# Patient Record
Sex: FEMALE | Race: WHITE | Employment: FULL TIME | URBAN - METROPOLITAN AREA
[De-identification: names, ages, dates, MRNs, and addresses within clinical notes are randomized per-mention and may not be internally consistent; named-entity substitution may affect disease eponyms.]

---

## 2017-02-20 ENCOUNTER — OFFICE VISIT (OUTPATIENT)
Dept: BEHAVIORAL HEALTH | Facility: CLINIC | Age: 37
End: 2017-02-20
Payer: COMMERCIAL

## 2017-02-20 DIAGNOSIS — F41.1 GAD (GENERALIZED ANXIETY DISORDER): Primary | ICD-10-CM

## 2017-02-20 DIAGNOSIS — F33.2 SEVERE EPISODE OF RECURRENT MAJOR DEPRESSIVE DISORDER, WITHOUT PSYCHOTIC FEATURES (H): ICD-10-CM

## 2017-02-20 PROCEDURE — 90832 PSYTX W PT 30 MINUTES: CPT | Performed by: SOCIAL WORKER

## 2017-02-20 ASSESSMENT — ANXIETY QUESTIONNAIRES
5. BEING SO RESTLESS THAT IT IS HARD TO SIT STILL: NEARLY EVERY DAY
6. BECOMING EASILY ANNOYED OR IRRITABLE: NEARLY EVERY DAY
IF YOU CHECKED OFF ANY PROBLEMS ON THIS QUESTIONNAIRE, HOW DIFFICULT HAVE THESE PROBLEMS MADE IT FOR YOU TO DO YOUR WORK, TAKE CARE OF THINGS AT HOME, OR GET ALONG WITH OTHER PEOPLE: EXTREMELY DIFFICULT
3. WORRYING TOO MUCH ABOUT DIFFERENT THINGS: NEARLY EVERY DAY
2. NOT BEING ABLE TO STOP OR CONTROL WORRYING: NEARLY EVERY DAY
GAD7 TOTAL SCORE: 19
7. FEELING AFRAID AS IF SOMETHING AWFUL MIGHT HAPPEN: SEVERAL DAYS
1. FEELING NERVOUS, ANXIOUS, OR ON EDGE: NEARLY EVERY DAY

## 2017-02-20 ASSESSMENT — PATIENT HEALTH QUESTIONNAIRE - PHQ9: 5. POOR APPETITE OR OVEREATING: NEARLY EVERY DAY

## 2017-02-20 NOTE — PROGRESS NOTES
[facility]  October 28, 2016      Behavioral Health Clinician Progress Note    Patient Name: Lianna Sorto           Service Type: Individual      Service Location:   Face to Face in Clinic     Session Start Time: 4:10 PM Session End Time: 4:30 PM      Session Length: 16 - 37      Attendees: Client    Visit Activities (Refresh list every visit): [facility] Only    Diagnostic Assessment Date: 9-6-2016  Treatment Plan Review Date: 5-  See Flowsheets for today's PHQ-9 and FAUSTINO-7 results  Previous PHQ-9:   PHQ-9 SCORE 9/28/2016 9/28/2016 10/28/2016   Total Score - - -   Total Score 16 16 18     Previous FAUSTINO-7:   FAUSTINO-7 SCORE 9/28/2016 9/28/2016 10/28/2016   Total Score - - -   Total Score 9 9 15       REJI LEVEL:  REJI Score (Last Two) 11/23/2010 9/6/2016   REJI Raw Score 49 32   Activation Score 82.8 62.6   REJI Level 4 3       DATA  Extended Session (60+ minutes): No  Interactive Complexity: No  Crisis: No    Treatment Objective(s) Addressed in This Session:  Target Behavior(s): disease management/lifestyle changes decrease anxiety and depression    Depressed Mood: Increase interest, engagement, and pleasure in doing things  Improve quantity and quality of night time sleep / decrease daytime naps  Feel less tired and more energy during the day   Improve diet, appetite, mindful eating, and / or meal planning  Improve concentration, focus, and mindfulness in daily activities   Anxiety: will experience a reduction in anxiety, will develop more effective coping skills to manage anxiety symptoms, will develop healthy cognitive patterns and beliefs and will increase ability to function adaptively  Relationship Problems: will address relationship difficulties in a more adaptive manner  Grief / Loss: will engage in effective approach to address and resolve grief/loss issues    Current Stressors / Issues:  Pt reports symptoms continue - she does not believe the medications are working for her.   Discussed asking PCP for referral to see Stacie Burrows to assess medications. She will make this request on my chart. She will also consider making an appointment with a female primary care provider. Patient reports her 9-year-old son's behaviors are under control and he has a psychiatrist and a therapist. Patient continues to have work struggles and is exhausted when she gets home at night.  She is sad that she has very little patience for her kids and doesn't have support. Discussed self-care-eating healthy, exercising, resting in being around supportive people. Patient will focus on trying to fit more things than in her schedule that are healthy for her to continue to assess as needed     Progress on Treatment Objective(s) / Homework:  New Objective established this session - PREPARATION (Decided to change - considering how); Intervened by negotiating a change plan and determining options / strategies for behavior change, identifying triggers, exploring social supports, and working towards setting a date to begin behavior change     Care Plan review completed:yes    Medication Review:  No changes to current psychiatric medication(s)    Medication Compliance:  Yes    Changes in Health Issues:   None reported    Chemical Use Review:   Substance Use: Chemical use reviewed, no active concerns identified      Tobacco Use: No current tobacco use.      Assessment: Current Emotional / Mental Status (status of significant symptoms):  Risk status (Self / Other harm or suicidal ideation)  Patient has had a history of suicidal ideation: no plan  Patient denies current fears or concerns for personal safety.  Patient denies current or recent suicidal ideation or behaviors.  Patient denies current or recent homicidal ideation or behaviors.  Patient denies current or recent self injurious behavior or ideation.  Patient denies other safety concerns.  A safety and risk management plan has not been developed at this time, however  patient was encouraged to call [place] [phone] should there be a change in any of these risk factors.    Appearance:   Appropriate   Eye Contact:   Fair   Psychomotor Behavior: Normal   Attitude:   Cooperative   Orientation:   All  Speech   Rate / Production: Normal    Volume:  Normal   Mood:    Anxious  Depressed  Sad  -  Affect:    Worrisome  tearful throughout session   Thought Content:  Clear   Thought Form:  Coherent  Logical   Insight:    Good     Diagnoses:  1. FAUSTINO (generalized anxiety disorder)    2. Severe episode of recurrent major depressive disorder, without psychotic features (H)        Collateral Reports Completed:  Routed note to PCP    Plan: (Homework, other):  Patient was  encouraged to schedule a follow up appointment with the clinic [facility] as needed.    CD Recommendations: No indications of CD issues.    ROMMEL Mixon (Mindy), [facility]    ______________________________________________________________________    Integrated Primary Care Behavioral Health Treatment Plan    Patient's Name: Lianna Sorto  YOB: 1980    Date: February 20, 2017    DSM-V Diagnoses: 296.33 Major Depressive Disorder, Recurrent Episode, Severe _ and With melancholic features or 300.02 (F41.1) Generalized Anxiety Disorder  Psychosocial / Contextual Factors: Patient is a single parent with 2 sons   WHODAS: see diagnostic assessment   Referral / Collaboration:  The following referral(s) was/were discussed but client declines follow up at this time. Continue to assess.    Anticipated number of session or this episode of care:3-8    MeasurableTreatment Goal(s) related to diagnosis / functional impairment(s)  Goal 1: Patient will decrease depression and anxiety by 50% as indicated by PHQ9 score, FAUSTINO 7 score and self report     I will know I've met my goal whenI am happy again      Objective #A (Patient Action)    Patient will Decrease frequency and intensity of feeling down, depressed, hopeless.  Status: New  - Date: February 20, 2017     Intervention(s)  [facility] will teach emotional regulation skills. Increase coping behaviors to manage painful emotions.    Objective #B  Patient will identify at least 3 triggers for anxiety.  Status: New - Date: February 20, 2017     Intervention(s)  [facility] will teach distraction skills. To use when symptoms feel overwhelming.    Patient has reviewed and agreed to the above plan.    Written by  Jodi Gauthier [facility], [facility]

## 2017-02-20 NOTE — MR AVS SNAPSHOT
After Visit Summary   2/20/2017    Lianna Sorto    MRN: [id number]           Patient Information     Date Of Birth          1980        Visit Information        Provider Department      2/20/2017 4:00 PM Jodi Gauthier MSW [facility]        Today's Diagnoses     FAUSTINO (generalized anxiety disorder)    -  1    Severe episode of recurrent major depressive disorder, without psychotic features (H)           Follow-ups after your visit        Who to contact     If you have questions or need follow up information about today's clinic visit or your schedule please contact [facility] directly at [phone].  Normal or non-critical lab and imaging results will be communicated to you by [employer], letter or phone within 4 business days after the clinic has received the results. If you do not hear from us within 7 days, please contact the clinic through [employer] or phone. If you have a critical or abnormal lab result, we will notify you by phone as soon as possible.  Submit refill requests through [facility] or call your pharmacy and they will forward the refill request to us. Please allow 3 business days for your refill to be completed.          Additional Information About Your Visit        MyChart Information     [employer] gives you secure access to your electronic health record. If you see a primary care provider, you can also send messages to your care team and make appointments. If you have questions, please call your primary care clinic.  If you do not have a primary care provider, please call [phone] and they will assist you.        Care EveryWhere ID     This is your Care EveryWhere ID. This could be used by other organizations to access your [place] medical records  [id number]         Blood Pressure from Last 3 Encounters:   09/28/16 118/83   08/31/16 122/77   04/20/16 145/81    Weight from Last 3 Encounters:   09/28/16 236 lb 2 oz (107.1 kg)   09/12/16 233 lb  (105.7 kg)   08/31/16 233 lb (105.7 kg)              Today, you had the following     No orders found for display       Primary Care Provider Office Phone # Fax #    Dillon Bonilla [phone]       [facility]        [address] [address]        Thank you!     Thank you for choosing [facility]  for your care. Our goal is always to provide you with excellent care. Hearing back from our patients is one way we can continue to improve our services. Please take a few minutes to complete the written survey that you may receive in the mail after your visit with us. Thank you!             Your Updated Medication List - Protect others around you: Learn how to safely use, store and throw away your medicines at www.disposemymeds.org.          This list is accurate as of: 2/20/17  5:52 PM.  Always use your most recent med list.                   Brand Name Dispense Instructions for use    buPROPion 150 MG 24 hr tablet    WELLBUTRIN XL    90 tablet    Take 1 tablet (150 mg) by mouth every morning       clindamycin 1 % lotion    CLINDAMAX    60 mL    Apply to AA QD       cyclobenzaprine 10 MG tablet    FLEXERIL    30 tablet    Take 1 tablet (10 mg) by mouth 3 times daily as needed for muscle spasms       fluticasone 50 MCG/ACT spray    FLONASE    16 g    Spray 2 sprays into both nostrils daily       hydrOXYzine 50 MG tablet    ATARAX    60 tablet    Take 1 tablet (50 mg) by mouth every 8 hours as needed for anxiety       ibuprofen 800 MG tablet    ADVIL/MOTRIN    90 tablet    Take 1 tablet (800 mg) by mouth every 8 hours as needed for moderate pain       levonorgestrel-ethinyl estradiol 0.15-0.03 MG per tablet    SEASONALE    91 tablet    Take 1 tablet by mouth daily       order for DME     2 Units    Equipment being ordered: Dynaflex insert       * spironolactone 100 MG tablet    ALDACTONE    90 tablet    1 tab PO daily       * spironolactone 50  MG tablet    ALDACTONE    90 tablet    Take 1 tablet (50 mg) by mouth daily       traMADol 50 MG tablet    ULTRAM    30 tablet    Take 1 tablet (50 mg) by mouth every 6 hours as needed for pain       triamcinolone 0.1 % cream    KENALOG    80 g    Apply to AA BID x 2 weeks, then PRN only       * Notice:  This list has 2 medication(s) that are the same as other medications prescribed for you. Read the directions carefully, and ask your doctor or other care provider to review them with you.

## 2017-02-21 ENCOUNTER — MYC MEDICAL ADVICE (OUTPATIENT)
Dept: FAMILY MEDICINE | Facility: CLINIC | Age: 37
End: 2017-02-21

## 2017-02-21 DIAGNOSIS — F41.1 GAD (GENERALIZED ANXIETY DISORDER): Primary | ICD-10-CM

## 2017-02-21 ASSESSMENT — ANXIETY QUESTIONNAIRES: GAD7 TOTAL SCORE: 19

## 2017-02-21 ASSESSMENT — PATIENT HEALTH QUESTIONNAIRE - PHQ9: SUM OF ALL RESPONSES TO PHQ QUESTIONS 1-9: 23

## 2017-02-21 NOTE — TELEPHONE ENCOUNTER
Dr Bonilla, please see her mychart note/ request for referral to Stacie GUTIERRES/ collaborative care psychiatry.    Shereen Chavez RNC

## 2017-03-05 NOTE — TELEPHONE ENCOUNTER
FANNIE      Last Written Prescription Date: 12-12-16  Last Fill Quantity: 91,  # refills: 0   Last Office Visit with G, P or [facility] prescribing provider: 09-28-16

## 2017-03-06 RX ORDER — LEVONORGESTREL AND ETHINYL ESTRADIOL 0.15-0.03
1 KIT ORAL DAILY
Qty: 91 TABLET | Refills: 0 | Status: SHIPPED | OUTPATIENT
Start: 2017-03-06 | End: 2017-06-05

## 2017-03-06 NOTE — TELEPHONE ENCOUNTER
Prescription approved per [facility] [place].    Filomena Hodges, PharmD  [facility]  On behalf of [facility]

## 2017-03-08 ENCOUNTER — OFFICE VISIT (OUTPATIENT)
Dept: PSYCHIATRY | Facility: CLINIC | Age: 37
End: 2017-03-08
Attending: FAMILY MEDICINE
Payer: COMMERCIAL

## 2017-03-08 VITALS
BODY MASS INDEX: 38.54 KG/M2 | DIASTOLIC BLOOD PRESSURE: 84 MMHG | SYSTOLIC BLOOD PRESSURE: 112 MMHG | HEIGHT: 66 IN | WEIGHT: 239.8 LBS | TEMPERATURE: 98 F | HEART RATE: 84 BPM

## 2017-03-08 DIAGNOSIS — F90.0 ADHD (ATTENTION DEFICIT HYPERACTIVITY DISORDER), INATTENTIVE TYPE: Primary | ICD-10-CM

## 2017-03-08 DIAGNOSIS — F33.1 MAJOR DEPRESSIVE DISORDER, RECURRENT EPISODE, MODERATE (H): ICD-10-CM

## 2017-03-08 DIAGNOSIS — F41.1 GAD (GENERALIZED ANXIETY DISORDER): ICD-10-CM

## 2017-03-08 PROCEDURE — 90792 PSYCH DIAG EVAL W/MED SRVCS: CPT | Performed by: CLINICAL NURSE SPECIALIST

## 2017-03-08 RX ORDER — LISDEXAMFETAMINE DIMESYLATE 20 MG/1
20 CAPSULE ORAL EVERY MORNING
Qty: 30 CAPSULE | Refills: 0 | Status: SHIPPED | OUTPATIENT
Start: 2017-03-08 | End: 2017-05-08

## 2017-03-08 ASSESSMENT — ANXIETY QUESTIONNAIRES
6. BECOMING EASILY ANNOYED OR IRRITABLE: NEARLY EVERY DAY
3. WORRYING TOO MUCH ABOUT DIFFERENT THINGS: NEARLY EVERY DAY
IF YOU CHECKED OFF ANY PROBLEMS ON THIS QUESTIONNAIRE, HOW DIFFICULT HAVE THESE PROBLEMS MADE IT FOR YOU TO DO YOUR WORK, TAKE CARE OF THINGS AT HOME, OR GET ALONG WITH OTHER PEOPLE: VERY DIFFICULT
7. FEELING AFRAID AS IF SOMETHING AWFUL MIGHT HAPPEN: NOT AT ALL
2. NOT BEING ABLE TO STOP OR CONTROL WORRYING: NEARLY EVERY DAY
GAD7 TOTAL SCORE: 16
1. FEELING NERVOUS, ANXIOUS, OR ON EDGE: NEARLY EVERY DAY
5. BEING SO RESTLESS THAT IT IS HARD TO SIT STILL: SEVERAL DAYS

## 2017-03-08 ASSESSMENT — PATIENT HEALTH QUESTIONNAIRE - PHQ9: 5. POOR APPETITE OR OVEREATING: NEARLY EVERY DAY

## 2017-03-08 NOTE — MR AVS SNAPSHOT
After Visit Summary   3/8/2017    Lianna Sorto    MRN: [id number]           Patient Information     Date Of Birth          1980        Visit Information        Provider Department      3/8/2017 2:45 PM Stacie Burrows APRN [facility]        Today's Diagnoses     ADHD (attention deficit hyperactivity disorder), inattentive type    -  1      Care Instructions    Treatment Plan:  Continue hydroxyzine 50 mg every 8 hours as needed for anxiety.   Discontinue bupropion (Wellbutrin)  mg daily.   Begin lisdexamfetamine (Vyvanse) 20 mg daily.   Other options: return to amphetamine (Adderall) 10 mg daily or clonidine or guanfacine.     Follow up in 2 weeks.     Read 1-2-3 Magic.     - Recommend patient discuss medications with their pharmacist.   - Safety plan was reviewed; to the ER as needed or call after hours crisis line; [phone]  - Education and counseling was done regarding use of medications, psychotherapy options  - Call [phone] for appointment or to speak to a nurse.   -Office hours: Monday through Thursday 8:00 am to 4:30 pm; Friday 8:00 am to Noon.   - Patient was given a copy of this Treatment Plan today.        Follow-ups after your visit        Who to contact     If you have questions or need follow up information about today's clinic visit or your schedule please contact [facility] directly at [phone].  Normal or non-critical lab and imaging results will be communicated to you by MyChart, letter or phone within 4 business days after the clinic has received the results. If you do not hear from us within 7 days, please contact the clinic through [employer] or phone. If you have a critical or abnormal lab result, we will notify you by phone as soon as possible.  Submit refill requests through [employer] or call your pharmacy and they will forward the refill request to us. Please allow 3 business days for your refill to be completed.     "      Additional Information About Your Visit        [employer] Information     [employer] gives you secure access to your electronic health record. If you see a primary care provider, you can also send messages to your care team and make appointments. If you have questions, please call your primary care clinic.  If you do not have a primary care provider, please call [phone] and they will assist you.        Care EveryWhere ID     This is your Care EveryWhere ID. This could be used by other organizations to access your [place] medical records  [id number]        Your Vitals Were     Pulse Temperature Height BMI (Body Mass Index)          84 98  F (36.7  C) (Tympanic) 5' 5.5\" (1.664 m) 39.3 kg/m2         Blood Pressure from Last 3 Encounters:   03/08/17 112/84   09/28/16 118/83   08/31/16 122/77    Weight from Last 3 Encounters:   03/08/17 239 lb 12.8 oz (108.8 kg)   09/28/16 236 lb 2 oz (107.1 kg)   09/12/16 233 lb (105.7 kg)              Today, you had the following     No orders found for display         Today's Medication Changes          These changes are accurate as of: 3/8/17  3:49 PM.  If you have any questions, ask your nurse or doctor.               Start taking these medicines.        Dose/Directions    lisdexamfetamine 20 MG capsule   Commonly known as:  VYVANSE   Used for:  ADHD (attention deficit hyperactivity disorder), inattentive type   Started by:  Stacie Burrows APRN CNS        Dose:  20 mg   Take 1 capsule (20 mg) by mouth every morning   Quantity:  30 capsule   Refills:  0         These medicines have changed or have updated prescriptions.        Dose/Directions    spironolactone 50 MG tablet   Commonly known as:  ALDACTONE   This may have changed:  Another medication with the same name was removed. Continue taking this medication, and follow the directions you see here.   Used for:  Hidradenitis suppurativa   Changed by:  Yumi Brewer PA-C        Dose:  50 mg   Take 1 tablet " (50 mg) by mouth daily   Quantity:  90 tablet   Refills:  3         Stop taking these medicines if you haven't already. Please contact your care team if you have questions.     buPROPion 150 MG 24 hr tablet   Commonly known as:  WELLBUTRIN XL   Stopped by:  Stacie Burrows APRN CNS                Where to get your medicines      Some of these will need a paper prescription and others can be bought over the counter.  Ask your nurse if you have questions.     Bring a paper prescription for each of these medications     lisdexamfetamine 20 MG capsule                Primary Care Provider Office Phone # Fax #    Dillon David Bonilla [phone]       [facility]        [address] [address]        Thank you!     Thank you for choosing [facility]  for your care. Our goal is always to provide you with excellent care. Hearing back from our patients is one way we can continue to improve our services. Please take a few minutes to complete the written survey that you may receive in the mail after your visit with us. Thank you!             Your Updated Medication List - Protect others around you: Learn how to safely use, store and throw away your medicines at www.disposemymeds.org.          This list is accurate as of: 3/8/17  3:49 PM.  Always use your most recent med list.                   Brand Name Dispense Instructions for use    clindamycin 1 % lotion    CLINDAMAX    60 mL    Apply to AA QD       cyclobenzaprine 10 MG tablet    FLEXERIL    30 tablet    Take 1 tablet (10 mg) by mouth 3 times daily as needed for muscle spasms       fluticasone 50 MCG/ACT spray    FLONASE    16 g    Spray 2 sprays into both nostrils daily       hydrOXYzine 50 MG tablet    ATARAX    60 tablet    Take 1 tablet (50 mg) by mouth every 8 hours as needed for anxiety       ibuprofen 800 MG tablet    ADVIL/MOTRIN    90 tablet    Take 1 tablet (800 mg) by mouth  every 8 hours as needed for moderate pain       levonorgestrel-ethinyl estradiol 0.15-0.03 MG per tablet    SEASONALE    91 tablet    Take 1 tablet by mouth daily       lisdexamfetamine 20 MG capsule    VYVANSE    30 capsule    Take 1 capsule (20 mg) by mouth every morning       order for DME     2 Units    Equipment being ordered: Dynaflex insert       spironolactone 50 MG tablet    ALDACTONE    90 tablet    Take 1 tablet (50 mg) by mouth daily       traMADol 50 MG tablet    ULTRAM    30 tablet    Take 1 tablet (50 mg) by mouth every 6 hours as needed for pain       triamcinolone 0.1 % cream    KENALOG    80 g    Apply to AA BID x 2 weeks, then PRN only

## 2017-03-08 NOTE — PATIENT INSTRUCTIONS
Treatment Plan:  Continue hydroxyzine 50 mg every 8 hours as needed for anxiety.   Discontinue bupropion (Wellbutrin)  mg daily.   Begin lisdexamfetamine (Vyvanse) 20 mg daily.   Other options: return to amphetamine (Adderall) 10 mg daily or clonidine or guanfacine.     Follow up in 2 weeks.     Read 1-2-3 Magic.     - Recommend patient discuss medications with their pharmacist.   - Safety plan was reviewed; to the ER as needed or call after hours crisis line; [phone]  - Education and counseling was done regarding use of medications, psychotherapy options  - Call [phone] for appointment or to speak to a nurse.   -Office hours: Monday through Thursday 8:00 am to 4:30 pm; Friday 8:00 am to Noon.   - Patient was given a copy of this Treatment Plan today.

## 2017-03-08 NOTE — PROGRESS NOTES
"                                                         Outpatient Psychiatric Evaluation-Standard    Name: Lianna Sorto  : 1980  Date: 3/8/2017    Source of Referral:  Primary Care Physician: Dillon Bonilla  Current Psychotherapist: Christine Gauthier - twice monthly    Identifying Data:  Patient is a 36 year old,  female who presents for initial visit with me.  Patient is currently employed full time, [employer] - registration.  Patient attended the session alone.   60 minutes were spent on evaluation with 40 minutes CC time.    HPI:  Patient reports was diagnosed with ADD as a child and was formally tested in  confirming ADD. Concerta was ineffective, amphetamine (Adderall) was helpful, but had side effects of feeling tired and nausea. Patient reports trying several antidepressants for anxiety and now more depression. Sertraline (Zoloft), duloxetine (Cymbalta), paroxetine (Paxil) and citalopram (Celexa) were ineffective. Bupropion (Wellbutrin)  mg causes irritability. Patient reports hydroxyzine 50 mg is ineffective, but 100 mg was somewhat helpful. Patient reports feeling waves of emotion, tearful 2-3 times per week. Patient states \"I try not to make new relationships\" as she \"puts up a wall\" to avoid emotional pain. Patient is  twice, first from ex 's drug use and second patient \"just couldn't handle\" her second 's depression. Patient reports having solid female relationships. Patient reports anxiety is the primary issue which is related to focus and organizational issues.     Patient reports stress and anxiety related to single parenting her two sons. One son has behavior issues and sees a psychiatric provider and therapist. Patient was diagnosed with fibromyalgia in .     Patient reports childhood was difficult - \"always walking on egg shells\" due to her mother's boyfriend. Patient has never met her biological father. Patient reports " "her mother had multiple partners.     Psychiatric Review of Symptoms:  Depression: Sleep: No change, Decrease and 4-5 hours per night, difficulty falling and staying asleep.  Appetite: No change and difficulty losing weight related to fibromyalgia  Depressed Mood Interest: Decrease Energy: Decrease Concentration: Decrease Worthless: Increase and More so in the past year - stress at home     Last PHQ-9 score = 12 vs 20    Rosanna:  No symptoms  Mood Disorder Questionnaire: Negative    Anxiety: Feeling nervous or on edge  Uncontrolled worrying  Trouble relaxing  Restlessness  Easily annoyed or irritable    GAD7 score: 16  Panic:  No symptoms  Agoraphobia:  Yes  OCD:  No symptoms  Psychosis: No symptoms  ADD / ADHD: Attention Problem(s) Task Completion Difficulties Poor Organization Distractible Forgetful  Gambling or shoplifting: No  Eating Disorder:  No symptoms  Suicide attempts:  No  Current SI risk:  No Protective Factors: children \"I want to enjoy life\"    Significant Losses / Trauma / Abuse / Neglect Issues:  There are no indications or report of: significant losses, trauma, abuse or neglect.    PTSD:  No symptoms    Issues of possible neglect are not present.    A safety and risk management plan has not been developed at this time, however client was given the after-hours number / 911 should there be a change in any of these risk factors.      Psychiatric History:   Hospitalizations: None  Past psychotherapy: counseling and medication(s) from physician / PCP    Past medication trials: (patient was presented with a list to review all currently available antidepressants, mood stabilizers, tranquilizers, hypnotics and antipsychotics)  New Antidepressants:  Celexa (citalopram), Cymbalta (duloxetine), Paxil (paroxetine), Wellbutrin, Zyban, Aplenzin (bupropion) and Zoloft (sertraline)  Stimulants / ADHD Meds: Adderall (amphetamine salts) and Concerta (methylphenidate)  Tranquilizers:  Atarax/Vistaril (hydroxyzine) " "  Sedatives: trazodone (Desyrel)       Chemical Use History:  Patient has not received chemical dependency treatment in the past.  Patient reports no problems as a result of their drinking / drug use.  Current use of drugs or alcohol: N/A  CAGE: None of the patient's responses to the CAGE screening were positive / Negative CAGE score   Based on the negative Cage-Aid score and clinical interview there  are not indications of drug or alcohol abuse.  Tobacco use: No  Ready to quit?  No  NRT tried: NA    Past Medical History:  Surgery:   Past Surgical History   Procedure Laterality Date     Surgical history of -        oral surgery     C  delivery only  2007,      arrest of dilation at 6 cm, low transverse uterine incision     Vagina surgery       Fell off deck, internal bleeding.     Allergies:    Allergies   Allergen Reactions     Celexa [Citalopram Hydrobromide] GI Disturbance     Morphine      Polymyxin B Other (See Comments)     Polymixin eye drops  \"severe burning\"     Vicodin [Hydrocodone-Acetaminophen]      Stomach upset, \"hearing things\", irritability      Primary MD: Dillon Bonilla Ipa  Seizures or head injury: No  Diet: \"Normal\"  Exercise: no regular exercise program  Supplements: Vitamin D    Current Medications:  Current Outpatient Prescriptions   Medication Sig     levonorgestrel-ethinyl estradiol (SEASONALE) 0.15-0.03 MG per tablet Take 1 tablet by mouth daily     triamcinolone (KENALOG) 0.1 % cream Apply to AA BID x 2 weeks, then PRN only     traMADol (ULTRAM) 50 MG tablet Take 1 tablet (50 mg) by mouth every 6 hours as needed for pain     buPROPion (WELLBUTRIN XL) 150 MG 24 hr tablet Take 1 tablet (150 mg) by mouth every morning     ibuprofen (ADVIL,MOTRIN) 800 MG tablet Take 1 tablet (800 mg) by mouth every 8 hours as needed for moderate pain     hydrOXYzine (ATARAX) 50 MG tablet Take 1 tablet (50 mg) by mouth every 8 hours as needed for anxiety     spironolactone (ALDACTONE) " "50 MG tablet Take 1 tablet (50 mg) by mouth daily     cyclobenzaprine (FLEXERIL) 10 MG tablet Take 1 tablet (10 mg) by mouth 3 times daily as needed for muscle spasms     clindamycin (CLINDAMAX) 1 % lotion Apply to AA QD     fluticasone (FLONASE) 50 MCG/ACT nasal spray Spray 2 sprays into both nostrils daily     order for DME Equipment being ordered: Dynaflex insert     [DISCONTINUED] spironolactone (ALDACTONE) 100 MG tablet 1 tab PO daily (Patient not taking: Reported on 3/8/2017)     No current facility-administered medications for this visit.        Vital Signs:  /84 (BP Location: Right arm, Patient Position: Chair, Cuff Size: Adult Large)  Pulse 84  Temp 98  F (36.7  C) (Tympanic)  Ht 5' 5.5\" (1.664 m)  Wt 239 lb 12.8 oz (108.8 kg)  BMI 39.3 kg/m2      Review of Systems:  (constitutional, HEENT, Neuro, Cardiac, Pulmonary, GI, , Heme / Lymph, Endocrine, Skin / Breast, MSK reviewed)  10 point ROS was negative except for the following: plantar fascitis, fibromyalgia    Family History:   (with focus on psychiatric and substance abuse)  Chemical use problems None  Mental health history: Mother Bipolar  Patient reports family history includes Allergies in her son; Arthritis in her mother; Breast Cancer in an other family member; Breast Cancer (age of onset: 42) in her maternal grandmother; C.A.D. in her maternal grandfather, mother, and another family member; CEREBROVASCULAR DISEASE in her maternal grandfather and another family member; Coronary Artery Disease in her mother and another family member; DIABETES in her maternal grandfather, mother, and another family member; Depression in her mother; GASTROINTESTINAL DISEASE in her mother; HEART DISEASE in her maternal grandfather, maternal grandmother, and mother; Hyperlipidemia in her mother; Hypertension in her mother and another family member; MENTAL ILLNESS in her mother; Musculoskeletal Disorder in her mother and another family member; Neurologic Disorder " "in her mother; OSTEOPOROSIS in her mother; Obesity in an other family member; Unknown/Adopted in her father, paternal grandfather, and paternal grandmother. There is no history of Asthma, Cancer - colorectal, or Prostate Cancer.    Social History:   Patient grew up in [place], [place]    Siblings: 2   Intact family growing up?; Parents never  - has never met biological father.   Highest education level was associate degree / vocational certificate.   Marital status and living situation: Lives with two children  two children. Sons age 9 and 7.  she has not been involved with the legal system.      Mental Status Assessment:     Appearance:  Well groomed      Behavior/relationship to examiner/demeanor:  Cooperative, engaged and pleasant  Motor activity:  Normal  Gait:  Normal   Speech:  Normal in volume, articulation, coherence   Mood (subjective report):  \"Irritable\"  Affect (objective appearance):  Mood congruent  Thought Process (Associations):  Logical, linear and goal directed  Thought content:  No evidence of suicidal or homicidal ideation,          no overt psychosis and                    patient does not appear to be responding to internal stimuli  Oriented to person, place, date/time   Attention Span and concentration: Intact   Memory:  Short-term memory intact and Long-term memory; Intact  Language:  Fluent   Fund of Knowledge/Intelligence:  Average  Use of language: Intact   Abstraction:  Normal  Insight:  Adequate  Judgment:  Adequate for safety    DSM5  Diagnosis:    Attention-Deficit/Hyperactivity Disorder  314.00 (F90.0) Predominantly inattentive presentation  296.32 Major Depressive Disorder, Recurrent Episode, Moderate _ and With anxious distress  300.01 (F41.0) Panic Disorder  300.02 (F41.1) Generalized Anxiety Disorder  Psychosocial & Contextual Factors: financial issues, children with behavior issues, recent separation    Strengths and Liabilities:   Patient identified the following strengths " or resources that will help her  succeed in counseling: friends / good social support, family support and positive work environment.  Things that may interfere with the patient's success include:financial hardship.    WHODAS 2.0 TOTAL SCORES 9/6/2016   Total Score 45         Impression:  Patient appears to be struggling with ADHD, inattentive type symptoms leading to high levels of anxiety and depression. Amphetamine (Adderall) was helpful in the past, but caused side effects. Lisdexamfetamine (Vyvanse) has a slower release mechanism and may be better tolerated. Discussed other options as listed below.    Bupropion (Wellbutrin) is causing increased irritability and is discontinued.   Medication side effects and alternatives reviewed.     Treatment Plan:  Continue hydroxyzine 50 mg every 8 hours as needed for anxiety.   Discontinue bupropion (Wellbutrin)  mg daily.   Begin lisdexamfetamine (Vyvanse) 20 mg daily.   Other options: return to amphetamine (Adderall) 10 mg daily or clonidine or guanfacine.     Follow up in 2 weeks.     Read 1-2-3 Magic.     - Recommend patient discuss medications with their pharmacist.   - Safety plan was reviewed; to the ER as needed or call after hours crisis line; [phone]  - Education and counseling was done regarding use of medications, psychotherapy options  - Call [phone] for appointment or to speak to a nurse.   -Office hours: Monday through Thursday 8:00 am to 4:30 pm; Friday 8:00 am to Noon.   - Patient was given a copy of this Treatment Plan today.     My Practice Policy was reviewed and signed: YES       Patient will continue to be seen for ongoing consultation and stabilization.      Signed: Stacie Burrows, RN, MS, APRN                 Psychiatry

## 2017-03-09 ENCOUNTER — TELEPHONE (OUTPATIENT)
Dept: PSYCHIATRY | Facility: CLINIC | Age: 37
End: 2017-03-09

## 2017-03-09 ASSESSMENT — PATIENT HEALTH QUESTIONNAIRE - PHQ9: SUM OF ALL RESPONSES TO PHQ QUESTIONS 1-9: 20

## 2017-03-09 ASSESSMENT — ANXIETY QUESTIONNAIRES: GAD7 TOTAL SCORE: 16

## 2017-03-09 NOTE — TELEPHONE ENCOUNTER
Prior Authorization required for Vyvanse.. Please call [employer] at [phone] with ID# [id number]. Please let us know the outcome.  Thanks,   Zuleyma Santiago  Certified Pharmacy Technician  [facility]  [phone]

## 2017-03-10 NOTE — TELEPHONE ENCOUNTER
PA initiated.     Lianna Sorto (Key: [id number])  Vyvanse 20MG capsules  Status: PA Request  Created: March 10th, 2017  Sent: March 10th, 2017

## 2017-03-17 ENCOUNTER — TELEPHONE (OUTPATIENT)
Dept: PSYCHIATRY | Facility: CLINIC | Age: 37
End: 2017-03-17

## 2017-03-17 NOTE — TELEPHONE ENCOUNTER
Reason for call: Would like to switch to another medication due to copay amount.  Also had a question regarding the dose amount.    Can we leave a detailed message: yes

## 2017-03-20 NOTE — TELEPHONE ENCOUNTER
PA filled out and signed by provider. Faxed to [employer].  Analisa No CMA..........3/20/2017 12:28 PM

## 2017-03-22 NOTE — TELEPHONE ENCOUNTER
Chart reviewed. Even with PA, Vyvanse montly copy is not reasonable for this patient. Adderall caused side effects. Wellbutrin was ineffective. Patient would like ideas for an alternative.     From 3/8/17:  Impression:  Patient appears to be struggling with ADHD, inattentive type symptoms leading to high levels of anxiety and depression. Amphetamine (Adderall) was helpful in the past, but caused side effects. Lisdexamfetamine (Vyvanse) has a slower release mechanism and may be better tolerated. Discussed other options as listed below.   Bupropion (Wellbutrin) is causing increased irritability and is discontinued.   Medication side effects and alternatives reviewed.      Treatment Plan:  Continue hydroxyzine 50 mg every 8 hours as needed for anxiety.   Discontinue bupropion (Wellbutrin)  mg daily.   Begin lisdexamfetamine (Vyvanse) 20 mg daily.   Other options: return to amphetamine (Adderall) 10 mg daily or clonidine or guanfacine.      Follow up in 2 weeks.      Read 1-2-3 Magic.      - Recommend patient discuss medications with their pharmacist.   - Safety plan was reviewed; to the ER as needed or call after hours crisis line; [phone]  - Education and counseling was done regarding use of medications, psychotherapy options  - Call [phone] for appointment or to speak to a nurse.   -Office hours: Monday through Thursday 8:00 am to 4:30 pm; Friday 8:00 am to Noon.   - Patient was given a copy of this Treatment Plan today.      My Practice Policy was reviewed and signed: YES         Patient will continue to be seen for ongoing consultation and stabilization.        Signed: Stacie Burrows, RN, MS, APRN  Psychiatry

## 2017-03-22 NOTE — TELEPHONE ENCOUNTER
Reason for call: Client is having concerns with insurance coverage regarding medication changes.  Is requesting to go back to Adderall (generic) as that has been covered in the past.  Also has questions regarding dose changes.    Can we leave a detailed message: yes

## 2017-03-22 NOTE — TELEPHONE ENCOUNTER
Patient could try guanfacine 2 mg at bedtime for ADHD. Please review if she has any cardiac issues. If she would like to proceed, guanfacine 2 mg, #30, 1 refill. Thanks.

## 2017-03-23 NOTE — TELEPHONE ENCOUNTER
Reviewed chart and Pt requests. Left message for Lianna Sorto and explained Stacie Burrows, RN, MS, APRN suggested guanfacine for Pt. Requested call back to discuss with pt.     Will await call back.

## 2017-03-24 NOTE — TELEPHONE ENCOUNTER
Attempted outreach to work number, however as no indication if message could be left, and it was not a personal VM did not leave a VM for Pt, Will attempt outreach again.

## 2017-03-24 NOTE — TELEPHONE ENCOUNTER
Attempted outreach again, left VM on Pt's phone. Encouraged pt to schedule appt with Stacie Burrows, RN, MS, APRN

## 2017-03-28 ENCOUNTER — MYC MEDICAL ADVICE (OUTPATIENT)
Dept: PSYCHIATRY | Facility: CLINIC | Age: 37
End: 2017-03-28

## 2017-03-29 DIAGNOSIS — F90.0 ADHD (ATTENTION DEFICIT HYPERACTIVITY DISORDER), INATTENTIVE TYPE: Primary | ICD-10-CM

## 2017-03-29 RX ORDER — DEXTROAMPHETAMINE SACCHARATE, AMPHETAMINE ASPARTATE, DEXTROAMPHETAMINE SULFATE AND AMPHETAMINE SULFATE 5; 5; 5; 5 MG/1; MG/1; MG/1; MG/1
20 TABLET ORAL DAILY
Qty: 30 TABLET | Refills: 0 | Status: SHIPPED | OUTPATIENT
Start: 2017-03-29 | End: 2017-05-03

## 2017-03-29 NOTE — TELEPHONE ENCOUNTER
Patient notified via [employer] message that there is a prescription ready for . Requested she let us know how she would like to go about picking up the written prescription.  Analisa No CMA..........3/29/2017 1:44 PM

## 2017-03-29 NOTE — TELEPHONE ENCOUNTER
Okay for amphetamine (Adderall) 20 mg daily. I will print Rx here and ask nursing to phone patient.

## 2017-03-30 ENCOUNTER — TELEPHONE (OUTPATIENT)
Dept: PSYCHIATRY | Facility: CLINIC | Age: 37
End: 2017-03-30

## 2017-03-30 NOTE — TELEPHONE ENCOUNTER
Insurance requires a prior auth for adderall 20 mg tabs    [employer]  ID# [id number]  [phone]    Thanks!    Yamila Warren, [facility]  [phone]

## 2017-03-30 NOTE — TELEPHONE ENCOUNTER
Signed Rx is at the  [facility]   Can be picked up during clinic hours   My chart was sent to pt     Keren Arnold  [facility]

## 2017-04-04 NOTE — TELEPHONE ENCOUNTER
FOLLOWING UP ON THIS PRIOR AUTH REQUEST   OUR RECORD'S SHOW WE ARE STILL AWAITING A REPLY ON THIS REQUEST                                                        THANK YOU

## 2017-04-05 NOTE — TELEPHONE ENCOUNTER
PA was submitted 3/31. Per the insurance company they have 3-5 business days to reply. Per Cover my meds, no determination has been made yet.

## 2017-04-06 ENCOUNTER — TELEPHONE (OUTPATIENT)
Dept: FAMILY MEDICINE | Facility: CLINIC | Age: 37
End: 2017-04-06

## 2017-04-06 ENCOUNTER — E-VISIT (OUTPATIENT)
Dept: FAMILY MEDICINE | Facility: CLINIC | Age: 37
End: 2017-04-06
Payer: COMMERCIAL

## 2017-04-06 DIAGNOSIS — J06.9 VIRAL URI: Primary | ICD-10-CM

## 2017-04-06 PROCEDURE — 98969 ZZC NONPHYSICIAN ONLINE ASSESSMENT AND MANAGEMENT: CPT | Performed by: NURSE PRACTITIONER

## 2017-04-06 NOTE — TELEPHONE ENCOUNTER
Reason for Call:  Other call back    Detailed comments: She has a URI.  She had an e-visit with Clau Dukes who told her to take her allergy meds.  She takes Zyrtec and Flonase daily.  She would like an antibiotic.  She uses [facility].  Please advise.    Phone Number Patient can be reached at: Other phone number:  [phone]  Oncology    Best Time: any    Can we leave a detailed message on this number? YES    Call taken on 4/6/2017 at 8:55 AM by Skylar Marina

## 2017-04-06 NOTE — TELEPHONE ENCOUNTER
Patient was notified of the instructions below from the evisit on 4/5/17.     This is likely a viral infection at this time. Most viruses resolve within 7-10 days.   I recommend that you start taking something for your allergies, such as Zyrtec D.   Using nasal saline or a Neti pot will help irrigate your sinuses.   Take ibuprofen 600 mg every 6 hours for the sinus pressure and pain.     If you are not feeling any better in one week, please let me know.   Clau Dukes CNP     Patient agrees and understands the plan.  Patient will call us in a week if she is not improving.    Julia HUERTA RN

## 2017-04-10 ENCOUNTER — TELEPHONE (OUTPATIENT)
Dept: FAMILY MEDICINE | Facility: CLINIC | Age: 37
End: 2017-04-10

## 2017-04-10 DIAGNOSIS — J01.00 ACUTE NON-RECURRENT MAXILLARY SINUSITIS: Primary | ICD-10-CM

## 2017-04-10 NOTE — TELEPHONE ENCOUNTER
Left message on machine to call back    [facility] may give her the information.    Antibiotics were sent to her pharmacy.    Julia HUERTA RN

## 2017-04-10 NOTE — TELEPHONE ENCOUNTER
S-(situation): Patient called to report the symptoms are getting worse.     B-(background): patient did have an evisit on 4/6/17.    A-(assessment): Patient is getting worse.  Patient reports have facial pain and swelling more on the right side.  Patient reports the right side of the jaw and neck are tender and slightly swollen.  Patient reports having an headache.  Patient reports having nasal discharge with some blood seen.      R-(recommendations): will send to provider for review.  Evisit notes  Thank you for your reply Lianna.   This is likely a viral infection at this time. Most viruses resolve within 7-10 days.   I recommend that you start taking something for your allergies, such as Zyrtec D.   Using nasal saline or a Neti pot will help irrigate your sinuses.   Take ibuprofen 600 mg every 6 hours for the sinus pressure and pain.     If you are not feeling any better in one week, please let me know.   Clau Dukes, CNP

## 2017-04-10 NOTE — TELEPHONE ENCOUNTER
Reason for Call:  Other sinus problem    Detailed comments: Patient states her sinus infection is no better and she would like antibiotics.  [facility]    Phone Number Patient can be reached at: Work number on file:  [phone] (work)    Best Time: any    Can we leave a detailed message on this number? YES    Call taken on 4/10/2017 at 8:07 AM by Mindy Wagoner

## 2017-05-03 ENCOUNTER — MYC MEDICAL ADVICE (OUTPATIENT)
Dept: PSYCHIATRY | Facility: CLINIC | Age: 37
End: 2017-05-03

## 2017-05-03 DIAGNOSIS — F90.0 ADHD (ATTENTION DEFICIT HYPERACTIVITY DISORDER), INATTENTIVE TYPE: ICD-10-CM

## 2017-05-03 RX ORDER — DEXTROAMPHETAMINE SACCHARATE, AMPHETAMINE ASPARTATE, DEXTROAMPHETAMINE SULFATE AND AMPHETAMINE SULFATE 5; 5; 5; 5 MG/1; MG/1; MG/1; MG/1
20 TABLET ORAL DAILY
Qty: 30 TABLET | Refills: 0 | Status: SHIPPED | OUTPATIENT
Start: 2017-05-03 | End: 2017-05-08 | Stop reason: DRUGHIGH

## 2017-05-03 NOTE — TELEPHONE ENCOUNTER
Patient notified that Rx is ready for . Patient will  from clinic .  Analisa No CMA..........5/3/2017 3:21 PM

## 2017-05-08 ENCOUNTER — OFFICE VISIT (OUTPATIENT)
Dept: PSYCHIATRY | Facility: CLINIC | Age: 37
End: 2017-05-08
Payer: COMMERCIAL

## 2017-05-08 VITALS
HEART RATE: 80 BPM | BODY MASS INDEX: 39 KG/M2 | SYSTOLIC BLOOD PRESSURE: 123 MMHG | WEIGHT: 238 LBS | DIASTOLIC BLOOD PRESSURE: 92 MMHG

## 2017-05-08 DIAGNOSIS — F90.0 ADHD (ATTENTION DEFICIT HYPERACTIVITY DISORDER), INATTENTIVE TYPE: Primary | ICD-10-CM

## 2017-05-08 PROCEDURE — 99214 OFFICE O/P EST MOD 30 MIN: CPT | Performed by: CLINICAL NURSE SPECIALIST

## 2017-05-08 RX ORDER — DEXTROAMPHETAMINE SACCHARATE, AMPHETAMINE ASPARTATE, DEXTROAMPHETAMINE SULFATE AND AMPHETAMINE SULFATE 2.5; 2.5; 2.5; 2.5 MG/1; MG/1; MG/1; MG/1
10 TABLET ORAL 3 TIMES DAILY
Qty: 90 TABLET | Refills: 0 | Status: SHIPPED | OUTPATIENT
Start: 2017-07-01 | End: 2017-09-14

## 2017-05-08 RX ORDER — DEXTROAMPHETAMINE SACCHARATE, AMPHETAMINE ASPARTATE, DEXTROAMPHETAMINE SULFATE AND AMPHETAMINE SULFATE 2.5; 2.5; 2.5; 2.5 MG/1; MG/1; MG/1; MG/1
10 TABLET ORAL 3 TIMES DAILY
Qty: 90 TABLET | Refills: 0 | Status: SHIPPED | OUTPATIENT
Start: 2017-05-08 | End: 2017-08-30

## 2017-05-08 RX ORDER — DEXTROAMPHETAMINE SACCHARATE, AMPHETAMINE ASPARTATE, DEXTROAMPHETAMINE SULFATE AND AMPHETAMINE SULFATE 2.5; 2.5; 2.5; 2.5 MG/1; MG/1; MG/1; MG/1
10 TABLET ORAL 3 TIMES DAILY
Qty: 90 TABLET | Refills: 0 | Status: SHIPPED | OUTPATIENT
Start: 2017-06-06 | End: 2017-05-08

## 2017-05-08 RX ORDER — DEXTROAMPHETAMINE SACCHARATE, AMPHETAMINE ASPARTATE, DEXTROAMPHETAMINE SULFATE AND AMPHETAMINE SULFATE 2.5; 2.5; 2.5; 2.5 MG/1; MG/1; MG/1; MG/1
10 TABLET ORAL 3 TIMES DAILY
Qty: 90 TABLET | Refills: 0 | Status: SHIPPED | OUTPATIENT
Start: 2017-07-01 | End: 2017-05-08

## 2017-05-08 RX ORDER — DEXTROAMPHETAMINE SACCHARATE, AMPHETAMINE ASPARTATE, DEXTROAMPHETAMINE SULFATE AND AMPHETAMINE SULFATE 2.5; 2.5; 2.5; 2.5 MG/1; MG/1; MG/1; MG/1
10 TABLET ORAL 3 TIMES DAILY
Qty: 90 TABLET | Refills: 0 | Status: SHIPPED | OUTPATIENT
Start: 2017-06-06 | End: 2017-09-14

## 2017-05-08 ASSESSMENT — ANXIETY QUESTIONNAIRES
GAD7 TOTAL SCORE: 2
5. BEING SO RESTLESS THAT IT IS HARD TO SIT STILL: NOT AT ALL
3. WORRYING TOO MUCH ABOUT DIFFERENT THINGS: SEVERAL DAYS
IF YOU CHECKED OFF ANY PROBLEMS ON THIS QUESTIONNAIRE, HOW DIFFICULT HAVE THESE PROBLEMS MADE IT FOR YOU TO DO YOUR WORK, TAKE CARE OF THINGS AT HOME, OR GET ALONG WITH OTHER PEOPLE: SOMEWHAT DIFFICULT
1. FEELING NERVOUS, ANXIOUS, OR ON EDGE: SEVERAL DAYS
2. NOT BEING ABLE TO STOP OR CONTROL WORRYING: NOT AT ALL
6. BECOMING EASILY ANNOYED OR IRRITABLE: NOT AT ALL
7. FEELING AFRAID AS IF SOMETHING AWFUL MIGHT HAPPEN: NOT AT ALL

## 2017-05-08 ASSESSMENT — PATIENT HEALTH QUESTIONNAIRE - PHQ9: 5. POOR APPETITE OR OVEREATING: NOT AT ALL

## 2017-05-08 NOTE — PATIENT INSTRUCTIONS
Treatment Plan:  Change to amphetamine (Adderall) 10 mg three times daily. Three months prescriptions provided today.     Follow up in 3 months.     - Recommend patient discuss medications with their pharmacist. Risks and benefits for medications were discussed including, but not limited to, side effects.   - Safety plan was reviewed; to the ER as needed or call after hours crisis line; [phone]  - Education and counseling was done regarding use of medications, psychotherapy options  - Call [phone] for appointment or to speak to a nurse.    -Office hours: Monday through Thursday 8:00 am to 4:30 pm; Friday 8:00 am to Noon.   - Patient received a copy of this Treatment Plan today.

## 2017-05-08 NOTE — MR AVS SNAPSHOT
After Visit Summary   5/8/2017    Lianna Sorto    MRN: [id number]           Patient Information     Date Of Birth          1980        Visit Information        Provider Department      5/8/2017 9:15 AM Stacie Burrows APRN [facility] (formerly [facility]3]        Today's Diagnoses     ADHD (attention deficit hyperactivity disorder), inattentive type    -  1      Care Instructions    Treatment Plan:  Change to amphetamine (Adderall) 10 mg three times daily. Three months prescriptions provided today.     Follow up in 3 months.     - Recommend patient discuss medications with their pharmacist. Risks and benefits for medications were discussed including, but not limited to, side effects.   - Safety plan was reviewed; to the ER as needed or call after hours crisis line; [phone]  - Education and counseling was done regarding use of medications, psychotherapy options  - Call [phone] for appointment or to speak to a nurse.    -Office hours: Monday through Thursday 8:00 am to 4:30 pm; Friday 8:00 am to Noon.   - Patient received a copy of this Treatment Plan today.          Follow-ups after your visit        Who to contact     If you have questions or need follow up information about today's clinic visit or your schedule please contact [facility] directly at [phone].  Normal or non-critical lab and imaging results will be communicated to you by MyChart, letter or phone within 4 business days after the clinic has received the results. If you do not hear from us within 7 days, please contact the clinic through [employer] or phone. If you have a critical or abnormal lab result, we will notify you by phone as soon as possible.  Submit refill requests through [email or web address] or call your pharmacy and they will forward the refill request to us. Please allow 3 business days for your refill to be completed.          Additional Information About Your Visit        MyChart Information      Tracour gives you secure access to your electronic health record. If you see a primary care provider, you can also send messages to your care team and make appointments. If you have questions, please call your primary care clinic.  If you do not have a primary care provider, please call [phone] and they will assist you.        Care EveryWhere ID     This is your Care EveryWhere ID. This could be used by other organizations to access your [facility] medical records  [id number]        Your Vitals Were     Pulse BMI (Body Mass Index)                80 39 kg/m2           Blood Pressure from Last 3 Encounters:   05/08/17 (!) 123/92   03/08/17 112/84   09/28/16 118/83    Weight from Last 3 Encounters:   05/08/17 238 lb (108 kg)   03/08/17 239 lb 12.8 oz (108.8 kg)   09/28/16 236 lb 2 oz (107.1 kg)              Today, you had the following     No orders found for display         Today's Medication Changes          These changes are accurate as of: 5/8/17  9:33 AM.  If you have any questions, ask your nurse or doctor.               Start taking these medicines.        Dose/Directions    * amphetamine-dextroamphetamine 10 MG per tablet   Commonly known as:  ADDERALL   Used for:  ADHD (attention deficit hyperactivity disorder), inattentive type   Replaces:  amphetamine-dextroamphetamine 20 MG per tablet   Started by:  Stacie Burrows APRN CNS        Dose:  10 mg   Take 1 tablet (10 mg) by mouth 3 times daily   Quantity:  90 tablet   Refills:  0       * amphetamine-dextroamphetamine 10 MG per tablet   Commonly known as:  ADDERALL   Used for:  ADHD (attention deficit hyperactivity disorder), inattentive type   Started by:  Stacie Burrows APRN CNS        Dose:  10 mg   Start taking on:  6/6/2017   Take 1 tablet (10 mg) by mouth 3 times daily   Quantity:  90 tablet   Refills:  0       * amphetamine-dextroamphetamine 10 MG per tablet   Commonly known as:  ADDERALL   Used for:  ADHD (attention deficit  hyperactivity disorder), inattentive type   Started by:  Stacie Burrows APRN CNS        Dose:  10 mg   Start taking on:  7/1/2017   Take 1 tablet (10 mg) by mouth 3 times daily   Quantity:  90 tablet   Refills:  0       * Notice:  This list has 3 medication(s) that are the same as other medications prescribed for you. Read the directions carefully, and ask your doctor or other care provider to review them with you.      Stop taking these medicines if you haven't already. Please contact your care team if you have questions.     amphetamine-dextroamphetamine 20 MG per tablet   Commonly known as:  ADDERALL   Replaced by:  amphetamine-dextroamphetamine 10 MG per tablet   Stopped by:  Stacie Burrows APRN CNS           lisdexamfetamine 20 MG capsule   Commonly known as:  VYVANSE   Stopped by:  Stacie Burrows APRN CNS                Where to get your medicines      Some of these will need a paper prescription and others can be bought over the counter.  Ask your nurse if you have questions.     Bring a paper prescription for each of these medications     amphetamine-dextroamphetamine 10 MG per tablet    amphetamine-dextroamphetamine 10 MG per tablet    amphetamine-dextroamphetamine 10 MG per tablet                Primary Care Provider Office Phone # Fax #    Dillon Bonilla [phone]       [address] [address]        Thank you!     Thank you for choosing [facility]  for your care. Our goal is always to provide you with excellent care. Hearing back from our patients is one way we can continue to improve our services. Please take a few minutes to complete the written survey that you may receive in the mail after your visit with us. Thank you!             Your Updated Medication List - Protect others around you: Learn how to safely use, store and throw away your medicines at  www.disposemymeds.org.          This list is accurate as of: 5/8/17  9:33 AM.  Always use your most recent med list.                   Brand Name Dispense Instructions for use    * amphetamine-dextroamphetamine 10 MG per tablet    ADDERALL    90 tablet    Take 1 tablet (10 mg) by mouth 3 times daily       * amphetamine-dextroamphetamine 10 MG per tablet   Start taking on:  6/6/2017    ADDERALL    90 tablet    Take 1 tablet (10 mg) by mouth 3 times daily       * amphetamine-dextroamphetamine 10 MG per tablet   Start taking on:  7/1/2017    ADDERALL    90 tablet    Take 1 tablet (10 mg) by mouth 3 times daily       clindamycin 1 % lotion    CLINDAMAX    60 mL    Apply to AA QD       cyclobenzaprine 10 MG tablet    FLEXERIL    30 tablet    Take 1 tablet (10 mg) by mouth 3 times daily as needed for muscle spasms       fluticasone 50 MCG/ACT spray    FLONASE    16 g    Spray 2 sprays into both nostrils daily       ibuprofen 800 MG tablet    ADVIL/MOTRIN    90 tablet    Take 1 tablet (800 mg) by mouth every 8 hours as needed for moderate pain       levonorgestrel-ethinyl estradiol 0.15-0.03 MG per tablet    SEASONALE    91 tablet    Take 1 tablet by mouth daily       order for DME     2 Units    Equipment being ordered: Dynaflex insert       spironolactone 50 MG tablet    ALDACTONE    90 tablet    Take 1 tablet (50 mg) by mouth daily       traMADol 50 MG tablet    ULTRAM    30 tablet    Take 1 tablet (50 mg) by mouth every 6 hours as needed for pain       triamcinolone 0.1 % cream    KENALOG    80 g    Apply to AA BID x 2 weeks, then PRN only       * Notice:  This list has 3 medication(s) that are the same as other medications prescribed for you. Read the directions carefully, and ask your doctor or other care provider to review them with you.

## 2017-05-08 NOTE — PROGRESS NOTES
Psychiatric Progress Note    Name: Lianna Sorto  Date: 5/8/2017  Length of Visit: 30 minutes  MRN: [id number]      Current Outpatient Prescriptions   Medication Sig     amphetamine-dextroamphetamine (ADDERALL) 20 MG per tablet Take 1 tablet (20 mg) by mouth daily     levonorgestrel-ethinyl estradiol (SEASONALE) 0.15-0.03 MG per tablet Take 1 tablet by mouth daily     triamcinolone (KENALOG) 0.1 % cream Apply to AA BID x 2 weeks, then PRN only     traMADol (ULTRAM) 50 MG tablet Take 1 tablet (50 mg) by mouth every 6 hours as needed for pain     ibuprofen (ADVIL,MOTRIN) 800 MG tablet Take 1 tablet (800 mg) by mouth every 8 hours as needed for moderate pain     spironolactone (ALDACTONE) 50 MG tablet Take 1 tablet (50 mg) by mouth daily     cyclobenzaprine (FLEXERIL) 10 MG tablet Take 1 tablet (10 mg) by mouth 3 times daily as needed for muscle spasms     clindamycin (CLINDAMAX) 1 % lotion Apply to AA QD     fluticasone (FLONASE) 50 MCG/ACT nasal spray Spray 2 sprays into both nostrils daily     order for DME Equipment being ordered: Dynaflex insert     No current facility-administered medications for this visit.        Therapist:  ROMMEL Dupree    PHQ-9:  PHQ-9 score:    PHQ-9 SCORE 3/8/2017   Total Score 20       FAUSTINO-7:  FAUSTINO-7 SCORE 10/28/2016 2/20/2017 3/8/2017   Total Score - - -   Total Score 15 19 16           Interim History:  Patient returns for follow up from initial appointment 3-8-2017. At that time, hydroxyzine 50 mg every 8 hours as needed for anxiety, bupropion (Wellbutrin)  mg daily was discontinued and lisdexamfetamine (Vyvanse) was prescribed. Patient was to return in two weeks. Patient was not able to get insurance to cover the lisdexamfetamine (Vyvanse) and amphetamine (Adderall) 20 mg daily was started.     Today, patient reports she is doing quite well taking the amphetamine (Adderall), with increased focus and decreased irritability. Patient reports having more patience  "with her children. Patient reports slight dry mouth which is tolerable.       Past Medical History:   Diagnosis Date     Abnormal Pap smear of cervix 2011    Lolita-Benign     Acute tonsillitis 2008    treated with antibiotics     Condylomata 4/7/2011     Endometritis 2008    s/p SAB, treated with antibiotics     Hidradenitis 7/3/2009     Intussusception (H) 1983    surgically repaired     Unspecified trauma to perineum and vulva, unspecified as to episode of care in pregnancy 1985    Vaginal and uterine tear with internal bleeding, scarring.         10 point ROS is negative except for those listed above.     Vital Signs:   BP (!) 123/92 (BP Location: Right arm, Patient Position: Chair, Cuff Size: Adult Regular)  Pulse 80  Wt 238 lb (108 kg)  BMI 39 kg/m2      Mental Status Assessment:  Appearance:  Well groomed      Behavior/relationship to examiner/demeanor:  Cooperative, engaged and pleasant  Motor activity:  Normal  Gait:  Normal   Speech:  Normal in volume, articulation, coherence   Mood (subjective report):  \"So much better\"  Affect (objective appearance):  Mood congruent  Thought Process (Associations):  Logical, linear and goal directed  Thought content:  No evidence of suicidal or homicidal ideation,          no overt psychosis and                    patient does not appear to be responding to internal stimuli  Oriented to person, place, date/time   Attention Span and concentration: Intact   Memory:  Short-term memory intact and Long-term memory; Intact  Language:  Fluent   Fund of Knowledge/Intelligence:  Average  Use of language: Intact   Abstraction:  Normal  Insight:  Adequate  Judgment:  Adequate for safety    DSM DIAGNOSIS:  Attention-Deficit/Hyperactivity Disorder 314.00 (F90.0) Predominantly inattentive presentation  296.32 Major Depressive Disorder, Recurrent Episode, Moderate _ and With anxious distress  300.01 (F41.0) Panic Disorder  300.02 (F41.1) Generalized Anxiety " Disorder    Assessment:  Patient is tolerating the amphetamine (Adderall) and would benefit from an additional dose during the dy.     Medication side effects and alternatives were reviewed.     Treatment Plan:  Change to amphetamine (Adderall) 10 mg three times daily. Three months prescriptions provided today.     Follow up in 3 months.     - Recommend patient discuss medications with their pharmacist. Risks and benefits for medications were discussed including, but not limited to, side effects.   - Safety plan was reviewed; to the ER as needed or call after hours crisis line; [phone]  - Education and counseling was done regarding use of medications, psychotherapy options  - Call [phone] for appointment or to speak to a nurse.    -Office hours: Monday through Thursday 8:00 am to 4:30 pm; Friday 8:00 am to Noon.   - Patient received a copy of this Treatment Plan today.    Patient will continue to be seen for ongoing consultation and stabilization.      Signed:  Stacie Burrows RN, MS, CNS-BC

## 2017-05-09 ASSESSMENT — ANXIETY QUESTIONNAIRES: GAD7 TOTAL SCORE: 2

## 2017-05-09 ASSESSMENT — PATIENT HEALTH QUESTIONNAIRE - PHQ9: SUM OF ALL RESPONSES TO PHQ QUESTIONS 1-9: 5

## 2017-05-12 DIAGNOSIS — L73.2 HIDRADENITIS SUPPURATIVA: Primary | ICD-10-CM

## 2017-05-12 RX ORDER — AMPICILLIN TRIHYDRATE 500 MG
CAPSULE ORAL
Qty: 60 CAPSULE | Refills: 1 | Status: SHIPPED | OUTPATIENT
Start: 2017-05-12 | End: 2017-05-15

## 2017-05-12 NOTE — TELEPHONE ENCOUNTER
Can offer 1:30 on 5/22 if she would like    Can offer an antibiotic in the mean time which should help the flare. Will pend if she would like to try this.

## 2017-05-12 NOTE — TELEPHONE ENCOUNTER
Reason for Call:  Other     Detailed comments: Pt says her issue is worsening - She has had some clots (2 pea-size) come out from hidradenitis last night. Spots keep getting bigger and swelling. Spots are also painful.  She would like to be seen sooner than 06/05. - Please advise. She is hoping for Monday afternoon (05/15) - after 3:45.      Phone Number Patient can be reached at: Home number on file [phone] (home) / During work hours *[phone] (7:15-3:45)    Best Time: See above    Can we leave a detailed message on this number? YES    Call taken on 5/12/2017 at 11:28 AM by Denise Behrendt

## 2017-05-12 NOTE — TELEPHONE ENCOUNTER
Spoke to pt and she would like to start the medication.  Pt was able to make follow up appt sooner. Script sent to pharmacy.  Jennifer FARIAS RN BSN PHN  [facility]

## 2017-05-15 ENCOUNTER — TELEPHONE (OUTPATIENT)
Dept: DERMATOLOGY | Facility: CLINIC | Age: 37
End: 2017-05-15

## 2017-05-15 ENCOUNTER — OFFICE VISIT (OUTPATIENT)
Dept: DERMATOLOGY | Facility: CLINIC | Age: 37
End: 2017-05-15
Payer: COMMERCIAL

## 2017-05-15 ENCOUNTER — MYC MEDICAL ADVICE (OUTPATIENT)
Dept: DERMATOLOGY | Facility: CLINIC | Age: 37
End: 2017-05-15

## 2017-05-15 VITALS — SYSTOLIC BLOOD PRESSURE: 141 MMHG | HEART RATE: 93 BPM | OXYGEN SATURATION: 98 % | DIASTOLIC BLOOD PRESSURE: 84 MMHG

## 2017-05-15 DIAGNOSIS — L73.2 HIDRADENITIS SUPPURATIVA: ICD-10-CM

## 2017-05-15 PROCEDURE — 99213 OFFICE O/P EST LOW 20 MIN: CPT | Performed by: PHYSICIAN ASSISTANT

## 2017-05-15 RX ORDER — SPIRONOLACTONE 50 MG/1
50 TABLET, FILM COATED ORAL DAILY
Qty: 90 TABLET | Refills: 3 | Status: SHIPPED | OUTPATIENT
Start: 2017-05-15 | End: 2017-05-16 | Stop reason: DRUGHIGH

## 2017-05-15 RX ORDER — SPIRONOLACTONE 100 MG/1
100 TABLET, FILM COATED ORAL DAILY
Qty: 90 TABLET | Refills: 1 | Status: SHIPPED | OUTPATIENT
Start: 2017-05-15 | End: 2017-10-27

## 2017-05-15 RX ORDER — CLINDAMYCIN PHOSPHATE 10 UG/ML
LOTION TOPICAL
Qty: 60 ML | Refills: 11 | Status: SHIPPED | OUTPATIENT
Start: 2017-05-15 | End: 2018-07-13

## 2017-05-15 RX ORDER — AMPICILLIN TRIHYDRATE 500 MG
CAPSULE ORAL
Qty: 60 CAPSULE | Refills: 1 | Status: SHIPPED | OUTPATIENT
Start: 2017-05-15 | End: 2018-01-03

## 2017-05-15 NOTE — NURSING NOTE
Chief Complaint   Patient presents with     Derm Problem     fu hidradenitis       Vitals:    05/15/17 1030   BP: 141/84   Pulse: 93   SpO2: 98%     Wt Readings from Last 1 Encounters:   05/08/17 108 kg (238 lb)       Neyda Martinez LPN.................5/15/2017

## 2017-05-15 NOTE — TELEPHONE ENCOUNTER
Pharmacy is requesting to know which strength rx for Spironolactone to fill?...    Please advise. Patient waiting at Pharmacy. Jeanette Gutierrez RN

## 2017-05-15 NOTE — MR AVS SNAPSHOT
After Visit Summary   5/15/2017    Lianna Sorto    MRN: [id number]           Patient Information     Date Of Birth          1980        Visit Information        Provider Department      5/15/2017 10:30 AM Yumi Brewer PA-C [facility]        Today's Diagnoses     Hidradenitis suppurativa           Follow-ups after your visit        Who to contact     If you have questions or need follow up information about today's clinic visit or your schedule please contact [facility] directly at [phone].  Normal or non-critical lab and imaging results will be communicated to you by Honeyhart, letter or phone within 4 business days after the clinic has received the results. If you do not hear from us within 7 days, please contact the clinic through [employer] or phone. If you have a critical or abnormal lab result, we will notify you by phone as soon as possible.  Submit refill requests through tripJane or call your pharmacy and they will forward the refill request to us. Please allow 3 business days for your refill to be completed.          Additional Information About Your Visit        MyChart Information     tripJane gives you secure access to your electronic health record. If you see a primary care provider, you can also send messages to your care team and make appointments. If you have questions, please call your primary care clinic.  If you do not have a primary care provider, please call [phone] and they will assist you.        Care EveryWhere ID     This is your Care EveryWhere ID. This could be used by other organizations to access your [facility] medical records  [id number]        Your Vitals Were     Pulse Pulse Oximetry                93 98%           Blood Pressure from Last 3 Encounters:   05/15/17 141/84   05/08/17 (!) 123/92   03/08/17 112/84    Weight from Last 3 Encounters:   05/08/17 108 kg (238 lb)   03/08/17 108.8 kg (239 lb 12.8 oz)   09/28/16 107.1 kg  (236 lb 2 oz)              Today, you had the following     No orders found for display         Today's Medication Changes          These changes are accurate as of: 5/15/17  3:02 PM.  If you have any questions, ask your nurse or doctor.               These medicines have changed or have updated prescriptions.        Dose/Directions    * spironolactone 50 MG tablet   Commonly known as:  ALDACTONE   This may have changed:  Another medication with the same name was added. Make sure you understand how and when to take each.   Used for:  Hidradenitis suppurativa   Changed by:  Yumi Brewer PA-C        Dose:  50 mg   Take 1 tablet (50 mg) by mouth daily   Quantity:  90 tablet   Refills:  3       * spironolactone 100 MG tablet   Commonly known as:  ALDACTONE   This may have changed:  You were already taking a medication with the same name, and this prescription was added. Make sure you understand how and when to take each.   Used for:  Hidradenitis suppurativa   Changed by:  Yumi Brewer PA-C        Dose:  100 mg   Take 1 tablet (100 mg) by mouth daily   Quantity:  90 tablet   Refills:  1       * Notice:  This list has 2 medication(s) that are the same as other medications prescribed for you. Read the directions carefully, and ask your doctor or other care provider to review them with you.         Where to get your medicines      These medications were sent to [facility] [address]  [address] [place] [address]     Phone:  [phone]     ampicillin 500 MG capsule    clindamycin 1 % lotion    spironolactone 100 MG tablet    spironolactone 50 MG tablet                Primary Care Provider Office Phone # Fax #    Dillon Bonilla [phone]       [facility]        [address] [place] [address]        Thank you!     Thank you for choosing [facility]  for your care. Our goal is always to  provide you with excellent care. Hearing back from our patients is one way we can continue to improve our services. Please take a few minutes to complete the written survey that you may receive in the mail after your visit with us. Thank you!             Your Updated Medication List - Protect others around you: Learn how to safely use, store and throw away your medicines at www.disposemymeds.org.          This list is accurate as of: 5/15/17  3:02 PM.  Always use your most recent med list.                   Brand Name Dispense Instructions for use    * amphetamine-dextroamphetamine 10 MG per tablet    ADDERALL    90 tablet    Take 1 tablet (10 mg) by mouth 3 times daily       * amphetamine-dextroamphetamine 10 MG per tablet   Start taking on:  6/6/2017    ADDERALL    90 tablet    Take 1 tablet (10 mg) by mouth 3 times daily       * amphetamine-dextroamphetamine 10 MG per tablet   Start taking on:  7/1/2017    ADDERALL    90 tablet    Take 1 tablet (10 mg) by mouth 3 times daily       ampicillin 500 MG capsule    PRINCIPEN    60 capsule    1 tab PO BID       clindamycin 1 % lotion    CLINDAMAX    60 mL    Apply to AA QD       cyclobenzaprine 10 MG tablet    FLEXERIL    30 tablet    Take 1 tablet (10 mg) by mouth 3 times daily as needed for muscle spasms       fluticasone 50 MCG/ACT spray    FLONASE    16 g    Spray 2 sprays into both nostrils daily       ibuprofen 800 MG tablet    ADVIL/MOTRIN    90 tablet    Take 1 tablet (800 mg) by mouth every 8 hours as needed for moderate pain       levonorgestrel-ethinyl estradiol 0.15-0.03 MG per tablet    SEASONALE    91 tablet    Take 1 tablet by mouth daily       order for DME     2 Units    Equipment being ordered: Dynaflex insert       * spironolactone 50 MG tablet    ALDACTONE    90 tablet    Take 1 tablet (50 mg) by mouth daily       * spironolactone 100 MG tablet    ALDACTONE    90 tablet    Take 1 tablet (100 mg) by mouth daily       traMADol 50 MG tablet    ULTRAM     30 tablet    Take 1 tablet (50 mg) by mouth every 6 hours as needed for pain       triamcinolone 0.1 % cream    KENALOG    80 g    Apply to AA BID x 2 weeks, then PRN only       * Notice:  This list has 5 medication(s) that are the same as other medications prescribed for you. Read the directions carefully, and ask your doctor or other care provider to review them with you.

## 2017-05-15 NOTE — TELEPHONE ENCOUNTER
That is totally fine. Give the 100 mg a chance to work and if she is still flaring, can always increase to 150 mg.

## 2017-05-15 NOTE — PROGRESS NOTES
HPI:   Lianna Sorto is a 35 year old female who presents for recheck of hidradenitis suppurativa - flaring  chief complaint  Location: getting new cysts in groin and now in axilla  Condition present for HS on and off for years. .   Previous treatments include: doxy x2 weeks (not helpful), thinks she has had clinda gel in the past (helpful)  -Denies h/o skin CA    Review Of Systems  Eyes: negative  Ears/Nose/Throat: negative  Respiratory: No shortness of breath, dyspnea on exertion, cough, or hemoptysis  Cardiovascular: negative  Gastrointestinal: negative  Genitourinary: negative  Musculoskeletal: negative  Neurologic: negative  Psychiatric: negative        PHYSICAL EXAM:   A&Ox3:Yes    Well developed, well nourished female Yes   Skin Type: 2  Areas checked: face, head, neck, groin, hands, wrists  1. Post inflammatory hyperpigmenation; healing cysts and tract marks on bilateral inguinal fold      ASSESSMENT/PLAN:     1. Hidradenitis suppurativa bilateral groin - advised. Flaring today. Started ampicillin a few weeks ago and feels that this has been helping.  Doing clindamycin QD and BPO wash QD in the shower. Also on spironolactone 100 mg. Discussed increasing this vs Accutane vs Humira. She would like to increase venu for now; into booklet for Humira given. From initial visit: Has had on and off for years. Recently has been getting painful, inflamed draining cysts. PCP put her on doxy, which helped minimally. Had years ago as well, lost weight which helped greatly. Just went through a difficult divorce recently so she did gain some weight. Discussed that decreasing her weight will help with this, which she does know.    --Continue Ampicillin 500 mg BID x 2-3 more months  --Continue OTC BPO wash to groin QD in shower. Advised on potential to bleach fabrics.  --Continue clindamycin lotion QD after shower  --Increase to spironolactone to 150 mg daily. Advised on potential for hypotension, teratogenetic effects,  menstrual irregularities, hyperkalemia and need for Q 6 month K+ checks. Stressed importance of PO water intake.   --Discussed Humira if struggling. Potential side effects of decreased immunity, hepatic dysfunction, risk of developing lymphoma, increased risk of cardiovascular events, reactivation of latent TB or other respiratory fungal infections discussed at length.       Follow-up: 2-3 months  CC:   Scribed By: Yumi Brewer MS, PA-C

## 2017-05-15 NOTE — TELEPHONE ENCOUNTER
"Spoke to patient.     \"I found out from the pharmacy that I was only on 50 mg of Spironolactone, so that might be why I had the flare up...  I am wanting to be on 100 mg. I didn't have these issues when I was the 100 mg, so I think I want to just start back at the 100 mg for now and if it doesn't work, I will call and maybe then we can go to the 150 mg? I don't really want to go too high- that medication makes me dizzy..\"     Please advise.  Ok to change Spironolactone dose to 100 mg daily instead of 150 mg daily?...    Jeanette Gutierrez RN         "

## 2017-06-05 RX ORDER — LEVONORGESTREL / ETHINYL ESTRADIOL 0.15-0.03
KIT ORAL
Qty: 91 TABLET | Refills: 0 | Status: SHIPPED | OUTPATIENT
Start: 2017-06-05 | End: 2017-06-07

## 2017-06-06 ENCOUNTER — TELEPHONE (OUTPATIENT)
Dept: FAMILY MEDICINE | Facility: CLINIC | Age: 37
End: 2017-06-06

## 2017-06-06 NOTE — TELEPHONE ENCOUNTER
Reason for Call:  Other letter     Detailed comments: pt is wanting a letter for a stand up desk for work     Phone Number Patient can be reached at: Other phone number: [phone]    Best Time: any     Can we leave a detailed message on this number? YES    Call taken on 6/6/2017 at 7:55 AM by Keren Valdez

## 2017-06-06 NOTE — TELEPHONE ENCOUNTER
Message left for pt to return a call to the clinic. What is the indication for the stand up desk. Pt may need an appt to follow up with this.     Vijaya Zavala RN

## 2017-06-06 NOTE — LETTER
[facility]  [address] [facility] [address] [address]  [phone]          June 7, 2017    RE:  Lianna Sorto                                                                                                                                                       [address] [facility] [address]            To whom it may concern:    Lianna Sorto has degenerative lumbar disc disease at the L3-L4 level.  This can cause low back pain when seated for long periods.  Please accommodate her request for a stand-up desk.    Your kind consideration is greatly appreciated!      Sincerely,        Dillon Bonilla MD

## 2017-06-06 NOTE — TELEPHONE ENCOUNTER
Pt is requesting a letter for a stand up desk at work. Pt reports that an ergonomic assessment was done at work and stand up desks were recommended, but have put on hold. She is hoping that a MD note will help her get this. The indication is mid low back pain. There was an MRI done on 3/9/16 that shows bulging disc L3-L4. Pt will be in clinic tomorrow for an appt with Clau MARTEL for a pap. She can be reached on her cell phone.     Please review and advise.   Thank you,  Vijaya Zavala RN

## 2017-06-07 ENCOUNTER — OFFICE VISIT (OUTPATIENT)
Dept: FAMILY MEDICINE | Facility: CLINIC | Age: 37
End: 2017-06-07
Payer: COMMERCIAL

## 2017-06-07 VITALS
TEMPERATURE: 99.1 F | HEART RATE: 82 BPM | DIASTOLIC BLOOD PRESSURE: 81 MMHG | HEIGHT: 66 IN | WEIGHT: 229 LBS | BODY MASS INDEX: 36.8 KG/M2 | SYSTOLIC BLOOD PRESSURE: 116 MMHG

## 2017-06-07 DIAGNOSIS — Z30.41 ENCOUNTER FOR SURVEILLANCE OF CONTRACEPTIVE PILLS: ICD-10-CM

## 2017-06-07 DIAGNOSIS — Z01.419 ENCOUNTER FOR GYNECOLOGICAL EXAMINATION WITHOUT ABNORMAL FINDING: Primary | ICD-10-CM

## 2017-06-07 PROCEDURE — G0124 SCREEN C/V THIN LAYER BY MD: HCPCS | Performed by: NURSE PRACTITIONER

## 2017-06-07 PROCEDURE — 99395 PREV VISIT EST AGE 18-39: CPT | Performed by: NURSE PRACTITIONER

## 2017-06-07 PROCEDURE — 87624 HPV HI-RISK TYP POOLED RSLT: CPT | Performed by: NURSE PRACTITIONER

## 2017-06-07 PROCEDURE — G0145 SCR C/V CYTO,THINLAYER,RESCR: HCPCS | Performed by: NURSE PRACTITIONER

## 2017-06-07 RX ORDER — LEVONORGESTREL AND ETHINYL ESTRADIOL 0.15-0.03
1 KIT ORAL DAILY
Qty: 84 TABLET | Refills: 4 | Status: SHIPPED | OUTPATIENT
Start: 2017-06-07 | End: 2018-08-21

## 2017-06-07 NOTE — PROGRESS NOTES
Answers for HPI/ROS submitted by the patient on 6/5/2017   Annual Exam:  Getting at least 3 servings of Calcium per day:: Yes  Bi-annual eye exam:: Yes  Dental care twice a year:: NO  Sleep apnea or symptoms of sleep apnea:: Daytime drowsiness, Excessive snoring  Diet:: Breakfast skipped  Frequency of exercise:: 1 day/week  Taking medications regularly:: Yes  Medication side effects:: None  Additional concerns today:: No  PHQ-2 Score: 2  Duration of exercise:: 45-60 minutes     SUBJECTIVE:     CC: Lianna Sorto is an 37 year old woman who presents for preventive health visit.     Healthy Habits:    Do you get at least three servings of calcium containing foods daily (dairy, green leafy vegetables, etc.)? yes    Amount of exercise or daily activities, outside of work: 1 day(s) per week    Problems taking medications regularly No    Medication side effects: No    Have you had an eye exam in the past two years? yes    Do you see a dentist twice per year? no    Do you have sleep apnea, excessive snoring or daytime drowsiness?yes- daytime drowsiness and excessive snoring.          Today's PHQ-2 Score:   PHQ-2 ( 1999 [employer]) 6/5/2017 1/27/2016   Q1: Little interest or pleasure in doing things 1 0   Q2: Feeling down, depressed or hopeless 1 0   PHQ-2 Score 2 0   Q1: Little interest or pleasure in doing things Several days -   Q2: Feeling down, depressed or hopeless Several days -   PHQ-2 Score 2 -       Abuse: Current or Past(Physical, Sexual or Emotional)- No  Do you feel safe in your environment - Yes    Social History   Substance Use Topics     Smoking status: Former Smoker     Packs/day: 0.50     Years: 10.00     Quit date: 1/3/2007     Smokeless tobacco: Never Used     Alcohol use Yes      Comment: Avg. twice per month     The patient does not drink >3 drinks per day nor >7 drinks per week.    Recent Labs   Lab Test  07/13/12   0910  06/22/11   1030   CHOL  180  186   HDL  51  46*   LDL  115  124   TRIG  74  77  "  [facility]  4.0  [facility]       Reviewed orders with patient.  Reviewed health maintenance and updated orders accordingly - Yes    Mammo Decision Support:  Mammogram not appropriate for this patient based on age.      History of abnormal Pap smear: YES - updated in Problem List and Health Maintenance accordingly    Reviewed and updated as needed this visit by clinical staff  Tobacco  Meds  Med Hx  Surg Hx  Fam Hx  Soc Hx        Reviewed and updated as needed this visit by Provider            ROS:  C: NEGATIVE for fever, chills, change in weight  I: NEGATIVE for worrisome rashes, moles or lesions  E: NEGATIVE for vision changes or irritation  ENT: NEGATIVE for ear, mouth and throat problems  R: NEGATIVE for significant cough or SOB  B: NEGATIVE for masses, tenderness or discharge  CV: NEGATIVE for chest pain, palpitations or peripheral edema  GI: NEGATIVE for nausea, abdominal pain, heartburn, or change in bowel habits  : NEGATIVE for unusual urinary or vaginal symptoms. Periods are regular.  M: NEGATIVE for significant arthralgias or myalgia  N: NEGATIVE for weakness, dizziness or paresthesias  P: NEGATIVE for changes in mood or affect    Problem list, Medication list, Allergies, and Medical/Social/Surgical histories reviewed in [facility] and updated as appropriate.      OBJECTIVE:     /81 (BP Location: Left arm)  Pulse 82  Temp 99.1  F (37.3  C) (Oral)  Ht 5' 6\" (1.676 m)  Wt 229 lb (103.9 kg)  LMP 03/15/2017 (Approximate)  BMI 36.96 kg/m2  EXAM:  GENERAL: healthy, alert and no distress  EYES: Eyes grossly normal to inspection, PERRL and conjunctivae and sclerae normal  HENT: ear canals and TM's normal, nose and mouth without ulcers or lesions  NECK: no adenopathy, no asymmetry, masses, or scars and thyroid normal to palpation  RESP: lungs clear to auscultation - no rales, rhonchi or wheezes  BREAST: normal without masses, tenderness or nipple discharge and no palpable axillary masses or " "adenopathy  CV: regular rate and rhythm, normal S1 S2, no S3 or S4, no murmur, click or rub, no peripheral edema and peripheral pulses strong  ABDOMEN: soft, nontender, no hepatosplenomegaly, no masses and bowel sounds normal   (female): normal female external genitalia, normal urethral meatus, vaginal mucosa pink, moist, well rugated, and normal cervix/adnexa/uterus without masses or discharge  MS: no gross musculoskeletal defects noted, no edema  SKIN: no suspicious lesions or rashes  NEURO: Normal strength and tone, mentation intact and speech normal  PSYCH: mentation appears normal, affect normal/bright    ASSESSMENT/PLAN:         ICD-10-CM    1. Encounter for gynecological examination without abnormal finding Z01.419 Pap imaged thin layer screen with HPV - recommended age 30 - 65     HPV High Risk Types DNA Cervical   2. Encounter for surveillance of contraceptive pills Z30.41 levonorgestrel-ethinyl estradiol (JOLESSA) 0.15-0.03 MG per tablet       COUNSELING:   Reviewed preventive health counseling, as reflected in patient instructions         reports that she quit smoking about 10 years ago. She has a 5.00 pack-year smoking history. She has never used smokeless tobacco.    Estimated body mass index is 36.96 kg/(m^2) as calculated from the following:    Height as of this encounter: 5' 6\" (1.676 m).    Weight as of this encounter: 229 lb (103.9 kg).   Weight management plan: Discussed healthy diet and exercise guidelines and patient will follow up in 12 months in clinic to re-evaluate.    The risks, benefits and treatment options of prescribed medications or other treatments have been discussed with the patient. The patient verbalized their understanding and should call or follow up if no improvement or if they develop further problems.    DANA Longoria [facility]  "

## 2017-06-07 NOTE — MR AVS SNAPSHOT
After Visit Summary   6/7/2017    Lianna Sorto    MRN: [id number]           Patient Information     Date Of Birth          1980        Visit Information        Provider Department      6/7/2017 11:40 AM Clau Dukes APRN [facility]        Today's Diagnoses     Encounter for gynecological examination without abnormal finding    -  1    Encounter for surveillance of contraceptive pills          Care Instructions      Preventive Health Recommendations  Female Ages 26 - 39  Yearly exam:   See your health care provider every year in order to    Review health changes.     Discuss preventive care.      Review your medicines if you your doctor has prescribed any.    Until age 30: Get a Pap test every three years (more often if you have had an abnormal result).    After age 30: Talk to your doctor about whether you should have a Pap test every 3 years or have a Pap test with HPV screening every 5 years.   You do not need a Pap test if your uterus was removed (hysterectomy) and you have not had cancer.  You should be tested each year for STDs (sexually transmitted diseases), if you're at risk.   Talk to your provider about how often to have your cholesterol checked.  If you are at risk for diabetes, you should have a diabetes test (fasting glucose).  Shots: Get a flu shot each year. Get a tetanus shot every 10 years.   Nutrition:     Eat at least 5 servings of fruits and vegetables each day.    Eat whole-grain bread, whole-wheat pasta and brown rice instead of white grains and rice.    Talk to your provider about Calcium and Vitamin D.     Lifestyle    Exercise at least 150 minutes a week (30 minutes a day, 5 days of the week). This will help you control your weight and prevent disease.    Limit alcohol to one drink per day.    No smoking.     Wear sunscreen to prevent skin cancer.    See your dentist every six months for an exam and cleaning.            Follow-ups after  "your visit        Who to contact     If you have questions or need follow up information about today's clinic visit or your schedule please contact [facility] directly at [phone].  Normal or non-critical lab and imaging results will be communicated to you by MyChart, letter or phone within 4 business days after the clinic has received the results. If you do not hear from us within 7 days, please contact the clinic through [employer] or phone. If you have a critical or abnormal lab result, we will notify you by phone as soon as possible.  Submit refill requests through [employer] or call your pharmacy and they will forward the refill request to us. Please allow 3 business days for your refill to be completed.          Additional Information About Your Visit        MyChart Information     [employer] gives you secure access to your electronic health record. If you see a primary care provider, you can also send messages to your care team and make appointments. If you have questions, please call your primary care clinic.  If you do not have a primary care provider, please call [phone] and they will assist you.        Care EveryWhere ID     This is your Care EveryWhere ID. This could be used by other organizations to access your [facility] medical records  [id number]        Your Vitals Were     Pulse Temperature Height Last Period BMI (Body Mass Index)       82 99.1  F (37.3  C) (Oral) 5' 6\" (1.676 m) 03/15/2017 (Approximate) 36.96 kg/m2        Blood Pressure from Last 3 Encounters:   06/07/17 116/81   05/15/17 141/84   05/08/17 (!) 123/92    Weight from Last 3 Encounters:   06/07/17 229 lb (103.9 kg)   05/08/17 238 lb (108 kg)   03/08/17 239 lb 12.8 oz (108.8 kg)              We Performed the Following     HPV High Risk Types DNA Cervical     Pap imaged thin layer screen with HPV - recommended age 30 - 65          Today's Medication Changes          These changes are accurate as of: 6/7/17 11:50 AM.  If " you have any questions, ask your nurse or doctor.               These medicines have changed or have updated prescriptions.        Dose/Directions    levonorgestrel-ethinyl estradiol 0.15-0.03 MG per tablet   Commonly known as:  FANNIE   This may have changed:  See the new instructions.   Used for:  Encounter for surveillance of contraceptive pills   Changed by:  Clau Dukes APRN CNP        Dose:  1 tablet   Take 1 tablet by mouth daily   Quantity:  84 tablet   Refills:  4            Where to get your medicines      These medications were sent to [facility] [address] [place] [address]  [address] [place] [address]     Phone:  [phone]     levonorgestrel-ethinyl estradiol 0.15-0.03 MG per tablet                Primary Care Provider Office Phone # Fax #    Dillon Bonilla [phone]       [facility]        [address] [place] [address]        Thank you!     Thank you for choosing [facility]  for your care. Our goal is always to provide you with excellent care. Hearing back from our patients is one way we can continue to improve our services. Please take a few minutes to complete the written survey that you may receive in the mail after your visit with us. Thank you!             Your Updated Medication List - Protect others around you: Learn how to safely use, store and throw away your medicines at www.disposemymeds.org.          This list is accurate as of: 6/7/17 11:50 AM.  Always use your most recent med list.                   Brand Name Dispense Instructions for use    * amphetamine-dextroamphetamine 10 MG per tablet    ADDERALL    90 tablet    Take 1 tablet (10 mg) by mouth 3 times daily       * amphetamine-dextroamphetamine 10 MG per tablet    ADDERALL    90 tablet    Take 1 tablet (10 mg) by mouth 3 times daily       * amphetamine-dextroamphetamine 10 MG per tablet   Start taking on:   7/1/2017    ADDERALL    90 tablet    Take 1 tablet (10 mg) by mouth 3 times daily       ampicillin 500 MG capsule    PRINCIPEN    60 capsule    1 tab PO BID       clindamycin 1 % lotion    CLINDAMAX    60 mL    Apply to AA QD       cyclobenzaprine 10 MG tablet    FLEXERIL    30 tablet    Take 1 tablet (10 mg) by mouth 3 times daily as needed for muscle spasms       fluticasone 50 MCG/ACT spray    FLONASE    16 g    Spray 2 sprays into both nostrils daily       ibuprofen 800 MG tablet    ADVIL/MOTRIN    90 tablet    Take 1 tablet (800 mg) by mouth every 8 hours as needed for moderate pain       levonorgestrel-ethinyl estradiol 0.15-0.03 MG per tablet    JOLESSA    84 tablet    Take 1 tablet by mouth daily       order for DME     2 Units    Equipment being ordered: Dynaflex insert       spironolactone 100 MG tablet    ALDACTONE    90 tablet    Take 1 tablet (100 mg) by mouth daily       traMADol 50 MG tablet    ULTRAM    30 tablet    Take 1 tablet (50 mg) by mouth every 6 hours as needed for pain       triamcinolone 0.1 % cream    KENALOG    80 g    Apply to AA BID x 2 weeks, then PRN only       * Notice:  This list has 3 medication(s) that are the same as other medications prescribed for you. Read the directions carefully, and ask your doctor or other care provider to review them with you.

## 2017-06-07 NOTE — NURSING NOTE
"Chief Complaint   Patient presents with     Physical       Initial /81 (BP Location: Left arm)  Pulse 82  Temp 99.1  F (37.3  C) (Oral)  Ht 5' 6\" (1.676 m)  Wt 229 lb (103.9 kg)  LMP 03/15/2017 (Approximate)  BMI 36.96 kg/m2 Estimated body mass index is 36.96 kg/(m^2) as calculated from the following:    Height as of this encounter: 5' 6\" (1.676 m).    Weight as of this encounter: 229 lb (103.9 kg).  Medication Reconciliation: complete  "

## 2017-06-12 LAB
COPATH REPORT: ABNORMAL
PAP: ABNORMAL

## 2017-06-13 LAB
FINAL DIAGNOSIS: NORMAL
HPV HR 12 DNA CVX QL NAA+PROBE: NEGATIVE
HPV16 DNA SPEC QL NAA+PROBE: NEGATIVE
HPV18 DNA SPEC QL NAA+PROBE: NEGATIVE
SPECIMEN DESCRIPTION: NORMAL

## 2017-08-30 ENCOUNTER — TELEPHONE (OUTPATIENT)
Dept: PSYCHIATRY | Facility: CLINIC | Age: 37
End: 2017-08-30

## 2017-08-30 DIAGNOSIS — F90.0 ADHD (ATTENTION DEFICIT HYPERACTIVITY DISORDER), INATTENTIVE TYPE: ICD-10-CM

## 2017-08-30 RX ORDER — DEXTROAMPHETAMINE SACCHARATE, AMPHETAMINE ASPARTATE, DEXTROAMPHETAMINE SULFATE AND AMPHETAMINE SULFATE 2.5; 2.5; 2.5; 2.5 MG/1; MG/1; MG/1; MG/1
10 TABLET ORAL 3 TIMES DAILY
Qty: 90 TABLET | Refills: 0 | Status: SHIPPED | OUTPATIENT
Start: 2017-08-30 | End: 2018-01-03

## 2017-08-30 NOTE — TELEPHONE ENCOUNTER
Patient was given 90 days of this RX . She called today to schedule her med check. She declined earlier openings due to schedule conflicts.   Will route to provider. If refilled, patient will  at clinic.     RX not on RN protocol.    Last Office Visit with [facility], [facility] or [facility] prescribing provider: 17  Next 5 appointments (look out 90 days)     Sep 14, 2017 12:45 PM CDT   [facility] Visit with DANA Molina   [facility]    [address] [facility] [address]   [phone]                   BP Readings from Last 3 Encounters:   17 116/81   05/15/17 141/84   17 (!) 123/92     Pulse Readings from Last 2 Encounters:   17 82   05/15/17 93     Lab Results   Component Value Date    GLC 97 2014     Lab Results   Component Value Date    WBC 8.0 2013     Lab Results   Component Value Date    RBC 4.41 2013     Lab Results   Component Value Date    HGB 12.9 2013     Lab Results   Component Value Date    HCT 38.9 2013     No components found for: MCT  Lab Results   Component Value Date    MCV 88 2013     Lab Results   Component Value Date    MCH 29.3 2013     Lab Results   Component Value Date    MCHC 33.2 2013     Lab Results   Component Value Date    RDW 13.5 2013     Lab Results   Component Value Date     2013     Lab Results   Component Value Date    CHOL 180 2012     Lab Results   Component Value Date    HDL 51 2012     Lab Results   Component Value Date     2012     Lab Results   Component Value Date    TRIG 74 2012     Lab Results   Component Value Date    CHOLHDLRATIO 4.0 2012          [facility] [place] Date: 17  Query Report Page#: 1  Patient Rx History Report  JULIA DUBOIS  Search Criteria: Last Name 'julia' and First Name 'jose m' and  = '[birth year]' and Request Period = '16'  to  '17' - 2 out of 2 Recipients Selected.  Fill Date Product, Str, Form Qty Days Pt ID Prescriber Written RX# N/R* Pharm **MED+  ---------- -------------------------------- ------ ---- --------- ---------- ---------- ------------ ----- --------- ------  2017 DEXTROAMP-AMPHETAMIN 10 MG TAB 90.00 30 [id number] 2017 [id number] N [id number] 00.0  06/15/2017 DEXTROAMP-AMPHETAMIN 10 MG TAB 90.00 30 [id number] 2017 [id number] N [id number] 00.0  2017 DEXTROAMP-AMPHETAMIN 10 MG TAB 90.00 30 [id number] 2017 [id number] N [id number] 00.0  2017 DEXTROAMP-AMPHETAMIN 20 MG TAB 30.00 30 [id number] 2017 [id number] N [id number] 00.0  2017 DEXTROAMP-AMPHETAMIN 20 MG TAB 30.00 30 [id number] 2017 [id number] N [id number] 00.0  2016 TRAMADOL HCL 50 MG TABLET 30.00 7 [id number] 2016 [id number] N [id number] 21.43  *N/R N=New R=Refill  +MED Daily  Prescribers for prescriptions listed  ----------------------------------------------------------------------------------------------------------------------------------  [id number] LINDA VAN I, (MD); [address] [facility] [address]  [id number] RAOUL WYNN ([facility]); [facility], [address] [facility] [address]  Pharmacies that dispensed prescriptions listed  ----------------------------------------------------------------------------------------------------------------------------------  [id number] [facility]; [address] [facility] [address],  Patients that match search criteria  ----------------------------------------------------------------------------------------------------------------------------------  [id number] ROSSANA KRUGER,  [birth year]; [address] , [facility] [address]  [id number] JULIA DUBOIS  [birth year]; [address] [facility] [address]  [id number] JULIA KRUGER,  [birth year]; [address] [facility] [address]  MED  Summary  This section displays cumulative MED values by unique recipient. The MED Max value is the maximum occurrence of cumulative MED  sustained for any 3 consecutive days. This value is calculated based on prescriptions dispensed during the date range requested.  -----------------------------------------------------------------------------------------------------------------------------------  21.43 SHERLY TRACY; 1980; [address], [facility] [address]  0 SHERLY DUMONT; 1980; [address] [facility] [address]  **Per CDC guidance, the conversion factors and associated daily morphine milligram equivalents for drugs prescribed as part of  medication-assisted treatment for opioid use disorder should not be used to benchmark against dosage thresholds meant for opioids  prescribed for pain.  Report Disclaimers:  The report provided above is based upon the search criteria and the data provided by the dispensing entities. For more information  about any prescription, please contact the dispenser or the prescriber.  This report contains confidential information, including patient identifiers, and is not a public record. The information on this  report must be treated as protected health information and is to be disclosed to others only as authorized by applicable state  and Federal regulations.

## 2017-08-30 NOTE — TELEPHONE ENCOUNTER
Reason for call:  Other   Patient called regarding (reason for call): prescription  Additional comments: Needs refill of adderal before appointment on 9/14.      Phone number to reach patient:  Home number on file [phone] (home)    Best Time:  anytime    Can we leave a detailed message on this number?  YES

## 2017-09-14 ENCOUNTER — OFFICE VISIT (OUTPATIENT)
Dept: PSYCHIATRY | Facility: CLINIC | Age: 37
End: 2017-09-14
Payer: COMMERCIAL

## 2017-09-14 VITALS
HEART RATE: 98 BPM | RESPIRATION RATE: 16 BRPM | TEMPERATURE: 98.2 F | SYSTOLIC BLOOD PRESSURE: 124 MMHG | DIASTOLIC BLOOD PRESSURE: 84 MMHG | HEIGHT: 66 IN

## 2017-09-14 DIAGNOSIS — F41.1 GAD (GENERALIZED ANXIETY DISORDER): Primary | ICD-10-CM

## 2017-09-14 DIAGNOSIS — F90.0 ADHD (ATTENTION DEFICIT HYPERACTIVITY DISORDER), INATTENTIVE TYPE: ICD-10-CM

## 2017-09-14 DIAGNOSIS — F33.1 MAJOR DEPRESSIVE DISORDER, RECURRENT EPISODE, MODERATE (H): ICD-10-CM

## 2017-09-14 PROCEDURE — 99214 OFFICE O/P EST MOD 30 MIN: CPT | Performed by: CLINICAL NURSE SPECIALIST

## 2017-09-14 RX ORDER — DEXTROAMPHETAMINE SACCHARATE, AMPHETAMINE ASPARTATE, DEXTROAMPHETAMINE SULFATE AND AMPHETAMINE SULFATE 2.5; 2.5; 2.5; 2.5 MG/1; MG/1; MG/1; MG/1
10 TABLET ORAL 3 TIMES DAILY
Qty: 90 TABLET | Refills: 0 | Status: SHIPPED | OUTPATIENT
Start: 2017-09-28 | End: 2018-01-03

## 2017-09-14 RX ORDER — DEXTROAMPHETAMINE SACCHARATE, AMPHETAMINE ASPARTATE, DEXTROAMPHETAMINE SULFATE AND AMPHETAMINE SULFATE 2.5; 2.5; 2.5; 2.5 MG/1; MG/1; MG/1; MG/1
10 TABLET ORAL 3 TIMES DAILY
Qty: 90 TABLET | Refills: 0 | Status: SHIPPED | OUTPATIENT
Start: 2017-10-12 | End: 2018-01-03

## 2017-09-14 RX ORDER — GABAPENTIN 100 MG/1
100-300 CAPSULE ORAL 2 TIMES DAILY PRN
Qty: 90 CAPSULE | Refills: 1 | Status: SHIPPED | OUTPATIENT
Start: 2017-09-14 | End: 2017-12-12

## 2017-09-14 ASSESSMENT — PATIENT HEALTH QUESTIONNAIRE - PHQ9
SUM OF ALL RESPONSES TO PHQ QUESTIONS 1-9: 3
5. POOR APPETITE OR OVEREATING: NOT AT ALL

## 2017-09-14 ASSESSMENT — ANXIETY QUESTIONNAIRES
GAD7 TOTAL SCORE: 3
5. BEING SO RESTLESS THAT IT IS HARD TO SIT STILL: NOT AT ALL
1. FEELING NERVOUS, ANXIOUS, OR ON EDGE: SEVERAL DAYS
7. FEELING AFRAID AS IF SOMETHING AWFUL MIGHT HAPPEN: NOT AT ALL
IF YOU CHECKED OFF ANY PROBLEMS ON THIS QUESTIONNAIRE, HOW DIFFICULT HAVE THESE PROBLEMS MADE IT FOR YOU TO DO YOUR WORK, TAKE CARE OF THINGS AT HOME, OR GET ALONG WITH OTHER PEOPLE: NOT DIFFICULT AT ALL
3. WORRYING TOO MUCH ABOUT DIFFERENT THINGS: SEVERAL DAYS
2. NOT BEING ABLE TO STOP OR CONTROL WORRYING: NOT AT ALL
6. BECOMING EASILY ANNOYED OR IRRITABLE: SEVERAL DAYS

## 2017-09-14 NOTE — MR AVS SNAPSHOT
After Visit Summary   9/14/2017    Lianna Sorto    MRN: [id number]           Patient Information     Date Of Birth          1980        Visit Information        Provider Department      9/14/2017 12:45 PM Stacie Burrows APRN [facility]        Today's Diagnoses     FAUSTINO (generalized anxiety disorder)    -  1    ADHD (attention deficit hyperactivity disorder), inattentive type          Care Instructions    Treatment Plan:  Continue amphetamine (Adderall) 10 mg three times daily.     Begin gabapentin 100-300 mg twice daily as needed for anxiety.     Follow up in 3 months.    - Recommend patient discuss medications with their pharmacist. Risks and benefits for medications were discussed including, but not limited to, side effects.   - Safety plan was reviewed; to the ER as needed or call after hours crisis line; [phone]  - Education and counseling was done regarding use of medications, psychotherapy options  - Call [phone] for appointment or to speak to a nurse.    -Office hours: Monday through Thursday 8:00 am to 4:30 pm; Friday 8:00 am to Noon.   - Patient received a copy of this Treatment Plan today.          Follow-ups after your visit        Who to contact     If you have questions or need follow up information about today's clinic visit or your schedule please contact [facility] directly at [phone].  Normal or non-critical lab and imaging results will be communicated to you by MyChart, letter or phone within 4 business days after the clinic has received the results. If you do not hear from us within 7 days, please contact the clinic through MyChart or phone. If you have a critical or abnormal lab result, we will notify you by phone as soon as possible.  Submit refill requests through [employer] or call your pharmacy and they will forward the refill request to us. Please allow 3 business days for your refill to be completed.           "Additional Information About Your Visit        MyChart Information     [employer] gives you secure access to your electronic health record. If you see a primary care provider, you can also send messages to your care team and make appointments. If you have questions, please call your primary care clinic.  If you do not have a primary care provider, please call [phone] and they will assist you.        Care EveryWhere ID     This is your Care EveryWhere ID. This could be used by other organizations to access your [facility] medical records  [id number]        Your Vitals Were     Pulse Temperature Respirations Height          98 98.2  F (36.8  C) (Tympanic) 16 5' 6\" (1.676 m)         Blood Pressure from Last 3 Encounters:   09/14/17 124/84   06/07/17 116/81   05/15/17 141/84    Weight from Last 3 Encounters:   06/07/17 229 lb (103.9 kg)   05/08/17 238 lb (108 kg)   03/08/17 239 lb 12.8 oz (108.8 kg)              Today, you had the following     No orders found for display         Today's Medication Changes          These changes are accurate as of: 9/14/17  1:06 PM.  If you have any questions, ask your nurse or doctor.               Start taking these medicines.        Dose/Directions    gabapentin 100 MG capsule   Commonly known as:  NEURONTIN   Used for:  FAUSTINO (generalized anxiety disorder)        Dose:  100-300 mg   Take 1-3 capsules (100-300 mg) by mouth 2 times daily as needed   Quantity:  90 capsule   Refills:  1            Where to get your medicines      These medications were sent to [facility] [place] [address]  [address] [address]     Phone:  [phone]     gabapentin 100 MG capsule         Some of these will need a paper prescription and others can be bought over the counter.  Ask your nurse if you have questions.     Bring a paper prescription for each of these medications     amphetamine-dextroamphetamine 10 MG per tablet    amphetamine-dextroamphetamine " 10 MG per tablet                Primary Care Provider Office Phone # Fax #    Dillon Bonilla [phone] [address] [address]        Equal Access to Services     MICHAEL STERN : Hadii aad ku hadgrissalty Soemilyali, waaxda luqadaha, qaybta kaalmada adeegyada, oswaldo ayadin hayaajazmin andradeadánchristina ramires. So [facility] [phone].    ATENCIÓN: Si habla español, tiene a bermudez disposición servicios gratuitos de asistencia lingüística. Llame al [phone].    We comply with applicable federal civil rights laws and [place] laws. We do not discriminate on the basis of race, color, national origin, age, disability sex, sexual orientation or gender identity.            Thank you!     Thank you for choosing [facility]  for your care. Our goal is always to provide you with excellent care. Hearing back from our patients is one way we can continue to improve our services. Please take a few minutes to complete the written survey that you may receive in the mail after your visit with us. Thank you!             Your Updated Medication List - Protect others around you: Learn how to safely use, store and throw away your medicines at www.disposemymeds.org.          This list is accurate as of: 9/14/17  1:06 PM.  Always use your most recent med list.                   Brand Name Dispense Instructions for use Diagnosis    * amphetamine-dextroamphetamine 10 MG per tablet    ADDERALL    90 tablet    Take 1 tablet (10 mg) by mouth 3 times daily    ADHD (attention deficit hyperactivity disorder), inattentive type       * amphetamine-dextroamphetamine 10 MG per tablet   Start taking on:  9/28/2017    ADDERALL    90 tablet    Take 1 tablet (10 mg) by mouth 3 times daily    ADHD (attention deficit hyperactivity disorder), inattentive type       * amphetamine-dextroamphetamine 10 MG per tablet   Start taking on:  10/12/2017    ADDERALL    90 tablet    Take 1 tablet (10 mg) by mouth  3 times daily    ADHD (attention deficit hyperactivity disorder), inattentive type       ampicillin 500 MG capsule    PRINCIPEN    60 capsule    1 tab PO BID    Hidradenitis suppurativa       clindamycin 1 % lotion    CLINDAMAX    60 mL    Apply to AA QD    Hidradenitis suppurativa       cyclobenzaprine 10 MG tablet    FLEXERIL    30 tablet    Take 1 tablet (10 mg) by mouth 3 times daily as needed for muscle spasms    Tension headache, Back muscle spasm       fluticasone 50 MCG/ACT spray    FLONASE    16 g    Spray 2 sprays into both nostrils daily    Allergic state       gabapentin 100 MG capsule    NEURONTIN    90 capsule    Take 1-3 capsules (100-300 mg) by mouth 2 times daily as needed    FAUSTINO (generalized anxiety disorder)       ibuprofen 800 MG tablet    ADVIL/MOTRIN    90 tablet    Take 1 tablet (800 mg) by mouth every 8 hours as needed for moderate pain    Fibromyalgia       levonorgestrel-ethinyl estradiol 0.15-0.03 MG per tablet    JOLESSA    84 tablet    Take 1 tablet by mouth daily    Encounter for surveillance of contraceptive pills       order for DME     2 Units    Equipment being ordered: Dynaflex insert    Bilateral foot pain, Repetitive stress injury       spironolactone 100 MG tablet    ALDACTONE    90 tablet    Take 1 tablet (100 mg) by mouth daily    Hidradenitis suppurativa       traMADol 50 MG tablet    ULTRAM    30 tablet    Take 1 tablet (50 mg) by mouth every 6 hours as needed for pain    Fibromyalgia       triamcinolone 0.1 % cream    KENALOG    80 g    Apply to AA BID x 2 weeks, then PRN only    Eczema, unspecified type       * Notice:  This list has 3 medication(s) that are the same as other medications prescribed for you. Read the directions carefully, and ask your doctor or other care provider to review them with you.

## 2017-09-14 NOTE — NURSING NOTE
"Chief Complaint   Patient presents with     Recheck Medication       Initial /84  Pulse 98  Temp 98.2  F (36.8  C) (Tympanic)  Resp 16  Ht 5' 6\" (1.676 m) Estimated body mass index is 36.96 kg/(m^2) as calculated from the following:    Height as of 6/7/17: 5' 6\" (1.676 m).    Weight as of 6/7/17: 229 lb (103.9 kg).    Medication Reconciliation:  complete    Brittany Mora CMA (AAMA)  "
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
## 2017-09-15 ASSESSMENT — ANXIETY QUESTIONNAIRES: GAD7 TOTAL SCORE: 3

## 2017-09-22 PROBLEM — F33.1 MODERATE EPISODE OF RECURRENT MAJOR DEPRESSIVE DISORDER (H): Status: ACTIVE | Noted: 2017-09-22

## 2017-10-25 ENCOUNTER — OFFICE VISIT (OUTPATIENT)
Dept: BEHAVIORAL HEALTH | Facility: CLINIC | Age: 37
End: 2017-10-25
Payer: COMMERCIAL

## 2017-10-25 DIAGNOSIS — F33.1 MODERATE EPISODE OF RECURRENT MAJOR DEPRESSIVE DISORDER (H): ICD-10-CM

## 2017-10-25 DIAGNOSIS — F41.1 GAD (GENERALIZED ANXIETY DISORDER): Primary | ICD-10-CM

## 2017-10-25 PROCEDURE — 90791 PSYCH DIAGNOSTIC EVALUATION: CPT | Performed by: SOCIAL WORKER

## 2017-10-25 ASSESSMENT — ANXIETY QUESTIONNAIRES
4. TROUBLE RELAXING: SEVERAL DAYS
3. WORRYING TOO MUCH ABOUT DIFFERENT THINGS: SEVERAL DAYS
GAD7 TOTAL SCORE: 7
5. BEING SO RESTLESS THAT IT IS HARD TO SIT STILL: NOT AT ALL
2. NOT BEING ABLE TO STOP OR CONTROL WORRYING: SEVERAL DAYS
6. BECOMING EASILY ANNOYED OR IRRITABLE: MORE THAN HALF THE DAYS
IF YOU CHECKED OFF ANY PROBLEMS ON THIS QUESTIONNAIRE, HOW DIFFICULT HAVE THESE PROBLEMS MADE IT FOR YOU TO DO YOUR WORK, TAKE CARE OF THINGS AT HOME, OR GET ALONG WITH OTHER PEOPLE: SOMEWHAT DIFFICULT
1. FEELING NERVOUS, ANXIOUS, OR ON EDGE: MORE THAN HALF THE DAYS
7. FEELING AFRAID AS IF SOMETHING AWFUL MIGHT HAPPEN: NOT AT ALL
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
## 2017-10-25 NOTE — MR AVS SNAPSHOT
After Visit Summary   10/25/2017    Lianna Sorto    MRN: [id number]           Patient Information     Date Of Birth          1980        Visit Information        Provider Department      10/25/2017 10:00 AM Jodi Gauthier LICSW [facility]        Today's Diagnoses     FAUSTINO (generalized anxiety disorder)    -  1    Moderate episode of recurrent major depressive disorder (H)           Follow-ups after your visit        Who to contact     If you have questions or need follow up information about today's clinic visit or your schedule please contact [facility] directly at [phone].  Normal or non-critical lab and imaging results will be communicated to you by [employer], letter or phone within 4 business days after the clinic has received the results. If you do not hear from us within 7 days, please contact the clinic through InStaff[employer] or phone. If you have a critical or abnormal lab result, we will notify you by phone as soon as possible.  Submit refill requests through [employer] or call your pharmacy and they will forward the refill request to us. Please allow 3 business days for your refill to be completed.          Additional Information About Your Visit        MyChart Information     [employer] gives you secure access to your electronic health record. If you see a primary care provider, you can also send messages to your care team and make appointments. If you have questions, please call your primary care clinic.  If you do not have a primary care provider, please call [phone] and they will assist you.        Care EveryWhere ID     This is your Care EveryWhere ID. This could be used by other organizations to access your [place] medical records  [id number]         Blood Pressure from Last 3 Encounters:   09/14/17 124/84   06/07/17 116/81   05/15/17 141/84    Weight from Last 3 Encounters:   06/07/17 229 lb (103.9 kg)   05/08/17 238 lb (108 kg)   03/08/17 239 lb  12.8 oz (108.8 kg)              Today, you had the following     No orders found for display       Primary Care Provider Office Phone # Fax #    Dillon Bonilla [phone] [address] [facility] [address]        Equal Access to Services     MICHAEL STERN : Hadii brandon ku hadasho Soomaali, waaxda luqadaha, qaybta kaalmada adeegyada, waxay river andradeadánchristina ramires. So [facility] [phone].    ATENCIÓN: Si habla español, tiene a bermudez disposición servicios gratuitos de asistencia lingüística. Shayy al [phone].    We comply with applicable federal civil rights laws and [place] laws. We do not discriminate on the basis of race, color, national origin, age, disability, sex, sexual orientation, or gender identity.            Thank you!     Thank you for choosing [facility]  for your care. Our goal is always to provide you with excellent care. Hearing back from our patients is one way we can continue to improve our services. Please take a few minutes to complete the written survey that you may receive in the mail after your visit with us. Thank you!             Your Updated Medication List - Protect others around you: Learn how to safely use, store and throw away your medicines at www.disposemymeds.org.          This list is accurate as of: 10/25/17 11:59 PM.  Always use your most recent med list.                   Brand Name Dispense Instructions for use Diagnosis    * amphetamine-dextroamphetamine 10 MG per tablet    ADDERALL    90 tablet    Take 1 tablet (10 mg) by mouth 3 times daily    ADHD (attention deficit hyperactivity disorder), inattentive type       * amphetamine-dextroamphetamine 10 MG per tablet    ADDERALL    90 tablet    Take 1 tablet (10 mg) by mouth 3 times daily    ADHD (attention deficit hyperactivity disorder), inattentive type       * amphetamine-dextroamphetamine 10 MG per tablet    ADDERALL    90 tablet    Take 1 tablet (10  mg) by mouth 3 times daily    ADHD (attention deficit hyperactivity disorder), inattentive type       ampicillin 500 MG capsule    PRINCIPEN    60 capsule    1 tab PO BID    Hidradenitis suppurativa       clindamycin 1 % lotion    CLINDAMAX    60 mL    Apply to AA QD    Hidradenitis suppurativa       cyclobenzaprine 10 MG tablet    FLEXERIL    30 tablet    Take 1 tablet (10 mg) by mouth 3 times daily as needed for muscle spasms    Tension headache, Back muscle spasm       fluticasone 50 MCG/ACT spray    FLONASE    16 g    Spray 2 sprays into both nostrils daily    Allergic state       gabapentin 100 MG capsule    NEURONTIN    90 capsule    Take 1-3 capsules (100-300 mg) by mouth 2 times daily as needed    FAUSTINO (generalized anxiety disorder)       ibuprofen 800 MG tablet    ADVIL/MOTRIN    90 tablet    Take 1 tablet (800 mg) by mouth every 8 hours as needed for moderate pain    Fibromyalgia       levonorgestrel-ethinyl estradiol 0.15-0.03 MG per tablet    JOLESSA    84 tablet    Take 1 tablet by mouth daily    Encounter for surveillance of contraceptive pills       order for DME     2 Units    Equipment being ordered: Dynaflex insert    Bilateral foot pain, Repetitive stress injury       traMADol 50 MG tablet    ULTRAM    30 tablet    Take 1 tablet (50 mg) by mouth every 6 hours as needed for pain    Fibromyalgia       triamcinolone 0.1 % cream    KENALOG    80 g    Apply to AA BID x 2 weeks, then PRN only    Eczema, unspecified type       * Notice:  This list has 3 medication(s) that are the same as other medications prescribed for you. Read the directions carefully, and ask your doctor or other care provider to review them with you.

## 2017-10-27 DIAGNOSIS — L73.2 HIDRADENITIS SUPPURATIVA: ICD-10-CM

## 2017-10-30 RX ORDER — SPIRONOLACTONE 100 MG/1
100 TABLET, FILM COATED ORAL DAILY
Qty: 90 TABLET | Refills: 1 | Status: SHIPPED | OUTPATIENT
Start: 2017-10-30 | End: 2018-04-25

## 2017-10-30 ASSESSMENT — PATIENT HEALTH QUESTIONNAIRE - PHQ9: SUM OF ALL RESPONSES TO PHQ QUESTIONS 1-9: 12

## 2017-10-31 ASSESSMENT — ANXIETY QUESTIONNAIRES: GAD7 TOTAL SCORE: 7

## 2017-12-12 DIAGNOSIS — F41.1 GAD (GENERALIZED ANXIETY DISORDER): ICD-10-CM

## 2017-12-12 RX ORDER — GABAPENTIN 100 MG/1
100-300 CAPSULE ORAL 2 TIMES DAILY PRN
Qty: 45 CAPSULE | Refills: 0 | Status: SHIPPED | OUTPATIENT
Start: 2017-12-12 | End: 2018-01-03

## 2017-12-12 NOTE — TELEPHONE ENCOUNTER
Gabapentin      Last Written Prescription Date:  09/14/2017  Last Fill Quantity: 90,   # refills: 1  Last Office Visit: 9/14/2017  Future Office visit:       Routing refill request to provider for review/approval because:  Drug not on the [facility], [facility] or [facility] refill protocol or controlled substance

## 2017-12-12 NOTE — TELEPHONE ENCOUNTER
Pt needs 3 month follow up scheduled.  Will forward to scheduling to call Pt.    gabapentin (NEURONTIN) 100 MG capsule  Controlled Substance Refill Request     Last refill: 10/27/17, Please add Writer to [place], Writer looked up Pt under Dr. Stevens's [place]NPMP account.    Last clinic visit: 9/14/17    Next appt: none scheduled     Controlled substance agreement on file: No.      Documentation in problem list reviewed:  Yes    Processing:  Escribe    RX monitoring [place] ([place]) reviewed:  reviewed- no concerns  [place] profile:  [email or web address]      Stacie, Please Add writer to your [place]MP as a delegated user.      Thanks    Jian Moctezuma RN

## 2017-12-15 ENCOUNTER — MYC MEDICAL ADVICE (OUTPATIENT)
Dept: FAMILY MEDICINE | Facility: CLINIC | Age: 37
End: 2017-12-15

## 2017-12-18 NOTE — TELEPHONE ENCOUNTER
Clau:  OK to schedule pt to discuss long-term management of her gabapentin and adderall, transitioning from Stacie Stormy?  Areli MALDONADO RN

## 2018-01-03 ENCOUNTER — OFFICE VISIT (OUTPATIENT)
Dept: PSYCHIATRY | Facility: CLINIC | Age: 38
End: 2018-01-03
Payer: COMMERCIAL

## 2018-01-03 VITALS
BODY MASS INDEX: 35.67 KG/M2 | WEIGHT: 221 LBS | HEART RATE: 111 BPM | TEMPERATURE: 97.1 F | SYSTOLIC BLOOD PRESSURE: 126 MMHG | DIASTOLIC BLOOD PRESSURE: 85 MMHG

## 2018-01-03 DIAGNOSIS — F90.0 ADHD (ATTENTION DEFICIT HYPERACTIVITY DISORDER), INATTENTIVE TYPE: ICD-10-CM

## 2018-01-03 DIAGNOSIS — F41.1 GAD (GENERALIZED ANXIETY DISORDER): ICD-10-CM

## 2018-01-03 PROCEDURE — 99213 OFFICE O/P EST LOW 20 MIN: CPT | Performed by: CLINICAL NURSE SPECIALIST

## 2018-01-03 RX ORDER — DEXTROAMPHETAMINE SACCHARATE, AMPHETAMINE ASPARTATE, DEXTROAMPHETAMINE SULFATE AND AMPHETAMINE SULFATE 2.5; 2.5; 2.5; 2.5 MG/1; MG/1; MG/1; MG/1
10 TABLET ORAL 3 TIMES DAILY
Qty: 90 TABLET | Refills: 0 | Status: SHIPPED | OUTPATIENT
Start: 2018-03-03 | End: 2018-04-25

## 2018-01-03 RX ORDER — GABAPENTIN 100 MG/1
100 CAPSULE ORAL 3 TIMES DAILY PRN
Qty: 90 CAPSULE | Refills: 3 | Status: SHIPPED | OUTPATIENT
Start: 2018-01-03 | End: 2018-04-25

## 2018-01-03 RX ORDER — DEXTROAMPHETAMINE SACCHARATE, AMPHETAMINE ASPARTATE, DEXTROAMPHETAMINE SULFATE AND AMPHETAMINE SULFATE 2.5; 2.5; 2.5; 2.5 MG/1; MG/1; MG/1; MG/1
10 TABLET ORAL 3 TIMES DAILY
Qty: 90 TABLET | Refills: 0 | Status: SHIPPED | OUTPATIENT
Start: 2018-01-03 | End: 2018-04-25

## 2018-01-03 RX ORDER — DEXTROAMPHETAMINE SACCHARATE, AMPHETAMINE ASPARTATE, DEXTROAMPHETAMINE SULFATE AND AMPHETAMINE SULFATE 2.5; 2.5; 2.5; 2.5 MG/1; MG/1; MG/1; MG/1
10 TABLET ORAL 3 TIMES DAILY
Qty: 90 TABLET | Refills: 0 | Status: SHIPPED | OUTPATIENT
Start: 2018-02-03 | End: 2018-04-25

## 2018-01-03 ASSESSMENT — ANXIETY QUESTIONNAIRES
IF YOU CHECKED OFF ANY PROBLEMS ON THIS QUESTIONNAIRE, HOW DIFFICULT HAVE THESE PROBLEMS MADE IT FOR YOU TO DO YOUR WORK, TAKE CARE OF THINGS AT HOME, OR GET ALONG WITH OTHER PEOPLE: SOMEWHAT DIFFICULT
1. FEELING NERVOUS, ANXIOUS, OR ON EDGE: MORE THAN HALF THE DAYS
6. BECOMING EASILY ANNOYED OR IRRITABLE: SEVERAL DAYS
GAD7 TOTAL SCORE: 7
3. WORRYING TOO MUCH ABOUT DIFFERENT THINGS: MORE THAN HALF THE DAYS
4. TROUBLE RELAXING: NOT AT ALL
5. BEING SO RESTLESS THAT IT IS HARD TO SIT STILL: NOT AT ALL
2. NOT BEING ABLE TO STOP OR CONTROL WORRYING: SEVERAL DAYS
7. FEELING AFRAID AS IF SOMETHING AWFUL MIGHT HAPPEN: SEVERAL DAYS

## 2018-01-03 ASSESSMENT — PATIENT HEALTH QUESTIONNAIRE - PHQ9: SUM OF ALL RESPONSES TO PHQ QUESTIONS 1-9: 14

## 2018-01-03 NOTE — PATIENT INSTRUCTIONS
Treatment Plan:  Continue amphetamine (Adderall) 10 mg three times daily and gabapentin 100 mg three times daily as needed.     Follow up with primary care provider.     - Recommend patient discuss medications with their pharmacist. Risks and benefits for medications were discussed including, but not limited to, side effects.   - Safety plan was reviewed; to the ER as needed or call after hours crisis line; [phone]  - Education and counseling was done regarding use of medications, psychotherapy options  - Call [phone] for appointment or to speak to a nurse.    -Office hours: Monday through Thursday 8:00 am to 4:30 pm.   - Patient received a copy of this Treatment Plan today.

## 2018-01-03 NOTE — NURSING NOTE
"Chief Complaint   Patient presents with     Recheck Medication       Initial /85 (BP Location: Right arm, Patient Position: Sitting, Cuff Size: Adult Large)  Pulse 111  Temp 97.1  F (36.2  C) (Tympanic)  Wt 221 lb (100.2 kg)  BMI 35.67 kg/m2 Estimated body mass index is 35.67 kg/(m^2) as calculated from the following:    Height as of 9/14/17: 5' 6\" (1.676 m).    Weight as of this encounter: 221 lb (100.2 kg).  Medication Reconciliation: complete    "

## 2018-01-03 NOTE — MR AVS SNAPSHOT
After Visit Summary   1/3/2018    Lianna Sorto    MRN: [id number]           Patient Information     Date Of Birth          1980        Visit Information        Provider Department      1/3/2018 12:45 PM Stacie Burrows APRN [facility]        Today's Diagnoses     FAUSTINO (generalized anxiety disorder)        ADHD (attention deficit hyperactivity disorder), inattentive type          Care Instructions    Treatment Plan:  Continue amphetamine (Adderall) 10 mg three times daily and gabapentin 100 mg three times daily as needed.     Follow up with primary care provider.     - Recommend patient discuss medications with their pharmacist. Risks and benefits for medications were discussed including, but not limited to, side effects.   - Safety plan was reviewed; to the ER as needed or call after hours crisis line; [phone]  - Education and counseling was done regarding use of medications, psychotherapy options  - Call [phone] for appointment or to speak to a nurse.    -Office hours: Monday through Thursday 8:00 am to 4:30 pm.   - Patient received a copy of this Treatment Plan today.            Follow-ups after your visit        Your next 10 appointments already scheduled     Mar 28, 2018 10:20 AM TORREYT   Beto Johnson with [facility]    [address] [address] [phone]              Who to contact     If you have questions or need follow up information about today's clinic visit or your schedule please contact [facility] directly at [phone].  Normal or non-critical lab and imaging results will be communicated to you by MyChart, letter or phone within 4 business days after the clinic has received the results. If you do not hear from us within 7 days, please contact the clinic through MyChart or phone. If you have a critical or abnormal lab result, we  will notify you by phone as soon as possible.  Submit refill requests through [employer] or call your pharmacy and they will forward the refill request to us. Please allow 3 business days for your refill to be completed.          Additional Information About Your Visit        Pok[employer]Reader Information     [employer] gives you secure access to your electronic health record. If you see a primary care provider, you can also send messages to your care team and make appointments. If you have questions, please call your primary care clinic.  If you do not have a primary care provider, please call [phone] and they will assist you.        Care EveryWhere ID     This is your Care EveryWhere ID. This could be used by other organizations to access your [place] medical records  [id number]        Your Vitals Were     Pulse Temperature BMI (Body Mass Index)             111 97.1  F (36.2  C) (Tympanic) 35.67 kg/m2          Blood Pressure from Last 3 Encounters:   01/03/18 126/85   09/14/17 124/84   06/07/17 116/81    Weight from Last 3 Encounters:   01/03/18 221 lb (100.2 kg)   06/07/17 229 lb (103.9 kg)   05/08/17 238 lb (108 kg)              Today, you had the following     No orders found for display         Today's Medication Changes          These changes are accurate as of: 1/3/18  1:17 PM.  If you have any questions, ask your nurse or doctor.               These medicines have changed or have updated prescriptions.        Dose/Directions    gabapentin 100 MG capsule   Commonly known as:  NEURONTIN   This may have changed:    - how much to take  - when to take this   Used for:  FAUSTINO (generalized anxiety disorder)   Changed by:  Stacie Burrows APRN CNS        Dose:  100 mg   Take 1 capsule (100 mg) by mouth 3 times daily as needed   Quantity:  90 capsule   Refills:  3         Stop taking these medicines if you haven't already. Please contact your care team if you have questions.     ampicillin 500 MG capsule   Commonly  known as:  JOHNATHAN   Stopped by:  Stacie Burrows APRN CNS                Where to get your medicines      These medications were sent to [facility] - [place], [place] - [address]  [address] [address]     Phone:  [phone]     gabapentin 100 MG capsule         Some of these will need a paper prescription and others can be bought over the counter.  Ask your nurse if you have questions.     Bring a paper prescription for each of these medications     amphetamine-dextroamphetamine 10 MG per tablet    amphetamine-dextroamphetamine 10 MG per tablet    amphetamine-dextroamphetamine 10 MG per tablet                Primary Care Provider Office Phone # Fax #    Dillon Bonilla [phone] [address] [facility] [facility] [address]        Equal Access to Services     MICHAEL STERN : Hadii aad ku hadasho Soomaali, waaxda luqadaha, qaybta kaalmada adeegyada, oswaldo negron . So [facility] [phone].    ATENCIÓN: Si habla español, tiene a bermudez disposición servicios gratuitos de asistencia lingüística. Llame al [phone].    We comply with applicable federal civil rights laws and [place] laws. We do not discriminate on the basis of race, color, national origin, age, disability, sex, sexual orientation, or gender identity.            Thank you!     Thank you for choosing [facility]  for your care. Our goal is always to provide you with excellent care. Hearing back from our patients is one way we can continue to improve our services. Please take a few minutes to complete the written survey that you may receive in the mail after your visit with us. Thank you!             Your Updated Medication List - Protect others around you: Learn how to safely use, store and throw away your medicines at www.disposemymeds.org.          This list is accurate as of: 1/3/18  1:17 PM.  Always use your most recent med list.                    Brand Name Dispense Instructions for use Diagnosis    * amphetamine-dextroamphetamine 10 MG per tablet    ADDERALL    90 tablet    Take 1 tablet (10 mg) by mouth 3 times daily    ADHD (attention deficit hyperactivity disorder), inattentive type       * amphetamine-dextroamphetamine 10 MG per tablet   Start taking on:  2/3/2018    ADDERALL    90 tablet    Take 1 tablet (10 mg) by mouth 3 times daily    ADHD (attention deficit hyperactivity disorder), inattentive type       * amphetamine-dextroamphetamine 10 MG per tablet   Start taking on:  3/3/2018    ADDERALL    90 tablet    Take 1 tablet (10 mg) by mouth 3 times daily    ADHD (attention deficit hyperactivity disorder), inattentive type       clindamycin 1 % lotion    CLINDAMAX    60 mL    Apply to AA QD    Hidradenitis suppurativa       cyclobenzaprine 10 MG tablet    FLEXERIL    30 tablet    Take 1 tablet (10 mg) by mouth 3 times daily as needed for muscle spasms    Tension headache, Back muscle spasm       fluticasone 50 MCG/ACT spray    FLONASE    16 g    Spray 2 sprays into both nostrils daily    Allergic state       gabapentin 100 MG capsule    NEURONTIN    90 capsule    Take 1 capsule (100 mg) by mouth 3 times daily as needed    FAUSTINO (generalized anxiety disorder)       ibuprofen 800 MG tablet    ADVIL/MOTRIN    90 tablet    Take 1 tablet (800 mg) by mouth every 8 hours as needed for moderate pain    Fibromyalgia       levonorgestrel-ethinyl estradiol 0.15-0.03 MG per tablet    JOLESSA    84 tablet    Take 1 tablet by mouth daily    Encounter for surveillance of contraceptive pills       order for DME     2 Units    Equipment being ordered: Dynaflex insert    Bilateral foot pain, Repetitive stress injury       spironolactone 100 MG tablet    ALDACTONE    90 tablet    Take 1 tablet (100 mg) by mouth daily    Hidradenitis suppurativa       traMADol 50 MG tablet    ULTRAM    30 tablet    Take 1 tablet (50 mg) by mouth every 6 hours as needed  for pain    Fibromyalgia       triamcinolone 0.1 % cream    KENALOG    80 g    Apply to AA BID x 2 weeks, then PRN only    Eczema, unspecified type       * Notice:  This list has 3 medication(s) that are the same as other medications prescribed for you. Read the directions carefully, and ask your doctor or other care provider to review them with you.

## 2018-01-03 NOTE — PROGRESS NOTES
Psychiatric Progress Note    Name: Lianna Sorto  Date: 1/3/2018  Length of Visit: Spent 21 minutes face to face with this patient, where at least 50 % of this time was spent in counseling on/or about family stress.     MRN: [id number]      Current Outpatient Prescriptions   Medication Sig     gabapentin (NEURONTIN) 100 MG capsule Take 1-3 capsules (100-300 mg) by mouth 2 times daily as needed     spironolactone (ALDACTONE) 100 MG tablet Take 1 tablet (100 mg) by mouth daily     amphetamine-dextroamphetamine (ADDERALL) 10 MG per tablet Take 1 tablet (10 mg) by mouth 3 times daily     levonorgestrel-ethinyl estradiol (JOLESSA) 0.15-0.03 MG per tablet Take 1 tablet by mouth daily     clindamycin (CLINDAMAX) 1 % lotion Apply to AA QD     triamcinolone (KENALOG) 0.1 % cream Apply to AA BID x 2 weeks, then PRN only     ibuprofen (ADVIL,MOTRIN) 800 MG tablet Take 1 tablet (800 mg) by mouth every 8 hours as needed for moderate pain     fluticasone (FLONASE) 50 MCG/ACT nasal spray Spray 2 sprays into both nostrils daily     amphetamine-dextroamphetamine (ADDERALL) 10 MG per tablet Take 1 tablet (10 mg) by mouth 3 times daily     amphetamine-dextroamphetamine (ADDERALL) 10 MG per tablet Take 1 tablet (10 mg) by mouth 3 times daily     traMADol (ULTRAM) 50 MG tablet Take 1 tablet (50 mg) by mouth every 6 hours as needed for pain (Patient not taking: Reported on 1/3/2018)     order for DME Equipment being ordered: Dynaflex insert     cyclobenzaprine (FLEXERIL) 10 MG tablet Take 1 tablet (10 mg) by mouth 3 times daily as needed for muscle spasms (Patient not taking: Reported on 1/3/2018)     No current facility-administered medications for this visit.        Therapist:  None    PHQ-9:  PHQ-9 score:    PHQ-9 SCORE 10/25/2017   Total Score -   Total Score 12       FAUSTINO-7:  FAUSTINO-7 SCORE 5/8/2017 9/14/2017 10/25/2017   Total Score - - -   Total Score 2 3 7           Interim History:  Patient returns for follow up from  "appointment 9-. Amphetamine (Adderall) 10 mg three times daily was continued. Gabapentin 100-300 mg twice daily for anxiety was added.     Patient reports the amphetamine (Adderall) 10 mg three times daily is effective. Without the amphetamine (Adderall), her \"mind goes a million miles an hour\".  Patient reports less irritability and more calm taking the gabapentin 100 mg twice daily which is also helpful for pain.      Patient has plantar fascitis and muscle cramps in her feet.  Patient reports her 10 year old son will be going to a day treatment program in [place] due to behavioral issues.     Past Medical History:   Diagnosis Date     Abnormal Pap smear of cervix 2011    [place]-Benign     Acute tonsillitis 2008    treated with antibiotics     Condylomata 4/7/2011     Endometritis 2008    s/p SAB, treated with antibiotics     Hidradenitis 7/3/2009     Intussusception (H) 1983    surgically repaired     Unspecified trauma to perineum and vulva, unspecified as to episode of care in pregnancy 1985    Vaginal and uterine tear with internal bleeding, scarring.         10 point ROS is negative except for those listed above.     Vital Signs:   /85 (BP Location: Right arm, Patient Position: Sitting, Cuff Size: Adult Large)  Pulse 111  Temp 97.1  F (36.2  C) (Tympanic)  Wt 221 lb (100.2 kg)  BMI 35.67 kg/m2      Mental Status Assessment:  Appearance:  Well groomed      Behavior/relationship to examiner/demeanor:  Cooperative, engaged and pleasant  Motor activity:  Normal  Gait:  Normal   Speech:  Normal in volume, articulation, coherence   Mood (subjective report):  \"Good\"  Affect (objective appearance):  Mood congruent  Thought Process (Associations):  Logical, linear and goal directed  Thought content:  No evidence of suicidal or homicidal ideation,          no overt psychosis and                    patient does not appear to be responding to internal stimuli  Oriented to person, place, date/time "   Attention Span and concentration: Intact   Memory:  Short-term memory intact and Long-term memory; Intact  Language:  Fluent   Fund of Knowledge/Intelligence:  Average  Use of language: Intact   Abstraction:  Normal  Insight:  Adequate  Judgment:  Adequate for safety    DSM DIAGNOSIS:  Attention-Deficit/Hyperactivity Disorder 314.00 (F90.0) Predominantly inattentive presentation  296.32 Major Depressive Disorder, Recurrent Episode, Moderate _ and With anxious distress  300.01 (F41.0) Panic Disorder  300.02 (F41.1) Generalized Anxiety Disorder    Assessment:  Patient is stable on current psychotropic medications and will return to the care of her PCP.     Medication side effects and alternatives were reviewed.     Treatment Plan:  Continue amphetamine (Adderall) 10 mg three times daily and gabapentin 100 mg three times daily as needed.     Follow up with primary care provider.     - Recommend patient discuss medications with their pharmacist. Risks and benefits for medications were discussed including, but not limited to, side effects.   - Safety plan was reviewed; to the ER as needed or call after hours crisis line; [phone]  - Education and counseling was done regarding use of medications, psychotherapy options  - Call [phone] for appointment or to speak to a nurse.    -Office hours: Monday through Thursday 8:00 am to 4:30 pm.   - Patient received a copy of this Treatment Plan today.    The patient is being returned to the referring provider for ongoing care and medication prescribing.  The patient can be referred back to this service for further consultation as needed.      Signed:  Stacie Burrows RN, MS, CNS-BC

## 2018-01-04 ASSESSMENT — ANXIETY QUESTIONNAIRES: GAD7 TOTAL SCORE: 7

## 2018-01-21 ENCOUNTER — HOSPITAL ENCOUNTER (EMERGENCY)
Facility: CLINIC | Age: 38
Discharge: HOME | End: 2018-01-21
Attending: PHYSICIAN ASSISTANT | Admitting: PHYSICIAN ASSISTANT
Payer: COMMERCIAL

## 2018-01-21 VITALS
DIASTOLIC BLOOD PRESSURE: 90 MMHG | HEART RATE: 93 BPM | TEMPERATURE: 98.3 F | OXYGEN SATURATION: 100 % | RESPIRATION RATE: 16 BRPM | SYSTOLIC BLOOD PRESSURE: 128 MMHG

## 2018-01-21 DIAGNOSIS — J10.1 INFLUENZA A: ICD-10-CM

## 2018-01-21 LAB
FLUAV+FLUBV AG SPEC QL: NEGATIVE
FLUAV+FLUBV AG SPEC QL: POSITIVE
INTERNAL QC OK POCT: YES
S PYO AG THROAT QL IA.RAPID: NEGATIVE
SPECIMEN SOURCE: ABNORMAL

## 2018-01-21 PROCEDURE — 87081 CULTURE SCREEN ONLY: CPT | Performed by: PHYSICIAN ASSISTANT

## 2018-01-21 PROCEDURE — 87804 INFLUENZA ASSAY W/OPTIC: CPT | Mod: 91 | Performed by: PHYSICIAN ASSISTANT

## 2018-01-21 PROCEDURE — 87880 STREP A ASSAY W/OPTIC: CPT | Performed by: PHYSICIAN ASSISTANT

## 2018-01-21 PROCEDURE — 99213 OFFICE O/P EST LOW 20 MIN: CPT | Performed by: PHYSICIAN ASSISTANT

## 2018-01-21 PROCEDURE — G0463 HOSPITAL OUTPT CLINIC VISIT: HCPCS

## 2018-01-21 RX ORDER — OSELTAMIVIR PHOSPHATE 75 MG/1
75 CAPSULE ORAL 2 TIMES DAILY
Qty: 10 CAPSULE | Refills: 0 | Status: SHIPPED | OUTPATIENT
Start: 2018-01-21 | End: 2018-01-26

## 2018-01-21 NOTE — DISCHARGE INSTRUCTIONS
Influenza (Adult)    Influenza is also called the flu. It is a viral illness that affects the air passages of your lungs. It is different from the common cold. The flu can easily be passed from one to person to another. It may be spread through the air by coughing and sneezing. Or it can be spread by touching the sick person and then touching your own eyes, nose, or mouth.  The flu starts 1 to 3 days after you are exposed to the flu virus. It may last for 1 to 2 weeks but many people feel tired or fatigued for many weeks afterward. You usually don t need to take antibiotics unless you have a complication. This might be an ear or sinus infection or pneumonia.  Symptoms of the flu may be mild or severe. They can include extreme tiredness (wanting to stay in bed all day), chills, fevers, muscle aches, soreness with eye movement, headache, and a dry, hacking cough.  Home care  Follow these guidelines when caring for yourself at home:    Avoid being around cigarette smoke, whether yours or other people s.    Acetaminophen or ibuprofen will help ease your fever, muscle aches, and headache. Don t give aspirin to anyone younger than 18 who has the flu. Aspirin can harm the liver.    Nausea and loss of appetite are common with the flu. Eat light meals. Drink 6 to 8 glasses of liquids every day. Good choices are water, sport drinks, soft drinks without caffeine, juices, tea, and soup. Extra fluids will also help loosen secretions in your nose and lungs.    Over-the-counter cold medicines will not make the flu go away faster. But the medicines may help with coughing, sore throat, and congestion in your nose and sinuses. Don t use a decongestant if you have high blood pressure.    Stay home until your fever has been gone for at least 24 hours without using medicine to reduce fever.  Follow-up care  Follow up with your healthcare provider, or as advised, if you are not getting better over the next week.  If you are age 65 or  older, talk with your provider about getting a pneumococcal vaccine every 5 years. You should also get this vaccine if you have chronic asthma or COPD. All adults should get a flu vaccine every fall. Ask your provider about this.  When to seek medical advice  Call your healthcare provider right away if any of these occur:    Cough with lots of colored mucus (sputum) or blood in your mucus    Chest pain, shortness of breath, wheezing, or trouble breathing    Severe headache, or face, neck, or ear pain    New rash with fever    Fever of 100.4 F (38 C) or higher, or as directed by your healthcare provider    Confusion, behavior change, or seizure    Severe weakness or dizziness    You get a new fever or cough after getting better for a few days  Date Last Reviewed: 1/1/2017 2000-2017 [employer]. [address] [place] [address], [place] [address]. All rights reserved. This information is not intended as a substitute for professional medical care. Always follow your healthcare professional's instructions.

## 2018-01-21 NOTE — LETTER
[facility] EMERGENCY DEPARTMENT  [address] [address]  Phone: [phone]  Fax: [phone]    January 21, 2018        Lianna Sorto  [address] [address]          To whom it may concern:    RE: Lianna Dsouza was evaluated in the urgent care for an illness on 1/21/18 and diagnosed with influenza A.     Please contact me for questions or concerns.      Sincerely,        Jazz Kevin PA-C

## 2018-01-21 NOTE — ED PROVIDER NOTES
History     Chief Complaint   Patient presents with     Cough     cough, fever, sore throat since yesterday.     Pharyngitis     HPI  Lianna Sorto is a 37 year old female who presents to  with concern over sore throat since earlier today.  She additionally complains of increased temp up to 100.1, chills, myalgias,  Cough, sinus pressure, nausea.  She has not had any shortness of breath, wheezing, nausea, vomiting, abdominal pain.    Problem List:    Patient Active Problem List    Diagnosis Date Noted     Moderate episode of recurrent major depressive disorder (H) 09/22/2017     Priority: Medium     Controlled substance agreement signed 09/28/2016     Priority: Medium     Hidradenitis suppurativa 11/02/2015     Priority: Medium     Obesity 10/19/2015     Priority: Medium     Anxiety 06/02/2014     Priority: Medium     Pelvic pain in female 11/13/2013     Priority: Medium     Fibromyalgia 05/28/2013     Priority: Medium     History of major depression 02/22/2013     Priority: Medium     Piriformis syndrome 12/12/2012     Priority: Medium     Scapular dyskinesis 03/13/2012     Priority: Medium     Varicose veins of legs 07/13/2011     Priority: Medium     Sacroiliac pain 07/13/2011     Priority: Medium     Right thigh pain 07/13/2011     Priority: Medium     Back muscle spasm 06/06/2011     Priority: Medium     Tension headache 06/06/2011     Priority: Medium     CARDIOVASCULAR SCREENING; LDL GOAL LESS THAN 130 06/06/2011     Priority: Medium     ASCUS on Pap smear 03/22/2011     Priority: Medium     3/22/11: Pap - ASCUS, + HPV 16. Plan colp.  4/7/11:[employer]--benign. Repeat pap 6 months. Reminder placed in epic.   11/22/11:Pap--ASCUS +(low risk) HPV type 54. Rpt pap in 1 yr. In , ep, prob list, hx updated. Reminder sent.  2/20/13:Pap--NIL. Pap 1 year. Reminder placed in EPIC  3/18/14: Pap - NIL, Neg HPV. Repeat pap 3 yrs.   6/7/2017 Pap: ASCUS, neg HPV. Plan to repeat Pap+HPV cotesting in 3 yrs (2020)            DDD (degenerative disc disease), cervical 03/10/2011     Priority: Medium     Acne 03/10/2011     Priority: Medium     Attention deficit disorder of adult 2010     Priority: Medium     Allergic rhinitis 2007     Priority: Medium     Problem list name updated by automated process. Provider to review          Past Medical History:    Past Medical History:   Diagnosis Date     Abnormal Pap smear of cervix      Acute tonsillitis      Condylomata 2011     Endometritis      Hidradenitis 7/3/2009     Intussusception (H)      Unspecified trauma to perineum and vulva, unspecified as to episode of care in pregnancy      Past Surgical History:    Past Surgical History:   Procedure Laterality Date     C  DELIVERY ONLY  2007,     arrest of dilation at 6 cm, low transverse uterine incision     SURGICAL HISTORY OF -       oral surgery     VAGINA SURGERY      Fell off deck, internal bleeding.       Family History:    Family History   Problem Relation Age of Onset     HEART DISEASE Mother      Arthritis Mother      Neurologic Disorder Mother      C.A.D. Mother      GASTROINTESTINAL DISEASE Mother      Musculoskeletal Disorder Mother      DIABETES Mother      Coronary Artery Disease Mother      has had 2 heart attacks before the age of 55     Hypertension Mother      Hyperlipidemia Mother      Depression Mother      MENTAL ILLNESS Mother      OSTEOPOROSIS Mother      HEART DISEASE Maternal Grandmother      Breast Cancer Maternal Grandmother 42     HEART DISEASE Maternal Grandfather      C.A.D. Maternal Grandfather      CEREBROVASCULAR DISEASE Maternal Grandfather      DIABETES Maternal Grandfather      Breast Cancer Other      CEREBROVASCULAR DISEASE Other      C.A.D. Other      DIABETES Other      Hypertension Other      Musculoskeletal Disorder Other      Unknown/Adopted Father      Unknown/Adopted Paternal Grandmother      Unknown/Adopted Paternal Grandfather      Allergies Son       Coronary Artery Disease Other      My uncle just  of a heart attack 1n February     Obesity Other      Asthma No family hx of      Cancer - colorectal No family hx of      Prostate Cancer No family hx of        Social History:  Marital Status:   [4]  Social History   Substance Use Topics     Smoking status: Former Smoker     Packs/day: 0.50     Years: 10.00     Quit date: 1/3/2007     Smokeless tobacco: Never Used     Alcohol use Yes      Comment: Avg. twice per month        Medications:      oseltamivir (TAMIFLU) 75 MG capsule   gabapentin (NEURONTIN) 100 MG capsule   amphetamine-dextroamphetamine (ADDERALL) 10 MG per tablet   [START ON 2/3/2018] amphetamine-dextroamphetamine (ADDERALL) 10 MG per tablet   [START ON 3/3/2018] amphetamine-dextroamphetamine (ADDERALL) 10 MG per tablet   spironolactone (ALDACTONE) 100 MG tablet   levonorgestrel-ethinyl estradiol (JOLESSA) 0.15-0.03 MG per tablet   clindamycin (CLINDAMAX) 1 % lotion   triamcinolone (KENALOG) 0.1 % cream   traMADol (ULTRAM) 50 MG tablet   ibuprofen (ADVIL,MOTRIN) 800 MG tablet   order for DME   cyclobenzaprine (FLEXERIL) 10 MG tablet   fluticasone (FLONASE) 50 MCG/ACT nasal spray       Review of Systems  CONSTITUTIONAL:POSITIVE  for Fever, chills, myalgias  INTEGUMENTARY/SKIN: POSITIVE for resolving rash on arm   EYES: NEGATIVE for vision changes or irritation  ENT/MOUTH: POSITIVE for sore throat, nasal congestion and NEGATIVE for ear pain   RESP:POSITIVE for cough NEGATIVE for shortness of breath, dyspnea, wheezing    GI: NEGATIVE for abdominal pain, diarrhea, nausea and vomiting  Physical Exam   BP: 128/90  Pulse: 93  Temp: 98.3  F (36.8  C)  Resp: 16  SpO2: 100 %  Physical Exam    GENERAL APPEARANCE: healthy, alert and no distress  EYES: EOMI,  PERRL, conjunctiva clear  HENT: ear canals and TM's normal.  Nasal discharge present.  Posterior pharynx not erythematous without exudate  NECK: supple, nontender, no lymphadenopathy  RESP:  lungs clear to auscultation - no rales, rhonchi or wheezes, frequent cough  CV: regular rates and rhythm, normal S1 S2, no murmur noted  SKIN: no suspicious lesions or rashes  ED Course     ED Course     Procedures        Critical Care time:  none            Labs Ordered and Resulted from Time of ED Arrival Up to the Time of Departure from the ED   INFLUENZA A/B ANTIGEN - Abnormal; Notable for the following:        Result Value    Influenza A Positive (*)     All other components within normal limits   RAPID STREP GROUP A SCREEN POCT       Assessments & Plan (with Medical Decision Making)     I have reviewed the nursing notes.    I have reviewed the findings, diagnosis, plan and need for follow up with the patient.       Discharge Medication List as of 1/21/2018  2:13 PM      START taking these medications    Details   oseltamivir (TAMIFLU) 75 MG capsule Take 1 capsule (75 mg) by mouth 2 times daily for 5 days, Disp-10 capsule, R-0, E-Prescribe           Final diagnoses:   Influenza A     37-year-old female presents to urgent care with concern over subjective fever, chills, myalgias, sore throat and cough which began earlier today.  She had stable vital signs upon arrival.  Physical exam findings as described above.  As part of evaluation she did have negative rapid strep test with culture pending.  Testing for influenza A was positive.  Her symptoms are classic for flu.  I do not suspect secondary pneumonia or other bacterial infection at this time.  Patient was discharged home stable with prescription for Tamiflu.  She was instructed to follow-up with primary care provider for recheck if no resolution of fever within the next 3-4 days.  Worrisome reasons to return to the ER/UC sooner discussed.    Disclaimer: This note consists of symbols derived from keyboarding, dictation, and/or voice recognition software. As a result, there may be errors in the script that have gone undetected.  Please consider this when  interpreting information found in the chart.      1/21/2018   [facility] EMERGENCY DEPARTMENT     Jazz Kevin PA-C  01/25/18 1136

## 2018-01-21 NOTE — ED AVS SNAPSHOT
[facility] Emergency Department    [address] [address]    Phone:  [phone]    Fax:  [phone]                                       Lianna Sorto   MRN: [id number]    Department:  [facility] Emergency Department   Date of Visit:  1/21/2018           After Visit Summary Signature Page     I have received my discharge instructions, and my questions have been answered. I have discussed any challenges I see with this plan with the nurse or doctor.    ..........................................................................................................................................  Patient/Patient Representative Signature      ..........................................................................................................................................  Patient Representative Print Name and Relationship to Patient    ..................................................               ................................................  Date                                            Time    ..........................................................................................................................................  Reviewed by Signature/Title    ...................................................              ..............................................  Date                                                            Time

## 2018-01-21 NOTE — ED AVS SNAPSHOT
[facility] Emergency Department    [address] [address]    Phone:  [phone]    Fax:  [phone]                                       Lianna Sorto   MRN: [id number]    Department:  [facility] Emergency Department   Date of Visit:  1/21/2018           Patient Information     Date Of Birth          1980        Your diagnoses for this visit were:     Influenza A        You were seen by Jazz Kevin PA-C.      Follow-up Information     Follow up with Dillon Bonilla MD.    Specialty:  Family Practice    Why:  if no improvement of fever or sooner if new or worsening symptoms     Contact information:    [address] [id number] [phone]          Discharge Instructions         Influenza (Adult)    Influenza is also called the flu. It is a viral illness that affects the air passages of your lungs. It is different from the common cold. The flu can easily be passed from one to person to another. It may be spread through the air by coughing and sneezing. Or it can be spread by touching the sick person and then touching your own eyes, nose, or mouth.  The flu starts 1 to 3 days after you are exposed to the flu virus. It may last for 1 to 2 weeks but many people feel tired or fatigued for many weeks afterward. You usually don t need to take antibiotics unless you have a complication. This might be an ear or sinus infection or pneumonia.  Symptoms of the flu may be mild or severe. They can include extreme tiredness (wanting to stay in bed all day), chills, fevers, muscle aches, soreness with eye movement, headache, and a dry, hacking cough.  Home care  Follow these guidelines when caring for yourself at home:    Avoid being around cigarette smoke, whether yours or other people s.    Acetaminophen or ibuprofen will help ease your fever, muscle aches, and headache. Don t give aspirin to anyone younger than 18 who has the flu. Aspirin can harm  the liver.    Nausea and loss of appetite are common with the flu. Eat light meals. Drink 6 to 8 glasses of liquids every day. Good choices are water, sport drinks, soft drinks without caffeine, juices, tea, and soup. Extra fluids will also help loosen secretions in your nose and lungs.    Over-the-counter cold medicines will not make the flu go away faster. But the medicines may help with coughing, sore throat, and congestion in your nose and sinuses. Don t use a decongestant if you have high blood pressure.    Stay home until your fever has been gone for at least 24 hours without using medicine to reduce fever.  Follow-up care  Follow up with your healthcare provider, or as advised, if you are not getting better over the next week.  If you are age 65 or older, talk with your provider about getting a pneumococcal vaccine every 5 years. You should also get this vaccine if you have chronic asthma or COPD. All adults should get a flu vaccine every fall. Ask your provider about this.  When to seek medical advice  Call your healthcare provider right away if any of these occur:    Cough with lots of colored mucus (sputum) or blood in your mucus    Chest pain, shortness of breath, wheezing, or trouble breathing    Severe headache, or face, neck, or ear pain    New rash with fever    Fever of 100.4 F (38 C) or higher, or as directed by your healthcare provider    Confusion, behavior change, or seizure    Severe weakness or dizziness    You get a new fever or cough after getting better for a few days  Date Last Reviewed: 1/1/2017 2000-2017 [employer]. [address] [place] [address], [place] [address]. All rights reserved. This information is not intended as a substitute for professional medical care. Always follow your healthcare professional's instructions.          Future Appointments        Provider Department Dept Phone Center    3/28/2018 10:20 AM DANA Longoria [facility]  [phone] [facility]      24 Hour Appointment Hotline       To make an appointment at any [facility], call [phone] ([phone]). If you don't have a family doctor or clinic, we will help you find one. [facility] clinics are conveniently located to serve the needs of you and your family.             Review of your medicines      START taking        Dose / Directions Last dose taken    oseltamivir 75 MG capsule   Commonly known as:  TAMIFLU   Dose:  75 mg   Quantity:  10 capsule        Take 1 capsule (75 mg) by mouth 2 times daily for 5 days   Refills:  0          Our records show that you are taking the medicines listed below. If these are incorrect, please call your family doctor or clinic.        Dose / Directions Last dose taken    * amphetamine-dextroamphetamine 10 MG per tablet   Commonly known as:  ADDERALL   Dose:  10 mg   Quantity:  90 tablet        Take 1 tablet (10 mg) by mouth 3 times daily   Refills:  0        * amphetamine-dextroamphetamine 10 MG per tablet   Commonly known as:  ADDERALL   Dose:  10 mg   Quantity:  90 tablet   Start taking on:  2/3/2018        Take 1 tablet (10 mg) by mouth 3 times daily   Refills:  0        * amphetamine-dextroamphetamine 10 MG per tablet   Commonly known as:  ADDERALL   Dose:  10 mg   Quantity:  90 tablet   Start taking on:  3/3/2018        Take 1 tablet (10 mg) by mouth 3 times daily   Refills:  0        clindamycin 1 % lotion   Commonly known as:  CLINDAMAX   Quantity:  60 mL        Apply to AA QD   Refills:  11        cyclobenzaprine 10 MG tablet   Commonly known as:  FLEXERIL   Dose:  10 mg   Quantity:  30 tablet        Take 1 tablet (10 mg) by mouth 3 times daily as needed for muscle spasms   Refills:  5        fluticasone 50 MCG/ACT spray   Commonly known as:  FLONASE   Dose:  2 spray   Quantity:  16 g        Spray 2 sprays into both nostrils daily   Refills:  0        gabapentin 100 MG capsule   Commonly known as:  NEURONTIN   Dose:  100 mg    Quantity:  90 capsule        Take 1 capsule (100 mg) by mouth 3 times daily as needed   Refills:  3        ibuprofen 800 MG tablet   Commonly known as:  ADVIL/MOTRIN   Dose:  800 mg   Quantity:  90 tablet        Take 1 tablet (800 mg) by mouth every 8 hours as needed for moderate pain   Refills:  3        levonorgestrel-ethinyl estradiol 0.15-0.03 MG per tablet   Commonly known as:  JOLESSA   Dose:  1 tablet   Quantity:  84 tablet        Take 1 tablet by mouth daily   Refills:  4        order for DME   Quantity:  2 Units        Equipment being ordered: Dynaflex insert   Refills:  0        spironolactone 100 MG tablet   Commonly known as:  ALDACTONE   Dose:  100 mg   Quantity:  90 tablet        Take 1 tablet (100 mg) by mouth daily   Refills:  1        traMADol 50 MG tablet   Commonly known as:  ULTRAM   Dose:  50 mg   Quantity:  30 tablet        Take 1 tablet (50 mg) by mouth every 6 hours as needed for pain   Refills:  3        triamcinolone 0.1 % cream   Commonly known as:  KENALOG   Quantity:  80 g        Apply to AA BID x 2 weeks, then PRN only   Refills:  1        * Notice:  This list has 3 medication(s) that are the same as other medications prescribed for you. Read the directions carefully, and ask your doctor or other care provider to review them with you.            Prescriptions were sent or printed at these locations (1 Prescription)                   [facility] [address] [address]    Telephone:  [phone]   Fax:  [phone]   Hours:                  E-Prescribed (1 of 1)         oseltamivir (TAMIFLU) 75 MG capsule                Procedures and tests performed during your visit     Beta strep group A r/o culture    Influenza A/B antigen    Rapid strep group A screen POCT      Orders Needing Specimen Collection     None      Pending Results     Date and Time Order Name Status Description    1/21/2018 1319 Beta strep group A r/o  culture In process             Pending Culture Results     Date and Time Order Name Status Description    1/21/2018 1319 Beta strep group A r/o culture In process             Pending Results Instructions     If you had any lab results that were not finalized at the time of your Discharge, you can call the ED Lab Result RN at [phone]. You will be contacted by this team for any positive Lab results or changes in treatment. The nurses are available 7 days a week from 10A to 6:30P.  You can leave a message 24 hours per day and they will return your call.        Test Results From Your Hospital Stay        1/21/2018  1:55 PM      Component Results     Component Value Ref Range & Units Status    Influenza A/B Agn Specimen Nasal  Final    Influenza A Positive (A) NEG^Negative Final    Influenza B Negative NEG^Negative Final    Test results must be correlated with clinical data. If necessary, results   should be confirmed by a molecular assay or viral culture.           1/21/2018  1:20 PM      Component Results     Component Value Ref Range & Units Status    Rapid Strep A Screen Negative neg Final    Internal QC OK Yes  Final         1/21/2018  1:37 PM                Thank you for choosing [facility]       Thank you for choosing [facility] for your care. Our goal is always to provide you with excellent care. Hearing back from our patients is one way we can continue to improve our services. Please take a few minutes to complete the written survey that you may receive in the mail after you visit with us. Thank you!        [employer] Information     [employer] gives you secure access to your electronic health record. If you see a primary care provider, you can also send messages to your care team and make appointments. If you have questions, please call your primary care clinic.  If you do not have a primary care provider, please call [phone] and they will assist you.        Care EveryWhere ID     This is your Care EveryWhere  ID. This could be used by other organizations to access your [place] medical records  [id number]        Equal Access to Services     MICHAEL STERN : Eryn Mobley, zulma solano, oswaldo milan. So [facility] [phone].    ATENCIÓN: Si habla español, tiene a bermudez disposición servicios gratuitos de asistencia lingüística. Llame al [phone].    We comply with applicable federal civil rights laws and [place] laws. We do not discriminate on the basis of race, color, national origin, age, disability, sex, sexual orientation, or gender identity.            After Visit Summary       This is your record. Keep this with you and show to your community pharmacist(s) and doctor(s) at your next visit.

## 2018-01-23 LAB
BACTERIA SPEC CULT: NORMAL
Lab: NORMAL
SPECIMEN SOURCE: NORMAL

## 2018-04-19 DIAGNOSIS — F90.0 ADHD (ATTENTION DEFICIT HYPERACTIVITY DISORDER), INATTENTIVE TYPE: ICD-10-CM

## 2018-04-23 RX ORDER — DEXTROAMPHETAMINE SACCHARATE, AMPHETAMINE ASPARTATE, DEXTROAMPHETAMINE SULFATE AND AMPHETAMINE SULFATE 2.5; 2.5; 2.5; 2.5 MG/1; MG/1; MG/1; MG/1
10 TABLET ORAL 3 TIMES DAILY
Qty: 90 TABLET | Refills: 0 | OUTPATIENT
Start: 2018-04-23

## 2018-04-23 NOTE — TELEPHONE ENCOUNTER
Stacie Burrows APRN CNS   Psych Rn - [facility]; Dillon Bonilla MD 1 hour ago (7:43 AM)                   Patient is to follow up with PCP.  (Routing comment)         Left message for patient to return call to clinic.    Kadi DAVIDSON Rn

## 2018-04-23 NOTE — TELEPHONE ENCOUNTER
Patient returning call to clinic  Notified her of refill for Adderall should be addressed through her PCP per Stacie Burrows note below  Patient verbalized understanding and agreed with plan  Scheduled appt 4/25/18 with provider  Transferred patient to scheduling to change her PCP to [facility], per patient request    Kadi DAVIDSON Rn

## 2018-04-25 ENCOUNTER — TELEPHONE (OUTPATIENT)
Dept: FAMILY MEDICINE | Facility: CLINIC | Age: 38
End: 2018-04-25

## 2018-04-25 ENCOUNTER — OFFICE VISIT (OUTPATIENT)
Dept: FAMILY MEDICINE | Facility: CLINIC | Age: 38
End: 2018-04-25
Payer: COMMERCIAL

## 2018-04-25 VITALS
HEIGHT: 66 IN | BODY MASS INDEX: 34.55 KG/M2 | TEMPERATURE: 98.9 F | DIASTOLIC BLOOD PRESSURE: 87 MMHG | SYSTOLIC BLOOD PRESSURE: 124 MMHG | HEART RATE: 89 BPM | WEIGHT: 215 LBS

## 2018-04-25 DIAGNOSIS — G44.209 TENSION HEADACHE: ICD-10-CM

## 2018-04-25 DIAGNOSIS — L73.2 HIDRADENITIS SUPPURATIVA: ICD-10-CM

## 2018-04-25 DIAGNOSIS — M62.830 BACK MUSCLE SPASM: ICD-10-CM

## 2018-04-25 DIAGNOSIS — F90.0 ADHD (ATTENTION DEFICIT HYPERACTIVITY DISORDER), INATTENTIVE TYPE: ICD-10-CM

## 2018-04-25 DIAGNOSIS — F90.0 ADHD (ATTENTION DEFICIT HYPERACTIVITY DISORDER), INATTENTIVE TYPE: Primary | ICD-10-CM

## 2018-04-25 DIAGNOSIS — F41.1 GAD (GENERALIZED ANXIETY DISORDER): ICD-10-CM

## 2018-04-25 LAB
ANION GAP SERPL CALCULATED.3IONS-SCNC: 5 MMOL/L (ref 3–14)
BUN SERPL-MCNC: 13 MG/DL (ref 7–30)
CALCIUM SERPL-MCNC: 8.8 MG/DL (ref 8.5–10.1)
CHLORIDE SERPL-SCNC: 106 MMOL/L (ref 94–109)
CO2 SERPL-SCNC: 27 MMOL/L (ref 20–32)
CREAT SERPL-MCNC: 0.74 MG/DL (ref 0.52–1.04)
GFR SERPL CREATININE-BSD FRML MDRD: 88 ML/MIN/1.7M2
GLUCOSE SERPL-MCNC: 105 MG/DL (ref 70–99)
POTASSIUM SERPL-SCNC: 3.8 MMOL/L (ref 3.4–5.3)
SODIUM SERPL-SCNC: 138 MMOL/L (ref 133–144)

## 2018-04-25 PROCEDURE — 99214 OFFICE O/P EST MOD 30 MIN: CPT | Performed by: NURSE PRACTITIONER

## 2018-04-25 PROCEDURE — 36415 COLL VENOUS BLD VENIPUNCTURE: CPT | Performed by: NURSE PRACTITIONER

## 2018-04-25 PROCEDURE — 80048 BASIC METABOLIC PNL TOTAL CA: CPT | Performed by: NURSE PRACTITIONER

## 2018-04-25 RX ORDER — DEXTROAMPHETAMINE SACCHARATE, AMPHETAMINE ASPARTATE, DEXTROAMPHETAMINE SULFATE AND AMPHETAMINE SULFATE 2.5; 2.5; 2.5; 2.5 MG/1; MG/1; MG/1; MG/1
10 TABLET ORAL 3 TIMES DAILY
Qty: 90 TABLET | Refills: 0 | Status: SHIPPED | OUTPATIENT
Start: 2018-06-25 | End: 2018-08-21

## 2018-04-25 RX ORDER — GABAPENTIN 100 MG/1
100 CAPSULE ORAL 3 TIMES DAILY PRN
Qty: 90 CAPSULE | Refills: 11 | Status: SHIPPED | OUTPATIENT
Start: 2018-04-25 | End: 2019-04-30

## 2018-04-25 RX ORDER — SPIRONOLACTONE 100 MG/1
100 TABLET, FILM COATED ORAL DAILY
Qty: 90 TABLET | Refills: 3 | Status: SHIPPED | OUTPATIENT
Start: 2018-04-25 | End: 2018-12-03

## 2018-04-25 RX ORDER — DEXTROAMPHETAMINE SACCHARATE, AMPHETAMINE ASPARTATE, DEXTROAMPHETAMINE SULFATE AND AMPHETAMINE SULFATE 2.5; 2.5; 2.5; 2.5 MG/1; MG/1; MG/1; MG/1
10 TABLET ORAL 3 TIMES DAILY
Qty: 90 TABLET | Refills: 0 | Status: SHIPPED | OUTPATIENT
Start: 2018-05-25 | End: 2018-04-25

## 2018-04-25 RX ORDER — DEXTROAMPHETAMINE SACCHARATE, AMPHETAMINE ASPARTATE, DEXTROAMPHETAMINE SULFATE AND AMPHETAMINE SULFATE 2.5; 2.5; 2.5; 2.5 MG/1; MG/1; MG/1; MG/1
10 TABLET ORAL 3 TIMES DAILY
Qty: 90 TABLET | Refills: 0 | Status: SHIPPED | OUTPATIENT
Start: 2018-04-25 | End: 2018-04-25

## 2018-04-25 RX ORDER — DEXTROAMPHETAMINE SACCHARATE, AMPHETAMINE ASPARTATE, DEXTROAMPHETAMINE SULFATE AND AMPHETAMINE SULFATE 2.5; 2.5; 2.5; 2.5 MG/1; MG/1; MG/1; MG/1
10 TABLET ORAL 3 TIMES DAILY
Qty: 90 TABLET | Refills: 0 | Status: CANCELLED | OUTPATIENT
Start: 2018-06-25

## 2018-04-25 RX ORDER — CYCLOBENZAPRINE HCL 10 MG
10 TABLET ORAL 3 TIMES DAILY PRN
Qty: 30 TABLET | Refills: 5 | Status: SHIPPED | OUTPATIENT
Start: 2018-04-25 | End: 2019-04-30

## 2018-04-25 ASSESSMENT — ANXIETY QUESTIONNAIRES
2. NOT BEING ABLE TO STOP OR CONTROL WORRYING: MORE THAN HALF THE DAYS
7. FEELING AFRAID AS IF SOMETHING AWFUL MIGHT HAPPEN: NOT AT ALL
5. BEING SO RESTLESS THAT IT IS HARD TO SIT STILL: NOT AT ALL
6. BECOMING EASILY ANNOYED OR IRRITABLE: SEVERAL DAYS
IF YOU CHECKED OFF ANY PROBLEMS ON THIS QUESTIONNAIRE, HOW DIFFICULT HAVE THESE PROBLEMS MADE IT FOR YOU TO DO YOUR WORK, TAKE CARE OF THINGS AT HOME, OR GET ALONG WITH OTHER PEOPLE: SOMEWHAT DIFFICULT
GAD7 TOTAL SCORE: 7
1. FEELING NERVOUS, ANXIOUS, OR ON EDGE: SEVERAL DAYS
3. WORRYING TOO MUCH ABOUT DIFFERENT THINGS: MORE THAN HALF THE DAYS

## 2018-04-25 ASSESSMENT — PATIENT HEALTH QUESTIONNAIRE - PHQ9: 5. POOR APPETITE OR OVEREATING: SEVERAL DAYS

## 2018-04-25 NOTE — PROGRESS NOTES
"  SUBJECTIVE:   Lianna Sorto is a 37 year old female who presents to clinic today for the following health issues:      Anxiety Follow-Up    Status since last visit: the same/ stable    Feels like the combination of adderall and gabapentin work well for her.    Would like to restart counseling with our [facility] - she will make an appointment     Other associated symptoms: None; just a lot of stress with her son.    Complicating factors:   Significant life event: No   Current substance abuse: None  Depression symptoms: Yes    FAUSTINO-7 SCORE 9/14/2017 10/25/2017 1/3/2018   Total Score - - -   Total Score 3 7 7         Amount of exercise or physical activity: None    Problems taking medications regularly: No    Medication side effects: none    Diet: regular (no restrictions)      Medication Followup of flexeril, adderall, aldactone    Taking Medication as prescribed: yes    Side Effects:  Some constipation, fatigue from adderall- gets very sleepy    Medication Helping Symptoms:  yes           Problem list and histories reviewed & adjusted, as indicated.  Additional history: as documented      Reviewed and updated as needed this visit by clinical staff  Tobacco  Allergies  Meds       Reviewed and updated as needed this visit by Provider         ROS:  Constitutional, HEENT, cardiovascular, pulmonary, gi and gu systems are negative, except as otherwise noted.    OBJECTIVE:     /87 (BP Location: Left arm)  Pulse 89  Temp 98.9  F (37.2  C) (Oral)  Ht 5' 5.75\" (1.67 m)  Wt 215 lb (97.5 kg)  BMI 34.97 kg/m2  Body mass index is 34.97 kg/(m^2).  GENERAL: healthy, alert and no distress  PSYCH: mentation appears normal, affect normal/bright      ASSESSMENT/PLAN:       ICD-10-CM    1. ADHD (attention deficit hyperactivity disorder), inattentive type F90.0 amphetamine-dextroamphetamine (ADDERALL) 10 MG per tablet     DISCONTINUED: amphetamine-dextroamphetamine (ADDERALL) 10 MG per tablet     DISCONTINUED: " amphetamine-dextroamphetamine (ADDERALL) 10 MG per tablet   2. Tension headache G44.209 cyclobenzaprine (FLEXERIL) 10 MG tablet   3. Back muscle spasm M62.830 cyclobenzaprine (FLEXERIL) 10 MG tablet   4. Hidradenitis suppurativa L73.2 spironolactone (ALDACTONE) 100 MG tablet     Basic metabolic panel   5. FAUSTINO (generalized anxiety disorder) F41.1 gabapentin (NEURONTIN) 100 MG capsule       The risks, benefits and treatment options of prescribed medications or other treatments have been discussed with the patient. The patient verbalized their understanding and should call or follow up if no improvement or if they develop further problems.    DANA Longoria [facility]

## 2018-04-25 NOTE — LETTER
My Depression Action Plan  Name: Lianna Sorto   Date of Birth 1980  Date: 4/25/2018    My doctor: Clau Dukes   My clinic: [facility]  [address] [address]  [phone]          GREEN    ZONE   Good Control    What it looks like:     Things are going generally well. You have normal up s and down s. You may even feel depressed from time to time, but bad moods usually last less than a day.   What you need to do:  1. Continue to care for yourself (see self care plan)  2. Check your depression survival kit and update it as needed  3. Follow your physician s recommendations including any medication.  4. Do not stop taking medication unless you consult with your physician first.           YELLOW         ZONE Getting Worse    What it looks like:     Depression is starting to interfere with your life.     It may be hard to get out of bed; you may be starting to isolate yourself from others.    Symptoms of depression are starting to last most all day and this has happened for several days.     You may have suicidal thoughts but they are not constant.   What you need to do:     1. Call your care team, your response to treatment will improve if you keep your care team informed of your progress. Yellow periods are signs an adjustment may need to be made.     2. Continue your self-care, even if you have to fake it!    3. Talk to someone in your support network    4. Open up your depression survival kit           RED    ZONE Medical Alert - Get Help    What it looks like:     Depression is seriously interfering with your life.     You may experience these or other symptoms: You can t get out of bed most days, can t work or engage in other necessary activities, you have trouble taking care of basic hygiene, or basic responsibilities, thoughts of suicide or death that will not go away, self-injurious behavior.     What you need to do:  1. Call your care  team and request a same-day appointment. If they are not available (weekends or after hours) call your local crisis line, emergency room or 911.            Depression Self Care Plan / Survival Kit    Self-Care for Depression  Here s the deal. Your body and mind are really not as separate as most people think.  What you do and think affects how you feel and how you feel influences what you do and think. This means if you do things that people who feel good do, it will help you feel better.  Sometimes this is all it takes.  There is also a place for medication and therapy depending on how severe your depression is, so be sure to consult with your medical provider and/ or Behavioral Health Consultant if your symptoms are worsening or not improving.     In order to better manage my stress, I will:    Exercise  Get some form of exercise, every day. This will help reduce pain and release endorphins, the  feel good  chemicals in your brain. This is almost as good as taking antidepressants!  This is not the same as joining a gym and then never going! (they count on that by the way ) It can be as simple as just going for a walk or doing some gardening, anything that will get you moving.      Hygiene   Maintain good hygiene (Get out of bed in the morning, Make your bed, Brush your teeth, Take a shower, and Get dressed like you were going to work, even if you are unemployed).  If your clothes don't fit try to get ones that do.    Diet  I will strive to eat foods that are good for me, drink plenty of water, and avoid excessive sugar, caffeine, alcohol, and other mood-altering substances.  Some foods that are helpful in depression are: complex carbohydrates, B vitamins, flaxseed, fish or fish oil, fresh fruits and vegetables.    Psychotherapy  I agree to participate in Individual Therapy (if recommended).    Medication  If prescribed medications, I agree to take them.  Missing doses can result in serious side effects.  I  understand that drinking alcohol, or other illicit drug use, may cause potential side effects.  I will not stop my medication abruptly without first discussing it with my provider.    Staying Connected With Others  I will stay in touch with my friends, family members, and my primary care provider/team.    Use your imagination  Be creative.  We all have a creative side; it doesn t matter if it s oil painting, sand castles, or mud pies! This will also kick up the endorphins.    Witness Beauty  (AKA stop and smell the roses) Take a look outside, even in mid-winter. Notice colors, textures. Watch the squirrels and birds.     Service to others  Be of service to others.  There is always someone else in need.  By helping others we can  get out of ourselves  and remember the really important things.  This also provides opportunities for practicing all the other parts of the program.    Humor  Laugh and be silly!  Adjust your TV habits for less news and crime-drama and more comedy.    Control your stress  Try breathing deep, massage therapy, biofeedback, and meditation. Find time to relax each day.     My support system    Clinic Contact:  Phone number:    Contact 1:  Phone number:    Contact 2:  Phone number:    [employer]/:  Phone number:    Therapist:  Phone number:    Local crisis center:    Phone number:    Other community support:  Phone number:

## 2018-04-25 NOTE — NURSING NOTE
"Chief Complaint   Patient presents with     Refill Request       Initial /87 (BP Location: Left arm)  Pulse 89  Temp 98.9  F (37.2  C) (Oral)  Ht 5' 5.75\" (1.67 m)  Wt 215 lb (97.5 kg)  BMI 34.97 kg/m2 Estimated body mass index is 34.97 kg/(m^2) as calculated from the following:    Height as of this encounter: 5' 5.75\" (1.67 m).    Weight as of this encounter: 215 lb (97.5 kg).  Medication Reconciliation: complete  "

## 2018-04-25 NOTE — MR AVS SNAPSHOT
After Visit Summary   4/25/2018    Lianna Sorto    MRN: [id number]           Patient Information     Date Of Birth          1980        Visit Information        Provider Department      4/25/2018 10:40 AM Clau Dukes APRN CNP [facility]        Today's Diagnoses     ADHD (attention deficit hyperactivity disorder), inattentive type    -  1    Tension headache        Back muscle spasm        Hidradenitis suppurativa        FAUSTINO (generalized anxiety disorder)          Care Instructions          Thank you for choosing [facility].  You may be receiving a survey in the mail from Gloria Bowman regarding your visit today.  Please take a few minutes to complete and return the survey to let us know how we are doing.      If you have questions or concerns, please contact us via MeetCute or you can contact your care team at [phone].    Our Clinic hours are:  Monday 6:40 am  to 7:00 pm  Tuesday -Friday 6:40 am to 5:00 pm    The [place] outpatient lab hours are:  Monday - Friday 6:10 am to 4:45 pm  Saturdays 7:00 am to 11:00 am  Appointments are required, call [phone]    If you have clinical questions after hours or would like to schedule an appointment,  call the clinic at [phone].            Follow-ups after your visit        Who to contact     If you have questions or need follow up information about today's clinic visit or your schedule please contact [facility] directly at [phone].  Normal or non-critical lab and imaging results will be communicated to you by Rewarding Returnhart, letter or phone within 4 business days after the clinic has received the results. If you do not hear from us within 7 days, please contact the clinic through OnGreent or phone. If you have a critical or abnormal lab result, we will notify you by phone as soon as possible.  Submit refill requests through MeetCute or call your pharmacy and they will forward the refill request to  "us. Please allow 3 business days for your refill to be completed.          Additional Information About Your Visit        FINDING ROVERhart Information     inmobly gives you secure access to your electronic health record. If you see a primary care provider, you can also send messages to your care team and make appointments. If you have questions, please call your primary care clinic.  If you do not have a primary care provider, please call [phone] and they will assist you.        Care EveryWhere ID     This is your Care EveryWhere ID. This could be used by other organizations to access your [facility] medical records  [id number]        Your Vitals Were     Pulse Temperature Height BMI (Body Mass Index)          89 98.9  F (37.2  C) (Oral) 5' 5.75\" (1.67 m) 34.97 kg/m2         Blood Pressure from Last 3 Encounters:   04/25/18 124/87   01/21/18 128/90   01/03/18 126/85    Weight from Last 3 Encounters:   04/25/18 215 lb (97.5 kg)   01/03/18 221 lb (100.2 kg)   06/07/17 229 lb (103.9 kg)              We Performed the Following     Basic metabolic panel          Today's Medication Changes          These changes are accurate as of 4/25/18 11:07 AM.  If you have any questions, ask your nurse or doctor.               Start taking these medicines.        Dose/Directions    amphetamine-dextroamphetamine 10 MG per tablet   Commonly known as:  ADDERALL   Used for:  ADHD (attention deficit hyperactivity disorder), inattentive type   Started by:  Clau Dukes APRN CNP        Dose:  10 mg   Start taking on:  6/25/2018   Take 1 tablet (10 mg) by mouth 3 times daily   Quantity:  90 tablet   Refills:  0         These medicines have changed or have updated prescriptions.        Dose/Directions    fluticasone 50 MCG/ACT spray   Commonly known as:  FLONASE   This may have changed:    - when to take this  - reasons to take this   Used for:  Allergic state        Dose:  2 spray   Spray 2 sprays into both nostrils daily "   Quantity:  16 g   Refills:  0            Where to get your medicines      These medications were sent to [facility] [place], [place] - [address]  [address] [address]     Phone:  [phone]     cyclobenzaprine 10 MG tablet    gabapentin 100 MG capsule    spironolactone 100 MG tablet         Some of these will need a paper prescription and others can be bought over the counter.  Ask your nurse if you have questions.     Bring a paper prescription for each of these medications     amphetamine-dextroamphetamine 10 MG per tablet                Primary Care Provider Office Phone # Fax #    DANA Roth [phone] [address] [address]        Equal Access to Services     MICHAEL STERN : Hadii aad ku hadasho Soemilyali, waaxda luqadaha, qaybta kaalmada adeegyada, waxherb lionin arie ramires. So [facility] [phone].    ATENCIÓN: Si habla español, tiene a bermudez disposición servicios gratuitos de asistencia lingüística. [facility] [phone].    We comply with applicable federal civil rights laws and [place] laws. We do not discriminate on the basis of race, color, national origin, age, disability, sex, sexual orientation, or gender identity.            Thank you!     Thank you for choosing [facility]  for your care. Our goal is always to provide you with excellent care. Hearing back from our patients is one way we can continue to improve our services. Please take a few minutes to complete the written survey that you may receive in the mail after your visit with us. Thank you!             Your Updated Medication List - Protect others around you: Learn how to safely use, store and throw away your medicines at www.disposemymeds.org.          This list is accurate as of 4/25/18 11:07 AM.  Always use your most recent med list.                   Brand Name Dispense Instructions for use Diagnosis     amphetamine-dextroamphetamine 10 MG per tablet   Start taking on:  6/25/2018    ADDERALL    90 tablet    Take 1 tablet (10 mg) by mouth 3 times daily    ADHD (attention deficit hyperactivity disorder), inattentive type       clindamycin 1 % lotion    CLINDAMAX    60 mL    Apply to AA QD    Hidradenitis suppurativa       cyclobenzaprine 10 MG tablet    FLEXERIL    30 tablet    Take 1 tablet (10 mg) by mouth 3 times daily as needed for muscle spasms    Tension headache, Back muscle spasm       fluticasone 50 MCG/ACT spray    FLONASE    16 g    Spray 2 sprays into both nostrils daily    Allergic state       gabapentin 100 MG capsule    NEURONTIN    90 capsule    Take 1 capsule (100 mg) by mouth 3 times daily as needed    FAUSTINO (generalized anxiety disorder)       levonorgestrel-ethinyl estradiol 0.15-0.03 MG per tablet    JOLESSA    84 tablet    Take 1 tablet by mouth daily    Encounter for surveillance of contraceptive pills       order for DME     2 Units    Equipment being ordered: Dynaflex insert    Bilateral foot pain, Repetitive stress injury       spironolactone 100 MG tablet    ALDACTONE    90 tablet    Take 1 tablet (100 mg) by mouth daily    Hidradenitis suppurativa       traMADol 50 MG tablet    ULTRAM    30 tablet    Take 1 tablet (50 mg) by mouth every 6 hours as needed for pain    Fibromyalgia       triamcinolone 0.1 % cream    KENALOG    80 g    Apply to AA BID x 2 weeks, then PRN only    Eczema, unspecified type

## 2018-04-25 NOTE — TELEPHONE ENCOUNTER
Prior Authorization Retail Medication Request    Medication/Dose: Amphetamine 10mg  ICD code (if different than what is on RX):    Previously Tried and Failed:    Rationale:      Insurance Name:  [employer]  Insurance ID:  [id number]      Pharmacy Information (if different than what is on RX)  Name:  [facility]  Phone: [phone]

## 2018-04-25 NOTE — LETTER
April 25, 2018      Lianna KRUGER Macario  [address] [facility] [address]        Dear ,    We are writing to inform you of your test results.      Your test results fall within the expected range(s) or remain unchanged from previous results.  Please continue with current treatment plan.    Resulted Orders   Basic metabolic panel   Result Value Ref Range    Sodium 138 133 - 144 mmol/L    Potassium 3.8 3.4 - 5.3 mmol/L    Chloride 106 94 - 109 mmol/L    Carbon Dioxide 27 20 - 32 mmol/L    Anion Gap 5 3 - 14 mmol/L    Glucose 105 (H) 70 - 99 mg/dL    Urea Nitrogen 13 7 - 30 mg/dL    Creatinine 0.74 0.52 - 1.04 mg/dL    GFR Estimate 88 >60 mL/min/1.7m2      Comment:      Non  GFR Calc    GFR Estimate If Black >90 >60 mL/min/1.7m2      Comment:       GFR Calc    Calcium 8.8 8.5 - 10.1 mg/dL       If you have any questions or concerns, please call the clinic at the number listed above.       Sincerely,        DANA Longoria CNP

## 2018-04-25 NOTE — PATIENT INSTRUCTIONS
Thank you for choosing [facility].  You may be receiving a survey in the mail from Gloria Bowman regarding your visit today.  Please take a few minutes to complete and return the survey to let us know how we are doing.      If you have questions or concerns, please contact us via [employer] or you can contact your care team at [phone].    Our Clinic hours are:  Monday 6:40 am  to 7:00 pm  Tuesday -Friday 6:40 am to 5:00 pm    The [place] outpatient lab hours are:  Monday - Friday 6:10 am to 4:45 pm  Saturdays 7:00 am to 11:00 am  Appointments are required, call [phone]    If you have clinical questions after hours or would like to schedule an appointment,  call the clinic at [phone].

## 2018-04-26 ASSESSMENT — ANXIETY QUESTIONNAIRES: GAD7 TOTAL SCORE: 7

## 2018-04-26 ASSESSMENT — PATIENT HEALTH QUESTIONNAIRE - PHQ9: SUM OF ALL RESPONSES TO PHQ QUESTIONS 1-9: 12

## 2018-04-30 NOTE — TELEPHONE ENCOUNTER
Pt will run out on Wed, so can you please address asap.  Thanks, [facility]    Prior AUth #  [phone]  Amphetamine 10mg

## 2018-05-01 NOTE — TELEPHONE ENCOUNTER
PA Initiation    Medication: Amphetamine 10mg - INITIATED  Insurance Company: [employer] - Phone [phone] Fax [phone]  [facility] [place] [place] [facility] [address]  Filling Pharmacy Phone: [phone]  Filling Pharmacy Fax:    Start Date: 5/1/2018

## 2018-05-04 NOTE — TELEPHONE ENCOUNTER
Prior Authorization Approval    Authorization Effective Date: 5/1/2018  Authorization Expiration Date: 5/1/2019  Medication: Amphetamine 10mg - APPROVED  Approved Dose/Quantity: 90 FOR 30- DAYS  Reference #:     Insurance Company: [employer] - Phone [phone] Fax [phone]  Expected CoPay:       CoPay Card Available:      Foundation Assistance Needed:    Which Pharmacy is filling the prescription (Not needed for infusion/clinic administered): [facility] [place], [place] - [address] [place] [address]  Pharmacy Notified: Yes  Patient Notified: Yes

## 2018-07-13 DIAGNOSIS — L73.2 HIDRADENITIS SUPPURATIVA: ICD-10-CM

## 2018-07-13 RX ORDER — CLINDAMYCIN PHOSPHATE 10 UG/ML
LOTION TOPICAL
Qty: 60 ML | Refills: 0 | Status: SHIPPED | OUTPATIENT
Start: 2018-07-13 | End: 2018-10-08

## 2018-07-13 NOTE — TELEPHONE ENCOUNTER
"Requested Prescriptions   Pending Prescriptions Disp Refills     clindamycin (CLINDAMAX) 1 % lotion  Last Written Prescription Date:  5/15/2017  Last Fill Quantity: 60ml,  # refills: 11   Last office visit: 5/15/2017 with prescribing provider:  [facility]   Future Office Visit:   Next 5 appointments (look out 90 days)     Jul 18, 2018 11:00 AM CDT   Beto Johnson with Clau Dukes, APRN CNP   [facility] ([facility]    [address] [address]   [phone]                  60 mL 11     Sig: Apply to AA QD    Topical Acne Medications Protocol Passed    7/13/2018  3:00 PM       Passed - Patient is 12 years of age or older       Passed - Recent (12 mo) or future (30 days) visit within the authorizing provider's specialty    Patient had office visit in the last 12 months or has a visit in the next 30 days with authorizing provider or within the authorizing provider's specialty.  See \"Patient Info\" tab in inbasket, or \"Choose Columns\" in Meds & Orders section of the refill encounter.              "

## 2018-07-13 NOTE — LETTER
[facility] [place]  [address]  [facility] [address]  Phone: [phone]    2018    Lianna Sorto                                                                                                                 [address] [facility] [address]            Dear Ms. Sorto,    We are concerned about your health care.  We recently provided you with a medication refill.  Many medications require routine follow-up with your Dermatology Provider.      At this time we ask that: You schedule a routine office visit with your Dermatology Provider to follow your Hidradenditis suppurativa. It has been over 1 year since you were seen in Dermatology clinic on 5-.    Your prescription: Is  and has been refilled once so you may have time for the above noted follow-up. Please be seen prior to needing your next refill of medication.     We are currently booking Dermatology appointments 6 to 8 weeks in advance.    Thank you,      Yumi MONSALVE / mmc

## 2018-07-13 NOTE — TELEPHONE ENCOUNTER
> 1 year. Needs appointment. Letter sent and note sent to pharmacy as well. Jeanette Gutierrez RN

## 2018-08-21 ENCOUNTER — OFFICE VISIT (OUTPATIENT)
Dept: FAMILY MEDICINE | Facility: CLINIC | Age: 38
End: 2018-08-21
Payer: COMMERCIAL

## 2018-08-21 VITALS
SYSTOLIC BLOOD PRESSURE: 106 MMHG | HEART RATE: 74 BPM | OXYGEN SATURATION: 98 % | DIASTOLIC BLOOD PRESSURE: 74 MMHG | WEIGHT: 204.3 LBS | HEIGHT: 66 IN | BODY MASS INDEX: 32.83 KG/M2 | TEMPERATURE: 97.1 F

## 2018-08-21 DIAGNOSIS — F90.0 ADHD (ATTENTION DEFICIT HYPERACTIVITY DISORDER), INATTENTIVE TYPE: ICD-10-CM

## 2018-08-21 DIAGNOSIS — Z30.41 ENCOUNTER FOR SURVEILLANCE OF CONTRACEPTIVE PILLS: ICD-10-CM

## 2018-08-21 PROCEDURE — 99213 OFFICE O/P EST LOW 20 MIN: CPT | Performed by: NURSE PRACTITIONER

## 2018-08-21 RX ORDER — DEXTROAMPHETAMINE SACCHARATE, AMPHETAMINE ASPARTATE, DEXTROAMPHETAMINE SULFATE AND AMPHETAMINE SULFATE 2.5; 2.5; 2.5; 2.5 MG/1; MG/1; MG/1; MG/1
10 TABLET ORAL 3 TIMES DAILY
Qty: 90 TABLET | Refills: 0 | Status: SHIPPED | OUTPATIENT
Start: 2018-08-21 | End: 2018-08-21

## 2018-08-21 RX ORDER — LEVONORGESTREL AND ETHINYL ESTRADIOL 0.15-0.03
1 KIT ORAL DAILY
Qty: 84 TABLET | Refills: 4 | Status: SHIPPED | OUTPATIENT
Start: 2018-08-21 | End: 2019-11-18

## 2018-08-21 RX ORDER — DEXTROAMPHETAMINE SACCHARATE, AMPHETAMINE ASPARTATE, DEXTROAMPHETAMINE SULFATE AND AMPHETAMINE SULFATE 2.5; 2.5; 2.5; 2.5 MG/1; MG/1; MG/1; MG/1
10 TABLET ORAL 3 TIMES DAILY
Qty: 90 TABLET | Refills: 0 | Status: SHIPPED | OUTPATIENT
Start: 2018-09-21 | End: 2018-08-21

## 2018-08-21 RX ORDER — DEXTROAMPHETAMINE SACCHARATE, AMPHETAMINE ASPARTATE, DEXTROAMPHETAMINE SULFATE AND AMPHETAMINE SULFATE 2.5; 2.5; 2.5; 2.5 MG/1; MG/1; MG/1; MG/1
10 TABLET ORAL 3 TIMES DAILY
Qty: 90 TABLET | Refills: 0 | Status: SHIPPED | OUTPATIENT
Start: 2018-10-21 | End: 2019-01-02

## 2018-08-21 NOTE — PROGRESS NOTES
"  SUBJECTIVE:   Lianna Sorto is a 38 year old female who presents to clinic today for the following health issues:        Medication Followup of Adderall    Taking Medication as prescribed: yes    Side Effects:  None    Medication Helping Symptoms:  Yes    Currently using 20-30 mg daily.         NEEDS BIRTH CONTROL REFILLED  Works well for her - no side effects.  Wants to continue without change.        Problem list and histories reviewed & adjusted, as indicated.  Additional history: as documented    Reviewed and updated as needed this visit by clinical staff       Reviewed and updated as needed this visit by Provider         ROS:  Constitutional, HEENT, cardiovascular, pulmonary, gi and gu systems are negative, except as otherwise noted.    OBJECTIVE:     /74  Pulse 74  Temp 97.1  F (36.2  C) (Tympanic)  Ht 5' 5.5\" (1.664 m)  Wt 204 lb 4.8 oz (92.7 kg)  LMP 08/17/2018 (Exact Date)  SpO2 98%  BMI 33.48 kg/m2  Body mass index is 33.48 kg/(m^2).  GENERAL: healthy, alert and no distress  RESP: lungs clear to auscultation - no rales, rhonchi or wheezes  CV: regular rate and rhythm, normal S1 S2, no S3 or S4, no murmur, click or rub, no peripheral edema and peripheral pulses strong  PSYCH: mentation appears normal, affect normal/bright      ASSESSMENT/PLAN:       ICD-10-CM    1. Encounter for surveillance of contraceptive pills Z30.41 levonorgestrel-ethinyl estradiol (JOLESSA) 0.15-0.03 MG per tablet   2. ADHD (attention deficit hyperactivity disorder), inattentive type F90.0 amphetamine-dextroamphetamine (ADDERALL) 10 MG per tablet     DISCONTINUED: amphetamine-dextroamphetamine (ADDERALL) 10 MG per tablet     DISCONTINUED: amphetamine-dextroamphetamine (ADDERALL) 10 MG per tablet       The risks, benefits and treatment options of prescribed medications or other treatments have been discussed with the patient. The patient verbalized their understanding and should call or follow up if no improvement or " if they develop further problems.    DANA Longoria CNP  [facility]

## 2018-08-21 NOTE — NURSING NOTE
"Initial /74  Pulse 74  Temp 97.1  F (36.2  C) (Tympanic)  Ht 5' 5.5\" (1.664 m)  Wt 204 lb 4.8 oz (92.7 kg)  LMP 08/17/2018 (Exact Date)  SpO2 98%  BMI 33.48 kg/m2 Estimated body mass index is 33.48 kg/(m^2) as calculated from the following:    Height as of this encounter: 5' 5.5\" (1.664 m).    Weight as of this encounter: 204 lb 4.8 oz (92.7 kg). .      "

## 2018-08-21 NOTE — MR AVS SNAPSHOT
"              After Visit Summary   8/21/2018    Lianna Sorto    MRN: [id number]           Patient Information     Date Of Birth          1980        Visit Information        Provider Department      8/21/2018 10:40 AM Clau Dukes APRN CNP [facility]        Today's Diagnoses     Encounter for surveillance of contraceptive pills        ADHD (attention deficit hyperactivity disorder), inattentive type           Follow-ups after your visit        Who to contact     If you have questions or need follow up information about today's clinic visit or your schedule please contact [facility] directly at [phone].  Normal or non-critical lab and imaging results will be communicated to you by Ridemakerzhart, letter or phone within 4 business days after the clinic has received the results. If you do not hear from us within 7 days, please contact the clinic through Ridemakerzhart or phone. If you have a critical or abnormal lab result, we will notify you by phone as soon as possible.  Submit refill requests through [employer] or call your pharmacy and they will forward the refill request to us. Please allow 3 business days for your refill to be completed.          Additional Information About Your Visit        MyChart Information     [employer] gives you secure access to your electronic health record. If you see a primary care provider, you can also send messages to your care team and make appointments. If you have questions, please call your primary care clinic.  If you do not have a primary care provider, please call [phone] and they will assist you.        Care EveryWhere ID     This is your Care EveryWhere ID. This could be used by other organizations to access your [facility] medical records  [id number]        Your Vitals Were     Pulse Temperature Height Last Period Pulse Oximetry BMI (Body Mass Index)    74 97.1  F (36.2  C) (Tympanic) 5' 5.5\" (1.664 m) 08/17/2018 (Exact Date) 98% " 33.48 kg/m2       Blood Pressure from Last 3 Encounters:   08/21/18 106/74   04/25/18 124/87   01/21/18 128/90    Weight from Last 3 Encounters:   08/21/18 204 lb 4.8 oz (92.7 kg)   04/25/18 215 lb (97.5 kg)   01/03/18 221 lb (100.2 kg)              Today, you had the following     No orders found for display         Today's Medication Changes          These changes are accurate as of 8/21/18 11:27 AM.  If you have any questions, ask your nurse or doctor.               Start taking these medicines.        Dose/Directions    amphetamine-dextroamphetamine 10 MG per tablet   Commonly known as:  ADDERALL   Used for:  ADHD (attention deficit hyperactivity disorder), inattentive type   Started by:  Clau Dukes APRN CNP        Dose:  10 mg   Start taking on:  10/21/2018   Take 1 tablet (10 mg) by mouth 3 times daily   Quantity:  90 tablet   Refills:  0         These medicines have changed or have updated prescriptions.        Dose/Directions    fluticasone 50 MCG/ACT spray   Commonly known as:  FLONASE   This may have changed:    - when to take this  - reasons to take this   Used for:  Allergic state        Dose:  2 spray   Spray 2 sprays into both nostrils daily   Quantity:  16 g   Refills:  0            Where to get your medicines      These medications were sent to [facility] [address]  [address] [place] [address]     Phone:  [phone]     levonorgestrel-ethinyl estradiol 0.15-0.03 MG per tablet         Some of these will need a paper prescription and others can be bought over the counter.  Ask your nurse if you have questions.     Bring a paper prescription for each of these medications     amphetamine-dextroamphetamine 10 MG per tablet                Primary Care Provider Office Phone # Fax #    DANA Roth [phone] [address] [facility] [address]        Equal Access to Services     MICHAEL STERN  AH: Hadii brandon reeder hadgriso Soemilyali, waaxda luqadaha, qaybta kaalmada jorge luis, oswaldo ayadin hayaajazmin blasstar vidal la'aan ike. So [facility] [phone].    ATENCIÓN: Si habla neyda, tiene a bermudez disposición servicios gratuitos de asistencia lingüística. Llame al [phone].    We comply with applicable federal civil rights laws and [place] laws. We do not discriminate on the basis of race, color, national origin, age, disability, sex, sexual orientation, or gender identity.            Thank you!     Thank you for choosing [facility]  for your care. Our goal is always to provide you with excellent care. Hearing back from our patients is one way we can continue to improve our services. Please take a few minutes to complete the written survey that you may receive in the mail after your visit with us. Thank you!             Your Updated Medication List - Protect others around you: Learn how to safely use, store and throw away your medicines at www.disposemymeds.org.          This list is accurate as of 8/21/18 11:27 AM.  Always use your most recent med list.                   Brand Name Dispense Instructions for use Diagnosis    amphetamine-dextroamphetamine 10 MG per tablet   Start taking on:  10/21/2018    ADDERALL    90 tablet    Take 1 tablet (10 mg) by mouth 3 times daily    ADHD (attention deficit hyperactivity disorder), inattentive type       clindamycin 1 % lotion    CLINDAMAX    60 mL    Apply to AA QD    Hidradenitis suppurativa       cyclobenzaprine 10 MG tablet    FLEXERIL    30 tablet    Take 1 tablet (10 mg) by mouth 3 times daily as needed for muscle spasms    Tension headache, Back muscle spasm       fluticasone 50 MCG/ACT spray    FLONASE    16 g    Spray 2 sprays into both nostrils daily    Allergic state       gabapentin 100 MG capsule    NEURONTIN    90 capsule    Take 1 capsule (100 mg) by mouth 3 times daily as needed    FAUSTINO (generalized anxiety disorder)       levonorgestrel-ethinyl  estradiol 0.15-0.03 MG per tablet    JOLESSA    84 tablet    Take 1 tablet by mouth daily    Encounter for surveillance of contraceptive pills       order for DME     2 Units    Equipment being ordered: Dynaflex insert    Bilateral foot pain, Repetitive stress injury       spironolactone 100 MG tablet    ALDACTONE    90 tablet    Take 1 tablet (100 mg) by mouth daily    Hidradenitis suppurativa       traMADol 50 MG tablet    ULTRAM    30 tablet    Take 1 tablet (50 mg) by mouth every 6 hours as needed for pain    Fibromyalgia       triamcinolone 0.1 % cream    KENALOG    80 g    Apply to AA BID x 2 weeks, then PRN only    Eczema, unspecified type

## 2018-10-08 DIAGNOSIS — L73.2 HIDRADENITIS SUPPURATIVA: ICD-10-CM

## 2018-10-08 RX ORDER — CLINDAMYCIN PHOSPHATE 10 UG/ML
LOTION TOPICAL
Qty: 60 ML | Refills: 0 | Status: SHIPPED | OUTPATIENT
Start: 2018-10-08 | End: 2018-12-03

## 2018-10-08 NOTE — TELEPHONE ENCOUNTER
Unable to reach patient. She was last seen by Derm Provider over a year ago on 5-15-17, so it has been over a year since she has been seen and E visit and Phone visits are not currently available with Specialists, so message left that as of now, that is not an option, so keep 10-23-18 appointment. Call if questions.  Jeanette Gutierrez RN

## 2018-10-08 NOTE — TELEPHONE ENCOUNTER
Reason for Call:  Medication or medication refill:    Do you use a [facility]?  Name of the pharmacy and phone number for the current request:  [facility] [phone]    Name of the medication requested: clindamycin    Other request: pt will be out of medication. Is currently schedule for Oct 23, would like to know if she can do an E-Visit or telephone visit instead? She has not had any changes     Can we leave a detailed message on this number? YES    Phone number patient can be reached at: Home number on file [phone] (home)    Best Time: any     Call taken on 10/8/2018 at 12:07 PM by Aiyana Zimmerman

## 2018-11-16 ENCOUNTER — OFFICE VISIT (OUTPATIENT)
Dept: FAMILY MEDICINE | Facility: CLINIC | Age: 38
End: 2018-11-16
Payer: COMMERCIAL

## 2018-11-16 VITALS
HEART RATE: 82 BPM | SYSTOLIC BLOOD PRESSURE: 110 MMHG | DIASTOLIC BLOOD PRESSURE: 76 MMHG | BODY MASS INDEX: 31.58 KG/M2 | HEIGHT: 66 IN | TEMPERATURE: 97.7 F | OXYGEN SATURATION: 98 % | WEIGHT: 196.5 LBS

## 2018-11-16 DIAGNOSIS — N30.00 ACUTE CYSTITIS WITHOUT HEMATURIA: Primary | ICD-10-CM

## 2018-11-16 DIAGNOSIS — N89.8 VAGINAL DISCHARGE: ICD-10-CM

## 2018-11-16 DIAGNOSIS — R30.0 DYSURIA: ICD-10-CM

## 2018-11-16 DIAGNOSIS — R82.90 NONSPECIFIC FINDING ON EXAMINATION OF URINE: ICD-10-CM

## 2018-11-16 LAB
ALBUMIN UR-MCNC: NEGATIVE MG/DL
APPEARANCE UR: CLEAR
BACTERIA #/AREA URNS HPF: ABNORMAL /HPF
BILIRUB UR QL STRIP: NEGATIVE
COLOR UR AUTO: YELLOW
GLUCOSE UR STRIP-MCNC: NEGATIVE MG/DL
HGB UR QL STRIP: ABNORMAL
KETONES UR STRIP-MCNC: NEGATIVE MG/DL
LEUKOCYTE ESTERASE UR QL STRIP: ABNORMAL
MUCOUS THREADS #/AREA URNS LPF: PRESENT /LPF
NITRATE UR QL: POSITIVE
NON-SQ EPI CELLS #/AREA URNS LPF: ABNORMAL /LPF
PH UR STRIP: 5.5 PH (ref 5–7)
RBC #/AREA URNS AUTO: ABNORMAL /HPF
SOURCE: ABNORMAL
SP GR UR STRIP: >1.03 (ref 1–1.03)
SPECIMEN SOURCE: NORMAL
UROBILINOGEN UR STRIP-ACNC: 0.2 EU/DL (ref 0.2–1)
WBC #/AREA URNS AUTO: ABNORMAL /HPF
WET PREP SPEC: NORMAL

## 2018-11-16 PROCEDURE — 87088 URINE BACTERIA CULTURE: CPT | Performed by: INTERNAL MEDICINE

## 2018-11-16 PROCEDURE — 81001 URINALYSIS AUTO W/SCOPE: CPT | Performed by: INTERNAL MEDICINE

## 2018-11-16 PROCEDURE — 87210 SMEAR WET MOUNT SALINE/INK: CPT | Performed by: INTERNAL MEDICINE

## 2018-11-16 PROCEDURE — 99213 OFFICE O/P EST LOW 20 MIN: CPT | Performed by: INTERNAL MEDICINE

## 2018-11-16 PROCEDURE — 87186 SC STD MICRODIL/AGAR DIL: CPT | Performed by: INTERNAL MEDICINE

## 2018-11-16 PROCEDURE — 87086 URINE CULTURE/COLONY COUNT: CPT | Performed by: INTERNAL MEDICINE

## 2018-11-16 RX ORDER — SULFAMETHOXAZOLE/TRIMETHOPRIM 800-160 MG
1 TABLET ORAL 2 TIMES DAILY
Qty: 6 TABLET | Refills: 0 | Status: SHIPPED | OUTPATIENT
Start: 2018-11-16 | End: 2019-04-12

## 2018-11-16 ASSESSMENT — ANXIETY QUESTIONNAIRES
IF YOU CHECKED OFF ANY PROBLEMS ON THIS QUESTIONNAIRE, HOW DIFFICULT HAVE THESE PROBLEMS MADE IT FOR YOU TO DO YOUR WORK, TAKE CARE OF THINGS AT HOME, OR GET ALONG WITH OTHER PEOPLE: VERY DIFFICULT
1. FEELING NERVOUS, ANXIOUS, OR ON EDGE: SEVERAL DAYS
2. NOT BEING ABLE TO STOP OR CONTROL WORRYING: SEVERAL DAYS
6. BECOMING EASILY ANNOYED OR IRRITABLE: NEARLY EVERY DAY
GAD7 TOTAL SCORE: 9
5. BEING SO RESTLESS THAT IT IS HARD TO SIT STILL: SEVERAL DAYS
7. FEELING AFRAID AS IF SOMETHING AWFUL MIGHT HAPPEN: SEVERAL DAYS
3. WORRYING TOO MUCH ABOUT DIFFERENT THINGS: SEVERAL DAYS

## 2018-11-16 ASSESSMENT — PATIENT HEALTH QUESTIONNAIRE - PHQ9
5. POOR APPETITE OR OVEREATING: SEVERAL DAYS
SUM OF ALL RESPONSES TO PHQ QUESTIONS 1-9: 13

## 2018-11-16 NOTE — MR AVS SNAPSHOT
After Visit Summary   11/16/2018    Lianna Sorto    MRN: [id number]           Patient Information     Date Of Birth          1980        Visit Information        Provider Department      11/16/2018 10:00 AM Liberty Martinez,  [facility]        Today's Diagnoses     Acute cystitis without hematuria    -  1    Dysuria        Nonspecific finding on examination of urine        Vaginal discharge          Care Instructions      Understanding Urinary Tract Infections (UTIs)  Most UTIs are caused by bacteria, although they may also be caused by viruses or fungi. Bacteria from the bowel are the most common source of infection. The infection may start because of any of the following:    Sexual activity. During sex, bacteria can travel from the penis, vagina, or rectum into the urethra.     Bacteria on the skin outside the rectum may travel into the urethra. This is more common in women since the rectum and urethra are closer to each other than in men. Wiping from front to back after using the toilet and keeping the area clean can help prevent germs from getting to the urethra.    Blockage of urine flow through the urinary tract. If urine sits too long, germs may start to grow out of control.      Parts of the urinary tract  The infection can occur in any part of the urinary tract.    The kidneys collect and store urine.    The ureters carry urine from the kidneys to the bladder.    The bladder holds urine until you are ready to let it out.    The urethra carries urine from the bladder out of the body. It is shorter in women, so bacteria can move through it more easily. The urethra is longer in men, so a UTI is less likely to reach the bladder or kidneys in men.  Date Last Reviewed: 1/1/2017 2000-2018 [employer]. [address] [place] [address], [place] [address]. All rights reserved. This information is not intended as a substitute for professional medical care. Always follow  "your healthcare professional's instructions.                Follow-ups after your visit        Follow-up notes from your care team     Return in about 1 week (around 11/23/2018) for If symptoms don't improve.      Your next 10 appointments already scheduled     Dec 03, 2018  4:15 PM CST   [facility] Yumi [facility]-C   [facility]    [address] [facility] [address] [phone]              Who to contact     If you have questions or need follow up information about today's clinic visit or your schedule please contact [facility] directly at [phone].  Normal or non-critical lab and imaging results will be communicated to you by MyChart, letter or phone within 4 business days after the clinic has received the results. If you do not hear from us within 7 days, please contact the clinic through Moonfruithart or phone. If you have a critical or abnormal lab result, we will notify you by phone as soon as possible.  Submit refill requests through [employer] or call your pharmacy and they will forward the refill request to us. Please allow 3 business days for your refill to be completed.          Additional Information About Your Visit        MyChart Information     [employer] gives you secure access to your electronic health record. If you see a primary care provider, you can also send messages to your care team and make appointments. If you have questions, please call your primary care clinic.  If you do not have a primary care provider, please call [phone] and they will assist you.        Care EveryWhere ID     This is your Care EveryWhere ID. This could be used by other organizations to access your [facility] medical records  [id number]        Your Vitals Were     Pulse Temperature Height Last Period Pulse Oximetry BMI (Body Mass Index)    82 97.7  F (36.5  C) (Tympanic) 5' 5.5\" (1.664 m) 08/31/2018 98% 32.2 kg/m2       Blood Pressure from " Last 3 Encounters:   11/16/18 110/76   08/21/18 106/74   04/25/18 124/87    Weight from Last 3 Encounters:   11/16/18 196 lb 8 oz (89.1 kg)   08/21/18 204 lb 4.8 oz (92.7 kg)   04/25/18 215 lb (97.5 kg)              We Performed the Following     *UA reflex to Microscopic and Culture ([place] and [facility] (except [place] and [place])     Urine Culture Aerobic Bacterial     Urine Microscopic     Wet prep          Today's Medication Changes          These changes are accurate as of 11/16/18 10:26 AM.  If you have any questions, ask your nurse or doctor.               Start taking these medicines.        Dose/Directions    sulfamethoxazole-trimethoprim 800-160 MG per tablet   Commonly known as:  BACTRIM DS/SEPTRA DS   Used for:  Acute cystitis without hematuria   Started by:  Liberty Martinez DO        Dose:  1 tablet   Take 1 tablet by mouth 2 times daily for 3 days   Quantity:  6 tablet   Refills:  0            Where to get your medicines      These medications were sent to [facility] [address] [place] [address]  [address] [place] [address]     Phone:  [phone]     sulfamethoxazole-trimethoprim 800-160 MG per tablet                Primary Care Provider Office Phone # Fax #    Clau Maldonado DANA Marr [facility] [phone]       [address] [facility] [address]        Equal Access to Services     MICHAEL STERN AH: Hadii brandon ku hadasho Sogenesis, waaxda luqadaha, qaybta kaalmada adeegyada, oswaldo negron . So [facility] [phone].    ATENCIÓN: Si habla español, tiene a bermudez disposición servicios gratuitos de asistencia lingüística. Shayy muller [phone].    We comply with applicable federal civil rights laws and [place] laws. We do not discriminate on the basis of race, color, national origin, age, disability, sex, sexual orientation, or gender identity.            Thank you!     Thank you for choosing [facility]   for your care. Our goal is always to provide you with excellent care. Hearing back from our patients is one way we can continue to improve our services. Please take a few minutes to complete the written survey that you may receive in the mail after your visit with us. Thank you!             Your Updated Medication List - Protect others around you: Learn how to safely use, store and throw away your medicines at www.disposemymeds.org.          This list is accurate as of 11/16/18 10:26 AM.  Always use your most recent med list.                   Brand Name Dispense Instructions for use Diagnosis    amphetamine-dextroamphetamine 10 MG per tablet    ADDERALL    90 tablet    Take 1 tablet (10 mg) by mouth 3 times daily    ADHD (attention deficit hyperactivity disorder), inattentive type       clindamycin 1 % lotion    CLINDAMAX    60 mL    Apply to AA QD    Hidradenitis suppurativa       cyclobenzaprine 10 MG tablet    FLEXERIL    30 tablet    Take 1 tablet (10 mg) by mouth 3 times daily as needed for muscle spasms    Tension headache, Back muscle spasm       fluticasone 50 MCG/ACT spray    FLONASE    16 g    Spray 2 sprays into both nostrils daily    Allergic state       gabapentin 100 MG capsule    NEURONTIN    90 capsule    Take 1 capsule (100 mg) by mouth 3 times daily as needed    FAUSTINO (generalized anxiety disorder)       levonorgestrel-ethinyl estradiol 0.15-0.03 MG per tablet    JOLESSA    84 tablet    Take 1 tablet by mouth daily    Encounter for surveillance of contraceptive pills       order for DME     2 Units    Equipment being ordered: Dynaflex insert    Bilateral foot pain, Repetitive stress injury       spironolactone 100 MG tablet    ALDACTONE    90 tablet    Take 1 tablet (100 mg) by mouth daily    Hidradenitis suppurativa       sulfamethoxazole-trimethoprim 800-160 MG per tablet    BACTRIM DS/SEPTRA DS    6 tablet    Take 1 tablet by mouth 2 times daily for 3 days    Acute cystitis without hematuria        traMADol 50 MG tablet    ULTRAM    30 tablet    Take 1 tablet (50 mg) by mouth every 6 hours as needed for pain    Fibromyalgia       triamcinolone 0.1 % cream    KENALOG    80 g    Apply to AA BID x 2 weeks, then PRN only    Eczema, unspecified type

## 2018-11-16 NOTE — LETTER
[facility]  [address] [facility] [address]  Phone: [phone]    11/23/18    Lianna Sorto  [address] [facility] [address]      Lianna,        We are writing to inform you of the results from your recent lab work, included is a copy of those results.    Component      Latest Ref Rng & Units 11/16/2018   Color Urine       Yellow   Appearance Urine       Clear   Glucose Urine      NEG:Negative mg/dL Negative   Bilirubin Urine      NEG:Negative Negative   Ketones Urine      NEG:Negative mg/dL Negative   Specific Gravity Urine      1.003 - 1.035 >1.030   Blood Urine      NEG:Negative Small (A)   pH Urine      5.0 - 7.0 pH 5.5   Protein Albumin Urine      NEG:Negative mg/dL Negative   Urobilinogen Urine      0.2 - 1.0 EU/dL 0.2   Nitrite Urine      NEG:Negative Positive (A)   Leukocyte Esterase Urine      NEG:Negative Small (A)   Source       Midstream Urine   WBC Urine      OTO5:0 - 5 /HPF 10-25 (A)   RBC Urine      OTO2:O - 2 /HPF 5-10 (A)   Squamous Epithelial /LPF Urine      FEW:Few /LPF Few   Bacteria Urine      NEG:Negative /HPF Moderate (A)   Mucous Urine      NEG:Negative /LPF Present (A)   Specimen Description       Midstream Urine   Special Requests       Specimen received in preservative   Culture Micro       >100,000 colonies/mL (A) . . .     Vaginal test is negative.  Urine test is positive, continue with antibiotic discussed at time of visit.  Antibiotic given will work for this infection.  If you have any questions, please do not hesitate to call our office.        Sincerely,      Liberty Martinez, DO

## 2018-11-16 NOTE — PATIENT INSTRUCTIONS
Understanding Urinary Tract Infections (UTIs)  Most UTIs are caused by bacteria, although they may also be caused by viruses or fungi. Bacteria from the bowel are the most common source of infection. The infection may start because of any of the following:    Sexual activity. During sex, bacteria can travel from the penis, vagina, or rectum into the urethra.     Bacteria on the skin outside the rectum may travel into the urethra. This is more common in women since the rectum and urethra are closer to each other than in men. Wiping from front to back after using the toilet and keeping the area clean can help prevent germs from getting to the urethra.    Blockage of urine flow through the urinary tract. If urine sits too long, germs may start to grow out of control.      Parts of the urinary tract  The infection can occur in any part of the urinary tract.    The kidneys collect and store urine.    The ureters carry urine from the kidneys to the bladder.    The bladder holds urine until you are ready to let it out.    The urethra carries urine from the bladder out of the body. It is shorter in women, so bacteria can move through it more easily. The urethra is longer in men, so a UTI is less likely to reach the bladder or kidneys in men.  Date Last Reviewed: 1/1/2017 2000-2018 [employer]. [address] [place] [address], [place] [address]. All rights reserved. This information is not intended as a substitute for professional medical care. Always follow your healthcare professional's instructions.

## 2018-11-16 NOTE — PROGRESS NOTES
SUBJECTIVE:   Lianna Sorto is a 38 year old female who presents to clinic today for the following health issues:        URINARY TRACT SYMPTOMS  Onset: 4 days     Description:   Painful urination (Dysuria): YES  Blood in urine (Hematuria): no   Delay in urine (Hesitency): no     Intensity: moderate    Progression of Symptoms:  same    Accompanying Signs & Symptoms:  Fever/chills: no   Flank pain YES  Nausea and vomiting: no   Any vaginal symptoms: Vaginal odor   Abdominal/Pelvic Pain: YES, abdominal cramping     History:   History of frequent UTI's: no   History of kidney stones: no   Sexually Active: YES  Possibility of pregnancy: No    Precipitating factors: None     Therapies Tried and outcome:cranberry juice     Patient was in a hurry, on her break from work.  She requested self collect wet prep    New partner x 3 months      Current Outpatient Prescriptions   Medication Sig Dispense Refill     amphetamine-dextroamphetamine (ADDERALL) 10 MG per tablet Take 1 tablet (10 mg) by mouth 3 times daily 90 tablet 0     clindamycin (CLINDAMAX) 1 % lotion Apply to AA QD 60 mL 0     cyclobenzaprine (FLEXERIL) 10 MG tablet Take 1 tablet (10 mg) by mouth 3 times daily as needed for muscle spasms 30 tablet 5     fluticasone (FLONASE) 50 MCG/ACT nasal spray Spray 2 sprays into both nostrils daily 16 g 0     gabapentin (NEURONTIN) 100 MG capsule Take 1 capsule (100 mg) by mouth 3 times daily as needed 90 capsule 11     levonorgestrel-ethinyl estradiol (JOLESSA) 0.15-0.03 MG per tablet Take 1 tablet by mouth daily 84 tablet 4     spironolactone (ALDACTONE) 100 MG tablet Take 1 tablet (100 mg) by mouth daily 90 tablet 3     triamcinolone (KENALOG) 0.1 % cream Apply to AA BID x 2 weeks, then PRN only 80 g 1     order for DME Equipment being ordered: Dynaflex insert 2 Units 0     traMADol (ULTRAM) 50 MG tablet Take 1 tablet (50 mg) by mouth every 6 hours as needed for pain (Patient not taking: Reported on 11/16/2018) 30 tablet  "3       Reviewed and updated as needed this visit by clinical staff  Tobacco  Allergies  Meds  Problems  Med Hx  Surg Hx  Fam Hx  Soc Hx        Reviewed and updated as needed this visit by Provider  Allergies  Meds  Problems         ROS:  Constitutional, HEENT, cardiovascular, pulmonary, gi and gu systems are negative, except as otherwise noted.    OBJECTIVE:     /76  Pulse 82  Temp 97.7  F (36.5  C) (Tympanic)  Ht 5' 5.5\" (1.664 m)  Wt 196 lb 8 oz (89.1 kg)  LMP 08/31/2018  SpO2 98%  BMI 32.2 kg/m2  Body mass index is 32.2 kg/(m^2).  GENERAL APPEARANCE: healthy, alert and no distress  ABDOMEN: soft, mild suprapubic tenderness  MS: no CVA tenderness    Diagnostic Test Results:  Results for orders placed or performed in visit on 11/16/18 (from the past 24 hour(s))   *UA reflex to Microscopic and Culture ([place] and [facility] (except [place] and [place])   Result Value Ref Range    Color Urine Yellow     Appearance Urine Clear     Glucose Urine Negative NEG^Negative mg/dL    Bilirubin Urine Negative NEG^Negative    Ketones Urine Negative NEG^Negative mg/dL    Specific Gravity Urine >1.030 1.003 - 1.035    Blood Urine Small (A) NEG^Negative    pH Urine 5.5 5.0 - 7.0 pH    Protein Albumin Urine Negative NEG^Negative mg/dL    Urobilinogen Urine 0.2 0.2 - 1.0 EU/dL    Nitrite Urine Positive (A) NEG^Negative    Leukocyte Esterase Urine Small (A) NEG^Negative    Source Midstream Urine    Urine Microscopic   Result Value Ref Range    WBC Urine 10-25 (A) OTO5^0 - 5 /HPF    RBC Urine 5-10 (A) OTO2^O - 2 /HPF    Squamous Epithelial /LPF Urine Few FEW^Few /LPF    Bacteria Urine Moderate (A) NEG^Negative /HPF    Mucous Urine Present (A) NEG^Negative /LPF       ASSESSMENT/PLAN:       ICD-10-CM    1. Acute cystitis without hematuria N30.00 sulfamethoxazole-trimethoprim (BACTRIM DS/SEPTRA DS) 800-160 MG per tablet   2. Vaginal discharge N89.8 Wet prep   3. Dysuria R30.0 *UA reflex to Microscopic and " Culture ([place] and [facility] (except [place] and [place])     Urine Microscopic   4. Nonspecific finding on examination of urine R82.90 Urine Culture Aerobic Bacterial         Liberty Martinez,   [facility]

## 2018-11-17 ASSESSMENT — ANXIETY QUESTIONNAIRES: GAD7 TOTAL SCORE: 9

## 2018-11-18 LAB
BACTERIA SPEC CULT: ABNORMAL
Lab: ABNORMAL
SPECIMEN SOURCE: ABNORMAL

## 2018-12-03 ENCOUNTER — OFFICE VISIT (OUTPATIENT)
Dept: DERMATOLOGY | Facility: CLINIC | Age: 38
End: 2018-12-03
Payer: COMMERCIAL

## 2018-12-03 VITALS — OXYGEN SATURATION: 97 % | SYSTOLIC BLOOD PRESSURE: 128 MMHG | HEART RATE: 92 BPM | DIASTOLIC BLOOD PRESSURE: 76 MMHG

## 2018-12-03 DIAGNOSIS — L73.2 HIDRADENITIS SUPPURATIVA: ICD-10-CM

## 2018-12-03 DIAGNOSIS — L30.9 ECZEMA, UNSPECIFIED TYPE: ICD-10-CM

## 2018-12-03 PROCEDURE — 99212 OFFICE O/P EST SF 10 MIN: CPT | Performed by: PHYSICIAN ASSISTANT

## 2018-12-03 RX ORDER — SPIRONOLACTONE 100 MG/1
100 TABLET, FILM COATED ORAL DAILY
Qty: 90 TABLET | Refills: 3 | Status: SHIPPED | OUTPATIENT
Start: 2018-12-03 | End: 2020-02-12

## 2018-12-03 RX ORDER — CLINDAMYCIN PHOSPHATE 10 UG/ML
LOTION TOPICAL
Qty: 60 ML | Refills: 11 | Status: SHIPPED | OUTPATIENT
Start: 2018-12-03 | End: 2019-12-10

## 2018-12-03 RX ORDER — TRIAMCINOLONE ACETONIDE 1 MG/G
CREAM TOPICAL
Qty: 80 G | Refills: 1 | Status: SHIPPED | OUTPATIENT
Start: 2018-12-03 | End: 2019-12-10

## 2018-12-03 NOTE — LETTER
12/3/2018         RE: Lianna Sorto  [address]  [facility] [address]        Dear Colleague,    Thank you for referring your patient, Lianna Sorto, to the [facility]. Please see a copy of my visit note below.    HPI:   Lianna Sorto is a 35 year old female who presents for recheck of hidradenitis suppurativa.  Doing great on spironolactone and topical clindamycin. Has not needed antibiotics in over 1 year. Pleased with results.   chief complaint  Location: groin and axilla  Condition present on and off for years. .   Previous treatments include: doxy x2 weeks (not helpful), thinks she has had clinda gel in the past (helpful)  -Denies h/o skin CA    Review Of Systems  Eyes: negative  Ears/Nose/Throat: negative  Respiratory: No shortness of breath, dyspnea on exertion, cough, or hemoptysis  Cardiovascular: negative  Gastrointestinal: negative  Genitourinary: negative  Musculoskeletal: negative  Neurologic: negative  Psychiatric: negative    This document serves as a record of the services and decisions personally performed and made by Yumi Brewer PA-C. It was created on her behalf by Fatoumata Moncada, a trained medical scribe. The creation of this document is based the provider's statements to the medical scribe.  Fatoumata Moncada December 3, 2018 4:31 PM      PHYSICAL EXAM:   A&Ox3:Yes    Well developed, well nourished female Yes   Skin Type: 2  Areas checked: face, head, neck, groin, hands, wrists  1. Post inflammatory hyperpigmenation; healing cysts and tract marks on bilateral inguinal fold      ASSESSMENT/PLAN:     1. Hidradenitis suppurativa bilateral groin - doing great on current regimen. Pleased with progress.    From initial visit: Has had on and off for years. Recently has been getting painful, inflamed draining cysts. PCP put her on doxy, which helped minimally. Had years ago as well, lost weight which helped greatly. Just went through a difficult divorce recently so she did  gain some weight. Discussed that decreasing her weight will help with this, which she does know.    --Continue OTC gentle cleanser wash to groin QD in shower.   --Continue clindamycin lotion QD after shower  --Spironolactone to 100 mg daily. Advised on potential for hypotension, teratogenetic effects, menstrual irregularities, hyperkalemia and need for Q 6 month K+ checks. Stressed importance of PO water intake.   2. Hand eczema - does well with emollients and intermittent TAC. Continue this PRN      Follow-up: yearly if doing well/PRN sooner  CC:   Scribed By: Fatoumata Moncada, Medical Scribe    The information in this document, created by the medical scribe for me, accurately reflects the services I personally performed and the decisions made by me. I have reviewed and approved this document for accuracy prior to leaving the patient care area.  Yumi Brewer PA-C December 3, 2018 4:32 PM          Again, thank you for allowing me to participate in the care of your patient.        Sincerely,        Yumi Brewer PA-C

## 2018-12-03 NOTE — MR AVS SNAPSHOT
After Visit Summary   12/3/2018    Lianna Sorto    MRN: [id number]           Patient Information     Date Of Birth          1980        Visit Information        Provider Department      12/3/2018 4:15 PM Yumi Brewer PA-C [facility]        Today's Diagnoses     Hidradenitis suppurativa        Eczema, unspecified type           Follow-ups after your visit        Who to contact     If you have questions or need follow up information about today's clinic visit or your schedule please contact [facility] directly at [phone].  Normal or non-critical lab and imaging results will be communicated to you by Aerospikehart, letter or phone within 4 business days after the clinic has received the results. If you do not hear from us within 7 days, please contact the clinic through Aerospikehart or phone. If you have a critical or abnormal lab result, we will notify you by phone as soon as possible.  Submit refill requests through WorkTouch or call your pharmacy and they will forward the refill request to us. Please allow 3 business days for your refill to be completed.          Additional Information About Your Visit        MyChart Information     WorkTouch gives you secure access to your electronic health record. If you see a primary care provider, you can also send messages to your care team and make appointments. If you have questions, please call your primary care clinic.  If you do not have a primary care provider, please call [phone] and they will assist you.        Care EveryWhere ID     This is your Care EveryWhere ID. This could be used by other organizations to access your [facility] medical records  [id number]        Your Vitals Were     Pulse Pulse Oximetry                92 97%           Blood Pressure from Last 3 Encounters:   12/03/18 128/76   11/16/18 110/76   08/21/18 106/74    Weight from Last 3 Encounters:   11/16/18 89.1 kg (196 lb 8 oz)   08/21/18 92.7 kg (204  lb 4.8 oz)   04/25/18 97.5 kg (215 lb)              Today, you had the following     No orders found for display         Where to get your medicines      These medications were sent to [facility] - [place], [place] - [address]  [address] [facility] [facility] [address] [facility] [address]     Phone:  [phone]     clindamycin 1 % external lotion    spironolactone 100 MG tablet    triamcinolone 0.1 % external cream          Primary Care Provider Office Phone # Fax #    Clau Maldonado Andrea Dukes, DANA SHETH [phone]       [address] [facility] [address]        Equal Access to Services     MICHAEL STERN : Hadii brandon micheleo Sogenesis, waaxda luqadaha, qaybta kaalmada adeegyada, oswaldo negron . So [facility] [phone].    ATENCIÓN: Si habla español, tiene a bermudez disposición servicios gratuitos de asistencia lingüística. Llame al [phone].    We comply with applicable federal civil rights laws and [place] laws. We do not discriminate on the basis of race, color, national origin, age, disability, sex, sexual orientation, or gender identity.            Thank you!     Thank you for choosing [facility]  for your care. Our goal is always to provide you with excellent care. Hearing back from our patients is one way we can continue to improve our services. Please take a few minutes to complete the written survey that you may receive in the mail after your visit with us. Thank you!             Your Updated Medication List - Protect others around you: Learn how to safely use, store and throw away your medicines at www.disposemymeds.org.          This list is accurate as of 12/3/18  4:59 PM.  Always use your most recent med list.                   Brand Name Dispense Instructions for use Diagnosis    amphetamine-dextroamphetamine 10 MG tablet    ADDERALL    90 tablet    Take 1 tablet (10 mg) by mouth 3 times daily    ADHD (attention deficit hyperactivity disorder),  inattentive type       clindamycin 1 % external lotion    CLINDAMAX    60 mL    Apply to AA QD    Hidradenitis suppurativa       cyclobenzaprine 10 MG tablet    FLEXERIL    30 tablet    Take 1 tablet (10 mg) by mouth 3 times daily as needed for muscle spasms    Tension headache, Back muscle spasm       fluticasone 50 MCG/ACT nasal spray    FLONASE    16 g    Spray 2 sprays into both nostrils daily    Allergic state       gabapentin 100 MG capsule    NEURONTIN    90 capsule    Take 1 capsule (100 mg) by mouth 3 times daily as needed    FAUSTINO (generalized anxiety disorder)       levonorgestrel-ethinyl estradiol 0.15-0.03 MG tablet    JOLESSA    84 tablet    Take 1 tablet by mouth daily    Encounter for surveillance of contraceptive pills       order for DME     2 Units    Equipment being ordered: Dynaflex insert    Bilateral foot pain, Repetitive stress injury       spironolactone 100 MG tablet    ALDACTONE    90 tablet    Take 1 tablet (100 mg) by mouth daily    Hidradenitis suppurativa       traMADol 50 MG tablet    ULTRAM    30 tablet    Take 1 tablet (50 mg) by mouth every 6 hours as needed for pain    Fibromyalgia       triamcinolone 0.1 % external cream    KENALOG    80 g    Apply to AA BID x 2 weeks, then PRN only    Eczema, unspecified type

## 2018-12-03 NOTE — NURSING NOTE
"Chief Complaint   Patient presents with     Derm Problem     f/u        Initial /76 (BP Location: Right arm, Patient Position: Chair, Cuff Size: Adult Regular)  Pulse 92  SpO2 97% Estimated body mass index is 32.2 kg/(m^2) as calculated from the following:    Height as of 11/16/18: 1.664 m (5' 5.5\").    Weight as of 11/16/18: 89.1 kg (196 lb 8 oz).  Medications and allergies reviewed.    Marsha POLK CMA    "

## 2018-12-03 NOTE — PROGRESS NOTES
HPI:   Lianna Sorto is a 35 year old female who presents for recheck of hidradenitis suppurativa.  Doing great on spironolactone and topical clindamycin. Has not needed antibiotics in over 1 year. Pleased with results.   chief complaint  Location: groin and axilla  Condition present on and off for years. .   Previous treatments include: doxy x2 weeks (not helpful), thinks she has had clinda gel in the past (helpful)  -Denies h/o skin CA    Review Of Systems  Eyes: negative  Ears/Nose/Throat: negative  Respiratory: No shortness of breath, dyspnea on exertion, cough, or hemoptysis  Cardiovascular: negative  Gastrointestinal: negative  Genitourinary: negative  Musculoskeletal: negative  Neurologic: negative  Psychiatric: negative    This document serves as a record of the services and decisions personally performed and made by Yumi Brewer PA-C. It was created on her behalf by Fatoumata Moncada, a trained medical scribe. The creation of this document is based the provider's statements to the medical scribe.  Fatoumata Moncada December 3, 2018 4:31 PM      PHYSICAL EXAM:   A&Ox3:Yes    Well developed, well nourished female Yes   Skin Type: 2  Areas checked: face, head, neck, groin, hands, wrists  1. Post inflammatory hyperpigmenation; healing cysts and tract marks on bilateral inguinal fold      ASSESSMENT/PLAN:     1. Hidradenitis suppurativa bilateral groin - doing great on current regimen. Pleased with progress.    From initial visit: Has had on and off for years. Recently has been getting painful, inflamed draining cysts. PCP put her on doxy, which helped minimally. Had years ago as well, lost weight which helped greatly. Just went through a difficult divorce recently so she did gain some weight. Discussed that decreasing her weight will help with this, which she does know.    --Continue OTC gentle cleanser wash to groin QD in shower.   --Continue clindamycin lotion QD after shower  --Spironolactone to 100 mg daily.  Advised on potential for hypotension, teratogenetic effects, menstrual irregularities, hyperkalemia and need for Q 6 month K+ checks. Stressed importance of PO water intake.   2. Hand eczema - does well with emollients and intermittent TAC. Continue this PRN      Follow-up: yearly if doing well/PRN sooner  CC:   Scribed By: Fatoumata Moncada, Medical Scribe    The information in this document, created by the medical scribe for me, accurately reflects the services I personally performed and the decisions made by me. I have reviewed and approved this document for accuracy prior to leaving the patient care area.  Yumi Brewer PA-C December 3, 2018 4:32 PM

## 2019-01-02 ENCOUNTER — OFFICE VISIT (OUTPATIENT)
Dept: FAMILY MEDICINE | Facility: CLINIC | Age: 39
End: 2019-01-02
Payer: COMMERCIAL

## 2019-01-02 VITALS
SYSTOLIC BLOOD PRESSURE: 110 MMHG | WEIGHT: 197 LBS | BODY MASS INDEX: 31.66 KG/M2 | DIASTOLIC BLOOD PRESSURE: 80 MMHG | TEMPERATURE: 97.7 F | OXYGEN SATURATION: 98 % | HEIGHT: 66 IN | HEART RATE: 89 BPM

## 2019-01-02 DIAGNOSIS — Z23 ENCOUNTER FOR IMMUNIZATION: ICD-10-CM

## 2019-01-02 DIAGNOSIS — F90.0 ADHD (ATTENTION DEFICIT HYPERACTIVITY DISORDER), INATTENTIVE TYPE: Primary | ICD-10-CM

## 2019-01-02 PROCEDURE — 99213 OFFICE O/P EST LOW 20 MIN: CPT | Mod: 25 | Performed by: NURSE PRACTITIONER

## 2019-01-02 PROCEDURE — 90714 TD VACC NO PRESV 7 YRS+ IM: CPT | Performed by: NURSE PRACTITIONER

## 2019-01-02 PROCEDURE — 90471 IMMUNIZATION ADMIN: CPT | Performed by: NURSE PRACTITIONER

## 2019-01-02 RX ORDER — DEXTROAMPHETAMINE SACCHARATE, AMPHETAMINE ASPARTATE, DEXTROAMPHETAMINE SULFATE AND AMPHETAMINE SULFATE 2.5; 2.5; 2.5; 2.5 MG/1; MG/1; MG/1; MG/1
10 TABLET ORAL 3 TIMES DAILY
Qty: 90 TABLET | Refills: 0 | Status: SHIPPED | OUTPATIENT
Start: 2019-03-02 | End: 2019-04-22

## 2019-01-02 RX ORDER — DEXTROAMPHETAMINE SACCHARATE, AMPHETAMINE ASPARTATE, DEXTROAMPHETAMINE SULFATE AND AMPHETAMINE SULFATE 2.5; 2.5; 2.5; 2.5 MG/1; MG/1; MG/1; MG/1
10 TABLET ORAL 3 TIMES DAILY
Qty: 90 TABLET | Refills: 0 | Status: SHIPPED | OUTPATIENT
Start: 2019-02-02 | End: 2019-01-02

## 2019-01-02 RX ORDER — DEXTROAMPHETAMINE SACCHARATE, AMPHETAMINE ASPARTATE, DEXTROAMPHETAMINE SULFATE AND AMPHETAMINE SULFATE 2.5; 2.5; 2.5; 2.5 MG/1; MG/1; MG/1; MG/1
10 TABLET ORAL 3 TIMES DAILY
Qty: 90 TABLET | Refills: 0 | Status: SHIPPED | OUTPATIENT
Start: 2019-01-02 | End: 2019-01-02

## 2019-01-02 ASSESSMENT — MIFFLIN-ST. JEOR: SCORE: 1600.34

## 2019-01-02 NOTE — PATIENT INSTRUCTIONS
Thank you for choosing [facility].  You may be receiving a survey in the mail from Gloria Bowman regarding your visit today.  Please take a few minutes to complete and return the survey to let us know how we are doing.      If you have questions or concerns, please contact us via PlayEarth or you can contact your care team at [phone].    Our Clinic hours are:  Monday 6:40 am  to 7:00 pm  Tuesday -Friday 6:40 am to 5:00 pm    The [place] outpatient lab hours are:  Monday - Friday 6:10 am to 4:45 pm  Saturdays 7:00 am to 11:00 am  Appointments are required, call [phone]    If you have clinical questions after hours or would like to schedule an appointment,  call the clinic at [phone].

## 2019-01-02 NOTE — NURSING NOTE
Screening Questionnaire for Adult Immunization    Are you sick today?   No   Do you have allergies to medications, food, a vaccine component or latex?   Yes   Have you ever had a serious reaction after receiving a vaccination?   No   Do you have a long-term health problem with heart disease, lung disease, asthma, kidney disease, metabolic disease (e.g. diabetes), anemia, or other blood disorder?   No   Do you have cancer, leukemia, HIV/AIDS, or any other immune system problem?   No   In the past 3 months, have you taken medications that affect  your immune system, such as prednisone, other steroids, or anticancer drugs; drugs for the treatment of rheumatoid arthritis, Crohn s disease, or psoriasis; or have you had radiation treatments?   No   Have you had a seizure, or a brain or other nervous system problem?   No   During the past year, have you received a transfusion of blood or blood     products, or been given immune (gamma) globulin or antiviral drug?   No   For women: Are you pregnant or is there a chance you could become        pregnant during the next month?   No   Have you received any vaccinations in the past 4 weeks?   No     Immunization questionnaire was positive for at least one answer.  Notified provider; .        Per orders of Dr. Dukes, injection of Td given by PREMA SON. Patient instructed to remain in clinic for 15 minutes afterwards, and to report any adverse reaction to me immediately.       Screening performed by PREMA SON on 1/2/2019 at 12:53 PM.

## 2019-01-02 NOTE — PROGRESS NOTES
"  SUBJECTIVE:   Lianna Sorto is a 38 year old female who presents to clinic today for the following health issues:      Medication Followup of Adderall    Taking Medication as prescribed: yes    Side Effects:  None    Can't take XR- makes her too tired    Medication Helping Symptoms:  Yes    Taking 2-3 tabs per day           Problem list and histories reviewed & adjusted, as indicated.  Additional history: as documented    Reviewed and updated as needed this visit by clinical staff  Tobacco  Allergies  Meds       Reviewed and updated as needed this visit by Provider         ROS:  Constitutional, HEENT, cardiovascular, pulmonary, gi and gu systems are negative, except as otherwise noted.    OBJECTIVE:     /80 (BP Location: Right arm)   Pulse 89   Temp 97.7  F (36.5  C) (Tympanic)   Ht 1.676 m (5' 6\")   Wt 89.4 kg (197 lb)   SpO2 98%   BMI 31.80 kg/m    Body mass index is 31.8 kg/m .  GENERAL: healthy, alert and no distress  PSYCH: mentation appears normal, affect normal/bright      ASSESSMENT/PLAN:         ICD-10-CM    1. ADHD (attention deficit hyperactivity disorder), inattentive type F90.0 amphetamine-dextroamphetamine (ADDERALL) 10 MG tablet     DISCONTINUED: amphetamine-dextroamphetamine (ADDERALL) 10 MG tablet     DISCONTINUED: amphetamine-dextroamphetamine (ADDERALL) 10 MG tablet   2. Encounter for immunization Z23 ADMIN 1st VACCINE       ADD symptoms well controlled.  Continue adderall as prescribed - 3 months given.      The risks, benefits and treatment options of prescribed medications or other treatments have been discussed with the patient. The patient verbalized their understanding and should call or follow up if no improvement or if they develop further problems.      DANA Longoria [facility]  "

## 2019-03-26 ENCOUNTER — OFFICE VISIT (OUTPATIENT)
Dept: FAMILY MEDICINE | Facility: CLINIC | Age: 39
End: 2019-03-26
Payer: COMMERCIAL

## 2019-03-26 VITALS
RESPIRATION RATE: 18 BRPM | HEART RATE: 90 BPM | OXYGEN SATURATION: 98 % | BODY MASS INDEX: 31.34 KG/M2 | HEIGHT: 66 IN | TEMPERATURE: 97.8 F | WEIGHT: 195 LBS | DIASTOLIC BLOOD PRESSURE: 78 MMHG | SYSTOLIC BLOOD PRESSURE: 124 MMHG

## 2019-03-26 DIAGNOSIS — T78.40XS ALLERGIC STATE, SEQUELA: ICD-10-CM

## 2019-03-26 DIAGNOSIS — J32.9 SINUSITIS, UNSPECIFIED CHRONICITY, UNSPECIFIED LOCATION: Primary | ICD-10-CM

## 2019-03-26 PROCEDURE — 99213 OFFICE O/P EST LOW 20 MIN: CPT | Performed by: FAMILY MEDICINE

## 2019-03-26 RX ORDER — FLUTICASONE PROPIONATE 50 MCG
2 SPRAY, SUSPENSION (ML) NASAL DAILY
Qty: 16 G | Refills: 0 | Status: SHIPPED | OUTPATIENT
Start: 2019-03-26 | End: 2019-10-03

## 2019-03-26 ASSESSMENT — MIFFLIN-ST. JEOR: SCORE: 1591.26

## 2019-03-26 NOTE — PROGRESS NOTES
"  SUBJECTIVE:   Lianna Sorto is a 38 year old female who presents to clinic today for the following health issues:      ENT Symptoms             Symptoms: cc Present Absent Comment   Fever/Chills  x  Over the weekend low grade    Fatigue x x     Muscle Aches  x     Eye Irritation  x     Sneezing x x     Nasal Bennett/Drg x x     Sinus Pressure/Pain x x     Loss of smell   x    Dental pain  x  Right side    Sore Throat   x    Swollen Glands   x    Ear Pain/Fullness  x     Cough x x     Wheeze   x    Chest Pain   x    Shortness of breath   x    Rash   x    Other x x  Headache and dizziness      Symptom duration:   X 2 weeks    Symptom severity:  sinus    Treatments tried:  OTC sinus spray , OTC decongestant    Contacts:               Problem list and histories reviewed & adjusted, as indicated.  Additional history:         Reviewed and updated as needed this visit by clinical staff  Tobacco  Allergies  Meds       Reviewed and updated as needed this visit by Provider      Past history is positive for sinus infections    PMH, FH and social history reviewed    OBJECTIVE:  /78   Pulse 90   Temp 97.8  F (36.6  C)   Resp 18   Ht 1.676 m (5' 6\")   Wt 88.5 kg (195 lb)   LMP 03/05/2019   SpO2 98%   BMI 31.47 kg/m    LUNGS: clear to auscultation, normal breath sounds  CV: RRR without murmur  ABD: BS+, soft, nontender, no masses, no hepatosplenomegaly  EXTREMITIES: without joint tenderness, swelling or erythema.  No muscle tenderness or abnormality.  SKIN: No rashes or abnormalities  NEURO:non focal exam  HEENT: TMs retracted without erythema, pharynx mildly erythematous and maxillary sinus tenderness bilateral    ASSESSMENT:        Sinusitis, unspecified chronicity, unspecified location  Allergic state, sequela      PLAN:    Push fluids  Steam or hot packs prn  Return for new or worsening symptoms.    Orders Placed This Encounter     amoxicillin-clavulanate (AUGMENTIN) 875-125 MG tablet     fluticasone " (FLONASE) 50 MCG/ACT nasal spray

## 2019-03-26 NOTE — PATIENT INSTRUCTIONS
Our Clinic hours are:  Mondays    7:20 am - 7 pm  Tues -  Fri  7:20 am - 5 pm    Clinic Phone: [phone]    The clinic lab opens at 7:30 am Mon - Fri and appointments are required.    [facility]. [phone]  Monday  8 am - 7pm  Tues - Fri 8 am - 5:30 pm

## 2019-04-12 ENCOUNTER — OFFICE VISIT (OUTPATIENT)
Dept: FAMILY MEDICINE | Facility: CLINIC | Age: 39
End: 2019-04-12
Payer: COMMERCIAL

## 2019-04-12 VITALS
TEMPERATURE: 97.1 F | HEART RATE: 84 BPM | BODY MASS INDEX: 31.15 KG/M2 | OXYGEN SATURATION: 97 % | WEIGHT: 193 LBS | RESPIRATION RATE: 16 BRPM | SYSTOLIC BLOOD PRESSURE: 108 MMHG | DIASTOLIC BLOOD PRESSURE: 80 MMHG

## 2019-04-12 DIAGNOSIS — N30.00 ACUTE CYSTITIS WITHOUT HEMATURIA: Primary | ICD-10-CM

## 2019-04-12 LAB
BACTERIA #/AREA URNS HPF: ABNORMAL /HPF
MUCOUS THREADS #/AREA URNS LPF: PRESENT /LPF
NON-SQ EPI CELLS #/AREA URNS LPF: ABNORMAL /LPF
RBC #/AREA URNS AUTO: ABNORMAL /HPF
WBC #/AREA URNS AUTO: ABNORMAL /HPF

## 2019-04-12 PROCEDURE — 87088 URINE BACTERIA CULTURE: CPT | Performed by: NURSE PRACTITIONER

## 2019-04-12 PROCEDURE — 81015 MICROSCOPIC EXAM OF URINE: CPT | Performed by: NURSE PRACTITIONER

## 2019-04-12 PROCEDURE — 87086 URINE CULTURE/COLONY COUNT: CPT | Performed by: NURSE PRACTITIONER

## 2019-04-12 PROCEDURE — 99213 OFFICE O/P EST LOW 20 MIN: CPT | Performed by: NURSE PRACTITIONER

## 2019-04-12 PROCEDURE — 87186 SC STD MICRODIL/AGAR DIL: CPT | Performed by: NURSE PRACTITIONER

## 2019-04-12 RX ORDER — SULFAMETHOXAZOLE/TRIMETHOPRIM 800-160 MG
1 TABLET ORAL 2 TIMES DAILY
Qty: 14 TABLET | Refills: 0 | Status: SHIPPED | OUTPATIENT
Start: 2019-04-12 | End: 2019-10-03

## 2019-04-12 NOTE — PROGRESS NOTES
SUBJECTIVE:   Lianna Sorto is a 38 year old female who presents to clinic today for the following   health issues:    URINARY TRACT SYMPTOMS  Onset: was dx with uti/bladder infection 2 months ago, finished antibiotic but felt like sx never fully went away, got worse started monday    Description:   Painful urination (Dysuria): no   Blood in urine (Hematuria): no   Delay in urine (Hesitency): YES    Intensity: moderate    Progression of Symptoms:  worsening    Accompanying Signs & Symptoms:  Fever/chills: no   Flank pain no   Nausea and vomiting: no   Any vaginal symptoms: none  Abdominal/Pelvic Pain: YES- cramping feeling    History:   History of frequent UTI's: no   History of kidney stones: no   Sexually Active: YES  Possibility of pregnancy: No    Precipitating factors:   n/a    Therapies Tried and outcome: AZO    Additional history: as documented    Reviewed  and updated as needed this visit by clinical staff         Reviewed and updated as needed this visit by Provider         Labs reviewed in EPIC    ROS:  Constitutional, HEENT, cardiovascular, pulmonary, gi and gu systems are negative, except as otherwise noted.    OBJECTIVE:     /80 (BP Location: Left arm, Patient Position: Sitting, Cuff Size: Adult Regular)   Pulse 84   Temp 97.1  F (36.2  C) (Tympanic)   Resp 16   Wt 87.5 kg (193 lb)   SpO2 97%   BMI 31.15 kg/m    Body mass index is 31.15 kg/m .  GENERAL: healthy, alert and no distress  ABDOMEN: tenderness suprapubic  PSYCH: mentation appears normal, affect normal/bright    Diagnostic Test Results:  Results for orders placed or performed in visit on 04/12/19 (from the past 24 hour(s))   Urine Microscopic   Result Value Ref Range    WBC Urine 10-25 (A) OTO5^0 - 5 /HPF    RBC Urine 2-5 (A) OTO2^O - 2 /HPF    Squamous Epithelial /LPF Urine Moderate (A) FEW^Few /LPF    Bacteria Urine Few (A) NEG^Negative /HPF    Mucous Urine Present (A) NEG^Negative /LPF       ASSESSMENT/PLAN:     1. Acute  cystitis without hematuria  - Urine Microscopic  - Urine Culture Aerobic Bacterial  - sulfamethoxazole-trimethoprim (BACTRIM DS/SEPTRA DS) 800-160 MG tablet; Take 1 tablet by mouth 2 times daily for 7 days  Dispense: 14 tablet; Refill: 0    See Patient Instructions    DANA Damon [facility] [place] [place]

## 2019-04-14 LAB
BACTERIA SPEC CULT: ABNORMAL
BACTERIA SPEC CULT: ABNORMAL
SPECIMEN SOURCE: ABNORMAL

## 2019-04-22 ENCOUNTER — MYC REFILL (OUTPATIENT)
Dept: FAMILY MEDICINE | Facility: CLINIC | Age: 39
End: 2019-04-22

## 2019-04-22 DIAGNOSIS — F90.0 ADHD (ATTENTION DEFICIT HYPERACTIVITY DISORDER), INATTENTIVE TYPE: ICD-10-CM

## 2019-04-23 RX ORDER — DEXTROAMPHETAMINE SACCHARATE, AMPHETAMINE ASPARTATE, DEXTROAMPHETAMINE SULFATE AND AMPHETAMINE SULFATE 2.5; 2.5; 2.5; 2.5 MG/1; MG/1; MG/1; MG/1
10 TABLET ORAL 3 TIMES DAILY
Qty: 24 TABLET | Refills: 0 | Status: SHIPPED | OUTPATIENT
Start: 2019-04-23 | End: 2019-04-30

## 2019-04-23 NOTE — TELEPHONE ENCOUNTER
Patient request refill from provider with    Patient Comment: I will be out after today, I have an appt with Clau on 04/30/19 for med check.    Provider please review and advise. Thank you.

## 2019-04-23 NOTE — TELEPHONE ENCOUNTER
Left message for patient that the [facility]  to .    Katherine Macdonald on 4/23/2019 at 2:15 PM

## 2019-04-30 ENCOUNTER — OFFICE VISIT (OUTPATIENT)
Dept: FAMILY MEDICINE | Facility: CLINIC | Age: 39
End: 2019-04-30
Payer: COMMERCIAL

## 2019-04-30 VITALS
WEIGHT: 195 LBS | SYSTOLIC BLOOD PRESSURE: 104 MMHG | RESPIRATION RATE: 16 BRPM | OXYGEN SATURATION: 99 % | HEART RATE: 84 BPM | TEMPERATURE: 97.2 F | HEIGHT: 66 IN | DIASTOLIC BLOOD PRESSURE: 76 MMHG | BODY MASS INDEX: 31.34 KG/M2

## 2019-04-30 DIAGNOSIS — M62.830 BACK MUSCLE SPASM: ICD-10-CM

## 2019-04-30 DIAGNOSIS — F90.0 ADHD (ATTENTION DEFICIT HYPERACTIVITY DISORDER), INATTENTIVE TYPE: Primary | ICD-10-CM

## 2019-04-30 DIAGNOSIS — M79.7 FIBROMYALGIA: ICD-10-CM

## 2019-04-30 DIAGNOSIS — F41.1 GAD (GENERALIZED ANXIETY DISORDER): ICD-10-CM

## 2019-04-30 PROCEDURE — 99214 OFFICE O/P EST MOD 30 MIN: CPT | Performed by: NURSE PRACTITIONER

## 2019-04-30 RX ORDER — GABAPENTIN 100 MG/1
200 CAPSULE ORAL 2 TIMES DAILY
Qty: 120 CAPSULE | Refills: 11 | Status: SHIPPED | OUTPATIENT
Start: 2019-04-30 | End: 2019-10-08 | Stop reason: DRUGHIGH

## 2019-04-30 RX ORDER — DEXTROAMPHETAMINE SACCHARATE, AMPHETAMINE ASPARTATE, DEXTROAMPHETAMINE SULFATE AND AMPHETAMINE SULFATE 2.5; 2.5; 2.5; 2.5 MG/1; MG/1; MG/1; MG/1
10 TABLET ORAL 3 TIMES DAILY
Qty: 90 TABLET | Refills: 0 | Status: SHIPPED | OUTPATIENT
Start: 2019-05-30 | End: 2019-04-30

## 2019-04-30 RX ORDER — DEXTROAMPHETAMINE SACCHARATE, AMPHETAMINE ASPARTATE, DEXTROAMPHETAMINE SULFATE AND AMPHETAMINE SULFATE 2.5; 2.5; 2.5; 2.5 MG/1; MG/1; MG/1; MG/1
10 TABLET ORAL 3 TIMES DAILY
Qty: 90 TABLET | Refills: 0 | Status: SHIPPED | OUTPATIENT
Start: 2019-04-30 | End: 2019-04-30

## 2019-04-30 RX ORDER — DEXTROAMPHETAMINE SACCHARATE, AMPHETAMINE ASPARTATE, DEXTROAMPHETAMINE SULFATE AND AMPHETAMINE SULFATE 2.5; 2.5; 2.5; 2.5 MG/1; MG/1; MG/1; MG/1
10 TABLET ORAL 3 TIMES DAILY
Qty: 90 TABLET | Refills: 0 | Status: SHIPPED | OUTPATIENT
Start: 2019-06-29 | End: 2019-08-28

## 2019-04-30 RX ORDER — CYCLOBENZAPRINE HCL 10 MG
10 TABLET ORAL 3 TIMES DAILY PRN
Qty: 30 TABLET | Refills: 5 | Status: SHIPPED | OUTPATIENT
Start: 2019-04-30 | End: 2020-11-02

## 2019-04-30 RX ORDER — GABAPENTIN 100 MG/1
100 CAPSULE ORAL 3 TIMES DAILY PRN
Qty: 90 CAPSULE | Refills: 11 | Status: SHIPPED | OUTPATIENT
Start: 2019-04-30 | End: 2019-04-30

## 2019-04-30 ASSESSMENT — MIFFLIN-ST. JEOR: SCORE: 1591.26

## 2019-04-30 NOTE — PROGRESS NOTES
"  SUBJECTIVE:   Lianna Sorto is a 38 year old female who presents to clinic today for the following   health issues:  Chief Complaint   Patient presents with     A.D.H.D     Refill on Adderall.     Refill Request     would like refill on Gabapentine and Flexeril, has been having bad muscle spasms lately.      Medication Followup of Adderall, Flexeril, Gabapentin    Taking Medication as prescribed: yes, was out of the Adderall for 2-3 days     Side Effects:  Adderall makes her a little drowsy     Medication Helping Symptoms:  yes     ADHD    Onset: since childhood     Description:   Easily distracted: YES  Short attention span: YES  Trouble following directions: YES   Impulsive behavior: no   Trouble completing tasks: YES    Accompanying Signs & Symptoms:        Change in sleep pattern: no  Irritability at certain times of the day: no  Socially withdrawn: no  Depression symptoms: no  Anxiety symptoms: YES    History:  Caffeine intake: Small  Loss of appetite: no  Healthy diet: YES  Did you have problems in school or with previous employment: YES  Family history of ADHD: YES- oldest leonora  Have you had an evaluation for ADHD in the past: YES  Do you use alcohol or drugs: no    Therapies tried: Adderall (concerta) with total relief      Muscle spasms:  Both legs  Right worse than left.  She thinks it is related to her fibromyalgia.  Flexeril at bedtime is helpful.  Gabapentin helps with the pain.  Symptoms worse with stress.      Additional history: as documented    Reviewed  and updated as needed this visit by clinical staff  Tobacco  Allergies  Meds  Med Hx  Surg Hx  Fam Hx  Soc Hx        Reviewed and updated as needed this visit by Provider           ROS:  Constitutional, HEENT, cardiovascular, pulmonary, gi and gu systems are negative, except as otherwise noted.    OBJECTIVE:     /76   Pulse 84   Temp 97.2  F (36.2  C) (Tympanic)   Resp 16   Ht 1.676 m (5' 6\")   Wt 88.5 kg (195 lb)   SpO2 99%   " BMI 31.47 kg/m    Body mass index is 31.47 kg/m .  GENERAL: healthy, alert and no distress  MS: no gross musculoskeletal defects noted, no edema  PSYCH: mentation appears normal, affect normal/bright      ASSESSMENT/PLAN:       ICD-10-CM    1. ADHD (attention deficit hyperactivity disorder), inattentive type F90.0 Well controlled.  3 months of scripts given.  amphetamine-dextroamphetamine (ADDERALL) 10 MG tablet     DISCONTINUED: amphetamine-dextroamphetamine (ADDERALL) 10 MG tablet     DISCONTINUED: amphetamine-dextroamphetamine (ADDERALL) 10 MG tablet     2. Back muscle spasm M62.830 Well controlled.  cyclobenzaprine (FLEXERIL) 10 MG tablet     3. FAUSTINO (generalized anxiety disorder) F41.1 Improving.  gabapentin (NEURONTIN) 100 MG capsule     4. Fibromyalgia M79.7 Recent flare of pain symptoms. She thinks related to stress.  Increase gabapentin to 200 mg BID.  gabapentin (NEURONTIN) 100 MG capsule     Follow up in 3 months.    The risks, benefits and treatment options of prescribed medications or other treatments have been discussed with the patient. The patient verbalized their understanding and should call or follow up if no improvement or if they develop further problems.    DANA Longoria [facility]

## 2019-05-10 ENCOUNTER — OFFICE VISIT (OUTPATIENT)
Dept: FAMILY MEDICINE | Facility: CLINIC | Age: 39
End: 2019-05-10
Payer: COMMERCIAL

## 2019-05-10 VITALS
OXYGEN SATURATION: 98 % | TEMPERATURE: 97.5 F | WEIGHT: 190 LBS | DIASTOLIC BLOOD PRESSURE: 78 MMHG | RESPIRATION RATE: 18 BRPM | HEIGHT: 66 IN | HEART RATE: 88 BPM | BODY MASS INDEX: 30.53 KG/M2 | SYSTOLIC BLOOD PRESSURE: 120 MMHG

## 2019-05-10 DIAGNOSIS — R11.2 NAUSEA AND VOMITING, INTRACTABILITY OF VOMITING NOT SPECIFIED, UNSPECIFIED VOMITING TYPE: Primary | ICD-10-CM

## 2019-05-10 PROCEDURE — 99213 OFFICE O/P EST LOW 20 MIN: CPT | Performed by: FAMILY MEDICINE

## 2019-05-10 RX ORDER — ONDANSETRON 4 MG/1
4 TABLET, FILM COATED ORAL EVERY 8 HOURS PRN
Qty: 12 TABLET | Refills: 0 | Status: SHIPPED | OUTPATIENT
Start: 2019-05-10 | End: 2019-10-03

## 2019-05-10 ASSESSMENT — PATIENT HEALTH QUESTIONNAIRE - PHQ9
SUM OF ALL RESPONSES TO PHQ QUESTIONS 1-9: 14
5. POOR APPETITE OR OVEREATING: MORE THAN HALF THE DAYS

## 2019-05-10 ASSESSMENT — ANXIETY QUESTIONNAIRES
7. FEELING AFRAID AS IF SOMETHING AWFUL MIGHT HAPPEN: NOT AT ALL
1. FEELING NERVOUS, ANXIOUS, OR ON EDGE: MORE THAN HALF THE DAYS
GAD7 TOTAL SCORE: 9
6. BECOMING EASILY ANNOYED OR IRRITABLE: SEVERAL DAYS
5. BEING SO RESTLESS THAT IT IS HARD TO SIT STILL: NOT AT ALL
2. NOT BEING ABLE TO STOP OR CONTROL WORRYING: SEVERAL DAYS
3. WORRYING TOO MUCH ABOUT DIFFERENT THINGS: NEARLY EVERY DAY

## 2019-05-10 ASSESSMENT — MIFFLIN-ST. JEOR: SCORE: 1563.58

## 2019-05-10 NOTE — LETTER
[facility]  [address]  [facility] [address]  Phone: [phone]    May 10, 2019        Lianna Sorto  [address] [facility] [address]          To whom it may concern:    RE: Lianna Sorto    Patient was seen and treated today at our clinic and missed work 5/7/2019 - 5/10/2019     Please contact me for questions or concerns.      Sincerely,        Fariha Ruvalcaba MD

## 2019-05-10 NOTE — PATIENT INSTRUCTIONS
"    Our Clinic hours are:  Mondays    7:20 am - 7 pm  Tues - Fri  7:20 am - 5 pm    Clinic Phone: [phone]    The clinic lab opens at 7:30 am Mon - Fri and appointments are required.    [facility]. [phone]  Monday  8 am - 7pm  Tues - Fri 8 am - 5:30 pm         Patient Education     [place] Diet  Your healthcare provider may recommend a bland diet if you have an upset stomach. It consists of foods that are mild and easy to digest. It is better to eat small frequent meals rather than 3 large meals a day.    Beverages  OK: Fruit juices, non-caffeinated teas and coffee, non-carbonated kern  Avoid: Carbonated beverage, caffeinated tea and coffee, all alcoholic beverages  Bread  OK: Refined white, wheat or rye bread, david or soda crackers, Kelsie toast, plain rolls, bagels  Avoid: Whole-grain bread  Cereal  OK: Refined cereals: cooked or ready to eat  Avoid: Whole-grain cereals and granola, or those containing bran, seeds or nuts  Desserts  OK: Peanut butter and all others except those to \"avoid\"  Avoid: Chocolate, cocoa, coconut, popcorn, nuts, seeds, jam, marmalade  Fruits  OK: Canned, cooked, frozen or fresh fruits without seeds or tough skin  Avoid: Olives, skin and seeds of fruit, dried fruit  Meats  OK: All fresh or preserved meat, fish and fowl  Avoid: Any that are prepared with those spices to \"avoid\"  Cheese and eggs  OK: Eggs, cottage cheese, cream cheese, other cheeses  Avoid: All cheeses made with those spices to \"avoid\"  Potatoes and pasta  OK: Potato, rice, macaroni, noodles, spaghetti  Avoid: None  Soups  OK: All soups without heavy seasoning  Avoid: Soups made with those spices to \"avoid\"  Vegetables  OK: Canned, cooked, fresh or frozen mildly flavored vegetables without seeds, skins or coarse fiber  Avoid: Vegetables prepared with those spices to \"avoid\"; skin and seeds of vegetables and those with coarse fiber, broccoli, cabbage, cauliflower, cucumber, green peppers, " and corn  Spices  OK: Salt, lemon and limejuice, vinegar, all extracts, porfirio, cinnamon, thyme, mace, allspice, paprika  Avoid: Chili powder, cloves, pepper, seed spices, garlic, gravy pickles, highly seasoned salad dressings  Date Last Reviewed: 5/1/2018 2000-2018 [employer]. [address] [place] [address]. All rights reserved. This information is not intended as a substitute for professional medical care. Always follow your healthcare professional's instructions.

## 2019-05-10 NOTE — PROGRESS NOTES
SUBJECTIVE:   Lianna Sorto is a 39 year old female who presents to clinic today for the following health issues:      HPI  Concern -   Chief Complaint   Patient presents with     Flu     Symtoms:nausea,dirrea,chills,hot and clamy, stomach ache, headaches.5/7/19 is when the symtoms started(will need a note for work)       Onset:since Tuesday the 7th of May    Description:   Thought of food makes her nausea    Intensity: moderate    Progression of Symptoms:  same    Therapies Tried and outcome: antacids, pepto, and ibuprofen    ADDITIONAL HPI: 39 year old female here for above issue.  Nausea/vomiting and diarrhea x 3 days (started Tues AM). Reports felt feverish first day but home thermometer read normal temp. Patient thinks thermometer may be broken. No melena or BRBPR.      ROS: 10 point review of systems negative except as per HPI.    PAST MEDICAL HISTORY:  Past Medical History:   Diagnosis Date     Abnormal Pap smear of cervix 2011    [place]     Acute tonsillitis 2008    treated with antibiotics     Condylomata 4/7/2011     Endometritis 2008    s/p SAB, treated with antibiotics     Hidradenitis 7/3/2009     Intussusception (H) 1983    surgically repaired     Unspecified trauma to perineum and vulva, unspecified as to episode of care in pregnancy 1985    Vaginal and uterine tear with internal bleeding, scarring.          ACTIVE MEDICAL PROBLEMS:  Patient Active Problem List   Diagnosis     Allergic rhinitis     Attention deficit disorder of adult     DDD (degenerative disc disease), cervical     Acne     ASCUS on Pap smear     Back muscle spasm     Tension headache     CARDIOVASCULAR SCREENING; LDL GOAL LESS THAN 130     Varicose veins of legs     Sacroiliac pain     Right thigh pain     Scapular dyskinesis     Piriformis syndrome     History of major depression     Fibromyalgia     Pelvic pain in female     Anxiety     Obesity     Hidradenitis suppurativa     Controlled substance agreement signed      Moderate episode of recurrent major depressive disorder (H)        FAMILY HISTORY:  Family History   Problem Relation Age of Onset     Heart Disease Mother      Arthritis Mother      Neurologic Disorder Mother      C.A.D. Mother      Gastrointestinal Disease Mother      Musculoskeletal Disorder Mother      Diabetes Mother      Coronary Artery Disease Mother         has had 2 heart attacks before the age of 55     Hypertension Mother      Hyperlipidemia Mother      Depression Mother      Mental Illness Mother      Osteoporosis Mother      Heart Disease Maternal Grandmother      Breast Cancer Maternal Grandmother 42     Heart Disease Maternal Grandfather      C.A.D. Maternal Grandfather      Cerebrovascular Disease Maternal Grandfather      Diabetes Maternal Grandfather      Breast Cancer Other      Cerebrovascular Disease Other      C.A.D. Other      Diabetes Other      Hypertension Other      Musculoskeletal Disorder Other      Unknown/Adopted Father      Unknown/Adopted Paternal Grandmother      Unknown/Adopted Paternal Grandfather      Allergies Son      Coronary Artery Disease Other         My uncle just  of a heart attack 1n February     Obesity Other      Asthma No family hx of      Cancer - colorectal No family hx of      Prostate Cancer No family hx of        SOCIAL HISTORY:  Social History     Socioeconomic History     Marital status:      Spouse name: Not on file     Number of children: 2     Years of education: Not on file     Highest education level: Not on file   Occupational History     Occupation:      Employer: [employer]   Social Needs     Financial resource strain: Not on file     Food insecurity:     Worry: Not on file     Inability: Not on file     Transportation needs:     Medical: Not on file     Non-medical: Not on file   Tobacco Use     Smoking status: Former Smoker     Packs/day: 0.50     Years: 10.00     Pack years: 5.00     Last attempt to quit:  1/3/2007     Years since quittin.3     Smokeless tobacco: Never Used   Substance and Sexual Activity     Alcohol use: Yes     Comment: Avg. twice per month     Drug use: No     Sexual activity: Yes     Partners: Male     Birth control/protection: Pill     Comment: , committed new partner   Lifestyle     Physical activity:     Days per week: Not on file     Minutes per session: Not on file     Stress: Not on file   Relationships     Social connections:     Talks on phone: Not on file     Gets together: Not on file     Attends [employer] service: Not on file     Active member of club or organization: Not on file     Attends meetings of clubs or organizations: Not on file     Relationship status: Not on file     Intimate partner violence:     Fear of current or ex partner: Not on file     Emotionally abused: Not on file     Physically abused: Not on file     Forced sexual activity: Not on file   Other Topics Concern     Parent/sibling w/ CABG, MI or angioplasty before 65F 55M? Yes     Comment: my mom, 5 of her brothers and her dad all had heart attacks   Social History Narrative     Not on file       MEDICATIONS:  Current Outpatient Medications   Medication Sig Dispense Refill     [START ON 2019] amphetamine-dextroamphetamine (ADDERALL) 10 MG tablet Take 1 tablet (10 mg) by mouth 3 times daily 90 tablet 0     clindamycin (CLINDAMAX) 1 % external lotion Apply to AA QD 60 mL 11     cyclobenzaprine (FLEXERIL) 10 MG tablet Take 1 tablet (10 mg) by mouth 3 times daily as needed for muscle spasms 30 tablet 5     fluticasone (FLONASE) 50 MCG/ACT nasal spray Spray 2 sprays into both nostrils daily 16 g 0     gabapentin (NEURONTIN) 100 MG capsule Take 2 capsules (200 mg) by mouth 2 times daily 120 capsule 11     levonorgestrel-ethinyl estradiol (JOLESSA) 0.15-0.03 MG per tablet Take 1 tablet by mouth daily 84 tablet 4     ondansetron (ZOFRAN) 4 MG tablet Take 1 tablet (4 mg) by mouth every 8 hours as needed for  "nausea 12 tablet 0     spironolactone (ALDACTONE) 100 MG tablet Take 1 tablet (100 mg) by mouth daily 90 tablet 3     triamcinolone (KENALOG) 0.1 % external cream Apply to AA BID x 2 weeks, then PRN only 80 g 1     CANNABIDIOL, HEMP OIL/EXTRACT, OR CBD PRODUCT OTHER THAN MEDICAL CANNABIS,        order for DME Equipment being ordered: Dynaflex insert (Patient not taking: Reported on 5/10/2019) 2 Units 0       ALLERGIES:     Allergies   Allergen Reactions     Celexa [Citalopram Hydrobromide] GI Disturbance     Morphine      Polymyxin B Other (See Comments)     Polymixin eye drops  \"severe burning\"     Vicodin [Hydrocodone-Acetaminophen]      Stomach upset, \"hearing things\", irritability        Problem list, Medication list, Allergies, and Medical/Social/Surgical histories reviewed in EPIC and updated as appropriate.    OBJECTIVE:                                                    VITALS: /78 (BP Location: Right arm, Patient Position: Chair, Cuff Size: Adult Regular)   Pulse 88   Temp 97.5  F (36.4  C) (Tympanic)   Resp 18   Ht 1.676 m (5' 6\")   Wt 86.2 kg (190 lb)   SpO2 98%   BMI 30.67 kg/m   Body mass index is 30.67 kg/m .  GENERAL: Pleasant, well appearing female.  ABDOMEN: Soft, non-distended, mild midepigastric tenderness to palpation.  No hepatosplenomegaly or palpable masses.    SKIN: warm and dry without obvious rashes.   EXTREMITIES: No edema, normal pulses.     ASSESSMENT/PLAN:                                                    1. Nausea and vomiting, intractability of vomiting not specified, unspecified vomiting type  Likely viral gastroenteritis. Advised hydration, BRAT diet. Stop peptobismol.  She's requesting something for nausea. Ok for zofran to use for a few days but follow-up if persistent symptoms. Follow-up if not improving or if worsening.   - ondansetron (ZOFRAN) 4 MG tablet; Take 1 tablet (4 mg) by mouth every 8 hours as needed for nausea  Dispense: 12 tablet; Refill: 0       "

## 2019-05-11 ASSESSMENT — ANXIETY QUESTIONNAIRES: GAD7 TOTAL SCORE: 9

## 2019-06-10 DIAGNOSIS — F90.0 ADHD (ATTENTION DEFICIT HYPERACTIVITY DISORDER), INATTENTIVE TYPE: ICD-10-CM

## 2019-06-11 RX ORDER — DEXTROAMPHETAMINE SACCHARATE, AMPHETAMINE ASPARTATE, DEXTROAMPHETAMINE SULFATE AND AMPHETAMINE SULFATE 2.5; 2.5; 2.5; 2.5 MG/1; MG/1; MG/1; MG/1
10 TABLET ORAL 3 TIMES DAILY
Qty: 90 TABLET | Refills: 0 | OUTPATIENT
Start: 2019-06-29

## 2019-06-11 NOTE — TELEPHONE ENCOUNTER
Requested Prescriptions   Pending Prescriptions Disp Refills     amphetamine-dextroamphetamine (ADDERALL) 10 MG tablet 90 tablet 0     Sig: Take 1 tablet (10 mg) by mouth 3 times daily       amphetamine-dextroamphetamine (ADDERALL) 10 MG tablet  Last Written Prescription Date:  6/29/19  Last Fill Quantity: 90 tab,   # refills: 0  Last Office Visit: 5/10/19 FREDERIC Ruvalcaba  Future Office visit:       Routing refill request to provider for review/approval because:  Drug not on the [facility] or [facility] refill protocol or controlled substance      There is no refill protocol information for this order

## 2019-06-11 NOTE — TELEPHONE ENCOUNTER
At 4/30/19 Office visit :   Well controlled.  3 months of scripts given.  amphetamine-dextroamphetamine (ADDERALL) 10 MG tablet     Too soon.

## 2019-06-14 ENCOUNTER — TELEPHONE (OUTPATIENT)
Dept: FAMILY MEDICINE | Facility: CLINIC | Age: 39
End: 2019-06-14

## 2019-06-15 NOTE — TELEPHONE ENCOUNTER
Insurance requiring a Prior Authorization for Amphetamine Salts 10mg    Prior Authorization Retail Medication Request    Medication/Dose: Amphetamine Salts 10mg  ICD code (if different than what is on RX):    Previously Tried and Failed:   Rationale:     Insurance Name: [employer]  Insurance ID: [id number]      Pharmacy Information (if different than what is on RX)  Name:   Phone:     Thank You,  Citlaly Resendez [facility] [place]

## 2019-06-17 ENCOUNTER — MYC MEDICAL ADVICE (OUTPATIENT)
Dept: FAMILY MEDICINE | Facility: CLINIC | Age: 39
End: 2019-06-17

## 2019-06-17 ENCOUNTER — E-VISIT (OUTPATIENT)
Dept: FAMILY MEDICINE | Facility: CLINIC | Age: 39
End: 2019-06-17
Payer: COMMERCIAL

## 2019-06-17 DIAGNOSIS — J34.89 RHINORRHEA: Primary | ICD-10-CM

## 2019-06-17 PROCEDURE — 99207 ZZC NO BILLABLE SERVICE THIS VISIT: CPT | Performed by: FAMILY MEDICINE

## 2019-06-17 NOTE — TELEPHONE ENCOUNTER
Prior Authorization Approval    Authorization Effective Date: 6/17/2019  Authorization Expiration Date: 6/16/2020  Medication: Amphetamine -APPROVED  Approved Dose/Quantity:   Reference #:     Insurance Company: [employer] - Phone [phone] Fax [phone]  Expected CoPay:       CoPay Card Available:      Foundation Assistance Needed:    Which Pharmacy is filling the prescription (Not needed for infusion/clinic administered): [facility] [address]  Pharmacy Notified: Yes  Patient Notified: No    Pharmacy will notify patient when medication is ready.

## 2019-06-17 NOTE — TELEPHONE ENCOUNTER
Central Prior Authorization Team   Phone: [phone]      PA Initiation    Medication: Amphetamine -Initiated  Insurance Company: [employer] - Phone [phone] Fax [phone]  Pharmacy Filling the Rx: [facility] [place], [place] - [address]  Filling Pharmacy Phone: [phone]  Filling Pharmacy Fax:    Start Date: 6/17/2019

## 2019-06-17 NOTE — TELEPHONE ENCOUNTER
This has somehow been delayed, not sure on my end or?  But pt has been taking lesser dose due to having to wait, so could it please get addressed very soon for her.  Please let us know outcome.       Thank you,   [facility]

## 2019-08-28 ENCOUNTER — OFFICE VISIT (OUTPATIENT)
Dept: FAMILY MEDICINE | Facility: CLINIC | Age: 39
End: 2019-08-28
Payer: COMMERCIAL

## 2019-08-28 VITALS
DIASTOLIC BLOOD PRESSURE: 80 MMHG | HEIGHT: 66 IN | SYSTOLIC BLOOD PRESSURE: 110 MMHG | WEIGHT: 196 LBS | OXYGEN SATURATION: 99 % | BODY MASS INDEX: 31.5 KG/M2 | TEMPERATURE: 98.1 F | HEART RATE: 99 BPM

## 2019-08-28 DIAGNOSIS — F90.0 ADHD (ATTENTION DEFICIT HYPERACTIVITY DISORDER), INATTENTIVE TYPE: ICD-10-CM

## 2019-08-28 DIAGNOSIS — F41.9 ANXIETY: Primary | ICD-10-CM

## 2019-08-28 PROCEDURE — 99214 OFFICE O/P EST MOD 30 MIN: CPT | Performed by: NURSE PRACTITIONER

## 2019-08-28 RX ORDER — DEXTROAMPHETAMINE SACCHARATE, AMPHETAMINE ASPARTATE, DEXTROAMPHETAMINE SULFATE AND AMPHETAMINE SULFATE 2.5; 2.5; 2.5; 2.5 MG/1; MG/1; MG/1; MG/1
10 TABLET ORAL 3 TIMES DAILY
Qty: 90 TABLET | Refills: 0 | Status: SHIPPED | OUTPATIENT
Start: 2019-09-28 | End: 2019-08-28

## 2019-08-28 RX ORDER — DEXTROAMPHETAMINE SACCHARATE, AMPHETAMINE ASPARTATE, DEXTROAMPHETAMINE SULFATE AND AMPHETAMINE SULFATE 2.5; 2.5; 2.5; 2.5 MG/1; MG/1; MG/1; MG/1
10 TABLET ORAL 3 TIMES DAILY
Qty: 90 TABLET | Refills: 0 | Status: SHIPPED | OUTPATIENT
Start: 2019-08-28 | End: 2019-08-28

## 2019-08-28 RX ORDER — ESCITALOPRAM OXALATE 10 MG/1
10 TABLET ORAL DAILY
Qty: 30 TABLET | Refills: 1 | Status: SHIPPED | OUTPATIENT
Start: 2019-08-28 | End: 2019-09-23 | Stop reason: SINTOL

## 2019-08-28 RX ORDER — DEXTROAMPHETAMINE SACCHARATE, AMPHETAMINE ASPARTATE, DEXTROAMPHETAMINE SULFATE AND AMPHETAMINE SULFATE 2.5; 2.5; 2.5; 2.5 MG/1; MG/1; MG/1; MG/1
10 TABLET ORAL 3 TIMES DAILY
Qty: 90 TABLET | Refills: 0 | Status: SHIPPED | OUTPATIENT
Start: 2019-10-28 | End: 2019-12-23

## 2019-08-28 ASSESSMENT — MIFFLIN-ST. JEOR: SCORE: 1590.8

## 2019-08-28 NOTE — PROGRESS NOTES
"Subjective     Lianna Sorto is a 39 year old female who presents to clinic today for the following health issues:    HPI   Medication Followup of adderall    Taking Medication as prescribed: yes    Side Effects:  None    Medication Helping Symptoms: \"Yes and no\"  Works to a point- has a lot of anxiety/stress currently- gets irritated easily    Doesn't help calm her nerves    Restarted smoking     ADHD Follow-Up    Date of last ADHD office visit: 4/30/2019  Status since last visit: Worse- stress and irritability  Taking controlled (daily) medications as prescribed: Yes                         ADHD Medication     Amphetamines Disp Start End     amphetamine-dextroamphetamine (ADDERALL) 10 MG tablet    90 tablet 10/28/2019     Sig - Route: Take 1 tablet (10 mg) by mouth 3 times daily - Oral    Class: Local Print    Earliest Fill Date: 10/28/2019               For anxiety and depression, previously tried wellbutrin, celexa, paxil, zoloft - didn't like any of them.      Reviewed and updated as needed this visit by Provider  Tobacco  Allergies  Meds  Problems  Med Hx  Surg Hx  Fam Hx         Review of Systems   ROS COMP: Constitutional, HEENT, cardiovascular, pulmonary, gi and gu systems are negative, except as otherwise noted.      Objective    /80 (BP Location: Right arm)   Pulse 99   Temp 98.1  F (36.7  C) (Tympanic)   Ht 1.676 m (5' 6\")   Wt 88.9 kg (196 lb)   SpO2 99%   BMI 31.64 kg/m    Body mass index is 31.64 kg/m .  Physical Exam   GENERAL: healthy, alert and no distress  PSYCH: mentation appears normal and tearful            Assessment & Plan       ICD-10-CM    1. Anxiety F41.9 Poorly controlled.  Start escitalopram (LEXAPRO) 10 MG tablet daily and follow up in one month.     2. ADHD (attention deficit hyperactivity disorder), inattentive type F90.0 Well controlled.  amphetamine-dextroamphetamine (ADDERALL) 10 MG tablet     DISCONTINUED: amphetamine-dextroamphetamine (ADDERALL) 10 MG tablet "     DISCONTINUED: amphetamine-dextroamphetamine (ADDERALL) 10 MG tablet        Tobacco Cessation:   reports that she has been smoking.  She has a 5.00 pack-year smoking history. She has never used smokeless tobacco.  Tobacco Cessation Action Plan: Information offered: Patient not interested at this time            Return in about 4 weeks (around 9/25/2019) for Depression/anxiety Recheck.    DANA Amos [facility]

## 2019-08-28 NOTE — PATIENT INSTRUCTIONS
Thank you for choosing [facility].  You may be receiving an email and/or telephone survey request from [facility] Customer Experience regarding your visit today.  Please take a few minutes to respond to the survey to let us know how we are doing.      If you have questions or concerns, please contact us via [employer] or you can contact your care team at [phone].    Our Clinic hours are:  Monday 6:40 am  to 7:00 pm  Tuesday -Friday 6:40 am to 5:00 pm    The [place] outpatient lab hours are:  Monday - Friday 6:10 am to 4:45 pm  Saturdays 7:00 am to 11:00 am  Appointments are required, call [phone]    If you have clinical questions after hours or would like to schedule an appointment,  call the clinic at [phone].

## 2019-09-23 ENCOUNTER — VIRTUAL VISIT (OUTPATIENT)
Dept: FAMILY MEDICINE | Facility: CLINIC | Age: 39
End: 2019-09-23
Payer: COMMERCIAL

## 2019-09-23 DIAGNOSIS — F41.9 ANXIETY: ICD-10-CM

## 2019-09-23 DIAGNOSIS — F33.1 MODERATE EPISODE OF RECURRENT MAJOR DEPRESSIVE DISORDER (H): ICD-10-CM

## 2019-09-23 PROCEDURE — 98966 PH1 ASSMT&MGMT NQHP 5-10: CPT | Performed by: NURSE PRACTITIONER

## 2019-09-23 RX ORDER — PROPRANOLOL HYDROCHLORIDE 10 MG/1
10 TABLET ORAL 3 TIMES DAILY PRN
Qty: 30 TABLET | Refills: 3 | Status: SHIPPED | OUTPATIENT
Start: 2019-09-23 | End: 2019-10-15 | Stop reason: DRUGHIGH

## 2019-09-23 RX ORDER — ESCITALOPRAM OXALATE 10 MG/1
10 TABLET ORAL DAILY
Qty: 30 TABLET | Refills: 1 | Status: CANCELLED | OUTPATIENT
Start: 2019-09-23

## 2019-09-23 RX ORDER — CETIRIZINE HYDROCHLORIDE 10 MG/1
10 TABLET ORAL DAILY
COMMUNITY

## 2019-09-23 ASSESSMENT — ANXIETY QUESTIONNAIRES
5. BEING SO RESTLESS THAT IT IS HARD TO SIT STILL: SEVERAL DAYS
3. WORRYING TOO MUCH ABOUT DIFFERENT THINGS: NEARLY EVERY DAY
6. BECOMING EASILY ANNOYED OR IRRITABLE: MORE THAN HALF THE DAYS
GAD7 TOTAL SCORE: 13
7. FEELING AFRAID AS IF SOMETHING AWFUL MIGHT HAPPEN: SEVERAL DAYS
1. FEELING NERVOUS, ANXIOUS, OR ON EDGE: MORE THAN HALF THE DAYS
IF YOU CHECKED OFF ANY PROBLEMS ON THIS QUESTIONNAIRE, HOW DIFFICULT HAVE THESE PROBLEMS MADE IT FOR YOU TO DO YOUR WORK, TAKE CARE OF THINGS AT HOME, OR GET ALONG WITH OTHER PEOPLE: EXTREMELY DIFFICULT
2. NOT BEING ABLE TO STOP OR CONTROL WORRYING: MORE THAN HALF THE DAYS

## 2019-09-23 ASSESSMENT — PATIENT HEALTH QUESTIONNAIRE - PHQ9: 5. POOR APPETITE OR OVEREATING: MORE THAN HALF THE DAYS

## 2019-09-23 NOTE — PROGRESS NOTES
"Lianna Sorto is a 39 year old female who is being evaluated via a billable telephone visit.      The patient has been notified of following:     \"This telephone visit will be conducted via a call between you and your physician/provider. We have found that certain health care needs can be provided without the need for a physical exam.  This service lets us provide the care you need with a short phone conversation.  If a prescription is necessary we can send it directly to your pharmacy.  If lab work is needed we can place an order for that and you can then stop by our lab to have the test done at a later time.    If during the course of the call the physician/provider feels a telephone visit is not appropriate, you will not be charged for this service.\"     Consent has been obtained for this service by 1 care team member: yes. See the scanned image in the medical record.    Lianna Sorto complains of    Chief Complaint   Patient presents with     Recheck Medication     lexapro, patient stopped lexapro. Would like a new one.     Depression     follow up       I have reviewed and updated the patient's Past Medical History, Social History, Family History and Medication List.    ALLERGIES  Celexa [citalopram hydrobromide]; Morphine; Polymyxin b; and Vicodin [hydrocodone-acetaminophen]    Mulu Smith (MA signature)        Depression Followup    How are you doing with your depression since your last visit? Same, stopped medication because of dizzines and nausea, quite about 10 days ago. Wondering if there is something as needed to be taken to calm the stress (which causes the depression.    Has previously tried celexa, paxil, zoloft, cymbalta, wellbutrin    Has tried as needed hydroyzine - didn't like it.    Are you having other symptoms that might be associated with depression? No    Have you had a significant life event?  OTHER: job and being a single mother with kids going back to school. (gets anxiety in " crowds)     Are you feeling anxious or having panic attacks?   Yes:  big crowds     Do you have any concerns with your use of alcohol or other drugs? No    Social History     Tobacco Use     Smoking status: Current Every Day Smoker     Packs/day: 0.50     Years: 10.00     Pack years: 5.00     Last attempt to quit: 1/3/2007     Years since quittin.7     Smokeless tobacco: Never Used   Substance Use Topics     Alcohol use: Yes     Comment: Avg. twice per month     Drug use: No     PHQ 2018 5/10/2019 2019   PHQ-9 Total Score 13 14 -   Q9: Thoughts of better off dead/self-harm past 2 weeks Not at all Not at all Not at all     FAUSTINO-7 SCORE 2018 5/10/2019 2019   Total Score - - -   Total Score 9 9 13           Suicide Assessment Five-step Evaluation and Treatment (SAFE-T)    Assessment/Plan:  (F41.9) Anxiety  Comment: poorly controlled. Has tried and failed many different medications. Will trial prn propranolol. Referral to psychiatry for further assessment and management.  Plan: MENTAL HEALTH REFERRAL  - Adult; Psychiatry and        Medication Management; Psychiatry; [facility]:         [facility] Psych[facility] Service [phone].  Medication management & future         refills will be returned to [facility] PCP upon         completion of evaluation; We mhoini...,         propranolol (INDERAL) 10 MG tablet            (F33.1) Moderate episode of recurrent major depressive disorder (H)  Comment: poorly controlled. Has tried and failed many different medications. Referral to psychiatry for further assessment and management.  Plan: MENTAL HEALTH REFERRAL  - Adult; Psychiatry and        Medication Management; Psychiatry; [facility]:         [facility] Psychiatry Service [phone].  Medication management & future         refills will be returned to [facility] PCP upon         completion of evaluation; We mohini...              I have reviewed the note as documented above.  This accurately captures  the substance of my conversation with the patient.      Total time of call between patient and provider was 10 minutes       The risks, benefits and treatment options of prescribed medications or other treatments have been discussed with the patient. The patient verbalized their understanding and should call or follow up if no improvement or if they develop further problems.    DANA Amos CNP

## 2019-09-24 ASSESSMENT — ANXIETY QUESTIONNAIRES: GAD7 TOTAL SCORE: 13

## 2019-09-30 ENCOUNTER — MYC MEDICAL ADVICE (OUTPATIENT)
Dept: FAMILY MEDICINE | Facility: CLINIC | Age: 39
End: 2019-09-30

## 2019-10-01 NOTE — TELEPHONE ENCOUNTER
Clau, please see her mychart note , I have recommended an Evisit or appointment and see she has not yet scheduled one of those. (did view the mychart note though)     Shereen Chavez RNC

## 2019-10-02 NOTE — TELEPHONE ENCOUNTER
An e visit isn't appropriate for this type of concern.  I can do a phone visit - she can have one of my simple spots this week.  Please help her schedule.  Clau Dukes, CNP

## 2019-10-03 ENCOUNTER — OFFICE VISIT (OUTPATIENT)
Dept: FAMILY MEDICINE | Facility: CLINIC | Age: 39
End: 2019-10-03
Payer: COMMERCIAL

## 2019-10-03 VITALS
RESPIRATION RATE: 20 BRPM | SYSTOLIC BLOOD PRESSURE: 104 MMHG | HEIGHT: 66 IN | WEIGHT: 198 LBS | TEMPERATURE: 97.8 F | DIASTOLIC BLOOD PRESSURE: 76 MMHG | HEART RATE: 92 BPM | OXYGEN SATURATION: 99 % | BODY MASS INDEX: 31.82 KG/M2

## 2019-10-03 DIAGNOSIS — T78.40XS ALLERGIC STATE, SEQUELA: ICD-10-CM

## 2019-10-03 DIAGNOSIS — J06.9 VIRAL URI WITH COUGH: Primary | ICD-10-CM

## 2019-10-03 PROCEDURE — 99214 OFFICE O/P EST MOD 30 MIN: CPT | Mod: 25 | Performed by: FAMILY MEDICINE

## 2019-10-03 PROCEDURE — 94640 AIRWAY INHALATION TREATMENT: CPT | Performed by: FAMILY MEDICINE

## 2019-10-03 RX ORDER — FLUTICASONE PROPIONATE 50 MCG
2 SPRAY, SUSPENSION (ML) NASAL DAILY
Qty: 16 G | Refills: 0 | Status: SHIPPED | OUTPATIENT
Start: 2019-10-03

## 2019-10-03 RX ORDER — ALBUTEROL SULFATE 90 UG/1
2 AEROSOL, METERED RESPIRATORY (INHALATION) 4 TIMES DAILY
Qty: 1 INHALER | Refills: 0 | Status: SHIPPED | OUTPATIENT
Start: 2019-10-03 | End: 2021-11-30

## 2019-10-03 RX ORDER — ALBUTEROL SULFATE 0.83 MG/ML
2.5 SOLUTION RESPIRATORY (INHALATION) ONCE
Status: COMPLETED | OUTPATIENT
Start: 2019-10-03 | End: 2019-10-03

## 2019-10-03 RX ORDER — BENZONATATE 200 MG/1
200 CAPSULE ORAL 3 TIMES DAILY PRN
Qty: 30 CAPSULE | Refills: 0 | Status: SHIPPED | OUTPATIENT
Start: 2019-10-03 | End: 2019-10-23

## 2019-10-03 RX ADMIN — ALBUTEROL SULFATE 2.5 MG: 0.83 SOLUTION RESPIRATORY (INHALATION) at 10:18

## 2019-10-03 ASSESSMENT — MIFFLIN-ST. JEOR: SCORE: 1599.87

## 2019-10-03 ASSESSMENT — PAIN SCALES - GENERAL: PAINLEVEL: MILD PAIN (3)

## 2019-10-03 NOTE — PROGRESS NOTES
"Subjective     Lianna Sorto is a 39 year old female who presents to clinic today for the following health issues:    HPI   Acute Illness   Acute illness concerns: deep cough that causes coughing attacks with fever and wheezing  Onset: 1 week    Fever: YES- 101    Chills/Sweats: YES    Headache (location?): YES    Sinus Pressure:YES    Conjunctivitis:  YES: bilateral    Ear Pain: started on left side with sinus pain that has now gone away    Rhinorrhea: YES    Congestion: YES    Sore Throat: no      Cough: YES-productive of clear sputum    Wheeze: YES    Decreased Appetite: no    Nausea: YES    Vomiting: no     Diarrhea:  no    Dysuria/Freq.: no     Fatigue/Achiness: YES    Sick/Strep Exposure: YES- child     Therapies Tried and outcome: flonase, tylenol and saline    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  More of a spasmodic cough at this point.     Reviewed and updated as needed this visit by Provider         Review of Systems   ROS COMP: Constitutional, HEENT, cardiovascular, pulmonary, gi and gu systems are negative, except as otherwise noted.      Objective    /76   Pulse 92   Temp 97.8  F (36.6  C) (Tympanic)   Resp 20   Ht 1.676 m (5' 6\")   Wt 89.8 kg (198 lb)   SpO2 99%   BMI 31.96 kg/m    Body mass index is 31.96 kg/m .  Physical Exam   GENERAL: healthy, alert and no distress  HENT: normal cephalic/atraumatic, bilateral ceruminosis cleared by flushing by CMA right ear: normal: no effusions, no erythema, normal landmarks, left ear: increased vascular pattern, no effusion, nose and mouth without ulcers or lesions, oropharynx clear and oral mucous membranes moist  NECK: no adenopathy, no asymmetry, masses, or scars and thyroid normal to palpation  RESP: scattered rhonci, improved after neb  CV: regular rate and rhythm, normal S1 S2, no S3 or S4, no murmur, click " or rub, no peripheral edema and peripheral pulses strong  ABDOMEN: soft, nontender, no hepatosplenomegaly, no masses and bowel sounds normal  MS: no gross musculoskeletal defects noted, no edema            Assessment & Plan       ICD-10-CM    1. Viral URI with cough J06.9 albuterol (PROAIR HFA/PROVENTIL HFA/VENTOLIN HFA) 108 (90 Base) MCG/ACT inhaler    B97.89 benzonatate (TESSALON) 200 MG capsule     albuterol (PROVENTIL) neb solution 2.5 mg     INHALATION/NEBULIZER TREATMENT, INITIAL   2. Allergic state, sequela T78.40XS fluticasone (FLONASE) 50 MCG/ACT nasal spray     Patient Instructions   Bronchitis----This looks like a bronchitis -- this is a viral infection the vast majority of the time.    Recommend starting albuterol 4 times daily.  Taper off of the albuterol as needed as your symptoms resolve.    If symptoms persist for more than 3-4 more days, call and we will fax in a prescription for Azithromycin x 5 days.    Return for follow-up if symptoms worsen or fail to improve despite treatment with Azithromycin after 7-10 days.  Continue symptomatic measures, fluids, and rest in the meantime.      Our Clinic hours are:  Mondays    7:20 am - 7 pm  Tues -  Fri  7:20 am - 5 pm    Clinic Phone: [phone]    The clinic lab opens at 7:30 am Mon - Fri and appointments are required.    [facility] [place]  Ph. [phone]  Monday  8 am - 7pm  Tues - Fri 8 am - 5:30 pm                Tobacco Cessation:   reports that she has been smoking. She has a 5.00 pack-year smoking history. She has never used smokeless tobacco.  Tobacco Cessation Action Plan: Information offered: Patient not interested at this time            Return in about 4 days (around 10/7/2019) for call if not better- see instructions.    Eliana Smith MD  [facility] [place]

## 2019-10-03 NOTE — LETTER
[facility]  [address]  [facility] [address]  Phone: [phone]    October 3, 2019        Lianna Sorto  [address] [facility] [address]          To whom it may concern:    RE: Lianna Sorto    Patient was seen and treated today at our clinic.  Return to work 10/7/2019 if feeling better.    Please contact me for questions or concerns.      Sincerely,        Eliana Smith MD

## 2019-10-03 NOTE — TELEPHONE ENCOUNTER
Left message for patient to return call to clinic or to check message on MyChart.     REG RiosN, RN

## 2019-10-03 NOTE — PATIENT INSTRUCTIONS
Bronchitis----This looks like a bronchitis -- this is a viral infection the vast majority of the time.    Recommend starting albuterol 4 times daily.  Taper off of the albuterol as needed as your symptoms resolve.    If symptoms persist for more than 3-4 more days, call and we will fax in a prescription for Azithromycin x 5 days.    Return for follow-up if symptoms worsen or fail to improve despite treatment with Azithromycin after 7-10 days.  Continue symptomatic measures, fluids, and rest in the meantime.      Our Clinic hours are:  Mondays    7:20 am - 7 pm  Tues -  Fri  7:20 am - 5 pm    Clinic Phone: [phone]    The clinic lab opens at 7:30 am Mon - Fri and appointments are required.    [facility]. [phone]  Monday  8 am - 7pm  Tues - Fri 8 am - 5:30 pm

## 2019-10-04 NOTE — TELEPHONE ENCOUNTER
Attempted to contact patient, no answer, left voice message to call back.    [facility] [place] okay to deliver message,  Please assist patient in scheduling telephone visit in same day slot.

## 2019-10-08 ENCOUNTER — VIRTUAL VISIT (OUTPATIENT)
Dept: FAMILY MEDICINE | Facility: CLINIC | Age: 39
End: 2019-10-08
Payer: COMMERCIAL

## 2019-10-08 DIAGNOSIS — M79.7 FIBROMYALGIA: Primary | ICD-10-CM

## 2019-10-08 DIAGNOSIS — F33.1 MODERATE EPISODE OF RECURRENT MAJOR DEPRESSIVE DISORDER (H): ICD-10-CM

## 2019-10-08 PROCEDURE — 99441 ZZC PHYSICIAN TELEPHONE EVALUATION 5-10 MIN: CPT | Performed by: NURSE PRACTITIONER

## 2019-10-08 RX ORDER — GABAPENTIN 300 MG/1
300 CAPSULE ORAL
Qty: 180 CAPSULE | Refills: 3 | Status: SHIPPED | OUTPATIENT
Start: 2019-10-08 | End: 2019-10-23

## 2019-10-08 ASSESSMENT — ANXIETY QUESTIONNAIRES
3. WORRYING TOO MUCH ABOUT DIFFERENT THINGS: NEARLY EVERY DAY
7. FEELING AFRAID AS IF SOMETHING AWFUL MIGHT HAPPEN: NEARLY EVERY DAY
GAD7 TOTAL SCORE: 15
6. BECOMING EASILY ANNOYED OR IRRITABLE: MORE THAN HALF THE DAYS
5. BEING SO RESTLESS THAT IT IS HARD TO SIT STILL: NOT AT ALL
1. FEELING NERVOUS, ANXIOUS, OR ON EDGE: NEARLY EVERY DAY
IF YOU CHECKED OFF ANY PROBLEMS ON THIS QUESTIONNAIRE, HOW DIFFICULT HAVE THESE PROBLEMS MADE IT FOR YOU TO DO YOUR WORK, TAKE CARE OF THINGS AT HOME, OR GET ALONG WITH OTHER PEOPLE: EXTREMELY DIFFICULT
2. NOT BEING ABLE TO STOP OR CONTROL WORRYING: NEARLY EVERY DAY

## 2019-10-08 ASSESSMENT — PATIENT HEALTH QUESTIONNAIRE - PHQ9
5. POOR APPETITE OR OVEREATING: SEVERAL DAYS
SUM OF ALL RESPONSES TO PHQ QUESTIONS 1-9: 18

## 2019-10-08 NOTE — PROGRESS NOTES
Subjective     Phone visit:      Lianna Sorto is a 39 year old female who presents to clinic today for the following health issues:    HPI   Depression and Anxiety Follow-Up    How are you doing with your depression since your last visit? Worsened - work is extremely difficult.    How are you doing with your anxiety since your last visit?  Worsened     Are you having other symptoms that might be associated with depression or anxiety? Yes:  sleeping and appetite have become difficult; constantly tired because of stress & anxiety    Have you had a significant life event? 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  Job takes so much energy; at times becoming unbearable to work.    Do you have any concerns with your use of alcohol or other drugs? No     No thoughts of self harm or suicide.    Has tried many medications for depression without success - has an appointment with psychiatry next week.    Social History     Tobacco Use     Smoking status: Current Every Day Smoker     Packs/day: 0.50     Years: 10.00     Pack years: 5.00     Last attempt to quit: 1/3/2007     Years since quittin.7     Smokeless tobacco: Never Used   Substance Use Topics     Alcohol use: Yes     Comment: Avg. twice per month     Drug use: No     PHQ 5/10/2019 2019 10/8/2019   PHQ-9 Total Score 14 - 18   Q9: Thoughts of better off dead/self-harm past 2 weeks Not at all Not at all Not at all     FAUSTINO-7 SCORE 5/10/2019 2019 10/8/2019   Total Score - - -   Total Score 9 13 15     No flowsheet data found.  No flowsheet data found.        How many servings of fruits and vegetables do you eat daily?  0-1    On average, how many sweetened beverages do you drink each day (soda, juice, sweet tea, etc)?   1  How many days per week do you miss taking your medication? If patient does not eat, she cannot take Adderall.    What makes it hard for you to take your medications?  Patient has difficulty staying on top of her eating habits  in order to take her medications.        Fibromyalgia:  Pain is worse.  Difficulty in the evening - trouble sleeping.  Currently taking gabapentin 200 mg BID - not working as well as it used to.                  Reviewed and updated as needed this visit by Provider  Tobacco  Allergies  Meds  Problems  Med Hx  Surg Hx  Fam Hx         Review of Systems   ROS COMP: Constitutional, HEENT, cardiovascular, pulmonary, gi and gu systems are negative, except as otherwise noted.            Assessment & Plan       ICD-10-CM    1. Fibromyalgia M79.7 gabapentin (NEURONTIN) 300 MG capsule   2. Moderate episode of recurrent major depressive disorder (H) F33.1      Poorly controlled fibromyalgia. Increase gabapentin to 300 mg BID.  Poorly controlled depression. Has an appointment with psychiatry next week.      Return in about 4 weeks (around 11/5/2019).    The risks, benefits and treatment options of prescribed medications or other treatments have been discussed with the patient. The patient verbalized their understanding and should call or follow up if no improvement or if they develop further problems.    Spent 10 minutes on the phone with this patient today during this telephone visit.    DANA Amos [facility]

## 2019-10-09 ASSESSMENT — ANXIETY QUESTIONNAIRES: GAD7 TOTAL SCORE: 15

## 2019-10-12 ENCOUNTER — MYC MEDICAL ADVICE (OUTPATIENT)
Dept: FAMILY MEDICINE | Facility: CLINIC | Age: 39
End: 2019-10-12

## 2019-10-12 NOTE — TELEPHONE ENCOUNTER
FMLA paperwork received and started.  Sent patient a My Chart note to clarify type of FMLA and time off required. Will await response from patient.

## 2019-10-15 ENCOUNTER — OFFICE VISIT (OUTPATIENT)
Dept: PSYCHIATRY | Facility: CLINIC | Age: 39
End: 2019-10-15
Payer: COMMERCIAL

## 2019-10-15 VITALS
SYSTOLIC BLOOD PRESSURE: 118 MMHG | HEART RATE: 92 BPM | BODY MASS INDEX: 31.64 KG/M2 | OXYGEN SATURATION: 98 % | DIASTOLIC BLOOD PRESSURE: 80 MMHG | WEIGHT: 196 LBS | TEMPERATURE: 98.2 F | RESPIRATION RATE: 14 BRPM

## 2019-10-15 DIAGNOSIS — F33.1 MAJOR DEPRESSIVE DISORDER, RECURRENT EPISODE, MODERATE (H): ICD-10-CM

## 2019-10-15 DIAGNOSIS — F41.1 GAD (GENERALIZED ANXIETY DISORDER): Primary | ICD-10-CM

## 2019-10-15 PROCEDURE — 90792 PSYCH DIAG EVAL W/MED SRVCS: CPT | Performed by: NURSE PRACTITIONER

## 2019-10-15 RX ORDER — VENLAFAXINE HYDROCHLORIDE 75 MG/1
75 CAPSULE, EXTENDED RELEASE ORAL DAILY
Qty: 30 CAPSULE | Refills: 0 | Status: SHIPPED | OUTPATIENT
Start: 2019-10-15 | End: 2019-10-21

## 2019-10-15 RX ORDER — PROPRANOLOL HYDROCHLORIDE 20 MG/1
20 TABLET ORAL 3 TIMES DAILY
Qty: 90 TABLET | Refills: 0 | Status: SHIPPED | OUTPATIENT
Start: 2019-10-15 | End: 2019-11-12

## 2019-10-15 ASSESSMENT — ANXIETY QUESTIONNAIRES
2. NOT BEING ABLE TO STOP OR CONTROL WORRYING: MORE THAN HALF THE DAYS
1. FEELING NERVOUS, ANXIOUS, OR ON EDGE: NEARLY EVERY DAY
5. BEING SO RESTLESS THAT IT IS HARD TO SIT STILL: SEVERAL DAYS
7. FEELING AFRAID AS IF SOMETHING AWFUL MIGHT HAPPEN: MORE THAN HALF THE DAYS
GAD7 TOTAL SCORE: 17
3. WORRYING TOO MUCH ABOUT DIFFERENT THINGS: NEARLY EVERY DAY
6. BECOMING EASILY ANNOYED OR IRRITABLE: NEARLY EVERY DAY

## 2019-10-15 ASSESSMENT — PATIENT HEALTH QUESTIONNAIRE - PHQ9
5. POOR APPETITE OR OVEREATING: NEARLY EVERY DAY
SUM OF ALL RESPONSES TO PHQ QUESTIONS 1-9: 22

## 2019-10-15 NOTE — PATIENT INSTRUCTIONS
1. Venlafaxine ER (Effexor) 1 capsule (75 mg) daily  2. Increase the Propranolol (Inderal) to 20 mg three times daily  3. Consider adding Abilify at next visit   4. Continue Adderall 5-10 mg three times daily       Continue all other medical directions per primary care provider.     Continue all other medications as reviewed per electronic medical record today.     Safety plan reviewed. To the Emergency Department as needed or call after hours crisis line at [phone] or [phone]. [place] Crisis Text Line: Text [place] to 659807  or  Suicide LifeLine Chat: suicideCuremark.org/chat/    To schedule individual or family therapy, call [facility] at [phone].     Schedule an appointment with me in 4 weeks or sooner as needed.  Call [facility] at [phone] to schedule.    Follow up with primary care provider as planned or for acute medical concerns.    Call the psychiatric nurse line with medication questions or concerns at [phone].    Localler may be used to communicate with your provider, but this is not intended to be used for emergencies.    Crisis Resources:    National Suicide Prevention Lifeline: 128.883.2468 (TTY: 335.236.6433). Call anytime for help.  (www.suicidepreventionlifeline.org)  National [place] [employer]ness (www.[place].org): 591.401.8878 or 212-072-4984.   Mental Health Association (www.mentalhealth.org): 684.961.4213 or 064-140-9736.  [place] Crisis Text Line: Text [place] to 777761  Suicide LifeLine Chat: suicideCuremark.org/chat

## 2019-10-15 NOTE — PROGRESS NOTES
"                                                         Outpatient Psychiatric Evaluation - Standard Adult    Name:  Lianna Sorto  : 1980    Source of Referral:  Primary Care Provider: DANA Amos CNP   Last visit: 2019  Current Psychotherapist: None     Identifying Data:  Patient is a 39 year old,   White American female  who presents for initial visit with me.  Patient is currently employed part time. Patient attended the session alone. Consent to communicate signed for no one. Consent for treatment signed and included in electronic medical record. Discussed limits of confidentiality today. My Practice Policy was reviewed and signed.     Patient prefers to be called: Laurie     Chief Complaint:    Chief Complaint   Patient presents with     Consult     Referred by Clau Dukes         HPI:      Lianna is a 39-year-old female who comes in today to explore medication options to manage her anxiety.  She tells me that it has worsened over the past 6 months due to \"so much going on\".  She is a  for [employer] and is feeling overwhelmed with her job tasks.  She has recently cut down her hours at work.  When she wakes up she dreads going to work as lately she has not been getting things completed correctly.  She also has 2 sons that are ages 10 and 12 years old with 1 of them experiencing oppositional defiant disorder and ADHD symptoms.  She is worried about her 12-year-old son who is very being.  Today she was tearful on and off during this interview.  She recently ended a relationship which leaves her with little support other than her mother and some friends.  Most days she has low energy and is withdrawn.  Medication she has tried to for managing the symptoms include Celexa, Cymbalta, Lexapro, Wellbutrin, Zoloft, and Inderal.  She recently started smoking again after being tobacco free for 12 years.  He tells me she is unable to sleep due to her pain.  " Lianna takes Adderall for ADHD symptoms.  She was diagnosed in 2010.  She ends up splitting the dose throughout the day as when she takes the whole tablet at a time she feels tired and wants to sleep all the time.  She tells me she has great difficulty sustaining and maintaining relationships.  At no time did she endorse any suicide thinking.  Lianna avoid shopping because it is hard to be around crowded places.  She has a tendency to cancel appointments because it can be difficult to coordinate that with her schedule when her anxiety is high she does not eat.  She has had a weight loss of 45 pounds but that was intentional with intermittent fasting.  Lianna has received E MDR therapy with Christine Gee for her life stressors in the past.  She tells me it was not helpful in controlling her symptoms.    Physical symptoms also plagued her.  She suffers from fibromyalgia and degenerative disc disease.  Plantar fasciitis has prevented her from doing work that requires a lot of walking.  Lianna tells me that she refuses to take pain medications.  She  her first  who went to correction and was abusing medication and engaging in methamphetamine use so she does not want to get into that.  Lianna informs me that she had hypoglycemic episodes when she was 14 and 20 years of age.  When that happens she gets shaky and faints.    Because of financial strain, she has had to move from a 4 bedroom house to a 2 bedroom 1 bath home which she does not like.  She tells me she has great difficulties keeping her house picked up.  Sometimes she forgets to pay bills which contributes to her financial strain.  Because of her overwhelming physical and mental health symptoms she has had thoughts to go on disability but has not made the formal process of facial.    Lianna informs me that she has had anxiety since childhood.  It was not until her adult years that she began getting medication.  Growing up she remembers her parents  fighting a lot which increased her anxiety.  She remembers every first day of the school year she would get nauseous and vomit because of her anxiety.  It was not until she was 17 years old that she first attended a movie theater.  At age 21 years her mom's fiancé  from a truck accident.  She became tearful with this as she was close to him.    Psychiatric Review of Symptoms:  Depression: Interest: Decrease  Depressed Mood  Sleep: Increase   Energy: Decrease  Appetite: Fluctuates    Concentration: Decrease  Psychomotor slowing   Suicide: No  Hopeless: Increase   Helpless: Increase   Worthless: Increase   Irritability: Increase    PHQ-9 scores:   PHQ-9 SCORE 2018 5/10/2019 10/8/2019   PHQ-9 Total Score - - -   PHQ-9 Total Score 13 14 18     Rosanna:  Distractibility: Increase  Racing Thoughts: Increase   MDQ Score: Borderline Postive Screen  Anxiety: Feeling nervous, anxious, or on edge  Worrying too much about different things  Trouble relaxing    FAUSTINO-7 scores:    FAUSTINO-7 SCORE 5/10/2019 2019 10/8/2019   Total Score - - -   Total Score 9 13 15     Panic:  Palpitations  Shortness of Breath  Crying   Agoraphobia:  No   PTSD:  History of Trauma   OCD:  No symptoms   Psychosis: No symptoms   ADD / ADHD: Attention Problem(s)  Task Completion Difficulties  Poor Organization  Distractible  Forgetful  Previous Diagnosis  Gambling or shoplifting: No   Eating Disorder:  No symptoms  Sleep:   Hypersomnolence with daytime fatigue     Psychiatric History:   Hospitalizations: None  History of Commitment? No   Past Treatment: counseling, medication(s) from physician / PCP and psychiatry  Suicide Attempts: None  Self-injurious Behavior: Denies  Electroconvulsive Therapy (ECT) or Transcranial Magnetic Stimulation (TMS): No   Genetic Testing: No     Substance Use History:  Current use of drugs or alcohol: Cannabis  and none recently    Patient reports no problems as a result of their drinking / drug use.   Based on the  "clinical interview, there  are not indications of drug or alcohol abuse.  Tobacco use: Yes Cigarettes  Ready to quit?  No  Nicotine Replacement Therapy tried: none   Caffeine: No  Patient has not received chemical dependency treatment in the past  Recovery Programming Involvement: None    Past Medical History:  Past Medical History:   Diagnosis Date     Abnormal Pap smear of cervix     Grace-Benign     Acute tonsillitis     treated with antibiotics     Condylomata 2011     Endometritis     s/p SAB, treated with antibiotics     Hidradenitis 7/3/2009     Intussusception (H)     surgically repaired     Unspecified trauma to perineum and vulva, unspecified as to episode of care in pregnancy     Vaginal and uterine tear with internal bleeding, scarring.        Surgery:   Past Surgical History:   Procedure Laterality Date     C  DELIVERY ONLY  2009    arrest of dilation at 6 cm, low transverse uterine incision     SURGICAL HISTORY OF -       oral surgery     VAGINA SURGERY      Fell off deck, internal bleeding.     Allergies:     Allergies   Allergen Reactions     Celexa [Citalopram Hydrobromide] GI Disturbance     Morphine      Polymyxin B Other (See Comments)     Polymixin eye drops  \"severe burning\"     Vicodin [Hydrocodone-Acetaminophen]      Stomach upset, \"hearing things\", irritability      Primary Care Provider: DANA Amos CNP  Seizures or Head Injury: No  Diet: No Restrictions  Food Allergies: No   Exercise: No regular exercise program  Supplements: Reviewed per Electronic Medical Record Today    albuterol (PROAIR HFA/PROVENTIL HFA/VENTOLIN HFA) 108 (90 Base) MCG/ACT inhaler, Inhale 2 puffs into the lungs 4 times daily  [START ON 10/28/2019] amphetamine-dextroamphetamine (ADDERALL) 10 MG tablet, Take 1 tablet (10 mg) by mouth 3 times daily  clindamycin (CLINDAMAX) 1 % external lotion, Apply to AA QD  cyclobenzaprine (FLEXERIL) 10 MG tablet, Take 1 tablet (10 " mg) by mouth 3 times daily as needed for muscle spasms  fluticasone (FLONASE) 50 MCG/ACT nasal spray, Spray 2 sprays into both nostrils daily  gabapentin (NEURONTIN) 300 MG capsule, Take 1 capsule (300 mg) by mouth 2 times daily  levonorgestrel-ethinyl estradiol (JOLESSA) 0.15-0.03 MG per tablet, Take 1 tablet by mouth daily  propranolol (INDERAL) 10 MG tablet, Take 1 tablet (10 mg) by mouth 3 times daily as needed (anxiety)  spironolactone (ALDACTONE) 100 MG tablet, Take 1 tablet (100 mg) by mouth daily  triamcinolone (KENALOG) 0.1 % external cream, Apply to AA BID x 2 weeks, then PRN only  benzonatate (TESSALON) 200 MG capsule, Take 1 capsule (200 mg) by mouth 3 times daily as needed for cough (Patient not taking: Reported on 10/15/2019)  cetirizine (ZYRTEC) 10 MG tablet, Take 10 mg by mouth daily  order for DME, Equipment being ordered: [facility] insert    No current facility-administered medications on file prior to visit.        The [place] Prescription Monitoring Program has been reviewed and there are no concerns about diversionary activity for controlled substances at this time.  October 8 90-day supply of gabapentin 400 mg capsules and 30 day supply of Adderall    Vital Signs:  Vitals: /80 (BP Location: Left arm)   Pulse 92   Temp 98.2  F (36.8  C) (Tympanic)   Resp 14   Wt 88.9 kg (196 lb)   SpO2 98%   BMI 31.64 kg/m      Labs:  Most recent labs reviewed and no new labs.   No EKG on file.    Review of Systems:  10 systems (general, cardiovascular, respiratory, eyes, ENT, endocrine, GI, , M/S, neurological) were reviewed. Most pertinent finding(s) is/are: No chest pain, no rash, no headache pain. The remaining systems are all unremarkable.  Family History:   Patient reported family history includes:   Family History   Problem Relation Age of Onset     Heart Disease Mother      Arthritis Mother      Neurologic Disorder Mother      C.A.D. Mother      Gastrointestinal Disease Mother       Musculoskeletal Disorder Mother      Diabetes Mother      Coronary Artery Disease Mother         has had 2 heart attacks before the age of 55     Hypertension Mother      Hyperlipidemia Mother      Depression Mother      Mental Illness Mother      Osteoporosis Mother      Heart Disease Maternal Grandmother      Breast Cancer Maternal Grandmother 42     Heart Disease Maternal Grandfather      C.A.D. Maternal Grandfather      Cerebrovascular Disease Maternal Grandfather      Diabetes Maternal Grandfather      Breast Cancer Other      Cerebrovascular Disease Other      C.A.D. Other      Diabetes Other      Hypertension Other      Musculoskeletal Disorder Other      Unknown/Adopted Father      Unknown/Adopted Paternal Grandmother      Unknown/Adopted Paternal Grandfather      Allergies Son      Coronary Artery Disease Other         My uncle just  of a heart attack 1n February     Obesity Other      Asthma No family hx of      Cancer - colorectal No family hx of      Prostate Cancer No family hx of      Mental Illness History: Yes: mom Bipolar  Substance Abuse History: Denies  Suicide History: Denies  Medications: Unknown     Social History:   Birth place: [facility]  Childhood: See epic HPI  Siblings:  2 brothers - estranged   Highest education level was associate degree / vocational certificate and And health sciences.   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 Feels safe at home.  Children: 2 sons ages 10 and 12 years  Firearms/Weapons Access: No: Patient denies   Service: No    Mental Status Examination:     Appearance:  slightly unkempt  Attitude:  cooperative   Eye Contact:  fair  Gait and Station: Normal  Psychomotor Behavior:  fidgeting  Oriented to:  time, person, and place  Attention Span and Concentration:  Fair  Speech:  pressured speech, rambling  and Speaks: Fluent English  Mood:  anxious, sad  and depressed  Affect:  intensity is heightened  Associations:  no loose associations  Thought Process:  disorganized and circumstantial  Thought Content:  no evidence of suicidal ideation or homicidal ideation, no auditory hallucinations present and no visual hallucinations present  Recent and Remote Memory:  fair Not formally assessed. No amnesia.  Fund of Knowledge: appropriate  Insight:  limited  Judgment:  fair  Impulse Control:  fair    Suicide Risk Assessment:  Today Lianna Sorto reports no thoughts to end her life or to harm other people. In addition, there are notable risk factors for self-harm, including single status, anxiety, comorbid medical condition of Fibromyalgia, hopelessness, withdrawing and mood change. However, risk is mitigated by commitment to family, sobriety, history of seeking help when needed, denies suicidal intent or plan and denies homicidal ideation, intent, or plan. Therefore, based on all available evidence including the factors cited above, Lianna Sorto does not appear to be at imminent risk for self-harm, does not meet criteria for a 72-hr hold, and therefore remains appropriate for ongoing outpatient level of care.  A thorough assessment of risk factors related to suicide and self-harm have been reviewed and are noted above. The patient convincingly denies acute suicidality on several occasions. Local community safety resources reviewed and printed for patient to use if needed. There was no deceit detected, and the patient presented in a manner that was believable.     DSM5  Diagnosis:  296.32 (F33.1) Major Depressive Disorder, Recurrent Episode, Moderate With anxious distress  300.02 (F41.1) Generalized Anxiety Disorder    Medical Comorbidities Include:   Patient Active Problem List    Diagnosis Date Noted     Moderate episode of recurrent major depressive disorder (H) 09/22/2017     Priority: Medium     Controlled substance  agreement signed 09/28/2016     Priority: Medium     Hidradenitis suppurativa 11/02/2015     Priority: Medium     Obesity 10/19/2015     Priority: Medium     Anxiety 06/02/2014     Priority: Medium     Pelvic pain in female 11/13/2013     Priority: Medium     Fibromyalgia 05/28/2013     Priority: Medium     History of major depression 02/22/2013     Priority: Medium     Piriformis syndrome 12/12/2012     Priority: Medium     Scapular dyskinesis 03/13/2012     Priority: Medium     Varicose veins of legs 07/13/2011     Priority: Medium     Sacroiliac pain 07/13/2011     Priority: Medium     Right thigh pain 07/13/2011     Priority: Medium     Back muscle spasm 06/06/2011     Priority: Medium     Tension headache 06/06/2011     Priority: Medium     CARDIOVASCULAR SCREENING; LDL GOAL LESS THAN 130 06/06/2011     Priority: Medium     ASCUS on Pap smear 03/22/2011     Priority: Medium     3/22/11: Pap - ASCUS, + HPV 16. Plan colp.  4/7/11:[place]--benign. Repeat pap 6 months. Reminder placed in epic.   11/22/11:Pap--ASCUS +(low risk) HPV type 54. Rpt pap in 1 yr. In , ep, prob list, hx updated. Reminder sent.  2/20/13:Pap--NIL. Pap 1 year. Reminder placed in EPIC  3/18/14: Pap - NIL, Neg HPV. Repeat pap 3 yrs.   6/7/2017 Pap: ASCUS, neg HPV. Plan to repeat Pap+HPV cotesting in 3 yrs (2020)           DDD (degenerative disc disease), cervical 03/10/2011     Priority: Medium     Acne 03/10/2011     Priority: Medium     Attention deficit disorder of adult 09/22/2010     Priority: Medium     Allergic rhinitis 03/30/2007     Priority: Medium     Problem list name updated by automated process. Provider to review         A 12-item WHODAS 2.0 assessment was completed by the patient today and recorded in EPIC.    WHODAS 2.0 Total Score 9/6/2016 10/25/2017   Total Score 45 32       The Patient Activation Measure (REJI) score was completed and recorded in [facility]. This assesses patient knowledge, skill, and confidence for self-management.    REJI Score (Last Two) 11/23/2010 9/6/2016   REJI Raw Score 49 32   Activation Score 82.8 62.6   REJI Level 4 3                Impression:  Lianna Sorto comes in today with concerns about uncontrolled anxiety symptoms.  Several life stressors and physical conditions contribute to the picture.  She is agreeable to starting venlafaxine daily.  She has found the propranolol helpful in decreasing anxiety symptoms so I will.  If she continues to have debilitating symptoms, I asked her to consider adding Abilify at her next visit.  She will continue with the Adderall as prescribed as she tells me it does help her to be able to multitask at work and get things done, even though she is struggling at this point.  Medication side effects and alternatives reviewed. Health promotion activities recommended and reviewed today. All questions addressed. Education and counseling completed regarding risks and benefits of medications and psychotherapy options. Collaborative Care Psychiatry Service model reviewed today. Recommend therapy for additional support.     Treatment Plan:    1. Venlafaxine ER (Effexor) 1 capsule (75 mg) daily  2. Increase the Propranolol (Inderal) to 20 mg three times daily  3. Consider adding Abilify at next visit   4. Continue Adderall 5-10 mg three times daily        Continue all other medical directions per primary care provider.     Continue all other medications as reviewed per electronic medical record today.     Safety plan reviewed. To the Emergency Department as needed or call after hours crisis line at [phone] or [phone]. [place] Crisis Text Line: Text [place] to 548119  or  Suicide LifeLine Chat: suicidepreventionlifeline.org/chat/    To schedule individual or family therapy, call [facility] at [phone].     Schedule an appointment with me in 4 weeks or sooner as needed.  Call [facility] at [phone] to schedule.    Follow up with primary care  provider as planned or for acute medical concerns.    Call the psychiatric nurse line with medication questions or concerns at [phone].    [employer] may be used to communicate with your provider, but this is not intended to be used for emergencies.    Crisis Resources:    National Suicide Prevention Lifeline: 748.491.4317 (TTY: 358.152.4104). Call anytime for help.  (www.suicidepreventionlifeline.org)  National [place] on Mental Illness (www.sina.org): 621.443.3855 or 299-930-0854.   Mental Health Association (www.mentalhealth.org): 892.473.9461 or 492-386-5910.  [place] Crisis Text [place]: Text [place] to 696056  Suicide LifeLine Chat: suicidepreAltitude Gamesline.org/chat    Administrative Billing:   Time spent with patient was 60 minutes and greater than 50% of time or 40 minutes was spent in counseling and coordination of care regarding above diagnoses and treatment plan.    Patient Status:  Patient will continue to be seen for ongoing consultation and stabilization.    Signed:   ELIANA Jeffrey-BC   Psychiatry

## 2019-10-15 NOTE — TELEPHONE ENCOUNTER
FMLA form completed and routed to TEZ Dukes for review and signature.     Send patient a follow up message on My Chart as she mentioned she sent in FMLA paperwork for her son too. I don't have any paperwork for Godwin.  Will await response from patient.

## 2019-10-15 NOTE — Clinical Note
Isiah Olguin Lianna will begin venlafaxine and I increased her propranolol for her anxiety and depression.  I asked her to consider adding Abilify at her next visit.  Apparently, she is taking her Adderall one half tab at a time throughout the day as she tells me when she takes a whole tablet it makes her tired.  I am encouraging her to seek counseling.  Thank you for the referral.

## 2019-10-16 ENCOUNTER — TELEPHONE (OUTPATIENT)
Dept: FAMILY MEDICINE | Facility: CLINIC | Age: 39
End: 2019-10-16

## 2019-10-16 ASSESSMENT — ANXIETY QUESTIONNAIRES: GAD7 TOTAL SCORE: 17

## 2019-10-16 NOTE — TELEPHONE ENCOUNTER
Clau,    She recently saw you for fibromyalgia and you increased her gabapentin.  Today she is asking for a refill of Tramadol.  Needs office visit correct? Erin LOZADA RN

## 2019-10-16 NOTE — TELEPHONE ENCOUNTER
Mental health providers do not refill pain medications. I spoke to Lianna and relayed this. Patient may follow-up in an office visit with her PCP.

## 2019-10-16 NOTE — TELEPHONE ENCOUNTER
Attempted to call the patient at work, got a voicemail.  [place]'d the patient to call me at my desk also. Erin LOZADA RN

## 2019-10-16 NOTE — TELEPHONE ENCOUNTER
Patient is reached and told of the need for office visit to evaluate the need for controlled substance.  Per patient she would like us to forward to Alicia as she saw her yesterday and spoke of her pain and maybe she will refill the tramadol for her.  Erin LOZADA RN

## 2019-10-16 NOTE — TELEPHONE ENCOUNTER
Clau,    Routing refill request to provider for review/approval because:  Drug not on the [facility] refill protocol Tramodol.  Attempted to contact patient unable to leave a message as mailbox not set up. Erin LOZADA RN

## 2019-10-17 ENCOUNTER — MYC MEDICAL ADVICE (OUTPATIENT)
Dept: PSYCHIATRY | Facility: CLINIC | Age: 39
End: 2019-10-17

## 2019-10-17 DIAGNOSIS — F41.1 GAD (GENERALIZED ANXIETY DISORDER): ICD-10-CM

## 2019-10-17 DIAGNOSIS — F33.1 MAJOR DEPRESSIVE DISORDER, RECURRENT EPISODE, MODERATE (H): Primary | ICD-10-CM

## 2019-10-17 NOTE — TELEPHONE ENCOUNTER
Form completed, signed and faxed to [employer] at [phone] and patient notified of status of request for her FMLA and patient updated our office via My Chart indicating son's FMLA was sent to Peds.

## 2019-10-17 NOTE — TELEPHONE ENCOUNTER
Last office visit: 10/15/19  Next office visit: 11/19/19    Last office visit note reviewed and summarized below:  Treatment Plan:  1.  Venlafaxine ER (Effexor) 1 capsule (75 mg) daily  2.  Increase the Propranolol (Inderal) to 20 mg three times daily  3.  Consider adding Abilify at next visit   4.  Continue Adderall 5-10 mg three times daily   5.  Continue all other medical directions per primary care provider.   6.  Continue all other medications as reviewed per electronic medical record today.   7.  Schedule an appointment with me in 4 weeks or sooner as needed    Routing to provider for review.       Cait Hartmann RN  10/17/19  12:02 PM

## 2019-10-21 RX ORDER — VENLAFAXINE 37.5 MG/1
37.5 TABLET ORAL 2 TIMES DAILY
Qty: 60 TABLET | Refills: 0 | Status: SHIPPED | OUTPATIENT
Start: 2019-10-21 | End: 2019-11-12

## 2019-10-23 ENCOUNTER — OFFICE VISIT (OUTPATIENT)
Dept: FAMILY MEDICINE | Facility: CLINIC | Age: 39
End: 2019-10-23
Payer: COMMERCIAL

## 2019-10-23 VITALS
WEIGHT: 197 LBS | SYSTOLIC BLOOD PRESSURE: 110 MMHG | DIASTOLIC BLOOD PRESSURE: 80 MMHG | OXYGEN SATURATION: 99 % | HEIGHT: 66 IN | TEMPERATURE: 97 F | RESPIRATION RATE: 16 BRPM | BODY MASS INDEX: 31.66 KG/M2 | HEART RATE: 90 BPM

## 2019-10-23 DIAGNOSIS — M79.7 FIBROMYALGIA: ICD-10-CM

## 2019-10-23 PROCEDURE — 99214 OFFICE O/P EST MOD 30 MIN: CPT | Performed by: NURSE PRACTITIONER

## 2019-10-23 RX ORDER — GABAPENTIN 300 MG/1
CAPSULE ORAL
Qty: 360 CAPSULE | Refills: 3 | Status: SHIPPED | OUTPATIENT
Start: 2019-10-23 | End: 2020-01-21

## 2019-10-23 ASSESSMENT — MIFFLIN-ST. JEOR: SCORE: 1595.34

## 2019-10-23 NOTE — PROGRESS NOTES
"Subjective     Lianna Sorto is a 39 year old female who presents to clinic today for the following health issues:    HPI   Chronic Pain Follow-Up  Asking for refill of tramadol - had an old prescription and it worked well       Type / Location of Pain: has fibromyalgia- her whole body, but states most of the pain currently is in her spine-      Hasn't been using tramadol much, but has had to add this back due to increase in pain  Analgesia/pain control:       Recent changes:  worse      Overall control: Comfortably manageable  Activity level/function:      Daily activities:  Able to do all daily activities    Work:  Able to work part time with limitations  Adverse effects:  No  Adherance    Taking medication as directed?  Yes    Participating in other treatments: no - not currently  Risk Factors:    Sleep:  Poor    Mood/anxiety:  Worsened; irritabilitly    Recent family or social stressors:  Overwhelmed with work, had to cut down hours due to pain    Other aggravating factors: prolonged sitting  PHQ-9 SCORE 5/10/2019 10/8/2019 10/15/2019   PHQ-9 Total Score - - -   PHQ-9 Total Score 14 18 22     FAUSTINO-7 SCORE 9/23/2019 10/8/2019 10/15/2019   Total Score - - -   Total Score 13 15 17     Encounter-Level CSA - 09/28/2016:    Controlled Substance Agreement - Scan on 10/4/2016 12:02 PM: CONTROLLED SUBSTANCE AGREEMENT     Patient-Level CSA:    There are no patient-level csa.                         Reviewed and updated as needed this visit by Provider         Review of Systems   ROS COMP: Constitutional, HEENT, cardiovascular, pulmonary, gi and gu systems are negative, except as otherwise noted.      Objective    /80 (BP Location: Right arm)   Pulse 90   Temp 97  F (36.1  C) (Tympanic)   Resp 16   Ht 1.676 m (5' 6\")   Wt 89.4 kg (197 lb)   SpO2 99%   BMI 31.80 kg/m    Body mass index is 31.8 kg/m .  Physical Exam   GENERAL: healthy, alert and no distress  PSYCH: mentation appears normal, tearful and " anxious            Assessment & Plan       ICD-10-CM    1. Fibromyalgia M79.7 gabapentin (NEURONTIN) 300 MG capsule     Poorly controlled chronic pain from fibromyalgia.  Likely correlated with poorly controlled mental health issues - she just established care with psychiatry and will follow up again in one month.  Discussed with patient that narcotics are not recommended for the treatment of chronic pain or fibromyalgia.  Recommend increasing her gabapentin.    Patient Instructions   Increase gabapentin: one capsule in the AM, one capsule at noon, and two capsule at bedtime.          Thank you for choosing [facility].  You may be receiving an email and/or telephone survey request from [facility] Customer Experience regarding your visit today.  Please take a few minutes to respond to the survey to let us know how we are doing.      If you have questions or concerns, please contact us via [employer] or you can contact your care team at [phone].    Our Clinic hours are:  Monday 6:40 am  to 7:00 pm  Tuesday -Friday 6:40 am to 5:00 pm    The [place] outpatient lab hours are:  Monday - Friday 6:10 am to 4:45 pm  Saturdays 7:00 am to 11:00 am  Appointments are required, call [phone]    If you have clinical questions after hours or would like to schedule an appointment,  call the clinic at [phone].        Return in about 4 weeks (around 11/20/2019).    The risks, benefits and treatment options of prescribed medications or other treatments have been discussed with the patient. The patient verbalized their understanding and should call or follow up if no improvement or if they develop further problems.    DANA Amos CNP  [facility]

## 2019-10-23 NOTE — PATIENT INSTRUCTIONS
Increase gabapentin: one capsule in the AM, one capsule at noon, and two capsule at bedtime.          Thank you for choosing [facility].  You may be receiving an email and/or telephone survey request from [facility] Customer Experience regarding your visit today.  Please take a few minutes to respond to the survey to let us know how we are doing.      If you have questions or concerns, please contact us via [employer] or you can contact your care team at [phone].    Our Clinic hours are:  Monday 6:40 am  to 7:00 pm  Tuesday -Friday 6:40 am to 5:00 pm    The [place] outpatient lab hours are:  Monday - Friday 6:10 am to 4:45 pm  Saturdays 7:00 am to 11:00 am  Appointments are required, call [phone]    If you have clinical questions after hours or would like to schedule an appointment,  call the clinic at [phone].

## 2019-10-31 ENCOUNTER — TELEPHONE (OUTPATIENT)
Dept: FAMILY MEDICINE | Facility: CLINIC | Age: 39
End: 2019-10-31

## 2019-11-03 ENCOUNTER — HEALTH MAINTENANCE LETTER (OUTPATIENT)
Age: 39
End: 2019-11-03

## 2019-11-12 ENCOUNTER — OFFICE VISIT (OUTPATIENT)
Dept: PSYCHIATRY | Facility: CLINIC | Age: 39
End: 2019-11-12
Payer: COMMERCIAL

## 2019-11-12 VITALS
HEART RATE: 100 BPM | TEMPERATURE: 98.8 F | RESPIRATION RATE: 16 BRPM | WEIGHT: 200 LBS | OXYGEN SATURATION: 98 % | DIASTOLIC BLOOD PRESSURE: 92 MMHG | HEIGHT: 66 IN | SYSTOLIC BLOOD PRESSURE: 112 MMHG | BODY MASS INDEX: 32.14 KG/M2

## 2019-11-12 DIAGNOSIS — F41.1 GAD (GENERALIZED ANXIETY DISORDER): ICD-10-CM

## 2019-11-12 DIAGNOSIS — F90.0 ADHD (ATTENTION DEFICIT HYPERACTIVITY DISORDER), INATTENTIVE TYPE: ICD-10-CM

## 2019-11-12 DIAGNOSIS — F43.23 ADJUSTMENT DISORDER WITH MIXED ANXIETY AND DEPRESSED MOOD: Primary | ICD-10-CM

## 2019-11-12 PROCEDURE — 99214 OFFICE O/P EST MOD 30 MIN: CPT | Performed by: NURSE PRACTITIONER

## 2019-11-12 RX ORDER — LAMOTRIGINE 25 MG/1
TABLET ORAL
Qty: 42 TABLET | Refills: 0 | Status: SHIPPED | OUTPATIENT
Start: 2019-11-12 | End: 2019-12-23

## 2019-11-12 RX ORDER — PROPRANOLOL HYDROCHLORIDE 20 MG/1
20 TABLET ORAL 3 TIMES DAILY
Qty: 90 TABLET | Refills: 0 | Status: SHIPPED | OUTPATIENT
Start: 2019-11-12 | End: 2019-12-23

## 2019-11-12 ASSESSMENT — PATIENT HEALTH QUESTIONNAIRE - PHQ9: SUM OF ALL RESPONSES TO PHQ QUESTIONS 1-9: 17

## 2019-11-12 ASSESSMENT — MIFFLIN-ST. JEOR: SCORE: 1608.94

## 2019-11-12 NOTE — PATIENT INSTRUCTIONS
1. Venlafaxine ER (Effexor) discontinued  2. Propranolol (Inderal)  20 mg three times daily  3. Lamotrigine 25 mg daily for two weeks then 50 mg daily for two weeks after that   4. Continue Adderall 5-10 mg three times daily       Continue all other medications as reviewed per electronic medical record today.     Safety plan reviewed. To the Emergency Department as needed or call after hours crisis line at [phone] or [phone]. [place] Crisis Text Line. Text [place] to 073194 or Suicide LifeLine Chat: suicidePharmworks.org/chat/    To schedule individual or family therapy, call [facility] at [phone].    Schedule an appointment with me in 4 weeks or sooner as needed. Call [facility] at [phone] to schedule.    Follow up with primary care provider as planned or for acute medical concerns.    Call the psychiatric nurse line with medication questions or concerns at [phone].    [employer] may be used to communicate with your provider, but this is not intended to be used for emergencies.    Crisis Resources:    National Suicide Prevention Lifeline: 259.754.7630 (TTY: 791.385.9932). Call anytime for help.  (www.suicidepreventionlifeline.org)  National [place] on Mental Illness (www.sina.org): 251.534.8474 or 609-763-0000.   Mental Health Association (www.mentalhealth.org): 199.735.8618 or 490-649-5134.  [place] Crisis Text [place]: Text [place] to 265570  Suicide LifeLine Chat: suicideGeorge Mobileline.org/chat

## 2019-11-12 NOTE — PROGRESS NOTES
"    Outpatient Psychiatric Progress Note    Name: Lianna Sorto   : 1980                    Primary Care Provider: DANA Amos CNP   Therapist: None    PHQ-9 scores:  PHQ-9 SCORE 5/10/2019 10/8/2019 10/15/2019   PHQ-9 Total Score - - -   PHQ-9 Total Score 14 18 22       FAUSTINO-7 scores:  FAUSTINO-7 SCORE 2019 10/8/2019 10/15/2019   Total Score - - -   Total Score 13 15 17       Patient Identification:    Patient is a 39 year old year old,   White American female  who presents for return visit with me.  Patient is currently employed full time. Patient attended the session alone. Patient prefers to be called: \"Laurie\".    Interim History:    I last saw Lianna Sorto for outpatient psychiatry Consultation on October 15, 2019.     During that appointment, we reviewed her symptoms of ongoing anxiety related to work, financial, and medical problems .  She agreed to start venlafaxine with plans to consider adding Abilify in the future.  She was wanting to continue taking her Adderall as she feels like this helps her to focus and concentrate at work.  Her propranolol dose was increased to better m anage her anxiety symptoms.       Current medications include: albuterol (PROAIR HFA/PROVENTIL HFA/VENTOLIN HFA) 108 (90 Base) MCG/ACT inhaler, Inhale 2 puffs into the lungs 4 times daily  amphetamine-dextroamphetamine (ADDERALL) 10 MG tablet, Take 1 tablet (10 mg) by mouth 3 times daily  cetirizine (ZYRTEC) 10 MG tablet, Take 10 mg by mouth daily  clindamycin (CLINDAMAX) 1 % external lotion, Apply to AA QD  cyclobenzaprine (FLEXERIL) 10 MG tablet, Take 1 tablet (10 mg) by mouth 3 times daily as needed for muscle spasms  fluticasone (FLONASE) 50 MCG/ACT nasal spray, Spray 2 sprays into both nostrils daily  gabapentin (NEURONTIN) 300 MG capsule, One capsule in the AM, one capsule at noon, and two capsules at bedtime.  levonorgestrel-ethinyl estradiol (JOLESSA) 0.15-0.03 MG per tablet, Take 1 tablet by " mouth daily  order for DME, Equipment being ordered: Dynaflex insert  propranolol (INDERAL) 20 MG tablet, Take 1 tablet (20 mg) by mouth 3 times daily  spironolactone (ALDACTONE) 100 MG tablet, Take 1 tablet (100 mg) by mouth daily  triamcinolone (KENALOG) 0.1 % external cream, Apply to AA BID x 2 weeks, then PRN only    No current facility-administered medications on file prior to visit.        The [place] Prescription Monitoring Program has been reviewed and there are no concerns about diversionary activity for controlled substances at this time.      I was able to review most recent Primary Care Provider, specialty provider, and therapy visit notes that I have access to.     Today, patient reports that when she took the Effexor she developed extreme nausea and her jaw was clenching.  She has not continued with that medication.  She constantly does not want to get out of bed and sometimes does not get dressed.  She isolates to her house and does not like being around people.  She does not like to go to [facility].  She tells me she has no ambition coupled with the irritability and mood dysregulation.  Yesterday she found out that she has to go to court with her son who is 12 years of age after he sold sleeping materials to other students in school.  He is now suspended and staying with her mother.  Work continues to be extremely stressful.  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  She was looking into going on disability in order to be present for her son but found out she can only work 12 hours a week if approved.  His father lives in [place] and is uninvolved. Lianna marcelina trying to cut down on smoking cigarettes but when she feels hopeless, she   Smokes more.     Today she asked about starting Lamotrigine.  We reviewed the side effect         has a past medical history of Abnormal Pap smear of cervix (2011),  "Acute tonsillitis (2008), Condylomata (4/7/2011), Endometritis (2008), Hidradenitis (7/3/2009), Intussusception (H) (1983), and Unspecified trauma to perineum and vulva, unspecified as to episode of care in pregnancy (1985). She also has no past medical history of Basal cell carcinoma or Squamous cell carcinoma.    Social history updates:    Bible study group on Fridays after work - trying to go back to Confucianism    Substance use updates:    No alcohol use   Tobacco use: Yes Cigarettes  Ready to quit?  No  Nicotine Replacement Therapy tried: none     Vital Signs:   BP (!) 112/92 (BP Location: Left arm, Patient Position: Sitting, Cuff Size: Adult Regular)   Pulse 100   Temp 98.8  F (37.1  C) (Tympanic)   Resp 16   Ht 1.676 m (5' 6\")   Wt 90.7 kg (200 lb)   SpO2 98%   BMI 32.28 kg/m      Labs:    Most recent laboratory results reviewed and no new labs.     Review of Systems:  10 systems (general, cardiovascular, respiratory, eyes, ENT, endocrine, GI, , M/S, neurological) were reviewed. Most pertinent finding(s) is/are: No chest pain, no rash, no headaches. The remaining systems are all unremarkable.    Mental Status Examination:  Appearance:  casually dressed  Attitude:  cooperative   Eye Contact:  adequate  Gait and Station: Normal  Psychomotor Behavior:  fidgeting  Oriented to:  time, person, and place  Attention Span and Concentration:  Fair  Speech:   pressured speech and Speaks: English  Mood:  anxious and depressed  Affect:  intensity is heightened  Associations:  no loose associations  Thought Process:  circumstantial  Thought Content:  no evidence of suicidal ideation or homicidal ideation, no auditory hallucinations present and no visual hallucinations present  Recent and Remote Memory:  fair Not formally assessed. No amnesia.  Fund of Knowledge: appropriate  Insight:  fair  Judgment:  fair  Impulse Control:  fair    Suicide Risk Assessment:  Today Lianna KRUGER Sorto reports that she is not feeling " suicidal or homicidal despite feeling hopeless. In addition, there are notable risk factors for self-harm, including single status, anxiety, hopelessness, withdrawing and mood change. However, risk is mitigated by commitment to family, sobriety, history of seeking help when needed, no access to firearms or weapons, denies suicidal intent or plan and denies homicidal ideation, intent, or plan. Therefore, based on all available evidence including the factors cited above, Lianna Sorto does not appear to be at imminent risk for self-harm, does not meet criteria for a 72-hr hold, and therefore remains appropriate for ongoing outpatient level of care.  A thorough assessment of risk factors related to suicide and self-harm have been reviewed and are noted above. The patient convincingly denies suicidality on several occasions. Local community safety resources printed and reviewed for patient to use if needed. There was no deceit detected, and the patient presented in a manner that was believable.     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  296.32 (F33.1) Major Depressive Disorder, Recurrent Episode, Moderate With anxious distress  300.02 (F41.1) Generalized Anxiety Disorder    Medical comorbidities include:   Patient Active Problem List    Diagnosis Date Noted     Moderate episode of recurrent major depressive disorder (H) 09/22/2017     Priority: Medium     Controlled substance agreement signed 09/28/2016     Priority: Medium     Hidradenitis suppurativa 11/02/2015     Priority: Medium     Obesity 10/19/2015     Priority: Medium     Anxiety 06/02/2014     Priority: Medium     Pelvic pain in female 11/13/2013     Priority: Medium     Fibromyalgia 05/28/2013     Priority: Medium     History of major depression 02/22/2013     Priority: Medium     Piriformis syndrome 12/12/2012     Priority: Medium     Scapular dyskinesis 03/13/2012     Priority:  Medium     Varicose veins of legs 07/13/2011     Priority: Medium     Sacroiliac pain 07/13/2011     Priority: Medium     Right thigh pain 07/13/2011     Priority: Medium     Back muscle spasm 06/06/2011     Priority: Medium     Tension headache 06/06/2011     Priority: Medium     CARDIOVASCULAR SCREENING; LDL GOAL LESS THAN 130 06/06/2011     Priority: Medium     ASCUS on Pap smear 03/22/2011     Priority: Medium     3/22/11: Pap - ASCUS, + HPV 16. Plan colp.  4/7/11:[place]--benign. Repeat pap 6 months. Reminder placed in epic.   11/22/11:Pap--ASCUS +(low risk) HPV type 54. Rpt pap in 1 yr. In , ep, prob list, hx updated. Reminder sent.  2/20/13:Pap--NIL. Pap 1 year. Reminder placed in EPIC  3/18/14: Pap - NIL, Neg HPV. Repeat pap 3 yrs.   6/7/2017 Pap: ASCUS, neg HPV. Plan to repeat Pap+HPV cotesting in 3 yrs (2020)           DDD (degenerative disc disease), cervical 03/10/2011     Priority: Medium     Acne 03/10/2011     Priority: Medium     Attention deficit disorder of adult 09/22/2010     Priority: Medium     Allergic rhinitis 03/30/2007     Priority: Medium     Problem list name updated by automated process. Provider to review         Assessment:    Lianna Sorto  Comes in today distraught, depressed, and anxious.  She had side effects from the Effexor so she stopped taking it.  Family stressors  Have increased with the recent actions done by her son who now has to go to court. She I s experiencing ongoing job stress and has been contemplating going on disability.  She desires to get her mood symptoms under control and to stop the waves of irritability followed by deep depression.  Today she agreed to a trial of lamotrigine.  Review of the signs and symptoms of Huston Abraham rash were reviewed.  She is getting some relief from her anxiety symptoms with the propranolol so this will be continued.Medication side effects and alternatives were reviewed. Health promotion activities recommended and reviewed  today. All questions addressed. Education and counseling completed regarding risks and benefits of medications and psychotherapy options.    Treatment Plan:  1. Venlafaxine ER (Effexor) discontinued  2. Propranolol (Inderal)  20 mg three times daily  3. Lamotrigine 25 mg daily for two weeks then 50 mg daily for two weeks after that   4. Continue Adderall 5-10 mg three times daily       Continue all other medications as reviewed per electronic medical record today.     Safety plan reviewed. To the Emergency Department as needed or call after hours crisis line at [phone] or [phone]. [place] Crisis Text [place]. Text [place] to 934986 or Suicide LifeLine Chat: [employer].org/chat/    To schedule individual or family therapy, call [facility] at [phone].    Schedule an appointment with me in 4 weeks or sooner as needed. Call [facility] at [phone] to schedule.    Follow up with primary care provider as planned or for acute medical concerns.    Call the psychiatric nurse line with medication questions or concerns at [phone].    [employer] may be used to communicate with your provider, but this is not intended to be used for emergencies.    Crisis Resources:    National Suicide Prevention Lifeline: 525.146.2314 (TTY: 928.424.1223). Call anytime for help.  (www.suicidepreventionlifeline.org)  National [place] on Mental Illness (www.sina.org): 733.815.5903 or 068-886-3888.   Mental Health Association (www.mentalhealth.org): 388.698.6477 or 238-815-5382.  [place] Crisis Text Line: Text [place] to 972024  Suicide LifeLine Chat: [employer].org/chat    Administrative Billing:   Time spent with patient was 30 minutes and greater than 50% of time or 20 minutes was spent in counseling and coordination of care regarding above diagnoses and treatment plan.    Patient Status:  Patient will continue to be seen for ongoing consultation and  stabilization.    Signed:   Alicia Smith, [facility]   Psychiatry

## 2019-11-18 DIAGNOSIS — Z30.41 ENCOUNTER FOR SURVEILLANCE OF CONTRACEPTIVE PILLS: ICD-10-CM

## 2019-11-18 NOTE — TELEPHONE ENCOUNTER
"Requested Prescriptions   Pending Prescriptions Disp Refills     SETLAKIN 0.15-0.03 MG tablet [Pharmacy Med Name: SETLAKIN 0.15-0.03MG TABS] 91 tablet 4     Sig: TAKE ONE TABLET BY MOUTH EVERY DAY       Contraceptives Protocol Failed - 11/18/2019 11:39 AM        Failed - Patient is not a current smoker if age is 35 or older        Passed - Recent (12 mo) or future (30 days) visit within the authorizing provider's specialty     Patient has had an office visit with the authorizing provider or a provider within the authorizing providers department within the previous 12 mos or has a future within next 30 days. See \"Patient Info\" tab in inbasket, or \"Choose Columns\" in Meds & Orders section of the refill encounter.              Passed - Medication is active on med list        Passed - No active pregnancy on record        Passed - No positive pregnancy test in past 12 months        Last Written Prescription Date:  8/21/2018  Last Fill Quantity: 84,  # refills: 4   Last office visit: 10/23/2019 with prescribing provider:  Ernst   Future Office Visit:   Next 5 appointments (look out 90 days)    Nov 26, 2019  1:15 PM CST  [facility] Smith NP  [facility] [address] [address] [phone]           "

## 2019-11-21 RX ORDER — LEVONORGESTREL AND ETHINYL ESTRADIOL 0.15-0.03
KIT ORAL
Qty: 91 TABLET | Refills: 1 | Status: SHIPPED | OUTPATIENT
Start: 2019-11-21 | End: 2020-05-26

## 2019-11-21 NOTE — TELEPHONE ENCOUNTER
Routing refill request to provider for review/approval because:  Please review.  Over 37 years and smoker.    Berenice DAVIDSON RN

## 2019-11-26 ENCOUNTER — OFFICE VISIT (OUTPATIENT)
Dept: PSYCHIATRY | Facility: CLINIC | Age: 39
End: 2019-11-26
Payer: COMMERCIAL

## 2019-11-26 VITALS
BODY MASS INDEX: 34.07 KG/M2 | RESPIRATION RATE: 12 BRPM | HEIGHT: 66 IN | SYSTOLIC BLOOD PRESSURE: 128 MMHG | HEART RATE: 84 BPM | WEIGHT: 212 LBS | DIASTOLIC BLOOD PRESSURE: 90 MMHG | OXYGEN SATURATION: 98 %

## 2019-11-26 DIAGNOSIS — F90.0 ADHD (ATTENTION DEFICIT HYPERACTIVITY DISORDER), INATTENTIVE TYPE: ICD-10-CM

## 2019-11-26 DIAGNOSIS — F33.1 MODERATE EPISODE OF RECURRENT MAJOR DEPRESSIVE DISORDER (H): ICD-10-CM

## 2019-11-26 DIAGNOSIS — F41.1 GAD (GENERALIZED ANXIETY DISORDER): ICD-10-CM

## 2019-11-26 DIAGNOSIS — F43.23 ADJUSTMENT DISORDER WITH MIXED ANXIETY AND DEPRESSED MOOD: Primary | ICD-10-CM

## 2019-11-26 PROCEDURE — 99214 OFFICE O/P EST MOD 30 MIN: CPT | Performed by: NURSE PRACTITIONER

## 2019-11-26 RX ORDER — LAMOTRIGINE 100 MG/1
100 TABLET ORAL DAILY
Qty: 30 TABLET | Refills: 0 | Status: SHIPPED | OUTPATIENT
Start: 2019-11-26 | End: 2019-12-23 | Stop reason: DRUGHIGH

## 2019-11-26 ASSESSMENT — MIFFLIN-ST. JEOR: SCORE: 1663.38

## 2019-11-26 ASSESSMENT — ANXIETY QUESTIONNAIRES
6. BECOMING EASILY ANNOYED OR IRRITABLE: MORE THAN HALF THE DAYS
2. NOT BEING ABLE TO STOP OR CONTROL WORRYING: MORE THAN HALF THE DAYS
1. FEELING NERVOUS, ANXIOUS, OR ON EDGE: NEARLY EVERY DAY
7. FEELING AFRAID AS IF SOMETHING AWFUL MIGHT HAPPEN: SEVERAL DAYS
5. BEING SO RESTLESS THAT IT IS HARD TO SIT STILL: NOT AT ALL
IF YOU CHECKED OFF ANY PROBLEMS ON THIS QUESTIONNAIRE, HOW DIFFICULT HAVE THESE PROBLEMS MADE IT FOR YOU TO DO YOUR WORK, TAKE CARE OF THINGS AT HOME, OR GET ALONG WITH OTHER PEOPLE: EXTREMELY DIFFICULT
3. WORRYING TOO MUCH ABOUT DIFFERENT THINGS: NEARLY EVERY DAY
GAD7 TOTAL SCORE: 12

## 2019-11-26 ASSESSMENT — PATIENT HEALTH QUESTIONNAIRE - PHQ9
5. POOR APPETITE OR OVEREATING: SEVERAL DAYS
SUM OF ALL RESPONSES TO PHQ QUESTIONS 1-9: 19

## 2019-11-26 NOTE — PROGRESS NOTES
"    Outpatient Psychiatric Progress Note    Name: Lianna Sorto   : 1980                    Primary Care Provider: DANA Amos CNP   Therapist: None     PHQ-9 scores:  PHQ-9 SCORE 10/8/2019 10/15/2019 2019   PHQ-9 Total Score - - -   PHQ-9 Total Score 18 22 17       FAUSTINO-7 scores:  FAUSTINO-7 SCORE 2019 10/8/2019 10/15/2019   Total Score - - -   Total Score 13 15 17       Patient Identification:    Patient is a 39 year old year old, single  White American female  who presents for return visit with me.  Patient is currently employed full time. Patient attended the session alone. Patient prefers to be called: \"Laurie\".    Interim History:    I last saw Lianna Sorto for outpatient psychiatry Return Visit on 2019.     During that appointment, we reviewed her mental health history.  At the visit she presented distraught, anxious and depressed.  Her work at the [employer] has been  Very stressful for her coupled with being a single parent and experiencing financial difficulties.  Given her mood dysregulation , I started her on Lamotrigine and discontinued the Venlafaxine.  Propranolol was added to help manage  Her anxiety. At the time of this visit she was taking Adderall and was splitting the dose into three times daily.  She maintains that the Adderall helps her to stay organized and focused at work.      Current medications include: albuterol (PROAIR HFA/PROVENTIL HFA/VENTOLIN HFA) 108 (90 Base) MCG/ACT inhaler, Inhale 2 puffs into the lungs 4 times daily  amphetamine-dextroamphetamine (ADDERALL) 10 MG tablet, Take 1 tablet (10 mg) by mouth 3 times daily  cetirizine (ZYRTEC) 10 MG tablet, Take 10 mg by mouth daily  clindamycin (CLINDAMAX) 1 % external lotion, Apply to AA QD  cyclobenzaprine (FLEXERIL) 10 MG tablet, Take 1 tablet (10 mg) by mouth 3 times daily as needed for muscle spasms  fluticasone (FLONASE) 50 MCG/ACT nasal spray, Spray 2 sprays into both nostrils " "daily  gabapentin (NEURONTIN) 300 MG capsule, One capsule in the AM, one capsule at noon, and two capsules at bedtime.  lamoTRIgine (LAMICTAL) 25 MG tablet, Take 1 tablet (25 mg) daily for two weeks then 2 tablets (50 mg) daily  order for DME, Equipment being ordered: Dynaflex insert  propranolol (INDERAL) 20 MG tablet, Take 1 tablet (20 mg) by mouth 3 times daily  SETLAKIN 0.15-0.03 MG tablet, TAKE ONE TABLET BY MOUTH EVERY DAY  spironolactone (ALDACTONE) 100 MG tablet, Take 1 tablet (100 mg) by mouth daily  triamcinolone (KENALOG) 0.1 % external cream, Apply to AA BID x 2 weeks, then PRN only    No current facility-administered medications on file prior to visit.        The [place] Prescription Monitoring Program has been reviewed and there are no concerns about diversionary activity for controlled substances at this time.      I was able to review most recent Primary Care Provider, specialty provider, and therapy visit notes that I have access to.     Today, patient reports that she has been looking for a new job and has an interview scheduled at an insurance office in [facility].  Her son has started Lamotrigine and is doing better, emotionally. Court is still pending for him as she has not contacted the courts to get this scheduled.  Most nights she gets 5 hours of sleep.  Pain and  Back spasms continue.  She is disappointed that her doctor will not prescribe Tramadol any more for her fibromyalgia, given that she felt relief with it.  Now she is taking \"a lot\" of Ibuprofen.  Today Laurie informs me that she is not taking her Adderall three times daily since she has times when she is unable to take it with food.  Otherwise she feels extremely nauseated. She tells me that at our next visit she will need a refill.  She has not felt any different since starting the Lamictal but has just started taking the 50 mg daily dose.  90 tabs were received November 18, 2019.       has a past medical history of Abnormal " "Pap smear of cervix (2011), Acute tonsillitis (2008), Condylomata (4/7/2011), Endometritis (2008), Hidradenitis (7/3/2009), Intussusception (H) (1983), and Unspecified trauma to perineum and vulva, unspecified as to episode of care in pregnancy (1985). She also has no past medical history of Basal cell carcinoma or Squamous cell carcinoma.    Social history updates:    Making turkey dinner.  Isolates to her home.    Substance use updates:    No alcohol use   Tobacco use: Yes Cigarettes  Ready to quit?  No  Nicotine Replacement Therapy tried: none     Vital Signs:   BP (!) 128/90 (BP Location: Left arm, Patient Position: Sitting, Cuff Size: Adult Regular)   Pulse 84   Resp 12   Ht 1.676 m (5' 6\")   Wt 96.2 kg (212 lb)   SpO2 98%   BMI 34.22 kg/m      Labs:    Most recent laboratory results reviewed and no new labs.     Review of Systems:  10 systems (general, cardiovascular, respiratory, eyes, ENT, endocrine, GI, , M/S, neurological) were reviewed. Most pertinent finding(s) is/are: no rash, generalized joint, neck, and  Back pain. The remaining systems are all unremarkable.    Mental Status Examination:  Appearance:  casually dressed  Attitude:  cooperative   Eye Contact:  good  Gait and Station: Normal  Psychomotor Behavior:  intact station, gait and muscle tone  Oriented to:  time, person, and place  Attention Span and Concentration:  Fair  Speech:   clear, coherent and Speaks: English  Mood:  anxious, depressed and better  Affect:  appropriate and in normal range  Associations:  no loose associations  Thought Process:  logical and goal oriented  Thought Content:  no evidence of suicidal ideation or homicidal ideation, no auditory hallucinations present and no visual hallucinations present  Recent and Remote Memory:  intact Not formally assessed. No amnesia.  Fund of Knowledge: appropriate  Insight:  fair  Judgment:  fair  Impulse Control:  fair    Suicide Risk Assessment:  Today Lianna Sorto reports no " thoughts to want to end  Her life or to harm other people. In addition, there are notable risk factors for self-harm, including single status, anxiety, comorbid medical condition of chronic pain and withdrawing. However, risk is mitigated by commitment to family, sobriety, history of seeking help when needed, no access to firearms or weapons, denies suicidal intent or plan and denies homicidal ideation, intent, or plan. Therefore, based on all available evidence including the factors cited above, Lianna Sorto does not appear to be at imminent risk for self-harm, does not meet criteria for a 72-hr hold, and therefore remains appropriate for ongoing outpatient level of care.  A thorough assessment of risk factors related to suicide and self-harm have been reviewed and are noted above. The patient convincingly denies suicidality on several occasions. Local community safety resources printed and reviewed for patient to use if needed. There was no deceit detected, and the patient presented in a manner that was believable.     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  296.32 (F33.1) Major Depressive Disorder, Recurrent Episode, Moderate With mixed features  300.02 (F41.1) Generalized Anxiety Disorder    Medical comorbidities include:   Patient Active Problem List    Diagnosis Date Noted     Moderate episode of recurrent major depressive disorder (H) 09/22/2017     Priority: Medium     Controlled substance agreement signed 09/28/2016     Priority: Medium     Hidradenitis suppurativa 11/02/2015     Priority: Medium     Obesity 10/19/2015     Priority: Medium     Anxiety 06/02/2014     Priority: Medium     Pelvic pain in female 11/13/2013     Priority: Medium     Fibromyalgia 05/28/2013     Priority: Medium     History of major depression 02/22/2013     Priority: Medium     Piriformis syndrome 12/12/2012     Priority: Medium     Scapular dyskinesis  03/13/2012     Priority: Medium     Varicose veins of legs 07/13/2011     Priority: Medium     Sacroiliac pain 07/13/2011     Priority: Medium     Right thigh pain 07/13/2011     Priority: Medium     Back muscle spasm 06/06/2011     Priority: Medium     Tension headache 06/06/2011     Priority: Medium     CARDIOVASCULAR SCREENING; LDL GOAL LESS THAN 130 06/06/2011     Priority: Medium     ASCUS on Pap smear 03/22/2011     Priority: Medium     3/22/11: Pap - ASCUS, + HPV 16. Plan colp.  4/7/11:[place]--benign. Repeat pap 6 months. Reminder placed in epic.   11/22/11:Pap--ASCUS +(low risk) HPV type 54. Rpt pap in 1 yr. In , ep, prob list, hx updated. Reminder sent.  2/20/13:Pap--NIL. Pap 1 year. Reminder placed in EPIC  3/18/14: Pap - NIL, Neg HPV. Repeat pap 3 yrs.   6/7/2017 Pap: ASCUS, neg HPV. Plan to repeat Pap+HPV cotesting in 3 yrs (2020)           DDD (degenerative disc disease), cervical 03/10/2011     Priority: Medium     Acne 03/10/2011     Priority: Medium     Attention deficit disorder of adult 09/22/2010     Priority: Medium     Allergic rhinitis 03/30/2007     Priority: Medium     Problem list name updated by automated process. Provider to review         Assessment:    Lianna Sorto  Comes in today reporting that she feels more stable.  Anxiety is there but less intense.  She is taking steps to find another job given her high level of stress where she is at. Her son recently got on a mood stabilizer and is doing better.  She continues to isolate.  While she tells me that her mood regulation has not changed much, she is only just starting the 50 mg daily dose of Lamotrigine.  When she is finished with this she will start theh 100 mg dose.  She tells me that she does not take the Adderall three times daily if she does not eat, so no refills needed today.  Medication side effects and alternatives were reviewed. Health promotion activities recommended and reviewed today. All questions addressed. Education  and counseling completed regarding risks and benefits of medications and psychotherapy options.    Treatment Plan:      1. Continue Adderall 10 mg up to three times daily  2. Propranolol (Inderal)  20 mg three times daily  3. Lamotrigine  50 mg daily for two weeks.  Then take 100 mg daily  after that       Continue all other medications as reviewed per electronic medical record today.     Safety plan reviewed. To the Emergency Department as needed or call after hours crisis line at [phone] or [phone]. [place] Crisis Text [place]. Text [place] to 831730 or Suicide LifeLine Chat: suicideAisleBuyer.org/chat/    To schedule individual or family therapy, call [facility] at [phone].    Schedule an appointment with me in 4 weeks or sooner as needed. Call [facility] at [phone] to schedule.    Follow up with primary care provider as planned or for acute medical concerns.    Call the psychiatric nurse line with medication questions or concerns at [phone].    vip.com may be used to communicate with your provider, but this is not intended to be used for emergencies.    Crisis Resources:    National Suicide Prevention Lifeline: 482.510.9241 (TTY: 124.330.4390). Call anytime for help.  (www.suicidepreventionlifeline.org)  National [place] on Mental Illness (www.sina.org): 663.114.8009 or 018-634-2586.   Mental Health Association (www.mentalhealth.org): 520.302.3259 or 265-435-4361.  [place] Crisis [place]: Text [place] to 496411  Suicide LifeLine Chat: suicideAisleBuyer.org/chat    Administrative Billing:   Time spent with patient was 30 minutes and greater than 50% of time or 20 minutes was spent in counseling and coordination of care regarding above diagnoses and treatment plan.    Patient Status:  Patient will continue to be seen for ongoing consultation and stabilization.    Signed:   ELIANA Jeffrey-BC   Psychiatry

## 2019-11-26 NOTE — PATIENT INSTRUCTIONS
1. Continue Adderall 10 mg up to three times daily  2. Propranolol (Inderal)  20 mg three times daily  3. Lamotrigine  50 mg daily for two weeks.  Then take 100 mg daily  after that       Continue all other medications as reviewed per electronic medical record today.     Safety plan reviewed. To the Emergency Department as needed or call after hours crisis line at [phone] or [phone]. [place] Crisis [place]. Text [place] to 591462 or Suicide LifeLine Chat: suicidePolitical Matchmakers.org/chat/    To schedule individual or family therapy, call [facility] at [phone].    Schedule an appointment with me in 4 weeks or sooner as needed. Call [facility] at [phone] to schedule.    Follow up with primary care provider as planned or for acute medical concerns.    Call the psychiatric nurse line with medication questions or concerns at [phone].    [employer] may be used to communicate with your provider, but this is not intended to be used for emergencies.    Crisis Resources:    National Suicide Prevention Lifeline: 537.877.2441 (TTY: 234.893.4253). Call anytime for help.  (www.suicidepreventionlifeline.org)  National [place] on Mental Illness (www.sina.org): 358.813.8004 or 803-384-7701.   Mental Health Association (www.mentalhealth.org): 251.225.1278 or 435-792-4040.  [place] Crisis [place]: Text [place] to 191938  Suicide LifeLine Chat: suicidePolitical Matchmakers.org/chat

## 2019-11-27 ASSESSMENT — ANXIETY QUESTIONNAIRES: GAD7 TOTAL SCORE: 12

## 2019-12-09 DIAGNOSIS — L73.2 HIDRADENITIS SUPPURATIVA: ICD-10-CM

## 2019-12-09 DIAGNOSIS — L30.9 ECZEMA, UNSPECIFIED TYPE: ICD-10-CM

## 2019-12-09 NOTE — LETTER
[facility]  [address]  [facility] [place], [place] [address]  Phone: [phone]    December 10, 2019    Lianna Sorto                                                                                                                [address] [facility] [address]            Dear Ms. Sorto,    We are concerned about your health care.  We recently provided you with a medication refill.  Many medications require routine follow-up with your Dermatology Provider.      At this time we ask that: You schedule a routine office visit with your Dermatology Provider to follow your Hidradenitis suppurativa. You need to be seen at least annually to renew a prescription.    Your prescription: Has been refilled for 1 month so you may have time for the above noted follow-up. Please be seen prior to needing your next refill of medication.     We are currently booking appointments 6-8 weeks in advance, so please plan accordingly.     Thank you,      Yumi MONSALVE / mmc

## 2019-12-10 RX ORDER — TRIAMCINOLONE ACETONIDE 1 MG/G
CREAM TOPICAL
Qty: 45 G | Refills: 0 | Status: SHIPPED | OUTPATIENT
Start: 2019-12-10 | End: 2020-03-26

## 2019-12-10 RX ORDER — CLINDAMYCIN PHOSPHATE 10 UG/ML
LOTION TOPICAL
Qty: 60 ML | Refills: 0 | Status: SHIPPED | OUTPATIENT
Start: 2019-12-10 | End: 2020-03-26

## 2019-12-10 NOTE — TELEPHONE ENCOUNTER
> 1 year since seen. Needs Derm appointment. Letter sent and note sent to pharmacy as well. Jeanette Gutierrez RN

## 2019-12-23 ENCOUNTER — OFFICE VISIT (OUTPATIENT)
Dept: PSYCHIATRY | Facility: CLINIC | Age: 39
End: 2019-12-23
Payer: COMMERCIAL

## 2019-12-23 VITALS
OXYGEN SATURATION: 98 % | WEIGHT: 200 LBS | SYSTOLIC BLOOD PRESSURE: 104 MMHG | HEART RATE: 79 BPM | DIASTOLIC BLOOD PRESSURE: 72 MMHG | HEIGHT: 66 IN | BODY MASS INDEX: 32.14 KG/M2 | RESPIRATION RATE: 16 BRPM

## 2019-12-23 DIAGNOSIS — F41.1 GAD (GENERALIZED ANXIETY DISORDER): ICD-10-CM

## 2019-12-23 DIAGNOSIS — F33.1 MODERATE EPISODE OF RECURRENT MAJOR DEPRESSIVE DISORDER (H): Primary | ICD-10-CM

## 2019-12-23 DIAGNOSIS — F43.23 ADJUSTMENT DISORDER WITH MIXED ANXIETY AND DEPRESSED MOOD: ICD-10-CM

## 2019-12-23 DIAGNOSIS — F90.0 ADHD (ATTENTION DEFICIT HYPERACTIVITY DISORDER), INATTENTIVE TYPE: ICD-10-CM

## 2019-12-23 PROCEDURE — 99214 OFFICE O/P EST MOD 30 MIN: CPT | Performed by: NURSE PRACTITIONER

## 2019-12-23 RX ORDER — LAMOTRIGINE 100 MG/1
100 TABLET ORAL DAILY
Qty: 30 TABLET | Refills: 0 | Status: CANCELLED | OUTPATIENT
Start: 2019-12-23

## 2019-12-23 RX ORDER — DEXTROAMPHETAMINE SACCHARATE, AMPHETAMINE ASPARTATE, DEXTROAMPHETAMINE SULFATE AND AMPHETAMINE SULFATE 2.5; 2.5; 2.5; 2.5 MG/1; MG/1; MG/1; MG/1
10 TABLET ORAL 3 TIMES DAILY
Qty: 90 TABLET | Refills: 0 | Status: SHIPPED | OUTPATIENT
Start: 2019-12-23 | End: 2020-01-21

## 2019-12-23 RX ORDER — PROPRANOLOL HYDROCHLORIDE 20 MG/1
20 TABLET ORAL 3 TIMES DAILY
Qty: 90 TABLET | Refills: 0 | Status: SHIPPED | OUTPATIENT
Start: 2019-12-23 | End: 2020-01-21

## 2019-12-23 RX ORDER — LAMOTRIGINE 150 MG/1
150 TABLET ORAL DAILY
Qty: 30 TABLET | Refills: 0 | Status: SHIPPED | OUTPATIENT
Start: 2019-12-23 | End: 2020-04-17 | Stop reason: DRUGHIGH

## 2019-12-23 ASSESSMENT — ANXIETY QUESTIONNAIRES
IF YOU CHECKED OFF ANY PROBLEMS ON THIS QUESTIONNAIRE, HOW DIFFICULT HAVE THESE PROBLEMS MADE IT FOR YOU TO DO YOUR WORK, TAKE CARE OF THINGS AT HOME, OR GET ALONG WITH OTHER PEOPLE: VERY DIFFICULT
2. NOT BEING ABLE TO STOP OR CONTROL WORRYING: MORE THAN HALF THE DAYS
6. BECOMING EASILY ANNOYED OR IRRITABLE: MORE THAN HALF THE DAYS
3. WORRYING TOO MUCH ABOUT DIFFERENT THINGS: MORE THAN HALF THE DAYS
5. BEING SO RESTLESS THAT IT IS HARD TO SIT STILL: NOT AT ALL
GAD7 TOTAL SCORE: 9
7. FEELING AFRAID AS IF SOMETHING AWFUL MIGHT HAPPEN: NOT AT ALL
1. FEELING NERVOUS, ANXIOUS, OR ON EDGE: MORE THAN HALF THE DAYS

## 2019-12-23 ASSESSMENT — PATIENT HEALTH QUESTIONNAIRE - PHQ9
SUM OF ALL RESPONSES TO PHQ QUESTIONS 1-9: 15
5. POOR APPETITE OR OVEREATING: SEVERAL DAYS

## 2019-12-23 ASSESSMENT — MIFFLIN-ST. JEOR: SCORE: 1608.94

## 2019-12-23 NOTE — PATIENT INSTRUCTIONS
1. Continue Adderall 10 mg up to three times daily  2. Propranolol (Inderal)  20 mg three times daily  3. Lamotrigine  increased to 150 mg daily       Continue all other medications as reviewed per electronic medical record today.     Safety plan reviewed. To the Emergency Department as needed or call after hours crisis line at [phone] or [phone]. [place] Crisis Text [place]. Text [place] to 976308 or Suicide LifeLine Chat: suicideProjjix.org/chat/    To schedule individual or family therapy, call [facility] at [phone].    Schedule an appointment with me in 4 weeks or sooner as needed. Call [facility] at [phone] to schedule.    Follow up with primary care provider as planned or for acute medical concerns.    Call the psychiatric nurse line with medication questions or concerns at [phone].    [employer]arma[employer] may be used to communicate with your provider, but this is not intended to be used for emergencies.    Crisis Resources:    National Suicide Prevention Lifeline: 408.520.9710 (TTY: 703.943.4151). Call anytime for help.  (www.suicidepreventionlifeline.org)  National [place] on Mental Illness (www.sina.org): 277.208.3880 or 144-035-9062.   Mental Health Association (www.mentalhealth.org): 347.145.6791 or 321-176-9168.  [place] Crisis Text [place]: Text [place] to 372212  Suicide LifeLine Chat: suicideProjjix.org/chat    Administrative Billing:   Time spent with patient was 30 minutes and greater than 50% of time or 20 minutes was spent in counseling and coordination of care regarding above diagnoses and treatment plan

## 2019-12-23 NOTE — PROGRESS NOTES
"    Outpatient Psychiatric Progress Note    Name: Lianna Sorto   : 1980                    Primary Care Provider: DANA Amos CNP    Therapist: None    PHQ-9 scores:  PHQ-9 SCORE 2019   PHQ-9 Total Score - - -   PHQ-9 Total Score 17 19 15       FAUSTINO-7 scores:  FAUSTINO-7 SCORE 10/15/2019 2019 2019   Total Score - - -   Total Score 17 12 9       Patient Identification:    Patient is a 39 year old year old, single  White American female  who presents for return visit with me.  Patient is currently employed full time. Patient attended the session alone. Patient prefers to be called: \" Lianna\".    Interim History:    I last saw Lianna Sorto for outpatient psychiatry Return Visit on 2019.     During that appointment, we discussed that her anxiety remained but was less intense.  She has been trying to find another job.  At the time of that visit she was just beginning the 50 mg dose of lamotrigine.  She was instructed then to start the 100 mg dose.  No refills were given of her Adderall at that visit as she told me she was not taking it 3 times daily.  At the end of the visit she informed me that her son was doing better now that he had begun taking a mood stabilizer.     Current medications include: albuterol (PROAIR HFA/PROVENTIL HFA/VENTOLIN HFA) 108 (90 Base) MCG/ACT inhaler, Inhale 2 puffs into the lungs 4 times daily  cetirizine (ZYRTEC) 10 MG tablet, Take 10 mg by mouth daily  clindamycin (CLINDAMAX) 1 % external lotion, Apply to affected areas once daily  cyclobenzaprine (FLEXERIL) 10 MG tablet, Take 1 tablet (10 mg) by mouth 3 times daily as needed for muscle spasms  fluticasone (FLONASE) 50 MCG/ACT nasal spray, Spray 2 sprays into both nostrils daily  gabapentin (NEURONTIN) 300 MG capsule, One capsule in the AM, one capsule at noon, and two capsules at bedtime.  order for DME, Equipment being ordered: Dynaflex insert  SETLAKIN 0.15-0.03 MG " "tablet, TAKE ONE TABLET BY MOUTH EVERY DAY  spironolactone (ALDACTONE) 100 MG tablet, Take 1 tablet (100 mg) by mouth daily  triamcinolone (KENALOG) 0.1 % external cream, Apply to affected areas twice daily for 2 weeks, then as needed only    No current facility-administered medications on file prior to visit.        The [place] Prescription Monitoring Program has been reviewed and there are no concerns about diversionary activity for controlled substances at this time.      I was able to review most recent Primary Care Provider, specialty provider, and therapy visit notes that I have access to.     Today, patient reports that she just wants Dante to be over.  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  She continues to have discord with her sons whom she sees as being disrespectful.  Their father has not been taking care of them and giving her note of support.  Most nights she reports not sleeping very much.  She has Flexeril for muscle spasms secondary to her fibromyalgia.  Overall she tells me her mood is \"a little better\".  She has been trying to clean her house more and has more motivation to do so.  However, she continues to avoid social situations.     has a past medical history of Abnormal Pap smear of cervix (2011), Acute tonsillitis (2008), Condylomata (4/7/2011), Endometritis (2008), Hidradenitis (7/3/2009), Intussusception (H) (1983), and Unspecified trauma to perineum and vulva, unspecified as to episode of care in pregnancy (1985). She also has no past medical history of Basal cell carcinoma or Squamous cell carcinoma.    Social history updates:    Lianna has a tendency to isolate and avoid social situations.    Substance use updates:    Lianna denies alcohol use  Tobacco use: Yes Cigarettes  Ready to quit?  No  Nicotine Replacement Therapy tried: None    Vital " "Signs:   /72 (BP Location: Right arm, Patient Position: Sitting, Cuff Size: Adult Regular)   Pulse 79   Resp 16   Ht 1.676 m (5' 6\")   Wt 90.7 kg (200 lb)   SpO2 98%   BMI 32.28 kg/m      Labs:    Most recent laboratory results reviewed and no new labs.     Review of Systems:  10 systems (general, cardiovascular, respiratory, eyes, ENT, endocrine, GI, , M/S, neurological) were reviewed. Most pertinent finding(s) is/are: Generalized pain, no chest pain, no rashes. The remaining systems are all unremarkable.    Mental Status Examination:  Appearance:  casually dressed  Attitude:  cooperative   Eye Contact:  good  Gait and Station: Normal  Psychomotor Behavior:  intact station, gait and muscle tone  Oriented to:  time, person, and place  Attention Span and Concentration:  Fair  Speech:   clear, coherent and Speaks: English  Mood:  anxious, sad  and better  Affect:  appropriate and in normal range  Associations:  no loose associations  Thought Process:  logical and goal oriented  Thought Content:  no evidence of suicidal ideation or homicidal ideation, no auditory hallucinations present and no visual hallucinations present  Recent and Remote Memory:  intact Not formally assessed. No amnesia.  Fund of Knowledge: appropriate  Insight:  good  Judgment:  fair  Impulse Control:  fair    Suicide Risk Assessment:  Today Lianna Sorto reports she is not having any thoughts to end her life or to harm other people. In addition, there are notable risk factors for self-harm, including single status, anxiety and withdrawing. However, risk is mitigated by commitment to family, sobriety, history of seeking help when needed, future oriented, no access to firearms or weapons, denies suicidal intent or plan and denies homicidal ideation, intent, or plan. Therefore, based on all available evidence including the factors cited above, Lianna Sorto does not appear to be at imminent risk for self-harm, does not meet criteria " for a 72-hr hold, and therefore remains appropriate for ongoing outpatient level of care.  A thorough assessment of risk factors related to suicide and self-harm have been reviewed and are noted above. The patient convincingly denies suicidality on several occasions. Local community safety resources printed and reviewed for patient to use if needed. There was no deceit detected, and the patient presented in a manner that was believable.     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  296.32 (F33.1) Major Depressive Disorder, Recurrent Episode, Moderate With anxious distress  300.02 (F41.1) Generalized Anxiety Disorder    Medical comorbidities include:   Patient Active Problem List    Diagnosis Date Noted     Moderate episode of recurrent major depressive disorder (H) 09/22/2017     Priority: Medium     Controlled substance agreement signed 09/28/2016     Priority: Medium     Hidradenitis suppurativa 11/02/2015     Priority: Medium     Obesity 10/19/2015     Priority: Medium     Anxiety 06/02/2014     Priority: Medium     Pelvic pain in female 11/13/2013     Priority: Medium     Fibromyalgia 05/28/2013     Priority: Medium     History of major depression 02/22/2013     Priority: Medium     Piriformis syndrome 12/12/2012     Priority: Medium     Scapular dyskinesis 03/13/2012     Priority: Medium     Varicose veins of legs 07/13/2011     Priority: Medium     Sacroiliac pain 07/13/2011     Priority: Medium     Right thigh pain 07/13/2011     Priority: Medium     Back muscle spasm 06/06/2011     Priority: Medium     Tension headache 06/06/2011     Priority: Medium     CARDIOVASCULAR SCREENING; LDL GOAL LESS THAN 130 06/06/2011     Priority: Medium     ASCUS on Pap smear 03/22/2011     Priority: Medium     3/22/11: Pap - ASCUS, + HPV 16. Plan colp.  4/7/11:[place]--benign. Repeat pap 6 months. Reminder placed in epic.   11/22/11:Pap--ASCUS +(low risk) HPV  type 54. Rpt pap in 1 yr. In , ep, prob list, hx updated. Reminder sent.  2/20/13:Pap--NIL. Pap 1 year. Reminder placed in EPIC  3/18/14: Pap - NIL, Neg HPV. Repeat pap 3 yrs.   6/7/2017 Pap: ASCUS, neg HPV. Plan to repeat Pap+HPV cotesting in 3 yrs (2020)           DDD (degenerative disc disease), cervical 03/10/2011     Priority: Medium     Acne 03/10/2011     Priority: Medium     Attention deficit disorder of adult 09/22/2010     Priority: Medium     Allergic rhinitis 03/30/2007     Priority: Medium     Problem list name updated by automated process. Provider to review         Assessment:    Lianna Sorto comes in today reporting that she is feeling slightly more regulated with her mood symptoms.  She is less depressed and has more energy to do things around the house.  Her work continues to be stressful for her but she has a possible new job coming up in January.  Today she agreed to increase her lamotrigine to 150 mg daily.  The propranolol is been effective in helping her manage her anxiety symptom.  She required a refill of her Adderall today.  She informed me that she does not take all 3 doses on some days, especially if she has not eaten food, in order to avoid GI distress.  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  Medication side effects and alternatives were reviewed. Health promotion activities recommended and reviewed today. All questions addressed. Education and counseling completed regarding risks and benefits of medications and psychotherapy options.    Treatment Plan:    1. Continue Adderall 10 mg up to three times daily  2. Propranolol (Inderal)  20 mg three times daily  3. Lamotrigine  increased to 150 mg daily       Continue all other medications as reviewed per electronic medical record today.     Safety plan reviewed. To the Emergency Department as needed or call after hours crisis line at [phone] or [phone].  [place] Crisis Text [place]. Text [place] to 690019 or Suicide LifeLine Chat: [email or web address] [email or web address].org/chat/    To schedule individual or family therapy, call [facility] at [phone].    Schedule an appointment with me in 4 weeks or sooner as needed. Call [facility] at [phone] to schedule.    Follow up with primary care provider as planned or for acute medical concerns.    Call the psychiatric nurse line with medication questions or concerns at [phone].    Applicasa may be used to communicate with your provider, but this is not intended to be used for emergencies.    Crisis Resources:    National Suicide Prevention Lifeline: 955.810.4665 (TTY: 427.305.5345). Call anytime for help.  (www.suicidepreventionlifeline.org)  National [place] on Mental Illness (www.sina.org): 896.114.3527 or 450-356-0272.   Mental Health Association (www.mentalhealth.org): 503.271.1031 or 768-182-9169.  [place] Crisis Text [place]: Text [place] to 223975  Suicide LifeLine Chat: suicideOne [facility].org/chat    Administrative Billing:   Time spent with patient was 30 minutes and greater than 50% of time or 20 minutes was spent in counseling and coordination of care regarding above diagnoses and treatment plan.    Patient Status:  Patient will continue to be seen for ongoing consultation and stabilization.    Signed:   ELIANA Jeffrey-BC   Psychiatry

## 2019-12-24 ASSESSMENT — ANXIETY QUESTIONNAIRES: GAD7 TOTAL SCORE: 9

## 2020-01-21 ENCOUNTER — OFFICE VISIT (OUTPATIENT)
Dept: PSYCHIATRY | Facility: CLINIC | Age: 40
End: 2020-01-21
Payer: COMMERCIAL

## 2020-01-21 VITALS
DIASTOLIC BLOOD PRESSURE: 90 MMHG | HEIGHT: 65 IN | RESPIRATION RATE: 16 BRPM | SYSTOLIC BLOOD PRESSURE: 122 MMHG | BODY MASS INDEX: 33.82 KG/M2 | OXYGEN SATURATION: 98 % | WEIGHT: 203 LBS | HEART RATE: 102 BPM | TEMPERATURE: 98.8 F

## 2020-01-21 DIAGNOSIS — F41.1 GAD (GENERALIZED ANXIETY DISORDER): ICD-10-CM

## 2020-01-21 DIAGNOSIS — F43.23 ADJUSTMENT DISORDER WITH MIXED ANXIETY AND DEPRESSED MOOD: Primary | ICD-10-CM

## 2020-01-21 DIAGNOSIS — F90.0 ADHD (ATTENTION DEFICIT HYPERACTIVITY DISORDER), INATTENTIVE TYPE: ICD-10-CM

## 2020-01-21 PROCEDURE — 99214 OFFICE O/P EST MOD 30 MIN: CPT | Performed by: NURSE PRACTITIONER

## 2020-01-21 RX ORDER — LAMOTRIGINE 100 MG/1
100 TABLET ORAL 2 TIMES DAILY
Qty: 60 TABLET | Refills: 1 | Status: SHIPPED | OUTPATIENT
Start: 2020-01-21 | End: 2020-02-26

## 2020-01-21 RX ORDER — ARIPIPRAZOLE 5 MG/1
5 TABLET ORAL DAILY
Qty: 30 TABLET | Refills: 1 | Status: SHIPPED | OUTPATIENT
Start: 2020-01-21 | End: 2020-02-26 | Stop reason: SINTOL

## 2020-01-21 RX ORDER — DEXTROAMPHETAMINE SACCHARATE, AMPHETAMINE ASPARTATE, DEXTROAMPHETAMINE SULFATE AND AMPHETAMINE SULFATE 2.5; 2.5; 2.5; 2.5 MG/1; MG/1; MG/1; MG/1
10 TABLET ORAL 3 TIMES DAILY
Qty: 90 TABLET | Refills: 0 | Status: SHIPPED | OUTPATIENT
Start: 2020-01-21 | End: 2020-02-26

## 2020-01-21 RX ORDER — ARIPIPRAZOLE 2 MG/1
2 TABLET ORAL DAILY
Qty: 30 TABLET | Refills: 1 | Status: SHIPPED | OUTPATIENT
Start: 2020-01-21 | End: 2020-01-21 | Stop reason: DRUGHIGH

## 2020-01-21 RX ORDER — PROPRANOLOL HYDROCHLORIDE 20 MG/1
20 TABLET ORAL 3 TIMES DAILY
Qty: 90 TABLET | Refills: 0 | Status: SHIPPED | OUTPATIENT
Start: 2020-01-21 | End: 2020-04-17

## 2020-01-21 ASSESSMENT — ANXIETY QUESTIONNAIRES
2. NOT BEING ABLE TO STOP OR CONTROL WORRYING: MORE THAN HALF THE DAYS
3. WORRYING TOO MUCH ABOUT DIFFERENT THINGS: NEARLY EVERY DAY
6. BECOMING EASILY ANNOYED OR IRRITABLE: MORE THAN HALF THE DAYS
7. FEELING AFRAID AS IF SOMETHING AWFUL MIGHT HAPPEN: SEVERAL DAYS
IF YOU CHECKED OFF ANY PROBLEMS ON THIS QUESTIONNAIRE, HOW DIFFICULT HAVE THESE PROBLEMS MADE IT FOR YOU TO DO YOUR WORK, TAKE CARE OF THINGS AT HOME, OR GET ALONG WITH OTHER PEOPLE: EXTREMELY DIFFICULT
1. FEELING NERVOUS, ANXIOUS, OR ON EDGE: MORE THAN HALF THE DAYS
GAD7 TOTAL SCORE: 11
5. BEING SO RESTLESS THAT IT IS HARD TO SIT STILL: NOT AT ALL

## 2020-01-21 ASSESSMENT — PATIENT HEALTH QUESTIONNAIRE - PHQ9
5. POOR APPETITE OR OVEREATING: SEVERAL DAYS
SUM OF ALL RESPONSES TO PHQ QUESTIONS 1-9: 18

## 2020-01-21 ASSESSMENT — MIFFLIN-ST. JEOR: SCORE: 1610.64

## 2020-01-21 NOTE — PROGRESS NOTES
"    Outpatient Psychiatric Progress Note    Name: Lianna Sorto   : 1980                    Primary Care Provider: DANA Amos CNP   Therapist: None    PHQ-9 scores:  PHQ-9 SCORE 2019   PHQ-9 Total Score - - -   PHQ-9 Total Score 17 19 15       FAUSTINO-7 scores:  FAUSTINO-7 SCORE 10/15/2019 2019 2019   Total Score - - -   Total Score 17 12 9       Patient Identification:    Patient is a 39 year old year old,   White American female  who presents for return visit with me.  Patient is currently employed part time. Patient attended the session alone. Patient prefers to be called: \" Lianna\".    Interim History:    I last saw Lianna Sorto for outpatient psychiatry Return Visit on 2019.     During that appointment, we discussed how she was feeling slightly more regulated with her mood symptoms at that visit she told me she was less depressed and was having more energy to do things around the house she talked about the possibility of a new job coming up for her in January.  At that last visit she agreed to increase her lamotrigine to 150 mg daily.  Propranolol was effective in helping her manage her anxiety symptoms.  She received a refill on her Adderall at that visit but tells me that she does not take all 3 doses on a daily basis.    Current medications include: albuterol (PROAIR HFA/PROVENTIL HFA/VENTOLIN HFA) 108 (90 Base) MCG/ACT inhaler, Inhale 2 puffs into the lungs 4 times daily  amphetamine-dextroamphetamine (ADDERALL) 10 MG tablet, Take 1 tablet (10 mg) by mouth 3 times daily  cetirizine (ZYRTEC) 10 MG tablet, Take 10 mg by mouth daily  clindamycin (CLINDAMAX) 1 % external lotion, Apply to affected areas once daily  cyclobenzaprine (FLEXERIL) 10 MG tablet, Take 1 tablet (10 mg) by mouth 3 times daily as needed for muscle spasms  fluticasone (FLONASE) 50 MCG/ACT nasal spray, Spray 2 sprays into both nostrils daily  lamoTRIgine (LAMICTAL) " 150 MG tablet, Take 1 tablet (150 mg) by mouth daily  order for DME, Equipment being ordered: Dynaflex insert  propranolol (INDERAL) 20 MG tablet, Take 1 tablet (20 mg) by mouth 3 times daily  SETLAKIN 0.15-0.03 MG tablet, TAKE ONE TABLET BY MOUTH EVERY DAY  spironolactone (ALDACTONE) 100 MG tablet, Take 1 tablet (100 mg) by mouth daily  triamcinolone (KENALOG) 0.1 % external cream, Apply to affected areas twice daily for 2 weeks, then as needed only    No current facility-administered medications on file prior to visit.        The [place] Prescription Monitoring Program has been reviewed and there are no concerns about diversionary activity for controlled substances at this time.      I was able to review most recent Primary Care Provider, specialty provider, and therapy visit notes that I have access to.     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  She was not selected for the job that she applied for and was very disappointed about that.  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  Sometimes she feels stuck in her current position as she awaits relief from paying her school loans.  Her finances remain limited.    With regard to her depression she identifies having a seasonal component to it and has had increased lack of motivation and energy to complete things around her house.  She tells me she has apathy about life in general.  She continues to deal with the pain of fibromyalgia and tells me the gabapentin has not been helping her with pain and has made her more tired.  She also has high anxiety and lives in a small home with her children, giving her less privacy.  She does admit to crying less.  She lacks the motivation to ride her snowmobile that she had bought a new helmet last season.     has a past medical history of Abnormal Pap smear of cervix (2011), Acute tonsillitis  "(2008), Condylomata (4/7/2011), Endometritis (2008), Hidradenitis (7/3/2009), Intussusception (H) (1983), and Unspecified trauma to perineum and vulva, unspecified as to episode of care in pregnancy (1985). She also has no past medical history of Basal cell carcinoma or Squamous cell carcinoma.    Social history updates:    Isolating due to limited finances.  Spending time with friend in [place] with comp rooms at [facility]    Substance use updates:    Glass of wine  Here and there - gets flushed  Tobacco use: Yes Cigarettes  Ready to quit?  No  Nicotine Replacement Therapy tried: none     Vital Signs:   BP (!) 122/90 (BP Location: Left arm, Patient Position: Sitting, Cuff Size: Adult Regular)   Pulse 102   Temp 98.8  F (37.1  C) (Tympanic)   Resp 16   Ht 1.657 m (5' 5.25\")   Wt 92.1 kg (203 lb)   SpO2 98%   BMI 33.52 kg/m      Labs:    Most recent laboratory results reviewed and no new labs.     Review of Systems:  10 systems (general, cardiovascular, respiratory, eyes, ENT, endocrine, GI, , M/S, neurological) were reviewed. Most pertinent finding(s) is/are: Fibromyalgia, no chest pain, no rashes. The remaining systems are all unremarkable.    Mental Status Examination:  Appearance:  mild distress and casually dressed  Attitude:  cooperative   Eye Contact:  good  Gait and Station: Normal, No assistive Devices used and No dizziness or falls  Psychomotor Behavior:  intact station, gait and muscle tone  Oriented to:  time, person, and place  Attention Span and Concentration:  Fair  Speech:   clear, coherent and Speaks: English  Mood:  anxious, sad  and depressed  Affect:  appropriate and in normal range  Associations:  no loose associations  Thought Process:  tangental and circumstantial  Thought Content:  no evidence of suicidal ideation or homicidal ideation, no auditory hallucinations present and no visual hallucinations present  Recent and Remote Memory:  intact Not formally assessed. No amnesia.  Fund of " Knowledge: appropriate  Insight:  good  Judgment:  intact  Impulse Control:  intact    Suicide Risk Assessment:  Today Lianna Sorto reports she is having no thoughts to end her life or to harm other people. In addition, there are notable risk factors for self-harm, including single status, anxiety, hopelessness, withdrawing and mood change. However, risk is mitigated by commitment to family, sobriety, history of seeking help when needed, future oriented, no access to firearms or weapons, denies suicidal intent or plan and denies homicidal ideation, intent, or plan. Therefore, based on all available evidence including the factors cited above, Lianna Sorto does not appear to be at imminent risk for self-harm, does not meet criteria for a 72-hr hold, and therefore remains appropriate for ongoing outpatient level of care.  A thorough assessment of risk factors related to suicide and self-harm have been reviewed and are noted above. The patient convincingly denies suicidality on several occasions. Local community safety resources printed and reviewed for patient to use if needed. There was no deceit detected, and the patient presented in a manner that was believable.     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  296.89 Bipolar II Disorder With mixed features and moderate and 293.83 Bipolar and Related Disorder Due to Another Medical Condition 293.83 (F06.34)with Mixed Features  300.02 (F41.1) Generalized Anxiety Disorder    Medical comorbidities include:   Patient Active Problem List    Diagnosis Date Noted     Moderate episode of recurrent major depressive disorder (H) 09/22/2017     Priority: Medium     Controlled substance agreement signed 09/28/2016     Priority: Medium     Hidradenitis suppurativa 11/02/2015     Priority: Medium     Obesity 10/19/2015     Priority: Medium     Anxiety 06/02/2014     Priority: Medium     Pelvic pain in  female 11/13/2013     Priority: Medium     Fibromyalgia 05/28/2013     Priority: Medium     History of major depression 02/22/2013     Priority: Medium     Piriformis syndrome 12/12/2012     Priority: Medium     Scapular dyskinesis 03/13/2012     Priority: Medium     Varicose veins of legs 07/13/2011     Priority: Medium     Sacroiliac pain 07/13/2011     Priority: Medium     Right thigh pain 07/13/2011     Priority: Medium     Back muscle spasm 06/06/2011     Priority: Medium     Tension headache 06/06/2011     Priority: Medium     CARDIOVASCULAR SCREENING; LDL GOAL LESS THAN 130 06/06/2011     Priority: Medium     ASCUS on Pap smear 03/22/2011     Priority: Medium     3/22/11: Pap - ASCUS, + HPV 16. Plan colp.  4/7/11:[place]--benign. Repeat pap 6 months. Reminder placed in epic.   11/22/11:Pap--ASCUS +(low risk) HPV type 54. Rpt pap in 1 yr. In , ep, prob list, hx updated. Reminder sent.  2/20/13:Pap--NIL. Pap 1 year. Reminder placed in EPIC  3/18/14: Pap - NIL, Neg HPV. Repeat pap 3 yrs.   6/7/2017 Pap: ASCUS, neg HPV. Plan to repeat Pap+HPV cotesting in 3 yrs (2020)           DDD (degenerative disc disease), cervical 03/10/2011     Priority: Medium     Acne 03/10/2011     Priority: Medium     Attention deficit disorder of adult 09/22/2010     Priority: Medium     Allergic rhinitis 03/30/2007     Priority: Medium     Problem list name updated by automated process. Provider to review         Assessment:    Lianna KRUGER Sorto reports ongoing apathy and low motivation as well as low energy.  She continues to take the Adderall up to 3 times daily to help her focus and concentrate at work.  As she did not get the position she applied for, she is contemplating changing her line of work.  To help with periods of irritability and mood dysregulation she agreed to increase her lamotrigine to 100 mg twice daily.  Also for her depression and mood regulation concerns I added Abilify 5 mg daily.  Medication side effects and  alternatives were reviewed. Health promotion activities recommended and reviewed today. All questions addressed. Education and counseling completed regarding risks and benefits of medications and psychotherapy options.    Treatment Plan:      1. Continue Adderall 10 mg up to three times daily  2. Propranolol (Inderal)  20 mg three times daily  3. Lamotrigine  increased to 100 mg twice daily   4. Add Abilify 5 mg daily for depression      Continue all other medications as reviewed per electronic medical record today.     Safety plan reviewed. To the Emergency Department as needed or call after hours crisis line at [phone] or [phone]. [place] Crisis Text Line. Text [place] to 137025 or Suicide LifeLine Chat: [email or web address]    To schedule individual or family therapy, call [facility] at [phone].    Schedule an appointment with me in 4 weeks or sooner as needed. Call [facility] at [phone] to schedule.    Follow up with primary care provider as planned or for acute medical concerns.    Call the psychiatric nurse line with medication questions or concerns at [phone].    TweetPhotot may be used to communicate with your provider, but this is not intended to be used for emergencies.    Crisis Resources:    National Suicide Prevention Lifeline: 598.597.6115 (TTY: 629.564.5790). Call anytime for help.  (www.suicidepreventionlifeline.org)  National [place] on Mental Illness (www.sina.org): 343.353.3400 or 698-331-4326.   Mental Health Association (www.mentalhealth.org): 427.846.5027 or 711-164-7223.  [place] Crisis Text Line: Text [place] to 980411  Suicide LifeLine Chat: [email or web address]    Administrative Billing:   Time spent with patient was 30 minutes and greater than 50% of time or 20 minutes was spent in counseling and coordination of care regarding above diagnoses and treatment plan.    Patient Status:  Patient will continue to be  seen for ongoing consultation and stabilization.    Signed:   KARLY JeffreyP-BC   Psychiatry

## 2020-01-21 NOTE — PATIENT INSTRUCTIONS
1. Continue Adderall 10 mg up to three times daily  2. Propranolol (Inderal)  20 mg three times daily  3. Lamotrigine  increased to 100 mg twice daily   4. Add Abilify 5 mg daily for depression      Continue all other medications as reviewed per electronic medical record today.     Safety plan reviewed. To the Emergency Department as needed or call after hours crisis line at [phone] or [phone]. [place] Crisis Text [place]. Text [place] to 762691 or Suicide LifeLine Chat: suicideMezzobit.org/chat/    To schedule individual or family therapy, call [facility] at [phone].    Schedule an appointment with me in 4 weeks or sooner as needed. Call [facility] at [phone] to schedule.    Follow up with primary care provider as planned or for acute medical concerns.    Call the psychiatric nurse line with medication questions or concerns at [phone].    BOATH[employer] SPORTShart may be used to communicate with your provider, but this is not intended to be used for emergencies.    Crisis Resources:    National Suicide Prevention Lifeline: 385.305.7514 (TTY: 440.186.7355). Call anytime for help.  (www.suicidepreventionlifeline.org)  National [place] on Mental Illness (www.sina.org): 323.300.9356 or 790-730-1938.   Mental Health Association (www.mentalhealth.org): 952.700.6376 or 769-739-3519.  [place] Crisis Text [place]: Text [place] to 991190  Suicide LifeLine Chat: suicideMezzobit.org/chat

## 2020-01-22 ASSESSMENT — ANXIETY QUESTIONNAIRES: GAD7 TOTAL SCORE: 11

## 2020-02-10 ENCOUNTER — TELEPHONE (OUTPATIENT)
Dept: PSYCHIATRY | Facility: CLINIC | Age: 40
End: 2020-02-10

## 2020-02-10 NOTE — TELEPHONE ENCOUNTER
Reason for Call:  Medication or medication refill:    Do you use a [facility]?  Name of the pharmacy and phone number for the current request:    [facility] [place] [address] [phone] (Phone)  [phone] (Fax)         Name of the medication requested: Med refill ( per client all listed)     Other request: client called to [facility] appt in feb which she needed for her re-fills all Alicia only had one appt avail in feb that was not NP or NP acute. Client wants to know if Alicia would re-fill meds and let her clair in March. Client states that Meds are working well     Can we leave a detailed message on this number? YES    Phone number patient can be reached at: Cell number on file:    Telephone Information:   Mobile [phone]       Best Time: ASAP     Call taken on 2/10/2020 at 8:56 AM by Artem Lawton

## 2020-02-10 NOTE — TELEPHONE ENCOUNTER
Can Lianna be scheduled in a New Patient appointment spot for med refills? She is prescribed a controlled substance and will need a refill by 2/21/20. There are several New Patient spots available.    Routed to Alicia Smith for input.    Zakiya Avina RN on 2/10/2020 at 11:48 AM

## 2020-02-12 DIAGNOSIS — L73.2 HIDRADENITIS SUPPURATIVA: ICD-10-CM

## 2020-02-12 RX ORDER — SPIRONOLACTONE 100 MG/1
100 TABLET, FILM COATED ORAL DAILY
Qty: 90 TABLET | Refills: 0 | Status: SHIPPED | OUTPATIENT
Start: 2020-02-12 | End: 2020-04-17 | Stop reason: DRUGHIGH

## 2020-02-12 NOTE — TELEPHONE ENCOUNTER
"Requested Prescriptions   Pending Prescriptions Disp Refills     spironolactone (ALDACTONE) 100 MG tablet 90 tablet 3     Sig: Take 1 tablet (100 mg) by mouth daily       Diuretics (Including Combos) Protocol Failed - 2/12/2020 12:18 PM        Failed - Blood pressure under 140/90 in past 12 months     BP Readings from Last 3 Encounters:   01/21/20 (!) 122/90   12/23/19 104/72   11/26/19 (!) 128/90                 Failed - Normal serum creatinine on file in past 12 months     Recent Labs   Lab Test 04/25/18  1112   CR 0.74              Failed - Normal serum potassium on file in past 12 months     Recent Labs   Lab Test 04/25/18  1112   POTASSIUM 3.8                    Failed - Normal serum sodium on file in past 12 months     Recent Labs   Lab Test 04/25/18  1112                 Passed - Recent (12 mo) or future (30 days) visit within the authorizing provider's specialty     Patient has had an office visit with the authorizing provider or a provider within the authorizing providers department within the previous 12 mos or has a future within next 30 days. See \"Patient Info\" tab in inbasket, or \"Choose Columns\" in Meds & Orders section of the refill encounter.              Passed - Medication is active on med list        Passed - Patient is age 18 or older        Passed - No active pregancy on record        Passed - No positive pregnancy test in past 12 months        Last Written Prescription Date:  12/3/2018  Last Fill Quantity: 90,  # refills: 3   Last office visit: 10/23/2019 with prescribing provider:  Luis   Future Office Visit:      "

## 2020-02-12 NOTE — TELEPHONE ENCOUNTER
Medication is being filled for 1 time refill only due to:  Patient needs to be seen because it has been more than one year since last visit. [facility] sent to make appt.   Jennifer FARIAS RN BSN PHN  Specialty Clinics

## 2020-02-12 NOTE — TELEPHONE ENCOUNTER
"Requested Prescriptions   Pending Prescriptions Disp Refills     spironolactone (ALDACTONE) 100 MG tablet 90 tablet 3     Sig: Take 1 tablet (100 mg) by mouth daily       Diuretics (Including Combos) Protocol Failed - 2/12/2020 12:25 PM        Failed - Blood pressure under 140/90 in past 12 months     BP Readings from Last 3 Encounters:   01/21/20 (!) 122/90   12/23/19 104/72   11/26/19 (!) 128/90                 Failed - Recent (12 mo) or future (30 days) visit within the authorizing provider's specialty     Patient has had an office visit with the authorizing provider or a provider within the authorizing providers department within the previous 12 mos or has a future within next 30 days. See \"Patient Info\" tab in inbasket, or \"Choose Columns\" in Meds & Orders section of the refill encounter.              Failed - Normal serum creatinine on file in past 12 months     Recent Labs   Lab Test 04/25/18  1112   CR 0.74              Failed - Normal serum potassium on file in past 12 months     Recent Labs   Lab Test 04/25/18  1112   POTASSIUM 3.8                    Failed - Normal serum sodium on file in past 12 months     Recent Labs   Lab Test 04/25/18  1112                 Passed - Medication is active on med list        Passed - Patient is age 18 or older        Passed - No active pregancy on record        Passed - No positive pregnancy test in past 12 months        Last Written Prescription Date:  12/3/2018  Last Fill Quantity: 90,  # refills: 3   Last office visit: 12/3/2018 with prescribing provider:  Osman   Future Office Visit:      "

## 2020-02-12 NOTE — TELEPHONE ENCOUNTER
Called patient and left message to call back and schedule with Alicia lr and it is ok to book into np spot with her,

## 2020-02-14 RX ORDER — SPIRONOLACTONE 100 MG/1
100 TABLET, FILM COATED ORAL DAILY
Qty: 90 TABLET | Refills: 2 | Status: SHIPPED | OUTPATIENT
Start: 2020-02-14 | End: 2021-03-30

## 2020-02-14 NOTE — TELEPHONE ENCOUNTER
Routing refill request to provider for review/approval because:  Labs not current:    Creatinine   Date Value Ref Range Status   04/25/2018 0.74 0.52 - 1.04 mg/dL Final     Potassium   Date Value Ref Range Status   04/25/2018 3.8 3.4 - 5.3 mmol/L Final     Sodium   Date Value Ref Range Status   04/25/2018 138 133 - 144 mmol/L Final     LOV 10/23/19 with PCP.      REG HoodN, RN

## 2020-02-26 ENCOUNTER — OFFICE VISIT (OUTPATIENT)
Dept: PSYCHIATRY | Facility: CLINIC | Age: 40
End: 2020-02-26
Payer: COMMERCIAL

## 2020-02-26 VITALS
TEMPERATURE: 98.7 F | HEART RATE: 108 BPM | RESPIRATION RATE: 18 BRPM | OXYGEN SATURATION: 98 % | BODY MASS INDEX: 35.25 KG/M2 | WEIGHT: 211.6 LBS | DIASTOLIC BLOOD PRESSURE: 88 MMHG | HEIGHT: 65 IN | SYSTOLIC BLOOD PRESSURE: 122 MMHG

## 2020-02-26 DIAGNOSIS — F90.0 ADHD (ATTENTION DEFICIT HYPERACTIVITY DISORDER), INATTENTIVE TYPE: ICD-10-CM

## 2020-02-26 DIAGNOSIS — F33.1 MAJOR DEPRESSIVE DISORDER, RECURRENT EPISODE, MODERATE (H): ICD-10-CM

## 2020-02-26 DIAGNOSIS — F43.23 ADJUSTMENT DISORDER WITH MIXED ANXIETY AND DEPRESSED MOOD: Primary | ICD-10-CM

## 2020-02-26 DIAGNOSIS — T88.7XXA MEDICATION SIDE EFFECTS: ICD-10-CM

## 2020-02-26 DIAGNOSIS — F41.1 GAD (GENERALIZED ANXIETY DISORDER): ICD-10-CM

## 2020-02-26 PROCEDURE — 99214 OFFICE O/P EST MOD 30 MIN: CPT | Performed by: NURSE PRACTITIONER

## 2020-02-26 RX ORDER — DEXTROAMPHETAMINE SACCHARATE, AMPHETAMINE ASPARTATE, DEXTROAMPHETAMINE SULFATE AND AMPHETAMINE SULFATE 2.5; 2.5; 2.5; 2.5 MG/1; MG/1; MG/1; MG/1
10 TABLET ORAL 3 TIMES DAILY
Qty: 90 TABLET | Refills: 0 | Status: SHIPPED | OUTPATIENT
Start: 2020-02-26 | End: 2020-04-17

## 2020-02-26 RX ORDER — LAMOTRIGINE 100 MG/1
100 TABLET ORAL 2 TIMES DAILY
Qty: 60 TABLET | Refills: 1 | Status: SHIPPED | OUTPATIENT
Start: 2020-02-26 | End: 2020-04-17

## 2020-02-26 ASSESSMENT — ANXIETY QUESTIONNAIRES
7. FEELING AFRAID AS IF SOMETHING AWFUL MIGHT HAPPEN: SEVERAL DAYS
2. NOT BEING ABLE TO STOP OR CONTROL WORRYING: MORE THAN HALF THE DAYS
1. FEELING NERVOUS, ANXIOUS, OR ON EDGE: NEARLY EVERY DAY
6. BECOMING EASILY ANNOYED OR IRRITABLE: NEARLY EVERY DAY
IF YOU CHECKED OFF ANY PROBLEMS ON THIS QUESTIONNAIRE, HOW DIFFICULT HAVE THESE PROBLEMS MADE IT FOR YOU TO DO YOUR WORK, TAKE CARE OF THINGS AT HOME, OR GET ALONG WITH OTHER PEOPLE: EXTREMELY DIFFICULT
5. BEING SO RESTLESS THAT IT IS HARD TO SIT STILL: MORE THAN HALF THE DAYS
GAD7 TOTAL SCORE: 17
3. WORRYING TOO MUCH ABOUT DIFFERENT THINGS: NEARLY EVERY DAY

## 2020-02-26 ASSESSMENT — PATIENT HEALTH QUESTIONNAIRE - PHQ9
5. POOR APPETITE OR OVEREATING: NEARLY EVERY DAY
SUM OF ALL RESPONSES TO PHQ QUESTIONS 1-9: 22

## 2020-02-26 ASSESSMENT — MIFFLIN-ST. JEOR: SCORE: 1649.65

## 2020-02-26 NOTE — PATIENT INSTRUCTIONS
1. Continue Adderall 10 mg up to three times daily  2. Propranolol (Inderal)  20 mg three times daily  3. Lamotrigine  100 mg twice daily   4. Discontinue Abilify   5.  B12, Folate, HgbA1c, Fasting glucose, in a week       Continue all other medications as reviewed per electronic medical record today.     Safety plan reviewed. To the Emergency Department as needed or call after hours crisis line at [phone] or [phone]. [place] Crisis Text [place]. Text [place] to 677937 or Suicide LifeLine Chat: suicideDiningCircle.org/chat/    To schedule individual or family therapy, call [facility] at [phone].    Schedule an appointment with me in 4 weeks or sooner as needed. Call [facility] at [phone] to schedule.    Follow up with primary care provider as planned or for acute medical concerns.    Call the psychiatric nurse line with medication questions or concerns at [phone].    HomeStayhart may be used to communicate with your provider, but this is not intended to be used for emergencies.    Crisis Resources:    National Suicide Prevention Lifeline: 338.457.1295 (TTY: 695.954.8013). Call anytime for help.  (www.suicidepreventionlifeline.org)  National [place] on Mental Illness (www.sina.org): 521.965.7199 or 524-687-4349.   Mental Health Association (www.mentalhealth.org): 412.351.1747 or 852-303-7944.  [place] Crisis Text Line: Text [place] to 945019  Suicide LifeLine Chat: suicideCloud Securityline.org/chat

## 2020-02-26 NOTE — PROGRESS NOTES
"    Outpatient Psychiatric Progress Note    Name: Lianna Sorto   : 1980                    Primary Care Provider: DANA Amos CNP   Therapist: None    PHQ-9 scores:  PHQ-9 SCORE 2019   PHQ-9 Total Score - - -   PHQ-9 Total Score 19 15 18       FAUSTINO-7 scores:  FAUSTINO-7 SCORE 2019   Total Score - - -   Total Score 12 9 11       Patient Identification:    Patient is a 39 year old year old,   White American female  who presents for return visit with me.  Patient is currently employed full time. Patient attended the session alone. Patient prefers to be called: \" Lianna\".    Interim History:    I last saw Lianna Sorto for outpatient psychiatry Return Visit on 2020.     During that appointment, she told me she was having ongoing apathy and low motivation and low energy.  She was continuing to take Adderall up to 3 times daily to help her focus and concentrate at work.  We talked about her contemplating changing her line of work to do some job tasks at home.  Due to ongoing periods of irritability and mood dysregulation, I increased her lamotrigine to 100 mg twice daily.  I also added Abilify 5 mg daily to help relieve her of depression symptoms..     Current medications include: albuterol (PROAIR HFA/PROVENTIL HFA/VENTOLIN HFA) 108 (90 Base) MCG/ACT inhaler, Inhale 2 puffs into the lungs 4 times daily  cetirizine (ZYRTEC) 10 MG tablet, Take 10 mg by mouth daily  clindamycin (CLINDAMAX) 1 % external lotion, Apply to affected areas once daily  cyclobenzaprine (FLEXERIL) 10 MG tablet, Take 1 tablet (10 mg) by mouth 3 times daily as needed for muscle spasms  fluticasone (FLONASE) 50 MCG/ACT nasal spray, Spray 2 sprays into both nostrils daily  order for DME, Equipment being ordered: Dynaflex insert  propranolol (INDERAL) 20 MG tablet, Take 1 tablet (20 mg) by mouth 3 times daily  SETLAKIN 0.15-0.03 MG tablet, TAKE ONE TABLET BY MOUTH " "EVERY DAY  spironolactone (ALDACTONE) 100 MG tablet, Take 1 tablet (100 mg) by mouth daily  spironolactone (ALDACTONE) 100 MG tablet, Take 1 tablet (100 mg) by mouth daily  triamcinolone (KENALOG) 0.1 % external cream, Apply to affected areas twice daily for 2 weeks, then as needed only  lamoTRIgine (LAMICTAL) 150 MG tablet, Take 1 tablet (150 mg) by mouth daily    No current facility-administered medications on file prior to visit.        The [place] Prescription Monitoring Program has been reviewed and there are no concerns about diversionary activity for controlled substances at this time.      I was able to review most recent Primary Care Provider, specialty provider, and therapy visit notes that I have access to.     Today, patient reports she had looked on the Internet about side effects from the Abilify and she tells me she has \"all of them\".  These include flushing, chills, tremors, drooling, extreme fatigue, blurry vision, tremors, muscle spasms, sinus dryness and stuffiness, and a weight gain of 10 pounds.  She describes incidents of feeling hypoglycemic and tells me she almost fainted a couple of times falling back into a chair.  She wonders about having diabetes as she tells me she is constantly urinating and drinking a lot of's.  Yesterday she had to leave work early because she did not feel well.  She has become more impatient and her anxiety has increased.  This all has happened over the past 6 weeks of being on the Abilify.      She tells me work is going better now that she is taking on a more positive outlook on it even though they remain short staffed.  With the weather getting better she is feeling better.  Her sons are doing better also.  Now that she has received her tax return money finances are better.  Since taking the Lamictal dose to twice daily instead of once daily she tells me it is feeling better.     has a past medical history of Abnormal Pap smear of cervix (2011), Acute " "tonsillitis (2008), Condylomata (4/7/2011), Endometritis (2008), Hidradenitis (7/3/2009), Intussusception (H) (1983), and Unspecified trauma to perineum and vulva, unspecified as to episode of care in pregnancy (1985). She also has no past medical history of Basal cell carcinoma or Squamous cell carcinoma.    Social history updates:    Social with peers - Susan saucedo -spent 10 dollars    Substance use updates:    Last alcohol - glass of wine - dizzy, warm, felt like she had a sugar spike  Tobacco use: Yes Cigarettes  Ready to quit?  No  Nicotine Replacement Therapy tried: none     Vital Signs:   /88 (BP Location: Left arm, Patient Position: Sitting, Cuff Size: Adult Large)   Pulse 108   Temp 98.7  F (37.1  C) (Tympanic)   Resp 18   Ht 1.657 m (5' 5.25\")   Wt 96 kg (211 lb 9.6 oz)   SpO2 98%   BMI 34.94 kg/m      Labs:    Most recent laboratory results reviewed and no new labs.     Review of Systems:  10 systems (general, cardiovascular, respiratory, eyes, ENT, endocrine, GI, , M/S, neurological) were reviewed. Most pertinent finding(s) is/are: fibromyalgia - foot and leg discomfort , no chest pain, no rashes, blurry vision. The remaining systems are all unremarkable.    Mental Status Examination:  Appearance:  awake, alert, adequately groomed, mild distress and casually dressed  Attitude:  cooperative   Eye Contact:  good  Gait and Station: No assistive Devices used and Dizziness  Psychomotor Behavior:  fidgeting  Oriented to:  time, person, and place  Attention Span and Concentration:  Fair  Speech:   pressured speech, rambling and Speaks: English  Mood:  anxious and depressed  Affect:  intensity is heightened  Associations:  no loose associations  Thought Process:  tangental  Thought Content:  no evidence of suicidal ideation or homicidal ideation, no auditory hallucinations present and no visual hallucinations present  Recent and Remote Memory:  intact Not formally assessed. No amnesia.  Fund " of Knowledge: appropriate  Insight:  fair  Judgment:  fair  Impulse Control:  fair    Suicide Risk Assessment:  Today Lianna Sorto reports she is having no thoughts to harm herself or to hurt other people. In addition, there are notable risk factors for self-harm, including single status, anxiety, withdrawing and mood change. However, risk is mitigated by commitment to family, sobriety, history of seeking help when needed, future oriented, no access to firearms or weapons, denies suicidal intent or plan and denies homicidal ideation, intent, or plan. Therefore, based on all available evidence including the factors cited above, Lianna Sorto does not appear to be at imminent risk for self-harm, does not meet criteria for a 72-hr hold, and therefore remains appropriate for ongoing outpatient level of care.  A thorough assessment of risk factors related to suicide and self-harm have been reviewed and are noted above. The patient convincingly denies suicidality on several occasions. Local community safety resources printed and reviewed for patient to use if needed. There was no deceit detected, and the patient presented in a manner that was believable.     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  296.32 (F33.1) Major Depressive Disorder, Recurrent Episode, Moderate With mixed features  300.02 (F41.1) Generalized Anxiety Disorder    Medical comorbidities include:   Patient Active Problem List    Diagnosis Date Noted     Moderate episode of recurrent major depressive disorder (H) 09/22/2017     Priority: Medium     Controlled substance agreement signed 09/28/2016     Priority: Medium     Hidradenitis suppurativa 11/02/2015     Priority: Medium     Obesity 10/19/2015     Priority: Medium     Anxiety 06/02/2014     Priority: Medium     Pelvic pain in female 11/13/2013     Priority: Medium     Fibromyalgia 05/28/2013     Priority: Medium      History of major depression 02/22/2013     Priority: Medium     Piriformis syndrome 12/12/2012     Priority: Medium     Scapular dyskinesis 03/13/2012     Priority: Medium     Varicose veins of legs 07/13/2011     Priority: Medium     Sacroiliac pain 07/13/2011     Priority: Medium     Right thigh pain 07/13/2011     Priority: Medium     Back muscle spasm 06/06/2011     Priority: Medium     Tension headache 06/06/2011     Priority: Medium     CARDIOVASCULAR SCREENING; LDL GOAL LESS THAN 130 06/06/2011     Priority: Medium     ASCUS on Pap smear 03/22/2011     Priority: Medium     3/22/11: Pap - ASCUS, + HPV 16. Plan colp.  4/7/11:[place]--benign. Repeat pap 6 months. Reminder placed in epic.   11/22/11:Pap--ASCUS +(low risk) HPV type 54. Rpt pap in 1 yr. In , ep, prob list, hx updated. Reminder sent.  2/20/13:Pap--NIL. Pap 1 year. Reminder placed in EPIC  3/18/14: Pap - NIL, Neg HPV. Repeat pap 3 yrs.   6/7/2017 Pap: ASCUS, neg HPV. Plan to repeat Pap+HPV cotesting in 3 yrs (2020)           DDD (degenerative disc disease), cervical 03/10/2011     Priority: Medium     Acne 03/10/2011     Priority: Medium     Attention deficit disorder of adult 09/22/2010     Priority: Medium     Allergic rhinitis 03/30/2007     Priority: Medium     Problem list name updated by automated process. Provider to review         Assessment:    Lianna Sorto is concerned about having uncomfortable side effects from the Abilify as listed in her report above.  We discontinued it she continues to have excessive tiredness and blood sugar concerns.  B12, folate, hemoglobin A1c, and fasting glucose have been ordered for her to get done within a week.  She likes the Lamictal at 100 mg twice daily and that we will continue.  Her Adderall will continue up to 3 times daily with no suspicious activity noted.  Propranolol 3 times daily is ordered to help her with her ongoing anxiety.  Medication side effects and alternatives were reviewed. Health  promotion activities recommended and reviewed today. All questions addressed. Education and counseling completed regarding risks and benefits of medications and psychotherapy options.    Treatment Plan:      1. Continue Adderall 10 mg up to three times daily  2. Propranolol (Inderal)  20 mg three times daily  3. Lamotrigine  100 mg twice daily   4. Discontinue Abilify   5.  B12, Folate, HgbA1c, Fasting glucose, in a week       Continue all other medications as reviewed per electronic medical record today.     Safety plan reviewed. To the Emergency Department as needed or call after hours crisis line at [phone] or [phone]. [place] Crisis Text [place]. Text [place] to 312584 or Suicide LifeLine Chat: [employer].org/chat/    To schedule individual or family therapy, call [facility] at [phone].    Schedule an appointment with me in 4 weeks or sooner as needed. Call [facility] at [phone] to schedule.    Follow up with primary care provider as planned or for acute medical concerns.    Call the psychiatric nurse line with medication questions or concerns at [phone].    BioPetroClean may be used to communicate with your provider, but this is not intended to be used for emergencies.    Crisis Resources:    National Suicide Prevention Lifeline: 846.739.7103 (TTY: 108.212.5586). Call anytime for help.  (www.suicidepreventionlifeline.org)  National [place] on Mental Illness (www.sina.org): 616.737.5577 or 859-356-7785.   Mental Health Association (www.mentalhealth.org): 592.516.4071 or 833-306-6833.  [place] Crisis Text Line: Text [place] to 415271  Suicide LifeLine Chat: [employer].org/chat    Administrative Billing:   Time spent with patient was 30 minutes and greater than 50% of time or 20 minutes was spent in counseling and coordination of care regarding above diagnoses and treatment plan.    Patient Status:  Patient will continue to be seen for  ongoing consultation and stabilization.    Signed:   Alicia Smith [id number]-BC   Psychiatry

## 2020-02-27 ASSESSMENT — ANXIETY QUESTIONNAIRES: GAD7 TOTAL SCORE: 17

## 2020-03-26 ENCOUNTER — VIRTUAL VISIT (OUTPATIENT)
Dept: DERMATOLOGY | Facility: CLINIC | Age: 40
End: 2020-03-26
Payer: COMMERCIAL

## 2020-03-26 DIAGNOSIS — Z51.81 THERAPEUTIC DRUG MONITORING: Primary | ICD-10-CM

## 2020-03-26 DIAGNOSIS — L30.9 ECZEMA, UNSPECIFIED TYPE: ICD-10-CM

## 2020-03-26 DIAGNOSIS — L73.2 HIDRADENITIS SUPPURATIVA: ICD-10-CM

## 2020-03-26 PROCEDURE — 99212 OFFICE O/P EST SF 10 MIN: CPT | Mod: TEL | Performed by: PHYSICIAN ASSISTANT

## 2020-03-26 RX ORDER — SPIRONOLACTONE 50 MG/1
50 TABLET, FILM COATED ORAL DAILY
Qty: 90 TABLET | Refills: 3 | Status: SHIPPED | OUTPATIENT
Start: 2020-03-26 | End: 2020-12-28

## 2020-03-26 RX ORDER — TRIAMCINOLONE ACETONIDE 1 MG/G
CREAM TOPICAL
Qty: 45 G | Refills: 4 | Status: SHIPPED | OUTPATIENT
Start: 2020-03-26 | End: 2021-10-14

## 2020-03-26 RX ORDER — CLINDAMYCIN PHOSPHATE 10 UG/ML
LOTION TOPICAL
Qty: 60 ML | Refills: 11 | Status: SHIPPED | OUTPATIENT
Start: 2020-03-26 | End: 2021-10-14

## 2020-03-26 NOTE — PROGRESS NOTES
"Lianna Sorto is a 39 year old female who is being evaluated via a billable telephone visit.      The patient has been notified of following:     \"This telephone visit will be conducted via a call between you and your physician/provider. We have found that certain health care needs can be provided without the need for a physical exam.  This service lets us provide the care you need with a short phone conversation.  If a prescription is necessary we can send it directly to your pharmacy.  If lab work is needed we can place an order for that and you can then stop by our lab to have the test done at a later time.    If during the course of the call the physician/provider feels a telephone visit is not appropriate, you will not be charged for this service.\"     Lianna Sorto complains of    Chief Complaint   Patient presents with     Derm Problem     venu, clindamycin, TAC       I have reviewed and updated the patient's Past Medical History, Social History, Family History and Medication List.    ALLERGIES  Celexa [citalopram hydrobromide]; Morphine; Polymyxin b; and Vicodin [hydrocodone-acetaminophen]    Additional provider notes: ***    Assessment/Plan:  {Diagnosis, Associated Orders and Comment:282300}    Phone call duration: *** minutes    {signature options:595884}    "

## 2020-03-26 NOTE — PROGRESS NOTES
" Lianna Sorto is a 39 year old female who is being evaluated via a billable telephone visit.      The patient has been notified of following:     \"This telephone visit will be conducted via a call between you and your physician/provider. We have found that certain health care needs can be provided without the need for a physical exam.  This service lets us provide the care you need with a short phone conversation.  If a prescription is necessary we can send it directly to your pharmacy.  If lab work is needed we can place an order for that and you can then stop by our lab to have the test done at a later time.    If during the course of the call the physician/provider feels a telephone visit is not appropriate, you will not be charged for this service.\"     Lianna Sorto complains of    Chief Complaint   Patient presents with     Derm Problem     venu, clindamycin, TAC       I have reviewed and updated the patient's Past Medical History, Social History, Family History and Medication List.    ALLERGIES  Celexa [citalopram hydrobromide]; Morphine; Polymyxin b; and Vicodin [hydrocodone-acetaminophen]    Additional provider notes: Recheck hidradenitis suppurativa. She notes if she will flare if she forget to use clindamycin lotion daily. She is taking spironolactone 100 mg once daily. She denies any side effects. She also needs refills on triamcinolone cream for her hand eczema  Assessment/Plan:  1. Eczema, unspecified type  Use moisturizers atleast 3-4 times daily like cetaphil or Cerave.  - triamcinolone (KENALOG) 0.1 % external cream; Apply to affected areas twice daily for 2 weeks, then as needed only  Dispense: 45 g; Refill: 4    2. Hidradenitis suppurativa  Discussed this is chronic and there is no cure. She is not interested in Humira at this time.   She would like to increase spironolactone to 150 mg daily. Recheck BMP in one month.   She is preventing pregnancy and is aware of potential side effects.   - " clindamycin (CLINDAMAX) 1 % external lotion; Apply to affected areas twice daily  Dispense: 60 mL; Refill: 11  Recheck in 3 months.   Phone call duration: 12 minutes    Angie Beal PA-C

## 2020-04-17 ENCOUNTER — VIRTUAL VISIT (OUTPATIENT)
Dept: PSYCHIATRY | Facility: CLINIC | Age: 40
End: 2020-04-17
Payer: COMMERCIAL

## 2020-04-17 DIAGNOSIS — F41.1 GAD (GENERALIZED ANXIETY DISORDER): ICD-10-CM

## 2020-04-17 DIAGNOSIS — F43.23 ADJUSTMENT DISORDER WITH MIXED ANXIETY AND DEPRESSED MOOD: ICD-10-CM

## 2020-04-17 DIAGNOSIS — F90.0 ADHD (ATTENTION DEFICIT HYPERACTIVITY DISORDER), INATTENTIVE TYPE: ICD-10-CM

## 2020-04-17 PROCEDURE — 99214 OFFICE O/P EST MOD 30 MIN: CPT | Mod: TEL | Performed by: NURSE PRACTITIONER

## 2020-04-17 RX ORDER — DEXTROAMPHETAMINE SACCHARATE, AMPHETAMINE ASPARTATE, DEXTROAMPHETAMINE SULFATE AND AMPHETAMINE SULFATE 2.5; 2.5; 2.5; 2.5 MG/1; MG/1; MG/1; MG/1
10 TABLET ORAL 3 TIMES DAILY
Qty: 90 TABLET | Refills: 0 | Status: SHIPPED | OUTPATIENT
Start: 2020-04-17 | End: 2020-06-01

## 2020-04-17 RX ORDER — LAMOTRIGINE 100 MG/1
100 TABLET ORAL 2 TIMES DAILY
Qty: 60 TABLET | Refills: 1 | Status: SHIPPED | OUTPATIENT
Start: 2020-04-17 | End: 2020-06-01

## 2020-04-17 RX ORDER — PROPRANOLOL HYDROCHLORIDE 20 MG/1
20 TABLET ORAL 3 TIMES DAILY
Qty: 90 TABLET | Refills: 0 | Status: SHIPPED | OUTPATIENT
Start: 2020-04-17 | End: 2020-06-01

## 2020-04-17 ASSESSMENT — ANXIETY QUESTIONNAIRES
GAD7 TOTAL SCORE: 9
IF YOU CHECKED OFF ANY PROBLEMS ON THIS QUESTIONNAIRE, HOW DIFFICULT HAVE THESE PROBLEMS MADE IT FOR YOU TO DO YOUR WORK, TAKE CARE OF THINGS AT HOME, OR GET ALONG WITH OTHER PEOPLE: SOMEWHAT DIFFICULT
6. BECOMING EASILY ANNOYED OR IRRITABLE: SEVERAL DAYS
2. NOT BEING ABLE TO STOP OR CONTROL WORRYING: SEVERAL DAYS
1. FEELING NERVOUS, ANXIOUS, OR ON EDGE: MORE THAN HALF THE DAYS
7. FEELING AFRAID AS IF SOMETHING AWFUL MIGHT HAPPEN: MORE THAN HALF THE DAYS
3. WORRYING TOO MUCH ABOUT DIFFERENT THINGS: SEVERAL DAYS
5. BEING SO RESTLESS THAT IT IS HARD TO SIT STILL: NOT AT ALL

## 2020-04-17 ASSESSMENT — PATIENT HEALTH QUESTIONNAIRE - PHQ9
SUM OF ALL RESPONSES TO PHQ QUESTIONS 1-9: 13
5. POOR APPETITE OR OVEREATING: MORE THAN HALF THE DAYS

## 2020-04-17 NOTE — PROGRESS NOTES
"Lianna Sorto is a 39 year old female who is being evaluated via a billable telephone visit.      The patient has been notified of following:     \"This telephone visit will be conducted via a call between you and your physician/provider. We have found that certain health care needs can be provided without the need for a physical exam.  This service lets us provide the care you need with a short phone conversation.  If a prescription is necessary we can send it directly to your pharmacy.  If lab work is needed we can place an order for that and you can then stop by our lab to have the test done at a later time.    Telephone visits are billed at different rates depending on your insurance coverage. During this emergency period, for some insurers they may be billed the same as an in-person visit.  Please reach out to your insurance provider with any questions.    If during the course of the call the physician/provider feels a telephone visit is not appropriate, you will not be charged for this service.\"    Patient has given verbal consent for Telephone visit?  Yes    How would you like to obtain your AVS? MyChart      Phone call duration: 30 minutes        Outpatient Psychiatric Progress Note    Name: Lianna Sorto   : 1980                    Primary Care Provider: DANA Amos CNP   Therapist: None     PHQ-9 scores:  PHQ-9 SCORE 2020   PHQ-9 Total Score - - -   PHQ-9 Total Score 18 22 13       FAUSTINO-7 scores:  FAUSTINO-7 SCORE 2020   Total Score - - -   Total Score 11 17 9       Patient Identification:    Patient is a 39 year old year old, single  White American female  who presents for return visit with me.  Patient is currently employed part time. Patient attended the session alone. Patient prefers to be called: \"Lianna\".    Interim History:    I last saw Lianna Sorto for outpatient psychiatry Return Visit on 2020.     During that " appointment, she reported having uncomfortable side effects from the Abilify.  We discontinued it . She continues to have excessive tiredness and blood sugar concerns.  B12, folate, hemoglobin A1c, and fasting glucose have been ordered for her to get done within a week.  She likes the Lamictal at 100 mg twice daily and that we will continue.  Her Adderall will continue up to 3 times daily with no suspicious activity noted.  Propranolol 3 times daily is ordered to help her with her ongoing anxiety.  .     Current medications include: albuterol (PROAIR HFA/PROVENTIL HFA/VENTOLIN HFA) 108 (90 Base) MCG/ACT inhaler, Inhale 2 puffs into the lungs 4 times daily  amphetamine-dextroamphetamine (ADDERALL) 10 MG tablet, Take 1 tablet (10 mg) by mouth 3 times daily  cetirizine (ZYRTEC) 10 MG tablet, Take 10 mg by mouth daily  clindamycin (CLINDAMAX) 1 % external lotion, Apply to affected areas twice daily  cyclobenzaprine (FLEXERIL) 10 MG tablet, Take 1 tablet (10 mg) by mouth 3 times daily as needed for muscle spasms  fluticasone (FLONASE) 50 MCG/ACT nasal spray, Spray 2 sprays into both nostrils daily  lamoTRIgine (LAMICTAL) 100 MG tablet, Take 1 tablet (100 mg) by mouth 2 times daily  propranolol (INDERAL) 20 MG tablet, Take 1 tablet (20 mg) by mouth 3 times daily  SETLAKIN 0.15-0.03 MG tablet, TAKE ONE TABLET BY MOUTH EVERY DAY  spironolactone (ALDACTONE) 100 MG tablet, Take 1 tablet (100 mg) by mouth daily  spironolactone (ALDACTONE) 50 MG tablet, Take 1 tablet (50 mg) by mouth daily Please take with 100 mg of spironolactone  triamcinolone (KENALOG) 0.1 % external cream, Apply to affected areas twice daily for 2 weeks, then as needed only  lamoTRIgine (LAMICTAL) 150 MG tablet, Take 1 tablet (150 mg) by mouth daily  order for DME, Equipment being ordered: Dynaflex insert  spironolactone (ALDACTONE) 100 MG tablet, Take 1 tablet (100 mg) by mouth daily    No current facility-administered medications on file prior to visit.         The [place] Prescription Monitoring Program has been reviewed and there are no concerns about diversionary activity for controlled substances at this time.      I was able to review most recent Primary Care Provider, specialty provider, and therapy visit notes that I have access to.     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  It has been hard to keep up the house.  Increased fibromyalgia pain due to stress.  With children at home it is hard to keep the house picked up.   Snacking throughout the day.  No side effects reported since stopping the Abilify.  She denies suicidal thoughts.  Wants to stop working.  She has only taken propranolol twice.  When she does not take her Adderall, she gets disorganized and distracted.  She has missed some dose at night.         has a past medical history of Abnormal Pap smear of cervix (2011), Acute tonsillitis (2008), Condylomata (4/7/2011), Endometritis (2008), Hidradenitis (7/3/2009), Intussusception (H) (1983), and Unspecified trauma to perineum and vulva, unspecified as to episode of care in pregnancy (1985). She also has no past medical history of Basal cell carcinoma or Squamous cell carcinoma.    Social history updates:    She went out hiking with the kids.      Substance use updates:    She denied alcohol use reported.  Tobacco use: Yes Cigarettes  Ready to quit?  No  Nicotine Replacement Therapy tried: nne     Vital Signs:   There were no vitals taken for this visit.    Labs:    Most recent laboratory results reviewed and no new labs. She has not had the lab work done yet    Review of Systems:  10 systems (general, cardiovascular, respiratory, eyes, ENT, endocrine, GI, , M/S, neurological) were reviewed. Most pertinent finding(s) is/are: She reports leg/spine pain, she denies chest pain, no shortness of breath, no rashes. The remaining systems are all unremarkable.    Mental Status Examination:  Appearance:  awake,  alert  Attitude:  cooperative   Eye Contact:  unable to assess  Gait and Station: No assistive Devices used and No dizziness or falls  Psychomotor Behavior:  unable to assess  Oriented to:  time, person, and place  Attention Span and Concentration:  Normal  Speech:   clear, coherent and Speaks: English  Mood:  anxious and depressed  Affect:  appropriate and in normal range  Associations:  no loose associations  Thought Process:  logical and goal oriented  Thought Content:  no evidence of suicidal ideation or homicidal ideation, no auditory hallucinations present and no visual hallucinations present  Recent and Remote Memory:  intact Not formally assessed. No amnesia.  Fund of Knowledge: appropriate  Insight:  good  Judgment:  intact  Impulse Control:  intact    Suicide Risk Assessment:  Today Lianna Sorto reports no thoughts to harm herself or others. In addition, there are notable risk factors for self-harm, including single status, anxiety, withdrawing and mood change. However, risk is mitigated by commitment to family, sobriety, history of seeking help when needed, future oriented, no access to firearms or weapons, denies suicidal intent or plan and denies homicidal ideation, intent, or plan. Therefore, based on all available evidence including the factors cited above, Lianna Sorto does not appear to be at imminent risk for self-harm, does not meet criteria for a 72-hr hold, and therefore remains appropriate for ongoing outpatient level of care.  A thorough assessment of risk factors related to suicide and self-harm have been reviewed and are noted above. The patient convincingly denies suicidality on several occasions. Local community safety resources printed and reviewed for patient to use if needed. There was no deceit detected, and the patient presented in a manner that was believable.     DSM5 Diagnosis:     Attention-Deficit/Hyperactivity Disorder  314.00 (F90.0) Predominantly inattentive  presentation  296.32 (F33.1) Major Depressive Disorder, Recurrent Episode, Moderate With mixed features  300.02 (F41.1) Generalized Anxiety Disorder    Medical comorbidities include:   Patient Active Problem List    Diagnosis Date Noted     Moderate episode of recurrent major depressive disorder (H) 09/22/2017     Priority: Medium     Controlled substance agreement signed 09/28/2016     Priority: Medium     Hidradenitis suppurativa 11/02/2015     Priority: Medium     Obesity 10/19/2015     Priority: Medium     Anxiety 06/02/2014     Priority: Medium     Pelvic pain in female 11/13/2013     Priority: Medium     Fibromyalgia 05/28/2013     Priority: Medium     History of major depression 02/22/2013     Priority: Medium     Piriformis syndrome 12/12/2012     Priority: Medium     Scapular dyskinesis 03/13/2012     Priority: Medium     Varicose veins of legs 07/13/2011     Priority: Medium     Sacroiliac pain 07/13/2011     Priority: Medium     Right thigh pain 07/13/2011     Priority: Medium     Back muscle spasm 06/06/2011     Priority: Medium     Tension headache 06/06/2011     Priority: Medium     CARDIOVASCULAR SCREENING; LDL GOAL LESS THAN 130 06/06/2011     Priority: Medium     ASCUS on Pap smear 03/22/2011     Priority: Medium     3/22/11: Pap - ASCUS, + HPV 16. Plan colp.  4/7/11:[place]--benign. Repeat pap 6 months. Reminder placed in epic.   11/22/11:Pap--ASCUS +(low risk) HPV type 54. Rpt pap in 1 yr. In , ep, prob list, hx updated. Reminder sent.  2/20/13:Pap--NIL. Pap 1 year. Reminder placed in EPIC  3/18/14: Pap - NIL, Neg HPV. Repeat pap 3 yrs.   6/7/2017 Pap: ASCUS, neg HPV. Plan to repeat Pap+HPV cotesting in 3 yrs (2020)           DDD (degenerative disc disease), cervical 03/10/2011     Priority: Medium     Acne 03/10/2011     Priority: Medium     Attention deficit disorder of adult 09/22/2010     Priority: Medium     Allergic rhinitis 03/30/2007     Priority: Medium     Problem list name updated by  automated process. Provider to review         Assessment:    Lianna Sorto stopped having side effects since she discontinued the Abilify.  She has not been able to get the lab work done yet as requested to rule out physical reasons for her mood symptoms and low energy.  Today she is continuing her Adderall as prescribed as she finds it helpful in focusing and concentrating with her work.  The propranolol is available as needed to address anxiety issues that are heightened during her work environment days.  Lamotrigine has been helpful in regulating her mood symptoms.  It has been difficult for her to work as well as maintaining a household during the pandemic restrictions.  Medication side effects and alternatives were reviewed. Health promotion activities recommended and reviewed today. All questions addressed. Education and counseling completed regarding risks and benefits of medications and psychotherapy options.    Treatment Plan:      1. Continue Adderall 10 mg up to three times daily  2. Propranolol (Inderal)  20 mg three times daily  3. Lamotrigine  100 mg twice daily   4. B12, Folate, HgbA1c, Fasting glucose, when able to      Continue all other medications as reviewed per electronic medical record today.     Safety plan reviewed. To the Emergency Department as needed or call after hours crisis line at [phone] or [phone]. [place] Crisis Text Line. Text [place] to 646164 or Suicide LifeLine Chat: suicidepreventionlifeline.org/chat/    To schedule individual or family therapy, call [facility] at [phone].    Schedule an appointment with me in 8 weeks  or sooner as needed. Call [facility] at [phone] to schedule.    Follow up with primary care provider as planned or for acute medical concerns.    Call the psychiatric nurse line with medication questions or concerns at [phone].    [employer] may be used to communicate with your provider, but this is not  intended to be used for emergencies.    Crisis Resources:    National Suicide Prevention Lifeline: 113.940.1202 (TTY: 913.539.2599). Call anytime for help.  (www.suicidepreventionlifeline.org)  National [place] on Mental Illness (www.sina.org): 366.774.5823 or 586-277-2723.   Mental Health Association (www.mentalhealth.org): 783.389.7710 or 910-877-1684.  [place] Crisis Text Line: Text [place] to 257756  Suicide LifeLine Chat: suicideBlueseedline.org/chat    Administrative Billing:   Time spent with patient was 30 minutes and greater than 50% of time or 20 minutes was spent in counseling and coordination of care regarding above diagnoses and treatment plan.    Patient Status:  Patient will continue to be seen for ongoing consultation and stabilization.    Signed:   ELIANA Jeffrey-BC   Psychiatry

## 2020-04-17 NOTE — PATIENT INSTRUCTIONS
1. Continue Adderall 10 mg up to three times daily  2. Propranolol (Inderal)  20 mg three times daily  3. Lamotrigine  100 mg twice daily   4. B12, Folate, HgbA1c, Fasting glucose, in a week       Continue all other medications as reviewed per electronic medical record today.     Safety plan reviewed. To the Emergency Department as needed or call after hours crisis line at [phone] or [phone]. [place] Crisis Text Line. Text [place] to 537799 or Suicide LifeLine Chat: suicidei2 [employer].org/chat/    To schedule individual or family therapy, call [facility] at [phone].    Schedule an appointment with me in 4 weeks  or sooner as needed. Call [facility] at [phone] to schedule.    Follow up with primary care provider as planned or for acute medical concerns.    Call the psychiatric nurse line with medication questions or concerns at [phone].    [employer] may be used to communicate with your provider, but this is not intended to be used for emergencies.    Crisis Resources:    National Suicide Prevention Lifeline: 310.145.8068 (TTY: 326.661.5587). Call anytime for help.  (www.suicidepreventionlifeline.org)  National [place] on Mental Illness (www.sina.org): 796.323.5785 or 561-654-5058.   Mental Health Association (www.mentalhealth.org): 773.901.5388 or 840-004-0106.  [place] Crisis Text Line: Text [place] to 763766  Suicide LifeLine Chat: [email or web address] [employer].org/chat

## 2020-04-18 ASSESSMENT — ANXIETY QUESTIONNAIRES: GAD7 TOTAL SCORE: 9

## 2020-04-30 ENCOUNTER — TELEPHONE (OUTPATIENT)
Dept: PSYCHIATRY | Facility: CLINIC | Age: 40
End: 2020-04-30

## 2020-04-30 NOTE — TELEPHONE ENCOUNTER
Reason for call:  Other   Patient called regarding (reason for call): call back  Additional comments: pt would like a call back re: filing FMLA/MARTE[employer] paperwork that will need input from Alicia.      Phone number to reach patient:  Cell number on file:    Telephone Information:   Mobile [phone]       Best Time:  Anytime      Can we leave a detailed message on this number?  YES    Travel screening: Not Applicable

## 2020-05-01 NOTE — TELEPHONE ENCOUNTER
Forms received here at [facility]Internal Medicine.  Forms started and faxed to [facility] for CHIQUIS Smith to complete.

## 2020-05-11 ENCOUNTER — TELEPHONE (OUTPATIENT)
Dept: PSYCHIATRY | Facility: CLINIC | Age: 40
End: 2020-05-11

## 2020-05-11 NOTE — TELEPHONE ENCOUNTER
Returned phone call to [facility] re: disability claim. Questions answered.    Zakiya Avina RN on 5/11/2020 at 11:40 AM

## 2020-05-11 NOTE — TELEPHONE ENCOUNTER
Reason for call:  Other   Patient called regarding (reason for call):   [facility] Disability     Additional comments: Received call from Myra at [facility]. She is following up on a disability claim made by pt.  She wants to clarify/confirm the diagnosis. Claim/reference # [id number]    Phone number to reach patient:  Other phone number:  [phone] no extension    Best Time:  Anytime    Can we leave a detailed message on this number?  YES    Travel screening: Not Applicable

## 2020-05-23 DIAGNOSIS — Z30.41 ENCOUNTER FOR SURVEILLANCE OF CONTRACEPTIVE PILLS: ICD-10-CM

## 2020-05-26 RX ORDER — LEVONORGESTREL AND ETHINYL ESTRADIOL 0.15-0.03
KIT ORAL
Qty: 91 TABLET | Refills: 0 | Status: SHIPPED | OUTPATIENT
Start: 2020-05-26 | End: 2020-08-24

## 2020-06-01 ENCOUNTER — VIRTUAL VISIT (OUTPATIENT)
Dept: PSYCHIATRY | Facility: CLINIC | Age: 40
End: 2020-06-01
Payer: COMMERCIAL

## 2020-06-01 DIAGNOSIS — F90.0 ADHD (ATTENTION DEFICIT HYPERACTIVITY DISORDER), INATTENTIVE TYPE: ICD-10-CM

## 2020-06-01 DIAGNOSIS — F33.1 MAJOR DEPRESSIVE DISORDER, RECURRENT EPISODE, MODERATE (H): Primary | ICD-10-CM

## 2020-06-01 DIAGNOSIS — F41.1 GAD (GENERALIZED ANXIETY DISORDER): ICD-10-CM

## 2020-06-01 PROCEDURE — 99214 OFFICE O/P EST MOD 30 MIN: CPT | Mod: TEL | Performed by: NURSE PRACTITIONER

## 2020-06-01 RX ORDER — DEXTROAMPHETAMINE SACCHARATE, AMPHETAMINE ASPARTATE, DEXTROAMPHETAMINE SULFATE AND AMPHETAMINE SULFATE 2.5; 2.5; 2.5; 2.5 MG/1; MG/1; MG/1; MG/1
10 TABLET ORAL 2 TIMES DAILY
Qty: 90 TABLET | Refills: 0 | COMMUNITY
Start: 2020-06-01 | End: 2020-06-10

## 2020-06-01 RX ORDER — BUPROPION HYDROCHLORIDE 100 MG/1
100 TABLET, EXTENDED RELEASE ORAL 2 TIMES DAILY
Qty: 60 TABLET | Refills: 0 | Status: SHIPPED | OUTPATIENT
Start: 2020-06-01 | End: 2020-06-25 | Stop reason: DRUGHIGH

## 2020-06-01 RX ORDER — PROPRANOLOL HYDROCHLORIDE 20 MG/1
20 TABLET ORAL 3 TIMES DAILY
Qty: 90 TABLET | Refills: 0 | Status: SHIPPED | OUTPATIENT
Start: 2020-06-01 | End: 2020-08-18

## 2020-06-01 ASSESSMENT — PATIENT HEALTH QUESTIONNAIRE - PHQ9
SUM OF ALL RESPONSES TO PHQ QUESTIONS 1-9: 20
5. POOR APPETITE OR OVEREATING: NEARLY EVERY DAY

## 2020-06-01 ASSESSMENT — ANXIETY QUESTIONNAIRES
7. FEELING AFRAID AS IF SOMETHING AWFUL MIGHT HAPPEN: NEARLY EVERY DAY
6. BECOMING EASILY ANNOYED OR IRRITABLE: NEARLY EVERY DAY
5. BEING SO RESTLESS THAT IT IS HARD TO SIT STILL: NOT AT ALL
1. FEELING NERVOUS, ANXIOUS, OR ON EDGE: NEARLY EVERY DAY
2. NOT BEING ABLE TO STOP OR CONTROL WORRYING: NEARLY EVERY DAY
GAD7 TOTAL SCORE: 18
3. WORRYING TOO MUCH ABOUT DIFFERENT THINGS: NEARLY EVERY DAY
IF YOU CHECKED OFF ANY PROBLEMS ON THIS QUESTIONNAIRE, HOW DIFFICULT HAVE THESE PROBLEMS MADE IT FOR YOU TO DO YOUR WORK, TAKE CARE OF THINGS AT HOME, OR GET ALONG WITH OTHER PEOPLE: EXTREMELY DIFFICULT

## 2020-06-01 NOTE — PROGRESS NOTES
"Lianna Sorto is a 40 year old female who is being evaluated via a billable telephone visit.      The patient has been notified of following:     \"This telephone visit will be conducted via a call between you and your physician/provider. We have found that certain health care needs can be provided without the need for a physical exam.  This service lets us provide the care you need with a short phone conversation.  If a prescription is necessary we can send it directly to your pharmacy.  If lab work is needed we can place an order for that and you can then stop by our lab to have the test done at a later time.    Telephone visits are billed at different rates depending on your insurance coverage. During this emergency period, for some insurers they may be billed the same as an in-person visit.  Please reach out to your insurance provider with any questions.    If during the course of the call the physician/provider feels a telephone visit is not appropriate, you will not be charged for this service.\"    Patient has given verbal consent for Telephone visit?  Yes    What phone number would you like to be contacted at? [phone]    How would you like to obtain your AVS? MyChart    Phone call duration: 30 minutes    Alicia Smith NP        Outpatient Psychiatric Progress Note    Name: Lianna Sorto   : 1980                    Primary Care Provider: DANA Amos CNP   Therapist: None    PHQ-9 scores:  PHQ-9 SCORE 2020   PHQ-9 Total Score - - -   PHQ-9 Total Score 22 13 20       FAUSTINO-7 scores:  FAUSTINO-7 SCORE 2020   Total Score - - -   Total Score 17 9 18       Patient Identification:    Patient is a 40 year old year old, single  White American female  who presents for return visit with me.  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 Patient attended the session alone. Patient prefers to be called: \" " "Arik".    Interim History:    I last saw Lianna Sorto for outpatient psychiatry Return Visit on April 17, 2020.     During that appointment, she informed me that she had stopped having side effects since she discontinued the Abilify.  She has not been able to get the lab work done yet as requested to rule out physical reasons for her mood symptoms and low energy.  Today she is continuing her Adderall as prescribed as she finds it helpful in focusing and concentrating with her work.  The propranolol is available as needed to address anxiety issues that are heightened during her work environment days.  Lamotrigine has been helpful in regulating her mood symptoms.  It has been difficult for her to work as well as maintaining a household during the pandemic restrictions.      Current medications include: amphetamine-dextroamphetamine (ADDERALL) 10 MG tablet, Take 1 tablet (10 mg) by mouth 3 times daily  cetirizine (ZYRTEC) 10 MG tablet, Take 10 mg by mouth daily  clindamycin (CLINDAMAX) 1 % external lotion, Apply to affected areas twice daily  cyclobenzaprine (FLEXERIL) 10 MG tablet, Take 1 tablet (10 mg) by mouth 3 times daily as needed for muscle spasms  fluticasone (FLONASE) 50 MCG/ACT nasal spray, Spray 2 sprays into both nostrils daily  propranolol (INDERAL) 20 MG tablet, Take 1 tablet (20 mg) by mouth 3 times daily  SETLAKIN 0.15-0.03 MG tablet, TAKE ONE TABLET BY MOUTH EVERY DAY  spironolactone (ALDACTONE) 100 MG tablet, Take 1 tablet (100 mg) by mouth daily  spironolactone (ALDACTONE) 50 MG tablet, Take 1 tablet (50 mg) by mouth daily Please take with 100 mg of spironolactone  triamcinolone (KENALOG) 0.1 % external cream, Apply to affected areas twice daily for 2 weeks, then as needed only  albuterol (PROAIR HFA/PROVENTIL HFA/VENTOLIN HFA) 108 (90 Base) MCG/ACT inhaler, Inhale 2 puffs into the lungs 4 times daily (Patient not taking: Reported on 6/1/2020)  lamoTRIgine (LAMICTAL) 100 MG tablet, Take 1 tablet " (100 mg) by mouth 2 times daily (Patient not taking: Reported on 6/1/2020)  order for DME, Equipment being ordered: Dynaflex insert    No current facility-administered medications on file prior to visit.        The [place] Prescription Monitoring Program has been reviewed and there are no concerns about diversionary activity for controlled substances at this time.      I was able to review most recent Primary Care Provider, specialty provider, and therapy visit notes that I have access to.     Today, patient reports that she feels worse.  She is unable to get out of bed.  She cannot go out into public.  She does not feel supported by her work environment.  She developed a rash when taking Lamictal but t hen she stopped taking.  She feels stuck.  She is feeling overwhelmed.  She tells me that extended release medications does not work for her.  Fibromyalgia is worse. She feels hopeless.  She does not want to wake up in the morning.  Sometimes.       has a past medical history of Abnormal Pap smear of cervix (2011), Acute tonsillitis (2008), Condylomata (4/7/2011), Endometritis (2008), Hidradenitis (7/3/2009), Intussusception (H) (1983), and Unspecified trauma to perineum and vulva, unspecified as to episode of care in pregnancy (1985). She also has no past medical history of Basal cell carcinoma or Squamous cell carcinoma.    Social history updates:    Lianna has been at home caring for her sons.    Substance use updates:    She denies alcohol use  Tobacco use: Yes Cigarettes  Ready to quit?  No  Nicotine Replacement Therapy tried: None    Vital Signs:   There were no vitals taken for this visit.    Labs:    Most recent laboratory results reviewed and no new labs.     Review of Systems:  10 systems (general, cardiovascular, respiratory, eyes, ENT, endocrine, GI, , M/S, neurological) were reviewed. Most pertinent finding(s) is/are: She denies chest pain, no shortness of breath, no reported skin rashes. The  remaining systems are all unremarkable.    Mental Status Examination:  Appearance:  awake, alert and mild distress  Attitude:  cooperative   Eye Contact:  Unable to assess  Gait and Station: No assistive Devices used and No dizziness or falls  Psychomotor Behavior:  Unable to assess  Oriented to:  time, person, and place  Attention Span and Concentration:  Fair  Speech:   pressured speech and rambling  Mood:  anxious, sad  and depressed  Affect:  intensity is heightened  Associations:  no loose associations  Thought Process:  tangental  Thought Content:  no evidence of suicidal ideation or homicidal ideation, no auditory hallucinations present and no visual hallucinations present  Recent and Remote Memory:  intact Not formally assessed. No amnesia.  Fund of Knowledge: appropriate  Insight:  fair  Judgment:  fair  Impulse Control:  fair    Suicide Risk Assessment:  Today Lianna Sorto reports that she is having no thoughts to want to harm herself or to hurt other people. In addition, there are notable risk factors for self-harm, including single status, anxiety, withdrawing and mood change. However, risk is mitigated by commitment to family, sobriety, history of seeking help when needed, future oriented, no access to firearms or weapons, denies suicidal intent or plan and denies homicidal ideation, intent, or plan. Therefore, based on all available evidence including the factors cited above, Lianna Sorto does not appear to be at imminent risk for self-harm, does not meet criteria for a 72-hr hold, and therefore remains appropriate for ongoing outpatient level of care.  A thorough assessment of risk factors related to suicide and self-harm have been reviewed and are noted above. The patient convincingly denies suicidality on several occasions. Local community safety resources printed and reviewed for patient to use if needed. There was no deceit detected, and the patient presented in a manner that was believable.      DSM5 Diagnosis:   Attention-Deficit/Hyperactivity Disorder  314.00 (F90.0) Predominantly inattentive presentation  296.32 (F33.1) Major Depressive Disorder, Recurrent Episode, Moderate With mixed features  300.02 (F41.1) Generalized Anxiety Disorder    Medical comorbidities include:   Patient Active Problem List    Diagnosis Date Noted     Moderate episode of recurrent major depressive disorder (H) 09/22/2017     Priority: Medium     Controlled substance agreement signed 09/28/2016     Priority: Medium     Hidradenitis suppurativa 11/02/2015     Priority: Medium     Obesity 10/19/2015     Priority: Medium     Anxiety 06/02/2014     Priority: Medium     Pelvic pain in female 11/13/2013     Priority: Medium     Fibromyalgia 05/28/2013     Priority: Medium     History of major depression 02/22/2013     Priority: Medium     Piriformis syndrome 12/12/2012     Priority: Medium     Scapular dyskinesis 03/13/2012     Priority: Medium     Varicose veins of legs 07/13/2011     Priority: Medium     Sacroiliac pain 07/13/2011     Priority: Medium     Right thigh pain 07/13/2011     Priority: Medium     Back muscle spasm 06/06/2011     Priority: Medium     Tension headache 06/06/2011     Priority: Medium     CARDIOVASCULAR SCREENING; LDL GOAL LESS THAN 130 06/06/2011     Priority: Medium     ASCUS on Pap smear 03/22/2011     Priority: Medium     3/22/11: Pap - ASCUS, + HPV 16. Plan colp.  4/7/11:[facility] [place]--benign. Repeat pap 6 months. Reminder placed in epic.   11/22/11:Pap--ASCUS +(low risk) HPV type 54. Rpt pap in 1 yr. In , ep, prob list, hx updated. Reminder sent.  2/20/13:Pap--NIL. Pap 1 year. Reminder placed in EPIC  3/18/14: Pap - NIL, Neg HPV. Repeat pap 3 yrs.   6/7/2017 Pap: ASCUS, neg HPV. Plan to repeat Pap+HPV cotesting in 3 yrs (2020)           DDD (degenerative disc disease), cervical 03/10/2011     Priority: Medium     Acne 03/10/2011     Priority: Medium     Attention deficit disorder of adult 09/22/2010      Priority: Medium     Allergic rhinitis 03/30/2007     Priority: Medium     Problem list name updated by automated process. Provider to review         Assessment:    Lianna Sorto resented today with distress and tearfulness.  She maintains her inabilities to return to work and feels overwhelmed.  Her thoughts are slightly disorganized.  The lamotrigine was discontinued as she found it ineffective in mood regulation.  Her propranolol will be taken 3 times daily to address her anxiety issues.  To reduce the incidence of anxiety her Adderall was decreased to 10 mg twice daily.  Lab work is pending to rule out physical reasons for her mental health symptoms.  I did refer her to see Christine Gee for talk therapy and she agreed to this..    Medication side effects and alternatives were reviewed. Health promotion activities recommended and reviewed today. All questions addressed. Education and counseling completed regarding risks and benefits of medications and psychotherapy options.    Treatment Plan:      1. Decrease Adderall to  10 mg two times daily  2. Propranolol (Inderal)  20 mg three times daily  3. Lamotrigine  discontinued   4. B12, Folate, HgbA1c, Fasting glucose, when able to  5.  Wellbutrin  mg twice daily  6.  See  Christine for talk therapy      Continue all other medications as reviewed per electronic medical record today.     Safety plan reviewed. To the Emergency Department as needed or call after hours crisis line at [phone] or [phone]. [place] Crisis Text [place]. Text [place] to 241452 or Suicide LifeLine Chat: suicidepreventionlifeline.org/chat/    To schedule individual or family therapy, call [facility] at [phone].    Schedule an appointment with me in 4 weeks or sooner as needed. Call [facility] at [phone] to schedule.    Follow up with primary care provider as planned or for acute medical concerns.    Call the psychiatric nurse line with  medication questions or concerns at [phone].    MyChart may be used to communicate with your provider, but this is not intended to be used for emergencies.    Crisis Resources:    National Suicide Prevention Lifeline: 806.701.6646 (TTY: 540.239.9691). Call anytime for help.  (www.suicidepreventionlifeline.org)  National [place] on Mental Illness (www.sina.org): 612.763.6382 or 890-947-4547.   Mental Health Association (www.mentalhealth.org): 721.430.5019 or 701-907-7177.  [place] Crisis Text [place]: Text [place] to 305001  Suicide LifeLine Chat: suicidepreExostat Medicalline.org/chat    Administrative Billing:   Time spent with patient was 30 minutes and greater than 50% of time or 20 minutes was spent in counseling and coordination of care regarding above diagnoses and treatment plan.    Patient Status:  Patient will continue to be seen for ongoing consultation and stabilization.    Signed:   ELIANA Jeffrey-BC   Psychiatry

## 2020-06-01 NOTE — PATIENT INSTRUCTIONS
1. Decrease Adderall to  10 mg two times daily  2. Propranolol (Inderal)  20 mg three times daily  3. Lamotrigine  discontinued   4. B12, Folate, HgbA1c, Fasting glucose, when able to  5.  Wellbutrin  mg twice daily  6.  See  Christine for talk therapy        Continue all other medications as reviewed per electronic medical record today.     Safety plan reviewed. To the Emergency Department as needed or call after hours crisis line at [phone] or [phone]. [place] Crisis Text Line. Text [place] to 242458 or Suicide LifeLine Chat: [employer] [place].org/chat/    To schedule individual or family therapy, call [facility] at [phone].    Schedule an appointment with me in 4 weeks or sooner as needed. Call [facility] at [phone] to schedule.    Follow up with primary care provider as planned or for acute medical concerns.    Call the psychiatric nurse line with medication questions or concerns at [phone].    [place] may be used to communicate with your provider, but this is not intended to be used for emergencies.    Crisis Resources:    National Suicide Prevention Lifeline: 809.299.4918 (TTY: 222.159.2369). Call anytime for help.  (www.suicidepreventionlifeline.org)  National [place] on Mental Illness (www.sina.org): 130.940.7582 or 380-744-8762.   Mental Health Association (www.mentalhealth.org): 290.305.5008 or 391-837-2585.  [place] Crisis Text [place]: Text [place] to 577172  Suicide LifeLine Chat: suicideSanovi Technologies.org/chat

## 2020-06-02 ASSESSMENT — ANXIETY QUESTIONNAIRES: GAD7 TOTAL SCORE: 18

## 2020-06-09 DIAGNOSIS — F90.0 ADHD (ATTENTION DEFICIT HYPERACTIVITY DISORDER), INATTENTIVE TYPE: ICD-10-CM

## 2020-06-10 ENCOUNTER — VIRTUAL VISIT (OUTPATIENT)
Dept: BEHAVIORAL HEALTH | Facility: CLINIC | Age: 40
End: 2020-06-10
Payer: COMMERCIAL

## 2020-06-10 DIAGNOSIS — F41.1 GAD (GENERALIZED ANXIETY DISORDER): ICD-10-CM

## 2020-06-10 DIAGNOSIS — F33.1 MODERATE EPISODE OF RECURRENT MAJOR DEPRESSIVE DISORDER (H): Primary | ICD-10-CM

## 2020-06-10 PROCEDURE — 90832 PSYTX W PT 30 MINUTES: CPT | Mod: TEL | Performed by: SOCIAL WORKER

## 2020-06-10 RX ORDER — DEXTROAMPHETAMINE SACCHARATE, AMPHETAMINE ASPARTATE, DEXTROAMPHETAMINE SULFATE AND AMPHETAMINE SULFATE 2.5; 2.5; 2.5; 2.5 MG/1; MG/1; MG/1; MG/1
10 TABLET ORAL 2 TIMES DAILY
Qty: 60 TABLET | Refills: 0 | Status: SHIPPED | OUTPATIENT
Start: 2020-06-10 | End: 2020-07-24

## 2020-06-10 NOTE — TELEPHONE ENCOUNTER
Routing refill request to provider for review/approval because:  Drug not on the [facility] refill protocol berna LOZADA RN

## 2020-06-18 ASSESSMENT — COLUMBIA-SUICIDE SEVERITY RATING SCALE - C-SSRS
1. IN THE PAST MONTH, HAVE YOU WISHED YOU WERE DEAD OR WISHED YOU COULD GO TO SLEEP AND NOT WAKE UP?: NO
2. HAVE YOU ACTUALLY HAD ANY THOUGHTS OF KILLING YOURSELF?: NO
4. HAVE YOU HAD THESE THOUGHTS AND HAD SOME INTENTION OF ACTING ON THEM?: NO
5. HAVE YOU STARTED TO WORK OUT OR WORKED OUT THE DETAILS OF HOW TO KILL YOURSELF? DO YOU INTEND TO CARRY OUT THIS PLAN?: NO
3. HAVE YOU BEEN THINKING ABOUT HOW YOU MIGHT KILL YOURSELF?: NO
ATTEMPT PAST THREE MONTHS: NO
TOTAL  NUMBER OF INTERRUPTED ATTEMPTS PAST 3 MONTHS: NO
1. IN THE PAST MONTH, HAVE YOU WISHED YOU WERE DEAD OR WISHED YOU COULD GO TO SLEEP AND NOT WAKE UP?: YES
2. HAVE YOU ACTUALLY HAD ANY THOUGHTS OF KILLING YOURSELF LIFETIME?: NO
TOTAL  NUMBER OF ABORTED OR SELF INTERRUPTED ATTEMPTS PAST LIFETIME: NO
ATTEMPT LIFETIME: NO
4. HAVE YOU HAD THESE THOUGHTS AND HAD SOME INTENTION OF ACTING ON THEM?: NO
5. HAVE YOU STARTED TO WORK OUT OR WORKED OUT THE DETAILS OF HOW TO KILL YOURSELF? DO YOU INTEND TO CARRY OUT THIS PLAN?: NO
6. HAVE YOU EVER DONE ANYTHING, STARTED TO DO ANYTHING, OR PREPARED TO DO ANYTHING TO END YOUR LIFE?: NO
TOTAL  NUMBER OF ABORTED OR SELF INTERRUPTED ATTEMPTS PAST 3 MONTHS: NO
6. HAVE YOU EVER DONE ANYTHING, STARTED TO DO ANYTHING, OR PREPARED TO DO ANYTHING TO END YOUR LIFE?: NO
REASONS FOR IDEATION LIFETIME: COMPLETELY TO END OR STOP THE PAIN (YOU COULDN'T GO ON LIVING WITH THE PAIN OR HOW YOU WERE FEELING)
TOTAL  NUMBER OF INTERRUPTED ATTEMPTS LIFETIME: NO

## 2020-06-18 NOTE — PROGRESS NOTES
"Lianna Sorto is a 40 year old female who is being evaluated via a telephone visit.      The patient has been notified of the following:     \"We have found that certain health care needs can be provided without the need for a face to face visit.  This service lets us provide the care you need with a short phone conversation.      I will have full access to your [place] medical record during this entire phone call.   I will be taking notes for your medical record.     Since this is like an office visit, we will bill your insurance company for this service.  Please check with your medical insurance if this type of telephone visit/virtual care is covered.  You may be responsible for the cost of this service if insurance coverage is denied.      There are potential benefits and risks of telephone visits (e.g. limits to patient confidentiality) that differ from in-person visits.?  Confidentiality still applies for telephone services, and nobody will record the visit.  It is important to be in a quiet, private space that is free of distractions (including cell phone or other devices) during the visit.??     If during the course of the call I believe a telephone visit is not appropriate, you will not be charged for this service\"    Consent has been obtained for this service by care team member: yes.    Start time: 2 PM    End time: 2:25 PM      Manisha 10 , 2020      Behavioral Health Clinician Progress Note    Patient Name: Lianna Sorto           Service Type: Phone Visit      Service Location:  at the patient's home        Session Length: 16 - 37      Attendees: Patient    Visit Activities (Refresh list every visit): [facility] Only    Diagnostic Assessment Date: 10/15/2019 by Alicia Smith NP, collaborative psychiatry  Treatment Plan Review Date: Not completed  See Flowsheets for today's PHQ-9 and FAUSTINO-7 results  Previous PHQ-9:   PHQ-9 SCORE 2/26/2020 4/17/2020 6/1/2020   PHQ-9 Total Score - - -   PHQ-9 Total Score 22 " 13 20     Previous FAUSTINO-7:   FAUSTINO-7 SCORE 2/26/2020 4/17/2020 6/1/2020   Total Score - - -   Total Score 17 9 18       REJI LEVEL:  REJI Score (Last Two) 9/6/2016 11/12/2019   REJI Raw Score 32 34   Activation Score 62.6 68.9   REJI Level 3 3       DATA  Extended Session (60+ minutes): No  Interactive Complexity: No  Crisis: No    Treatment Objective(s) Addressed in This Session:  Target Behavior(s): disease management/lifestyle changes Decrease anxiety and depression    Depressed Mood: Increase interest, engagement, and pleasure in doing things  Decrease frequency and intensity of feeling down, depressed, hopeless  Feel less tired and more energy during the day   Identify negative self-talk and behaviors: challenge core beliefs, myths, and actions  Improve concentration, focus, and mindfulness in daily activities   Feel less fidgety, restless or slow in daily activities / interpersonal interactions  Adjustment Difficulties: will develop coping/problem-solving skills to facilitate more adaptive adjustment  Relationship Problems: will address relationship difficulties in a more adaptive manner  Functional Impairment: will effectively address problems that interfere with adaptive functioning  Attention Problems: will develop coping skills to effectively manage attention issues  Psychological distress related to Pain    Current Stressors / Issues:  Patient is referred by Alicia Smith due to increased symptoms.  Patient is on a leave of absence at this time from her work due to her mental health.  She reports the closer she gets to her return date the more nervous she is.  She is a single parent of 2 teenage boys -1 diagnosed with ODD and ADHD.  This also contributes to her increase of depression and anxiety.  Discussed coping behaviors that could help decrease symptoms and increase management of them.  Patient will continue to be medication compliant and follow all medical recommendations.  She will return in  approximately 2 weeks or earlier if needed      Progress on Treatment Objective(s) / Homework:  None established    Motivational Interviewing    MI Intervention: Expressed Empathy/Understanding, Supported Autonomy, Collaboration, Evocation, Open-ended questions, Reflections: simple and complex, Change talk (evoked) and Reframe     Change Talk Expressed by the Patient: Desire to change Reasons to change Need to change Committment to change    Provider Response to Change Talk: E - Evoked more info from patient about behavior change, A - Affirmed patient's thoughts, decisions, or attempts at behavior change, R - Reflected patient's change talk and S - Summarized patient's change talk statements      Care Plan review completed: No    Medication Review:  No changes to current psychiatric medication(s)    Medication Compliance:  Yes    Changes in Health Issues:   Yes: Pain, Associated Psychological Distress    Chemical Use Review:   Substance Use: Chemical use reviewed, no active concerns identified      Tobacco Use: Did not assess    Assessment: Current Emotional / Mental Status (status of significant symptoms):  Risk status (Self / Other harm or suicidal ideation)  Patient has had a history of suicidal ideation: Ideation only no history of plan or attempt  Patient denies current fears or concerns for personal safety.  Patient denies current or recent suicidal ideation or behaviors.  Patient denies current or recent homicidal ideation or behaviors.  Patient denies current or recent self injurious behavior or ideation.  Patient denies other safety concerns.  A safety and risk management plan has not been developed at this time, however patient was encouraged to call [facility] should there be a change in any of these risk factors.    Appearance:   Telephone visit   Eye Contact:   Telephone visit   Psychomotor Behavior: Telephone visit   Attitude:   Cooperative   Orientation:   All  Speech   Rate /  Production: Normal    Volume:  Normal   Mood:    Anxious  Normal Expansive  Affect:    Telephone visit   Thought Content:  Clear   Thought Form:  Coherent  Logical   Insight:    Good     Diagnoses:  1. Moderate episode of recurrent major depressive disorder (H)    2. FAUSTINO (generalized anxiety disorder)        Collateral Reports Completed:  Communicated with: Psychiatry as needed    Plan: (Homework, other):  Patient was given information about behavioral services and encouraged to schedule a follow up appointment with the clinic [facility] in 2 weeks.  She was also given deep Breathing Strategies to practice when experiencing anxiety.  CD Recommendations: No indications of CD issues. Ferreira (Mindy),ROMAN,[facility]

## 2020-06-22 ENCOUNTER — TELEPHONE (OUTPATIENT)
Dept: PSYCHOLOGY | Facility: CLINIC | Age: 40
End: 2020-06-22

## 2020-06-22 ENCOUNTER — VIRTUAL VISIT (OUTPATIENT)
Dept: BEHAVIORAL HEALTH | Facility: CLINIC | Age: 40
End: 2020-06-22
Payer: COMMERCIAL

## 2020-06-22 DIAGNOSIS — F33.1 MODERATE EPISODE OF RECURRENT MAJOR DEPRESSIVE DISORDER (H): Primary | ICD-10-CM

## 2020-06-22 DIAGNOSIS — F41.1 GAD (GENERALIZED ANXIETY DISORDER): ICD-10-CM

## 2020-06-22 PROCEDURE — 90832 PSYTX W PT 30 MINUTES: CPT | Mod: TEL | Performed by: SOCIAL WORKER

## 2020-06-22 NOTE — PROGRESS NOTES
"Lianna Sorto is a 40 year old female who is being evaluated via a telephone visit.      The patient has been notified of the following:     \"We have found that certain health care needs can be provided without the need for a face to face visit.  This service lets us provide the care you need with a short phone conversation.      I will have full access to your [facility] medical record during this entire phone call.   I will be taking notes for your medical record.     Since this is like an office visit, we will bill your insurance company for this service.  Please check with your medical insurance if this type of telephone visit/virtual care is covered.  You may be responsible for the cost of this service if insurance coverage is denied.      There are potential benefits and risks of telephone visits (e.g. limits to patient confidentiality) that differ from in-person visits.?  Confidentiality still applies for telephone services, and nobody will record the visit.  It is important to be in a quiet, private space that is free of distractions (including cell phone or other devices) during the visit.??     If during the course of the call I believe a telephone visit is not appropriate, you will not be charged for this service\"    Consent has been obtained for this service by care team member: yes.    Start time: 135 pm    End time: 205 pm      June 22, 2020      Behavioral Health Clinician Progress Note    Patient Name: Lianna Sorto           Service Type: Phone Visit      Service Location:  at the patient's home        Session Length: 16 - 37      Attendees: Patient    Visit Activities (Refresh list every visit): [facility] Only    Diagnostic Assessment Date: 10/15/2019 by Alicia Smith NP, collaborative psychiatry  Treatment Plan Review Date: Not completed  See Flowsheets for today's PHQ-9 and FAUSTINO-7 results  Previous PHQ-9:   PHQ-9 SCORE 2/26/2020 4/17/2020 6/1/2020   PHQ-9 Total Score - - -   PHQ-9 Total Score 22 " 13 20     Previous FAUSTINO-7:   FAUSTINO-7 SCORE 2/26/2020 4/17/2020 6/1/2020   Total Score - - -   Total Score 17 9 18       REJI LEVEL:  REJI Score (Last Two) 9/6/2016 11/12/2019   REJI Raw Score 32 34   Activation Score 62.6 68.9   REJI Level 3 3       DATA  Extended Session (60+ minutes): No  Interactive Complexity: No  Crisis: No    Treatment Objective(s) Addressed in This Session:  Target Behavior(s): disease management/lifestyle changes Decrease anxiety and depression    Depressed Mood: Increase interest, engagement, and pleasure in doing things  Decrease frequency and intensity of feeling down, depressed, hopeless  Feel less tired and more energy during the day   Identify negative self-talk and behaviors: challenge core beliefs, myths, and actions  Improve concentration, focus, and mindfulness in daily activities   Feel less fidgety, restless or slow in daily activities / interpersonal interactions  Adjustment Difficulties: will develop coping/problem-solving skills to facilitate more adaptive adjustment  Relationship Problems: will address relationship difficulties in a more adaptive manner  Functional Impairment: will effectively address problems that interfere with adaptive functioning  Attention Problems: will develop coping skills to effectively manage attention issues  Psychological distress related to Pain    Current Stressors / Issues:  Patient reports symptoms continue.  She is concerned about returning to work and being able to maintain mental health stability and manage stressors.  Discussed choices-possible options.  Reviewed  coping behaviors that could help decrease symptoms and increase management of them.  Patient will continue to be medication compliant and follow all medical recommendations.  She will return in approximately 2 weeks or earlier if needed      Progress on Treatment Objective(s) / Homework:  New Objective established this session - ACTION (Actively working towards change); Intervened by  reinforcing change plan / affirming steps taken    Motivational Interviewing    MI Intervention: Co-Developed Goal: Increase coping skills to manage  life stressors, Expressed Empathy/Understanding, Supported Autonomy, Collaboration, Evocation, Open-ended questions, Reflections: simple and complex, Change talk (evoked) and Reframe     Change Talk Expressed by the Patient: Desire to change Reasons to change Need to change Committment to change    Provider Response to Change Talk: E - Evoked more info from patient about behavior change, A - Affirmed patient's thoughts, decisions, or attempts at behavior change, R - Reflected patient's change talk and S - Summarized patient's change talk statements      Care Plan review completed: No    Medication Review:  No changes to current psychiatric medication(s)    Medication Compliance:  Yes    Changes in Health Issues:   Yes: Pain, Associated Psychological Distress    Chemical Use Review:   Substance Use: Chemical use reviewed, no active concerns identified      Tobacco Use: Did not assess    Assessment: Current Emotional / Mental Status (status of significant symptoms):  Risk status (Self / Other harm or suicidal ideation)  Patient has had a history of suicidal ideation: Ideation only no history of plan or attempt t  Patient denies current fears or concerns for personal safety.  Patient denies current or recent suicidal ideation or behaviors.  Patient denies current or recent homicidal ideation or behaviors.  Patient denies current or recent self injurious behavior or ideation.  Patient denies other safety concerns.  A safety and risk management plan has not been developed at this time, however patient was encouraged to call [place] [phone] should there be a change in any of these risk factors.    Appearance:   Telephone visit   Eye Contact:   Telephone visit   Psychomotor Behavior: Telephone visit   Attitude:   Cooperative   Orientation:   All  Speech   Rate /  Production: Normal    Volume:  Normal   Mood:    Anxious  Normal Expansive  Affect:    Telephone visit   Thought Content:  Clear   Thought Form:  Coherent  Logical   Insight:    Good     Diagnoses:  1. Moderate episode of recurrent major depressive disorder (H)    2. FAUSTINO (generalized anxiety disorder)        Collateral Reports Completed:  Communicated with: Psychiatry as needed    Plan: (Homework, other):  Patient was  scheduled a follow up appointment with the clinic [facility] in 2 weeks.  She was also given deep Breathing Strategies to practice when experiencing anxiety.  CD Recommendations: No indications of CD issues. ROMMEL Ferreira (Mindy),[facility]

## 2020-06-25 ENCOUNTER — VIRTUAL VISIT (OUTPATIENT)
Dept: PSYCHIATRY | Facility: CLINIC | Age: 40
End: 2020-06-25
Payer: COMMERCIAL

## 2020-06-25 DIAGNOSIS — F33.1 MAJOR DEPRESSIVE DISORDER, RECURRENT EPISODE, MODERATE (H): ICD-10-CM

## 2020-06-25 DIAGNOSIS — F90.0 ADHD (ATTENTION DEFICIT HYPERACTIVITY DISORDER), INATTENTIVE TYPE: ICD-10-CM

## 2020-06-25 DIAGNOSIS — F43.23 ADJUSTMENT DISORDER WITH MIXED ANXIETY AND DEPRESSED MOOD: ICD-10-CM

## 2020-06-25 DIAGNOSIS — F41.1 GAD (GENERALIZED ANXIETY DISORDER): Primary | ICD-10-CM

## 2020-06-25 PROCEDURE — 99214 OFFICE O/P EST MOD 30 MIN: CPT | Mod: 95 | Performed by: NURSE PRACTITIONER

## 2020-06-25 RX ORDER — BUPROPION HYDROCHLORIDE 300 MG/1
300 TABLET ORAL EVERY MORNING
Qty: 30 TABLET | Refills: 1 | Status: SHIPPED | OUTPATIENT
Start: 2020-06-25 | End: 2020-08-18

## 2020-06-25 ASSESSMENT — ANXIETY QUESTIONNAIRES
7. FEELING AFRAID AS IF SOMETHING AWFUL MIGHT HAPPEN: MORE THAN HALF THE DAYS
1. FEELING NERVOUS, ANXIOUS, OR ON EDGE: NEARLY EVERY DAY
2. NOT BEING ABLE TO STOP OR CONTROL WORRYING: NEARLY EVERY DAY
GAD7 TOTAL SCORE: 17
3. WORRYING TOO MUCH ABOUT DIFFERENT THINGS: NEARLY EVERY DAY
5. BEING SO RESTLESS THAT IT IS HARD TO SIT STILL: NOT AT ALL
6. BECOMING EASILY ANNOYED OR IRRITABLE: NEARLY EVERY DAY
IF YOU CHECKED OFF ANY PROBLEMS ON THIS QUESTIONNAIRE, HOW DIFFICULT HAVE THESE PROBLEMS MADE IT FOR YOU TO DO YOUR WORK, TAKE CARE OF THINGS AT HOME, OR GET ALONG WITH OTHER PEOPLE: EXTREMELY DIFFICULT

## 2020-06-25 ASSESSMENT — PATIENT HEALTH QUESTIONNAIRE - PHQ9
5. POOR APPETITE OR OVEREATING: NEARLY EVERY DAY
SUM OF ALL RESPONSES TO PHQ QUESTIONS 1-9: 19

## 2020-06-25 NOTE — PATIENT INSTRUCTIONS
1. Increase  Bupropion XL to 300 mg daily  2.  FMLA/Short term disability extended for two more weeks with plans to return to work 2 days per week for two weeks starting the week of July 13.  3.  Adderall 10 mg twice daily-no refills needed today  4.  Propranolol 20 mg 3 times daily as needed for anxiety-no refills needed  5.  Continue seeing Christine for talk therapy to develop coping strategies to manage stress          Continue all other medications as reviewed per electronic medical record today.     Safety plan reviewed. To the Emergency Department as needed or call after hours crisis line at [phone] or [phone]. [place] Crisis Text Line. Text [place] to 072948 or Suicide LifeLine Chat: Songfor.org/chat/    To schedule individual or family therapy, call [facility] at [phone].    Schedule an appointment with me in 4 weeks or sooner as needed. Call [facility] at [phone] to schedule.    Follow up with primary care provider as planned or for acute medical concerns.    Call the psychiatric nurse line with medication questions or concerns at [phone].    SLEDVisionhart may be used to communicate with your provider, but this is not intended to be used for emergencies.    Crisis Resources:    National Suicide Prevention Lifeline: 348.810.2504 (TTY: 264.893.7315). Call anytime for help.  (www.suicidepreventionlifeline.org)  National [place] on Mental Illness (www.sina.org): 663.151.4416 or 059-376-2610.   Mental Health Association (www.mentalhealth.org): 701.411.2467 or 775-646-6120.  [place] Crisis Text Line: Text [place] to 490396  Suicide LifeLine Chat: Songfor.org/chat

## 2020-06-25 NOTE — PROGRESS NOTES
"Lianna Sorto is a 40 year old female who is being evaluated via a billable video visit.      The patient has been notified of following:     \"This video visit will be conducted via a call between you and your physician/provider. We have found that certain health care needs can be provided without the need for an in-person physical exam.  This service lets us provide the care you need with a video conversation.  If a prescription is necessary we can send it directly to your pharmacy.  If lab work is needed we can place an order for that and you can then stop by our lab to have the test done at a later time.    Video visits are billed at different rates depending on your insurance coverage.  Please reach out to your insurance provider with any questions.    If during the course of the call the physician/provider feels a video visit is not appropriate, you will not be charged for this service.\"    Patient has given verbal consent for Video visit? Yes    Will anyone else be joining your video visit? No        Video-Visit Details    Type of service:  Video Visit    Video Start Time: 9:01 AM  Video End Time: 9:21 AM    Video malfunction and the rest of the time spent on a telephone visit- 30 minutes total    Originating Location (pt. Location): Home    Distant Location (provider location):  [facility]     Platform used for Video Visit: Ruy Smith NP        Outpatient Psychiatric Progress Note    Name: Lianna Sorto   : 1980                    Primary Care Provider: DANA Amos CNP   Therapist: Christine Gee    PHQ-9 scores:  PHQ-9 SCORE 2020   PHQ-9 Total Score - - -   PHQ-9 Total Score 13 20 19       FAUSTINO-7 scores:  FAUSTINO-7 SCORE 2020   Total Score - - -   Total Score 9 18 17       Patient Identification:    Patient is a 40 year old year old,   White American female  who presents for return visit with me.  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 Patient attended the session alone. Patient prefers to be called: \" Lianna\".    Interim History:    I last saw Lianna Sorto for outpatient psychiatry Return Visit on June 1, 2020.     During that appointment, she presented with distress and tearfulness.  She maintains her inabilities to return to work and feels overwhelmed.  Her thoughts are slightly disorganized.  The lamotrigine was discontinued as she found it ineffective in mood regulation.  Her propranolol will be taken 3 times daily to address her anxiety issues.  To reduce the incidence of anxiety her Adderall was decreased to 10 mg twice daily.  Lab work is pending to rule out physical reasons for her mental health symptoms.  I did refer her to see Christine Gee for talk therapy and she agreed to this.. .     Current medications include: amphetamine-dextroamphetamine (ADDERALL) 10 MG tablet, Take 1 tablet (10 mg) by mouth 2 times daily  buPROPion (WELLBUTRIN SR) 100 MG 12 hr tablet, Take 1 tablet (100 mg) by mouth 2 times daily  cetirizine (ZYRTEC) 10 MG tablet, Take 10 mg by mouth daily  clindamycin (CLINDAMAX) 1 % external lotion, Apply to affected areas twice daily  cyclobenzaprine (FLEXERIL) 10 MG tablet, Take 1 tablet (10 mg) by mouth 3 times daily as needed for muscle spasms  fluticasone (FLONASE) 50 MCG/ACT nasal spray, Spray 2 sprays into both nostrils daily  order for DME, Equipment being ordered: Dynaflex insert  propranolol (INDERAL) 20 MG tablet, Take 1 tablet (20 mg) by mouth 3 times daily  SETLAKIN 0.15-0.03 MG tablet, TAKE ONE TABLET BY MOUTH EVERY DAY  spironolactone (ALDACTONE) 100 MG tablet, Take 1 tablet (100 mg) by mouth daily  spironolactone (ALDACTONE) 50 MG tablet, Take 1 tablet (50 mg) by mouth daily Please take with 100 mg of spironolactone  triamcinolone (KENALOG) 0.1 % external cream, Apply to affected areas twice daily for 2 weeks, then as needed " only  albuterol (PROAIR HFA/PROVENTIL HFA/VENTOLIN HFA) 108 (90 Base) MCG/ACT inhaler, Inhale 2 puffs into the lungs 4 times daily (Patient not taking: Reported on 6/25/2020)    No current facility-administered medications on file prior to visit.        The [place] Prescription Monitoring Program has been reviewed and there are no concerns about diversionary activity for controlled substances at this time.      I was able to review most recent Primary Care Provider, specialty provider, and therapy visit notes that I have access to.     Today, patient reports   Wellbutrin increasing insomnia.  Crying episodes increased.  Nightmares about work.  She has 12 weeks of MARTELL.  This is her 7th week.  He is thinking of returning to work in 2 weeks part time.  She is seeing Christine for therapy and possibly looking at [facility].  She feels like a failure to her children.  Sleeps all day.  Feels like she has no reason to get up.  She does not feel like be around people.  She loses her train of thought.  She feels slightly more motivated after taking her second dose.   She has not have to take the propranolol.  Since she has been at home monitoring her children, things have been more settled.  Once she is returning to work 2 days a week for two weeks will plan to go back to full time.  Family have noticed that she is not doing well.  Ex Mother in law paying for July rent     has a past medical history of Abnormal Pap smear of cervix (2011), Acute tonsillitis (2008), Condylomata (4/7/2011), Endometritis (2008), Hidradenitis (7/3/2009), Intussusception (H) (1983), and Unspecified trauma to perineum and vulva, unspecified as to episode of care in pregnancy (1985). She also has no past medical history of Basal cell carcinoma or Squamous cell carcinoma.    Social history updates:    Isolates to her home.  Her children require supervision to follow through with their responsibilities.    Substance use updates:    She does not  drink alcohol  Tobacco use: Yes Cigarettes  Ready to quit?  No  Nicotine Replacement Therapy tried: none     Vital Signs:   There were no vitals taken for this visit.    Labs:    Most recent laboratory results reviewed and no new labs.     Review of Systems:  10 systems (general, cardiovascular, respiratory, eyes, ENT, endocrine, GI, , M/S, neurological) were reviewed. Most pertinent finding(s) is/are: She denies chest pain, no skin rashes reported. The remaining systems are all unremarkable.    Mental Status Examination:  Appearance:  awake, alert and mild distress  Attitude:  cooperative   Eye Contact:  adequate  Gait and Station: No assistive Devices used and No dizziness or falls  Psychomotor Behavior:  no evidence of tardive dyskinesia, dystonia, or tics  Oriented to:  time, person, and place  Attention Span and Concentration:  Normal  Speech:   pressured speech and Speaks: English  Mood:  anxious and depressed  Affect:  intensity is heightened  Associations:  no loose associations  Thought Process:  logical and goal oriented  Thought Content:  passive suicidal ideation present, no auditory hallucinations present and no visual hallucinations present  Recent and Remote Memory:  intact Not formally assessed. No amnesia.  Fund of Knowledge: appropriate  Insight:  fair  Judgment:  fair  Impulse Control:  fair    Suicide Risk Assessment:  Today Lianna Sorto reports she has passive thoughts of not wanting to live.. In addition, there are notable risk factors for self-harm, including single status, anxiety, suicidal ideation, purposelessness/no reason for living, hopelessness and withdrawing. However, risk is mitigated by commitment to family, sobriety, history of seeking help when needed, future oriented, no access to firearms or weapons, denies suicidal intent or plan and denies homicidal ideation, intent, or plan. Therefore, based on all available evidence including the factors cited above, Lianna Sorto  does not appear to be at imminent risk for self-harm, does not meet criteria for a 72-hr hold, and therefore remains appropriate for ongoing outpatient level of care.  A thorough assessment of risk factors related to suicide and self-harm have been reviewed and are noted above. The patient convincingly denies suicidality on several occasions. Local community safety resources printed and reviewed for patient to use if needed. There was no deceit detected, and the patient presented in a manner that was believable.     DSM5 Diagnosis:  Attention-Deficit/Hyperactivity Disorder  314.01 (F90.2) Combined presentation  296.32 (F33.1) Major Depressive Disorder, Recurrent Episode, Moderate With anxious distress  300.02 (F41.1) Generalized Anxiety Disorder    Medical comorbidities include:   Patient Active Problem List    Diagnosis Date Noted     Moderate episode of recurrent major depressive disorder (H) 09/22/2017     Priority: Medium     Controlled substance agreement signed 09/28/2016     Priority: Medium     Hidradenitis suppurativa 11/02/2015     Priority: Medium     Obesity 10/19/2015     Priority: Medium     Anxiety 06/02/2014     Priority: Medium     Pelvic pain in female 11/13/2013     Priority: Medium     Fibromyalgia 05/28/2013     Priority: Medium     History of major depression 02/22/2013     Priority: Medium     Piriformis syndrome 12/12/2012     Priority: Medium     Scapular dyskinesis 03/13/2012     Priority: Medium     Varicose veins of legs 07/13/2011     Priority: Medium     Sacroiliac pain 07/13/2011     Priority: Medium     Right thigh pain 07/13/2011     Priority: Medium     Back muscle spasm 06/06/2011     Priority: Medium     Tension headache 06/06/2011     Priority: Medium     CARDIOVASCULAR SCREENING; LDL GOAL LESS THAN 130 06/06/2011     Priority: Medium     ASCUS on Pap smear 03/22/2011     Priority: Medium     3/22/11: Pap - ASCUS, + HPV 16. Plan colp.  4/7/11:[place]--benign. Repeat pap 6 months.  Reminder placed in epic.   11/22/11:Pap--ASCUS +(low risk) HPV type 54. Rpt pap in 1 yr. In , ep, prob list, hx updated. Reminder sent.  2/20/13:Pap--NIL. Pap 1 year. Reminder placed in EPIC  3/18/14: Pap - NIL, Neg HPV. Repeat pap 3 yrs.   6/7/2017 Pap: ASCUS, neg HPV. Plan to repeat Pap+HPV cotesting in 3 yrs (2020)           DDD (degenerative disc disease), cervical 03/10/2011     Priority: Medium     Acne 03/10/2011     Priority: Medium     Attention deficit disorder of adult 09/22/2010     Priority: Medium     Allergic rhinitis 03/30/2007     Priority: Medium     Problem list name updated by automated process. Provider to review         Assessment:    Lianna Sorto is reporting continued anxiety and depression symptoms with low energy.  She tells me family members have noticed when she has not been feeling well.  Today I increased her bupropion to 300 mg daily.  FMLA short-term disability will be extended for 2 more weeks with plans for her to return to work 2 days/week for 2 weeks starting the week of July 13.  Adderall will continue at twice daily dosing.  She continues with Christine Gee for individual talk therapy..    Medication side effects and alternatives were reviewed. Health promotion activities recommended and reviewed today. All questions addressed. Education and counseling completed regarding risks and benefits of medications and psychotherapy options.    Treatment Plan:      1. Increase  Bupropion XL to 300 mg daily  2.  FMLA/Short term disability extended for two more weeks with plans to return to work 2 days per week for two weeks starting the week of July 13.  3.  Adderall 10 mg twice daily-no refills needed today  4.  Propranolol 20 mg 3 times daily as needed for anxiety-no refills needed  5.  Continue seeing Christine for talk therapy to develop coping strategies to manage stress      Continue all other medications as reviewed per electronic medical record today.     Safety plan reviewed. To  the Emergency Department as needed or call after hours crisis line at [phone] or [phone]. [place] Crisis Text Line. Text [place] to 790097 or Suicide LifeLine Chat: suicideCartilix.org/chat/    To schedule individual or family therapy, call [facility] at [phone].    Schedule an appointment with me in 4 weeks or sooner as needed. Call [facility] at [phone] to schedule.    Follow up with primary care provider as planned or for acute medical concerns.    Call the psychiatric nurse line with medication questions or concerns at [phone].    Viximohart may be used to communicate with your provider, but this is not intended to be used for emergencies.    Crisis Resources:    National Suicide Prevention Lifeline: 375.818.2142 (TTY: 432.675.6535). Call anytime for help.  (www.suicidepreventionlifeline.org)  National [place] on Mental Illness (www.sina.org): 438.321.3536 or 601-184-1637.   Mental Health Association (www.mentalhealth.org): 608.960.7346 or 572-287-0893.  [place] Crisis Text Line: Text [place] to 966058  Suicide LifeLine Chat: suicideCartilix.org/chat    Administrative Billing:   Time spent with patient was  spent in counseling and coordination of care regarding above diagnoses and treatment plan.    Patient Status:  Patient will continue to be seen for ongoing consultation and stabilization.    Signed:   ELIANA Jeffrey-BC   Psychiatry

## 2020-06-26 ASSESSMENT — ANXIETY QUESTIONNAIRES: GAD7 TOTAL SCORE: 17

## 2020-07-09 ENCOUNTER — TELEPHONE (OUTPATIENT)
Dept: PSYCHIATRY | Facility: CLINIC | Age: 40
End: 2020-07-09

## 2020-07-09 NOTE — TELEPHONE ENCOUNTER
Reason for call:  Form   Our goal is to have forms completed within 72 hours, however some forms may require a visit or additional information.     [facility]   Where did the form come from? form was faxed in  What clinic location was the form placed at? [facility]  Where was the form placed? Unsure it was faxed   What number is listed as a contact on the form? Karishma Bass     Phone call message - patient request for a letter, form or note:     Date needed: Asap   Please fax to MARTELL team for [place] info on form  Has the patient signed a consent form for release of information? YES    Additional comments: MARTELL per patient Alicia needs to refill out paperwork to be more clear    Returning 2 days a week for 2 days starting 7.13 for 2 weeks    Returning full time week of  7.27     Type of letter, form or note: work ,     Phone number to reach patient:  Home number on file [phone] (home)    Best Time:  Any     Can we leave a detailed message on this number?  YES    Travel screening: Not Applicable

## 2020-07-09 NOTE — TELEPHONE ENCOUNTER
Patient faxed MARTELL paperwork needing to be completed. Paperwork should state patient will be returning to work beginning 7/13/20 working 2 days per week for 2 weeks; then returning to full time 7/27. Routing to [place] for assistance. Form was faxed to [facility].

## 2020-07-13 ENCOUNTER — VIRTUAL VISIT (OUTPATIENT)
Dept: ENDOCRINOLOGY | Facility: CLINIC | Age: 40
End: 2020-07-13
Payer: COMMERCIAL

## 2020-07-13 DIAGNOSIS — N91.2 AMENORRHEA: Primary | ICD-10-CM

## 2020-07-13 DIAGNOSIS — E04.1 THYROID NODULE: ICD-10-CM

## 2020-07-13 PROCEDURE — 99202 OFFICE O/P NEW SF 15 MIN: CPT | Mod: TEL | Performed by: INTERNAL MEDICINE

## 2020-07-13 NOTE — PROGRESS NOTES
"Lianna Sorto is a 40 year old female who is being evaluated via a billable telephone visit.      The patient has been notified of following:     \"This telephone visit will be conducted via a call between you and your physician/provider. We have found that certain health care needs can be provided without the need for a physical exam.  This service lets us provide the care you need with a short phone conversation.  If a prescription is necessary we can send it directly to your pharmacy.  If lab work is needed we can place an order for that and you can then stop by our lab to have the test done at a later time.    Telephone visits are billed at different rates depending on your insurance coverage. During this emergency period, for some insurers they may be billed the same as an in-person visit.  Please reach out to your insurance provider with any questions.    If during the course of the call the physician/provider feels a telephone visit is not appropriate, you will not be charged for this service.\"    Patient has given verbal consent for Telephone visit?  Yes    What phone number would you like to be contacted at? [phone]    How would you like to obtain your AVS? Beto Mcgarry, [facility]    CC: \"Hormone imbalance\"    HPI: Patient is self referred for \"hormone imbalance\".   Specifically, her menses stopped ~ 6 months ago.   Developed depression, anxiety, and insomnia over this time as well.   She is working with a psychiatrist.   Her adderall dose has been decreased and started on wellbutrin.     She is on an OCP. Every 3 months, she takes 1 week of placebo.   No menses after the last two times she did the placebo week.     She takes aldactone for facial hair. This has not seemed to help.   Coarse hair on her chin.    Episode of palpitations she attributes to too much caffeine intake.     She has two children. No GDM.   No plans for more children.   Menses began at age 15.   Remarks menses have " always been heavy are irregular except for when she was on an OCP.     Gained 15 pounds in last 6 months.   More cravings. Recently began to bike for exercise.     ROS: 10 point ROS neg other than the symptoms noted above in the HPI.    PMH:   Patient Active Problem List   Diagnosis     Allergic rhinitis     Attention deficit disorder of adult     DDD (degenerative disc disease), cervical     Acne     ASCUS on Pap smear     Back muscle spasm     Tension headache     CARDIOVASCULAR SCREENING; LDL GOAL LESS THAN 130     Varicose veins of legs     Sacroiliac pain     Right thigh pain     Scapular dyskinesis     Piriformis syndrome     History of major depression     Fibromyalgia     Pelvic pain in female     Anxiety     Obesity     Hidradenitis suppurativa     Controlled substance agreement signed     Moderate episode of recurrent major depressive disorder (H)     Meds:  Current Outpatient Medications   Medication     amphetamine-dextroamphetamine (ADDERALL) 10 MG tablet     buPROPion (WELLBUTRIN XL) 300 MG 24 hr tablet     cetirizine (ZYRTEC) 10 MG tablet     clindamycin (CLINDAMAX) 1 % external lotion     cyclobenzaprine (FLEXERIL) 10 MG tablet     fluticasone (FLONASE) 50 MCG/ACT nasal spray     order for DME     propranolol (INDERAL) 20 MG tablet     SETLAKIN 0.15-0.03 MG tablet     spironolactone (ALDACTONE) 100 MG tablet     spironolactone (ALDACTONE) 50 MG tablet     triamcinolone (KENALOG) 0.1 % external cream     albuterol (PROAIR HFA/PROVENTIL HFA/VENTOLIN HFA) 108 (90 Base) MCG/ACT inhaler     No current facility-administered medications for this visit.      FHX:   Several member's on mother's side with DM.   No known PCOS.     SHX:  Returning to work tomorrow.  at [employer]. Had been on a MARTELL.   1/4 PPD.     Exam:   Gen: In NAD.     A/P:   Irregular menses- Outside records reviewed. On OCP but no bleeding after her placebo weeks. Could be related to her depression and anxiety  (hypothalamic amenorrhea). Other possibilities include PCOS, hyper or hypothyroidism, hyperprolactinemia, Cushing's, CAH, androgen producing tumor.  -Labs as below.   -If normal schedule pelvic ultrasound.     Hirsutism - on aldactone without formal diagnosis of PCOS.   -17-OH progesterone, prolactin, AMH, DHEA, testosterone, TSH.   -Cortisol saliva X2.     Thyroid nodules - I have reviewed the natural history of thyroid nodules and thyroid cancer with the patient. FNA right nodule in 2013 was benign. She is having some dysphagia.   -US thyroid.     Due to the COVID 19 pandemic this visit was a telephone/video visit in order to help prevent spread of infection in this high risk patient and the general population. The patient gave verbal consent for the visit today.    Start time 1431  Stop time 1455  Total time 24  This visit would have been billed as 92216 as an E & M code    Cruz Beltran MD on 7/13/2020 at 2:56 PM

## 2020-07-13 NOTE — LETTER
"    7/13/2020         RE: Lianna Sorto  [address] [facility] [id number]        Dear Colleague,    Thank you for referring your patient, Lianna Sorto, to the [facility]. Please see a copy of my visit note below.    Lianna Sorto is a 40 year old female who is being evaluated via a billable telephone visit.      The patient has been notified of following:     \"This telephone visit will be conducted via a call between you and your physician/provider. We have found that certain health care needs can be provided without the need for a physical exam.  This service lets us provide the care you need with a short phone conversation.  If a prescription is necessary we can send it directly to your pharmacy.  If lab work is needed we can place an order for that and you can then stop by our lab to have the test done at a later time.    Telephone visits are billed at different rates depending on your insurance coverage. During this emergency period, for some insurers they may be billed the same as an in-person visit.  Please reach out to your insurance provider with any questions.    If during the course of the call the physician/provider feels a telephone visit is not appropriate, you will not be charged for this service.\"    Patient has given verbal consent for Telephone visit?  Yes    What phone number would you like to be contacted at? [phone]    How would you like to obtain your AVS? Beto Mcgarry, [facility]    CC: \"Hormone imbalance\"    HPI: Patient is self referred for \"hormone imbalance\".   Specifically, her menses stopped ~ 6 months ago.   Developed depression, anxiety, and insomnia over this time as well.   She is working with a psychiatrist.   Her adderall dose has been decreased and started on wellbutrin.     She is on an OCP. Every 3 months, she takes 1 week of placebo.   No menses after the last two times she did the placebo week.     She takes aldactone for facial hair. This has " not seemed to help.   Coarse hair on her chin.    Episode of palpitations she attributes to too much caffeine intake.     She has two children. No GDM.   No plans for more children.   Menses began at age 15.   Remarks menses have always been heavy are irregular except for when she was on an OCP.     Gained 15 pounds in last 6 months.   More cravings. Recently began to bike for exercise.     ROS: 10 point ROS neg other than the symptoms noted above in the HPI.    PMH:   Patient Active Problem List   Diagnosis     Allergic rhinitis     Attention deficit disorder of adult     DDD (degenerative disc disease), cervical     Acne     ASCUS on Pap smear     Back muscle spasm     Tension headache     CARDIOVASCULAR SCREENING; LDL GOAL LESS THAN 130     Varicose veins of legs     Sacroiliac pain     Right thigh pain     Scapular dyskinesis     Piriformis syndrome     History of major depression     Fibromyalgia     Pelvic pain in female     Anxiety     Obesity     Hidradenitis suppurativa     Controlled substance agreement signed     Moderate episode of recurrent major depressive disorder (H)     Meds:  Current Outpatient Medications   Medication     amphetamine-dextroamphetamine (ADDERALL) 10 MG tablet     buPROPion (WELLBUTRIN XL) 300 MG 24 hr tablet     cetirizine (ZYRTEC) 10 MG tablet     clindamycin (CLINDAMAX) 1 % external lotion     cyclobenzaprine (FLEXERIL) 10 MG tablet     fluticasone (FLONASE) 50 MCG/ACT nasal spray     order for DME     propranolol (INDERAL) 20 MG tablet     SETLAKIN 0.15-0.03 MG tablet     spironolactone (ALDACTONE) 100 MG tablet     spironolactone (ALDACTONE) 50 MG tablet     triamcinolone (KENALOG) 0.1 % external cream     albuterol (PROAIR HFA/PROVENTIL HFA/VENTOLIN HFA) 108 (90 Base) MCG/ACT inhaler     No current facility-administered medications for this visit.      FHX:   Several member's on mother's side with DM.   No known PCOS.     SHX:  Returning to work tomorrow.  at   [employer]. Had been on a MARTELL.   1/4 PPD.     Exam:   Gen: In NAD.     A/P:   Irregular menses- Outside records reviewed. On OCP but no bleeding after her placebo weeks. Could be related to her depression and anxiety (hypothalamic amenorrhea). Other possibilities include PCOS, hyper or hypothyroidism, hyperprolactinemia, Cushing's, CAH, androgen producing tumor.  -Labs as below.   -If normal schedule pelvic ultrasound.     Hirsutism - on aldactone without formal diagnosis of PCOS.   -17-OH progesterone, prolactin, AMH, DHEA, testosterone, TSH.   -Cortisol saliva X2.     Thyroid nodules - I have reviewed the natural history of thyroid nodules and thyroid cancer with the patient. FNA right nodule in 2013 was benign. She is having some dysphagia.   -US thyroid.     Due to the COVID 19 pandemic this visit was a telephone/video visit in order to help prevent spread of infection in this high risk patient and the general population. The patient gave verbal consent for the visit today.    Start time 1431  Stop time 1455  Total time 24  This visit would have been billed as 88230 as an E & M code    Cruz Beltran MD on 7/13/2020 at 2:56 PM              Again, thank you for allowing me to participate in the care of your patient.        Sincerely,        Cruz Beltran MD

## 2020-07-17 DIAGNOSIS — Z51.81 THERAPEUTIC DRUG MONITORING: ICD-10-CM

## 2020-07-17 DIAGNOSIS — T88.7XXA MEDICATION SIDE EFFECTS: ICD-10-CM

## 2020-07-17 DIAGNOSIS — N91.2 AMENORRHEA: ICD-10-CM

## 2020-07-17 LAB
ANION GAP SERPL CALCULATED.3IONS-SCNC: 4 MMOL/L (ref 3–14)
B-HCG SERPL-ACNC: <1 IU/L (ref 0–5)
BUN SERPL-MCNC: 12 MG/DL (ref 7–30)
CALCIUM SERPL-MCNC: 8.5 MG/DL (ref 8.5–10.1)
CHLORIDE SERPL-SCNC: 107 MMOL/L (ref 94–109)
CO2 SERPL-SCNC: 27 MMOL/L (ref 20–32)
CREAT SERPL-MCNC: 0.87 MG/DL (ref 0.52–1.04)
FOLATE SERPL-MCNC: 18.2 NG/ML
GFR SERPL CREATININE-BSD FRML MDRD: 85 ML/MIN/{1.73_M2}
GLUCOSE SERPL-MCNC: 168 MG/DL (ref 70–99)
HBA1C MFR BLD: 6.7 % (ref 0–5.6)
POTASSIUM SERPL-SCNC: 3.8 MMOL/L (ref 3.4–5.3)
PROLACTIN SERPL-MCNC: 18 UG/L (ref 3–27)
SODIUM SERPL-SCNC: 138 MMOL/L (ref 133–144)
TSH SERPL DL<=0.005 MIU/L-ACNC: 3.42 MU/L (ref 0.4–4)
VIT B12 SERPL-MCNC: 312 PG/ML (ref 193–986)

## 2020-07-17 PROCEDURE — 99000 SPECIMEN HANDLING OFFICE-LAB: CPT | Performed by: INTERNAL MEDICINE

## 2020-07-17 PROCEDURE — 82746 ASSAY OF FOLIC ACID SERUM: CPT | Performed by: NURSE PRACTITIONER

## 2020-07-17 PROCEDURE — 82607 VITAMIN B-12: CPT | Performed by: NURSE PRACTITIONER

## 2020-07-17 PROCEDURE — 83036 HEMOGLOBIN GLYCOSYLATED A1C: CPT | Performed by: NURSE PRACTITIONER

## 2020-07-17 PROCEDURE — 83498 ASY HYDROXYPROGESTERONE 17-D: CPT | Performed by: INTERNAL MEDICINE

## 2020-07-17 PROCEDURE — 83520 IMMUNOASSAY QUANT NOS NONAB: CPT | Mod: 90 | Performed by: INTERNAL MEDICINE

## 2020-07-17 PROCEDURE — 84403 ASSAY OF TOTAL TESTOSTERONE: CPT | Performed by: INTERNAL MEDICINE

## 2020-07-17 PROCEDURE — 84146 ASSAY OF PROLACTIN: CPT | Performed by: INTERNAL MEDICINE

## 2020-07-17 PROCEDURE — 84443 ASSAY THYROID STIM HORMONE: CPT | Performed by: INTERNAL MEDICINE

## 2020-07-17 PROCEDURE — 84270 ASSAY OF SEX HORMONE GLOBUL: CPT | Performed by: INTERNAL MEDICINE

## 2020-07-17 PROCEDURE — 80048 BASIC METABOLIC PNL TOTAL CA: CPT | Performed by: PHYSICIAN ASSISTANT

## 2020-07-17 PROCEDURE — 82627 DEHYDROEPIANDROSTERONE: CPT | Performed by: INTERNAL MEDICINE

## 2020-07-17 PROCEDURE — 84702 CHORIONIC GONADOTROPIN TEST: CPT | Performed by: INTERNAL MEDICINE

## 2020-07-17 PROCEDURE — 36415 COLL VENOUS BLD VENIPUNCTURE: CPT | Performed by: INTERNAL MEDICINE

## 2020-07-20 LAB
17OHP SERPL-MCNC: 23 NG/DL
MIS SERPL-MCNC: 5.5 NG/ML (ref 0.18–11.71)

## 2020-07-21 DIAGNOSIS — N91.2 AMENORRHEA: Primary | ICD-10-CM

## 2020-07-21 LAB
DHEA-S SERPL-MCNC: 156 UG/DL (ref 35–430)
SHBG SERPL-SCNC: 123 NMOL/L (ref 30–135)
TESTOST FREE SERPL-MCNC: 0.07 NG/DL (ref 0.13–0.92)
TESTOST SERPL-MCNC: 16 NG/DL (ref 8–60)

## 2020-07-21 NOTE — RESULT ENCOUNTER NOTE
Pt called re:urine results.  Was notified of UTI still present, needs to finish medication sent to pharmacy, if she starts experience fever, nausea, lower back pain to call back to see her.   Results released via [employer]    Citlaly Mcgarry MA

## 2020-07-24 DIAGNOSIS — F90.0 ADHD (ATTENTION DEFICIT HYPERACTIVITY DISORDER), INATTENTIVE TYPE: ICD-10-CM

## 2020-07-24 RX ORDER — DEXTROAMPHETAMINE SACCHARATE, AMPHETAMINE ASPARTATE, DEXTROAMPHETAMINE SULFATE AND AMPHETAMINE SULFATE 2.5; 2.5; 2.5; 2.5 MG/1; MG/1; MG/1; MG/1
10 TABLET ORAL 2 TIMES DAILY
Qty: 60 TABLET | Refills: 0 | Status: SHIPPED | OUTPATIENT
Start: 2020-07-24 | End: 2020-09-17

## 2020-07-24 NOTE — TELEPHONE ENCOUNTER
Controlled Substance Refill Request for amphetamine-dextroamphetamine (ADDERALL) 10 MG tablet     Last refill: 6/10/20    Last clinic visit: 6/25/20     Clinic visit frequency required: Q 1 month  Next appt: 8/7/20    Controlled substance agreement on file: Yes:  Date 10/4/16.    Documentation in problem list reviewed:  Yes    Processing:  Rx to be electronically transmitted to pharmacy by provider     [place] reviewed:  reviewed- no concerns

## 2020-08-03 PROCEDURE — 99000 SPECIMEN HANDLING OFFICE-LAB: CPT | Performed by: INTERNAL MEDICINE

## 2020-08-03 PROCEDURE — 82530 CORTISOL FREE: CPT | Mod: 90 | Performed by: INTERNAL MEDICINE

## 2020-08-04 DIAGNOSIS — N91.2 AMENORRHEA: ICD-10-CM

## 2020-08-07 ENCOUNTER — VIRTUAL VISIT (OUTPATIENT)
Dept: PSYCHIATRY | Facility: CLINIC | Age: 40
End: 2020-08-07
Payer: COMMERCIAL

## 2020-08-07 DIAGNOSIS — F90.0 ADHD (ATTENTION DEFICIT HYPERACTIVITY DISORDER), INATTENTIVE TYPE: Primary | ICD-10-CM

## 2020-08-07 DIAGNOSIS — F41.1 GAD (GENERALIZED ANXIETY DISORDER): ICD-10-CM

## 2020-08-07 DIAGNOSIS — F33.1 MAJOR DEPRESSIVE DISORDER, RECURRENT EPISODE, MODERATE (H): ICD-10-CM

## 2020-08-07 PROBLEM — E11.9 DIABETES MELLITUS, TYPE 2 (H): Status: ACTIVE | Noted: 2020-08-07

## 2020-08-07 PROCEDURE — 99214 OFFICE O/P EST MOD 30 MIN: CPT | Mod: TEL | Performed by: NURSE PRACTITIONER

## 2020-08-07 ASSESSMENT — ANXIETY QUESTIONNAIRES
1. FEELING NERVOUS, ANXIOUS, OR ON EDGE: SEVERAL DAYS
5. BEING SO RESTLESS THAT IT IS HARD TO SIT STILL: NOT AT ALL
3. WORRYING TOO MUCH ABOUT DIFFERENT THINGS: NEARLY EVERY DAY
6. BECOMING EASILY ANNOYED OR IRRITABLE: NEARLY EVERY DAY
7. FEELING AFRAID AS IF SOMETHING AWFUL MIGHT HAPPEN: SEVERAL DAYS
IF YOU CHECKED OFF ANY PROBLEMS ON THIS QUESTIONNAIRE, HOW DIFFICULT HAVE THESE PROBLEMS MADE IT FOR YOU TO DO YOUR WORK, TAKE CARE OF THINGS AT HOME, OR GET ALONG WITH OTHER PEOPLE: SOMEWHAT DIFFICULT
GAD7 TOTAL SCORE: 11
2. NOT BEING ABLE TO STOP OR CONTROL WORRYING: NEARLY EVERY DAY

## 2020-08-07 ASSESSMENT — PATIENT HEALTH QUESTIONNAIRE - PHQ9
5. POOR APPETITE OR OVEREATING: NOT AT ALL
SUM OF ALL RESPONSES TO PHQ QUESTIONS 1-9: 14

## 2020-08-07 NOTE — PATIENT INSTRUCTIONS
1. Bupropion XL  300 mg daily  2. Returned to work full time .  3.  Adderall 10 mg twice daily until gone  4.  Propranolol 20 mg 3 times daily as needed for anxiety  5.  Continue seeing Christine for talk therapy to develop coping strategies to manage stress   6. On August 21 start Vyvanse 20 mg daily       Continue all other medications as reviewed per electronic medical record today.     Safety plan reviewed. To the Emergency Department as needed or call after hours crisis line at [phone] or [phone]. [place] Crisis Text Line. Text [place] to 027253 or Suicide LifeLine Chat: [facility].org/chat/    To schedule individual or family therapy, call [facility] at [phone].    Schedule an appointment with me in September or sooner as needed. Call [facility] at [phone] to schedule.    Follow up with primary care provider as planned or for acute medical concerns.    Call the psychiatric nurse line with medication questions or concerns at [phone].    [employer] may be used to communicate with your provider, but this is not intended to be used for emergencies.    Crisis Resources:    National Suicide Prevention Lifeline: 749.151.9432 (TTY: 922.380.8701). Call anytime for help.  (www.suicidepreventionlifeline.org)  National [place] [employer]ness (www.[place].org): 519.735.3841 or 152-518-1498.   Mental Health Association (www.mentalhealth.org): 105.139.2920 or 131-405-2918.  [place] Crisis Text Line: Text [place] to 843517  Suicide LifeLine Chat: [facility].org/chat

## 2020-08-07 NOTE — PROGRESS NOTES
"Lianna Sorto is a 40 year old female who is being evaluated via a billable telephone visit.      The patient has been notified of following:     \"This telephone visit will be conducted via a call between you and your physician/provider. We have found that certain health care needs can be provided without the need for a physical exam.  This service lets us provide the care you need with a short phone conversation.  If a prescription is necessary we can send it directly to your pharmacy.  If lab work is needed we can place an order for that and you can then stop by our lab to have the test done at a later time.    Telephone visits are billed at different rates depending on your insurance coverage. During this emergency period, for some insurers they may be billed the same as an in-person visit.  Please reach out to your insurance provider with any questions.    If during the course of the call the physician/provider feels a telephone visit is not appropriate, you will not be charged for this service.\"    Patient has given verbal consent for Telephone visit?  Yes    What phone number would you like to be contacted at? [phone]    How would you like to obtain your AVS? MyChart    Phone call duration: 30 minutes    Alicia Smith NP        Outpatient Psychiatric Progress Note    Name: Lianna Sorto   : 1980                    Primary Care Provider: DANA Amos CNP   Therapist: Christine Gee     PHQ-9 scores:  PHQ-9 SCORE 2020   PHQ-9 Total Score - - -   PHQ-9 Total Score 20 19 14       FAUSTINO-7 scores:  FAUSTINO-7 SCORE 2020   Total Score - - -   Total Score 18 17 11       Patient Identification:    Patient is a 40 year old year old, single  White American female  who presents for return visit with me.  Patient is currently employed full time. Patient attended the session alone. Patient prefers to be called: \"Lianna\".    Interim History:    I last " saw Lianna KRUGER Sorto for outpatient psychiatry Return Visit on June 25, 2020.     During that appointment, She reported continued anxiety and depression symptoms with low energy.  She tells me family members have noticed when she has not been feeling well.  Today I increased her bupropion to 300 mg daily.  FMLA short-term disability will be extended for 2 more weeks with plans for her to return to work 2 days/week for 2 weeks starting the week of July 13.  Adderall will continue at twice daily dosing.  She continues with Christine Gee for individual talk therapy..    Current medications include: amphetamine-dextroamphetamine (ADDERALL) 10 MG tablet, Take 1 tablet (10 mg) by mouth 2 times daily  buPROPion (WELLBUTRIN XL) 300 MG 24 hr tablet, Take 1 tablet (300 mg) by mouth every morning  cetirizine (ZYRTEC) 10 MG tablet, Take 10 mg by mouth daily  clindamycin (CLINDAMAX) 1 % external lotion, Apply to affected areas twice daily  cyclobenzaprine (FLEXERIL) 10 MG tablet, Take 1 tablet (10 mg) by mouth 3 times daily as needed for muscle spasms  fluticasone (FLONASE) 50 MCG/ACT nasal spray, Spray 2 sprays into both nostrils daily  propranolol (INDERAL) 20 MG tablet, Take 1 tablet (20 mg) by mouth 3 times daily  SETLAKIN 0.15-0.03 MG tablet, TAKE ONE TABLET BY MOUTH EVERY DAY  spironolactone (ALDACTONE) 100 MG tablet, Take 1 tablet (100 mg) by mouth daily  spironolactone (ALDACTONE) 50 MG tablet, Take 1 tablet (50 mg) by mouth daily Please take with 100 mg of spironolactone  triamcinolone (KENALOG) 0.1 % external cream, Apply to affected areas twice daily for 2 weeks, then as needed only  albuterol (PROAIR HFA/PROVENTIL HFA/VENTOLIN HFA) 108 (90 Base) MCG/ACT inhaler, Inhale 2 puffs into the lungs 4 times daily (Patient not taking: Reported on 6/25/2020)  order for DME, Equipment being ordered: Dynaflex insert    No current facility-administered medications on file prior to visit.        The [place] Prescription  Monitoring Program has been reviewed and there are no concerns about diversionary activity for controlled substances at this time.      I was able to review most recent Primary Care Provider, specialty provider, and therapy visit notes that I have access to.     Today, patient reports that work is better than anticipated.  At first she had trouble sleeping but is doing better.  Lianna feels more respected and appreciated.  She asked about trying Vyvanse instead of Adderall.  She has been binge eating.  She is constantly hungry and eats even when she feels full.  Then she feels sick and nauseated.  Sometimes she has to force herself to get things down around the house.  Fibromyalgia has worsened.       has a past medical history of Abnormal Pap smear of cervix (2011), Acute tonsillitis (2008), Condylomata (4/7/2011), Endometritis (2008), Hidradenitis (7/3/2009), Intussusception (H) (1983), and Unspecified trauma to perineum and vulva, unspecified as to episode of care in pregnancy (1985). She also has no past medical history of Basal cell carcinoma or Squamous cell carcinoma.    Social history updates:    She is concerned about her son and contemplating sending him to [employer]    Substance use updates:    She denies drinking alcohol  Tobacco use: Yes Cigarettes  Ready to quit?  No  Nicotine Replacement Therapy tried: none     Vital Signs:   There were no vitals taken for this visit.    Labs:    Most recent laboratory results reviewed and no new labs.     Review of Systems:  10 systems (general, cardiovascular, respiratory, eyes, ENT, endocrine, GI, , M/S, neurological) were reviewed. Most pertinent finding(s) is/are: chronic generalized pain, no chest pain, no shortness of breath, no skin rashes. The remaining systems are all unremarkable.    Mental Status Examination:  Appearance:  awake, alert  Attitude:  cooperative   Eye Contact:  unable to assess  Gait and Station: No assistive Devices used and No  dizziness or falls  Psychomotor Behavior:  unable to assess  Oriented to:  time, person, and place  Attention Span and Concentration:  Normal  Speech:   clear, coherent and Speaks: English  Mood:  anxious  Affect:  appropriate and in normal range  Associations:  no loose associations  Thought Process:  logical and goal oriented  Thought Content:  no evidence of suicidal ideation or homicidal ideation, no auditory hallucinations present and no visual hallucinations present  Recent and Remote Memory:  intact Not formally assessed. No amnesia.  Fund of Knowledge: appropriate  Insight:  good  Judgment:  intact  Impulse Control:  intact    Suicide Risk Assessment:  Today Lianna Sorto reports that she is not having any thoughts to harm herself or other people. In addition, there are notable risk factors for self-harm, including single status, anxiety and mood change. However, risk is mitigated by commitment to family, sobriety, history of seeking help when needed, future oriented, no access to firearms or weapons, denies suicidal intent or plan and denies homicidal ideation, intent, or plan. Therefore, based on all available evidence including the factors cited above, Lianna Sorto does not appear to be at imminent risk for self-harm, does not meet criteria for a 72-hr hold, and therefore remains appropriate for ongoing outpatient level of care.  A thorough assessment of risk factors related to suicide and self-harm have been reviewed and are noted above. The patient convincingly denies suicidality on several occasions. Local community safety resources printed and reviewed for patient to use if needed. There was no deceit detected, and the patient presented in a manner that was believable.     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  296.32 (F33.1) Major Depressive Disorder, Recurrent Episode, Moderate With mixed features  300.02 (F41.1)  Generalized Anxiety Disorder    Medical comorbidities include:   Patient Active Problem List    Diagnosis Date Noted     Moderate episode of recurrent major depressive disorder (H) 09/22/2017     Priority: Medium     Controlled substance agreement signed 09/28/2016     Priority: Medium     Hidradenitis suppurativa 11/02/2015     Priority: Medium     Obesity 10/19/2015     Priority: Medium     Anxiety 06/02/2014     Priority: Medium     Pelvic pain in female 11/13/2013     Priority: Medium     Fibromyalgia 05/28/2013     Priority: Medium     History of major depression 02/22/2013     Priority: Medium     Piriformis syndrome 12/12/2012     Priority: Medium     Scapular dyskinesis 03/13/2012     Priority: Medium     Varicose veins of legs 07/13/2011     Priority: Medium     Sacroiliac pain 07/13/2011     Priority: Medium     Right thigh pain 07/13/2011     Priority: Medium     Back muscle spasm 06/06/2011     Priority: Medium     Tension headache 06/06/2011     Priority: Medium     CARDIOVASCULAR SCREENING; LDL GOAL LESS THAN 130 06/06/2011     Priority: Medium     ASCUS on Pap smear 03/22/2011     Priority: Medium     3/22/11: Pap - ASCUS, + HPV 16. Plan colp.  4/7/11:[place]--benign. Repeat pap 6 months. Reminder placed in epic.   11/22/11:Pap--ASCUS +(low risk) HPV type 54. Rpt pap in 1 yr. In , ep, prob list, hx updated. Reminder sent.  2/20/13:Pap--NIL. Pap 1 year. Reminder placed in EPIC  3/18/14: Pap - NIL, Neg HPV. Repeat pap 3 yrs.   6/7/2017 Pap: ASCUS, neg HPV. Plan to repeat Pap+HPV cotesting in 3 yrs (2020)           DDD (degenerative disc disease), cervical 03/10/2011     Priority: Medium     Acne 03/10/2011     Priority: Medium     Attention deficit disorder of adult 09/22/2010     Priority: Medium     Allergic rhinitis 03/30/2007     Priority: Medium     Problem list name updated by automated process. Provider to review         Assessment:    Lianna Sorto reports that she is now back to work and  things are going better for her there.  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  She is concerned about her inabilities to sustain her focus and attention at work.  She will take her Adderall 10 mg twice daily until gone.  On August 21 she will start Vyvanse 20 mg daily.  She will continue with Christine for talk therapy.  Propranolol is available to her as needed for breakthrough anxiety symptoms..    Medication side effects and alternatives were reviewed. Health promotion activities recommended and reviewed today. All questions addressed. Education and counseling completed regarding risks and benefits of medications and psychotherapy options.    Treatment Plan:    1. Bupropion XL  300 mg daily  2. Returned to work full time .  3.  Adderall 10 mg twice daily until gone  4.  Propranolol 20 mg 3 times daily as needed for anxiety  5.  Continue seeing Christine for talk therapy to develop coping strategies to manage stress   6. On August 21 start Vyvanse 20 mg daily      Continue all other medications as reviewed per electronic medical record today.     Safety plan reviewed. To the Emergency Department as needed or call after hours crisis line at [phone] or [phone]. [place] Crisis Text [place]. Text [place] to 418713 or Suicide LifeLine Chat: suicidepreventionlifeline.org/chat/    To schedule individual or family therapy, call [facility] at [phone].    Schedule an appointment with me in September or sooner as needed. Call [facility] at [phone] to schedule.    Follow up with primary care provider as planned or for acute medical concerns.    Call the psychiatric nurse line with medication questions or concerns at [phone].    Nexanthart may be used to communicate with your provider, but this is not intended to be used for emergencies.    Crisis Resources:    National Suicide Prevention Lifeline:  476.772.1916 (TTY: 525.239.6496). Call anytime for help.  (www.suicidepreventionlifeline.org)  National [place] on Mental Illness (www.sina.org): 247.282.7645 or 193-821-4413.   Mental Health Association (www.mentalhealth.org): 928.131.4203 or 120-058-9185.  [place] Crisis Text [place]: Text [place] to 116874  Suicide LifeLine Chat: suicideSI-BONE.org/chat    Administrative Billing:   Time spent with patient was 30 minutes and greater than 50% of time or 20 minutes was spent in counseling and coordination of care regarding above diagnoses and treatment plan.    Patient Status:  Patient will continue to be seen for ongoing consultation and stabilization.    Signed:   ELIANA Jeffrey-BC   Psychiatry

## 2020-08-08 ASSESSMENT — ANXIETY QUESTIONNAIRES: GAD7 TOTAL SCORE: 11

## 2020-08-11 LAB
COLLECT TME SPEC: 2330
CORTIS SAL-MCNC: 0.11 UG/DL

## 2020-08-18 RX ORDER — PROPRANOLOL HYDROCHLORIDE 20 MG/1
20 TABLET ORAL 3 TIMES DAILY
Qty: 90 TABLET | Refills: 0 | Status: SHIPPED | OUTPATIENT
Start: 2020-08-18 | End: 2021-02-25

## 2020-08-18 RX ORDER — BUPROPION HYDROCHLORIDE 300 MG/1
300 TABLET ORAL EVERY MORNING
Qty: 30 TABLET | Refills: 1 | Status: SHIPPED | OUTPATIENT
Start: 2020-08-18 | End: 2020-10-19

## 2020-08-18 RX ORDER — LISDEXAMFETAMINE DIMESYLATE 20 MG/1
20 CAPSULE ORAL EVERY MORNING
Qty: 30 CAPSULE | Refills: 0 | Status: SHIPPED | OUTPATIENT
Start: 2020-08-21 | End: 2020-09-17 | Stop reason: DRUGHIGH

## 2020-08-21 DIAGNOSIS — Z30.41 ENCOUNTER FOR SURVEILLANCE OF CONTRACEPTIVE PILLS: ICD-10-CM

## 2020-08-24 RX ORDER — LEVONORGESTREL AND ETHINYL ESTRADIOL 0.15-0.03
KIT ORAL
Qty: 28 TABLET | Refills: 0 | Status: SHIPPED | OUTPATIENT
Start: 2020-08-24 | End: 2020-11-02

## 2020-08-24 NOTE — TELEPHONE ENCOUNTER
Refilled x1 month  Due for physical and PAP  Please call patient and notify her  Clau Dukes, CNP

## 2020-08-24 NOTE — TELEPHONE ENCOUNTER
"Requested Prescriptions   Pending Prescriptions Disp Refills     SETLAKIN 0.15-0.03 MG tablet [Pharmacy Med Name: SETLAKIN 0.15-0.03MG TABS] 91 tablet 0     Sig: TAKE ONE TABLET BY MOUTH ONCE DAILY       Contraceptives Protocol Failed - 8/21/2020  5:34 PM        Failed - Patient is not a current smoker if age is 35 or older        Passed - Recent (12 mo) or future (30 days) visit within the authorizing provider's specialty     Patient has had an office visit with the authorizing provider or a provider within the authorizing providers department within the previous 12 mos or has a future within next 30 days. See \"Patient Info\" tab in inbasket, or \"Choose Columns\" in Meds & Orders section of the refill encounter.              Passed - Medication is active on med list        Passed - No active pregnancy on record        Passed - No positive pregnancy test in past 12 months             "

## 2020-09-15 DIAGNOSIS — F90.0 ADHD (ATTENTION DEFICIT HYPERACTIVITY DISORDER), INATTENTIVE TYPE: ICD-10-CM

## 2020-09-15 RX ORDER — LISDEXAMFETAMINE DIMESYLATE 20 MG/1
20 CAPSULE ORAL EVERY MORNING
Qty: 30 CAPSULE | Refills: 0 | OUTPATIENT
Start: 2020-09-15

## 2020-09-15 NOTE — TELEPHONE ENCOUNTER
Requested Prescriptions   Pending Prescriptions Disp Refills     lisdexamfetamine (VYVANSE) 20 MG capsule 30 capsule 0     Sig: Take 1 capsule (20 mg) by mouth every morning Fill August 21, 2020       There is no refill protocol information for this order

## 2020-09-16 NOTE — TELEPHONE ENCOUNTER
Alicia Smith NP  P Psych Rn - [facility]    Caller: Unspecified (Yesterday,  8:50 AM)               I thought that she did not care for the Vyvanse - please clarify

## 2020-09-16 NOTE — TELEPHONE ENCOUNTER
Message sent to patient regarding this question.     Cait Hartmann, RN    Nurse Liaison  [facility]

## 2020-09-17 DIAGNOSIS — F90.0 ADHD (ATTENTION DEFICIT HYPERACTIVITY DISORDER), INATTENTIVE TYPE: Primary | ICD-10-CM

## 2020-09-17 RX ORDER — LISDEXAMFETAMINE DIMESYLATE 20 MG/1
20 CAPSULE ORAL EVERY MORNING
Qty: 30 CAPSULE | Refills: 0 | OUTPATIENT
Start: 2020-09-17

## 2020-09-17 RX ORDER — LISDEXAMFETAMINE DIMESYLATE 30 MG/1
30 CAPSULE ORAL EVERY MORNING
Qty: 30 CAPSULE | Refills: 0 | Status: SHIPPED | OUTPATIENT
Start: 2020-09-17 | End: 2020-10-15

## 2020-09-17 NOTE — TELEPHONE ENCOUNTER
"Spoke with patient and clarified that patient does not want to continue to the Vyvanse, and does not want to go back to Adderall as was mentioned in previous encounter. She would like an increase to her dose. She states she is taking the same dose as her son and is \"three times the size\". Routing to provider.     lisdexamfetamine (VYVANSE) 20 MG capsule  30 capsule  0  8/21/2020   --    Sig - Route: Take 1 capsule (20 mg) by mouth every morning Fill August 21, 2020 - Oral    Sent to pharmacy as: Lisdexamfetamine Dimesylate 20 MG Oral Capsule (VYVANSE)    Class: E-Prescribe    Earliest Fill Date: 8/21/2020    Order: [id number]    E-Prescribing Status: Receipt confirmed by pharmacy (8/18/2020 12:48 PM CDT)        :    "

## 2020-09-17 NOTE — TELEPHONE ENCOUNTER
Noted.   No further action needed.    Cait Hartmann, RN    Nurse Liaison  [facility]

## 2020-09-18 NOTE — TELEPHONE ENCOUNTER
Patient called clinic. Was disconnected during transfer.  Writer attempted to contact patient-- no answer, LVM to call clinic back  If patient calls back, please contact writer and transfer if available.    Cait Hartmann RN    Nurse Liaison  [facility]

## 2020-10-13 DIAGNOSIS — F90.0 ADHD (ATTENTION DEFICIT HYPERACTIVITY DISORDER), INATTENTIVE TYPE: ICD-10-CM

## 2020-10-13 NOTE — TELEPHONE ENCOUNTER
Requested Prescriptions   Pending Prescriptions Disp Refills     lisdexamfetamine (VYVANSE) 30 MG capsule 30 capsule 0     Sig: Take 1 capsule (30 mg) by mouth every morning       There is no refill protocol information for this order

## 2020-10-15 RX ORDER — LISDEXAMFETAMINE DIMESYLATE 30 MG/1
30 CAPSULE ORAL EVERY MORNING
Qty: 30 CAPSULE | Refills: 0 | Status: SHIPPED | OUTPATIENT
Start: 2020-10-15 | End: 2020-10-27

## 2020-10-19 ENCOUNTER — TELEPHONE (OUTPATIENT)
Dept: FAMILY MEDICINE | Facility: CLINIC | Age: 40
End: 2020-10-19

## 2020-10-19 DIAGNOSIS — F33.1 MAJOR DEPRESSIVE DISORDER, RECURRENT EPISODE, MODERATE (H): ICD-10-CM

## 2020-10-19 DIAGNOSIS — E11.9 TYPE 2 DIABETES MELLITUS WITHOUT COMPLICATION, WITHOUT LONG-TERM CURRENT USE OF INSULIN (H): Primary | ICD-10-CM

## 2020-10-19 RX ORDER — BUPROPION HYDROCHLORIDE 300 MG/1
300 TABLET ORAL EVERY MORNING
Qty: 30 TABLET | Refills: 1 | Status: SHIPPED | OUTPATIENT
Start: 2020-10-19 | End: 2020-10-27

## 2020-10-19 NOTE — TELEPHONE ENCOUNTER
Patient is due for a diabetes follow up appointment with me.      Fasting labs due:  Lipid panel, A1C and Microalbumin    Orders were placed, please have lab work done before visit.      Please ask patient to bring in their glucometer so we can download the data.    Please call patient to schedule.    Also due for PARK Dukes, CNP

## 2020-10-20 NOTE — TELEPHONE ENCOUNTER
Panel Management Review          Diabetes            Composite cancer screening  Chart review shows that this patient is due/due soon for the following Pap Smear  Summary:    Patient is due/failing the following:   Fasting labs, office visit for diabetes and pappe/pe.      Type of outreach:    Sent "[employer] an [employer]" message. will postpone a few days for patient to view.       Alphonso DAVIDSON CMA

## 2020-10-27 ENCOUNTER — VIRTUAL VISIT (OUTPATIENT)
Dept: PSYCHIATRY | Facility: CLINIC | Age: 40
End: 2020-10-27
Payer: COMMERCIAL

## 2020-10-27 DIAGNOSIS — F41.1 GAD (GENERALIZED ANXIETY DISORDER): ICD-10-CM

## 2020-10-27 DIAGNOSIS — F50.819 BINGE EATING DISORDER: Primary | ICD-10-CM

## 2020-10-27 DIAGNOSIS — F90.0 ADHD (ATTENTION DEFICIT HYPERACTIVITY DISORDER), INATTENTIVE TYPE: ICD-10-CM

## 2020-10-27 DIAGNOSIS — F33.1 MAJOR DEPRESSIVE DISORDER, RECURRENT EPISODE, MODERATE (H): ICD-10-CM

## 2020-10-27 PROCEDURE — 99214 OFFICE O/P EST MOD 30 MIN: CPT | Mod: TEL | Performed by: NURSE PRACTITIONER

## 2020-10-27 RX ORDER — LISDEXAMFETAMINE DIMESYLATE 50 MG/1
50 CAPSULE ORAL EVERY MORNING
Qty: 30 CAPSULE | Refills: 0 | Status: SHIPPED | OUTPATIENT
Start: 2020-10-27 | End: 2020-11-24

## 2020-10-27 RX ORDER — BUPROPION HYDROCHLORIDE 300 MG/1
300 TABLET ORAL EVERY MORNING
Qty: 30 TABLET | Refills: 1 | Status: SHIPPED | OUTPATIENT
Start: 2020-10-27 | End: 2020-12-28

## 2020-10-27 RX ORDER — LISDEXAMFETAMINE DIMESYLATE 40 MG/1
40 CAPSULE ORAL EVERY MORNING
Qty: 14 CAPSULE | Refills: 0 | Status: CANCELLED | OUTPATIENT
Start: 2020-10-27

## 2020-10-27 ASSESSMENT — PATIENT HEALTH QUESTIONNAIRE - PHQ9
SUM OF ALL RESPONSES TO PHQ QUESTIONS 1-9: 13
5. POOR APPETITE OR OVEREATING: NOT AT ALL

## 2020-10-27 ASSESSMENT — ANXIETY QUESTIONNAIRES
IF YOU CHECKED OFF ANY PROBLEMS ON THIS QUESTIONNAIRE, HOW DIFFICULT HAVE THESE PROBLEMS MADE IT FOR YOU TO DO YOUR WORK, TAKE CARE OF THINGS AT HOME, OR GET ALONG WITH OTHER PEOPLE: SOMEWHAT DIFFICULT
6. BECOMING EASILY ANNOYED OR IRRITABLE: SEVERAL DAYS
GAD7 TOTAL SCORE: 7
7. FEELING AFRAID AS IF SOMETHING AWFUL MIGHT HAPPEN: SEVERAL DAYS
3. WORRYING TOO MUCH ABOUT DIFFERENT THINGS: MORE THAN HALF THE DAYS
1. FEELING NERVOUS, ANXIOUS, OR ON EDGE: SEVERAL DAYS
5. BEING SO RESTLESS THAT IT IS HARD TO SIT STILL: SEVERAL DAYS
2. NOT BEING ABLE TO STOP OR CONTROL WORRYING: SEVERAL DAYS

## 2020-10-27 NOTE — PATIENT INSTRUCTIONS
1.  Wellbutrin  mg daily    2.  Increase Vyvanse to 50 mg daily    3.  Attend individual talk therapy when needed    4.  Continue propranolol 20 mg up to 3 times daily as needed for panic and anxiety breakthrough-no refills today        Continue all other medications as reviewed per electronic medical record today.     Safety plan reviewed. To the Emergency Department as needed or call after hours crisis line at [phone] or [phone]. [place] Crisis Text Line. Text [place] to 323384 or Suicide LifeLine Chat: suicideVhoto.org/chat/    To schedule individual or family therapy, call [facility] at [phone].    Schedule an appointment with me in 6 weeks or sooner as needed. Call [facility] at [phone] to schedule.    Follow up with primary care provider as planned or for acute medical concerns.    Call the psychiatric nurse line with medication questions or concerns at [phone].    Corengihart may be used to communicate with your provider, but this is not intended to be used for emergencies.    Crisis Resources:    National Suicide Prevention Lifeline: 104.571.1860 (TTY: 744.727.5167). Call anytime for help.  (www.suicidepreventionlifeline.org)  National [place] on Mental Illness (www.sina.org): 198.450.3203 or 098-959-8147.   Mental Health Association (www.mentalhealth.org): 586.423.4783 or 962-450-2834.  [place] Crisis Text Line: Text [place] to 049256  Suicide LifeLine Chat: suicideVhoto.org/chat

## 2020-10-27 NOTE — PROGRESS NOTES
"Lianna Sorto is a 40 year old female who is being evaluated via a billable telephone visit.      The patient has been notified of following:     \"This telephone visit will be conducted via a call between you and your physician/provider. We have found that certain health care needs can be provided without the need for a physical exam.  This service lets us provide the care you need with a short phone conversation.  If a prescription is necessary we can send it directly to your pharmacy.  If lab work is needed we can place an order for that and you can then stop by our lab to have the test done at a later time.    Telephone visits are billed at different rates depending on your insurance coverage. During this emergency period, for some insurers they may be billed the same as an in-person visit.  Please reach out to your insurance provider with any questions.    If during the course of the call the physician/provider feels a telephone visit is not appropriate, you will not be charged for this service.\"    Patient has given verbal consent for Telephone visit?  Yes    What phone number would you like to be contacted at? [phone]    How would you like to obtain your AVS? MyChart    Phone call duration: 30 minutes    Alicia Smith NP    PHQ 2020 2020 10/27/2020   PHQ-9 Total Score 19 14 13   Q9: Thoughts of better off dead/self-harm past 2 weeks Not at all Not at all Not at all     FAUSTINO-7 SCORE 2020 2020 10/27/2020   Total Score - - -   Total Score 17 11 7             Outpatient Psychiatric Progress Note    Name: Lianna Sorto   : 1980                    Primary Care Provider: DANA Amos CNP   Therapist: Christine Gee    PHQ-9 scores:  PHQ-9 SCORE 2020 2020 10/27/2020   PHQ-9 Total Score - - -   PHQ-9 Total Score 19 14 13       FAUSTINO-7 scores:  FAUSTINO-7 SCORE 2020 2020 10/27/2020   Total Score - - -   Total Score 17 11 7       Patient " "Identification:    Patient is a 40 year old year old, single  White American female  who presents for return visit with me.  Patient is currently employed full time. Patient attended the session alone. Patient prefers to be called: \" Laurie\".    Interim History:    I last saw Lianna Sorto for outpatient psychiatry Return Visit on August 7, 2020.     During that appointment, she reported that she was now back to work and things are going better for her there.  Management has a renewed appreciation for what [employer] does for them and recognize the stress that she has been under.  She is concerned about her inabilities to sustain her focus and attention at work.  She will take her Adderall 10 mg twice daily until gone.  On August 21 she will start Vyvanse 20 mg daily.  She will continue with Christine for talk therapy.  Propranolol is available to her as needed for breakthrough anxiety symptoms..    .     Current medications include:      albuterol (PROAIR HFA/PROVENTIL HFA/VENTOLIN HFA) 108 (90 Base) MCG/ACT inhaler, Inhale 2 puffs into the lungs 4 times daily       buPROPion (WELLBUTRIN XL) 300 MG 24 hr tablet, Take 1 tablet (300 mg) by mouth every morning       cetirizine (ZYRTEC) 10 MG tablet, Take 10 mg by mouth daily       clindamycin (CLINDAMAX) 1 % external lotion, Apply to affected areas twice daily       cyclobenzaprine (FLEXERIL) 10 MG tablet, Take 1 tablet (10 mg) by mouth 3 times daily as needed for muscle spasms       fluticasone (FLONASE) 50 MCG/ACT nasal spray, Spray 2 sprays into both nostrils daily       lisdexamfetamine (VYVANSE) 40 MG capsule, Take 1 capsule (40 mg) by mouth every morning Fill no sooner than October 16       order for DME, Equipment being ordered: Dynaflex insert       propranolol (INDERAL) 20 MG tablet, Take 1 tablet (20 mg) by mouth 3 times daily       SETLAKIN 0.15-0.03 MG tablet, TAKE ONE TABLET BY MOUTH ONCE DAILY       spironolactone (ALDACTONE) 100 MG tablet, Take 1 tablet (100 " mg) by mouth daily       spironolactone (ALDACTONE) 50 MG tablet, Take 1 tablet (50 mg) by mouth daily Please take with 100 mg of spironolactone       triamcinolone (KENALOG) 0.1 % external cream, Apply to affected areas twice daily for 2 weeks, then as needed only    No current facility-administered medications on file prior to visit.        The [place] Prescription Monitoring Program has been reviewed and there are no concerns about diversionary activity for controlled substances at this time.      I was able to review most recent Primary Care Provider, specialty provider, and therapy visit notes that I have access to.     Today, patient reports that now that she is back at work she feels more appreciated.  She is at a 8 hours but has been picking up extra shifts.  Overall she feels that her mood has been improved.  Since the increase of Vyvanse she is able to stop and think before reacting.  She continues to binge eat and has been gaining weight.  She  Is getting labwork done later this week.  Her children increase her stress level as she works with them to get to bed on time and get their homework done.  She has less rage reactions.  She gets more forgetful in the evening.  She has been interacting more and less isolating.  She takes propranolol when in large crowds.  With the wellbutrin she feels like her mood is more positive and less hopeless.       has a past medical history of Abnormal Pap smear of cervix (2011), Acute tonsillitis (2008), Condylomata (4/7/2011), Endometritis (2008), Hidradenitis (7/3/2009), Intussusception (H) (1983), and Unspecified trauma to perineum and vulva, unspecified as to episode of care in pregnancy (1985). She also has no past medical history of Basal cell carcinoma or Squamous cell carcinoma.    Social history updates:    She has been more social with peers.  She attends W[employer] study groups for support.    Substance use updates:    She reports no alcohol  use  Tobacco use: Yes  Cigarettes  Ready to quit?  No  Nicotine Replacement Therapy tried: none  and History she is smoking less    Vital Signs:   There were no vitals taken for this visit.    Labs:    Most recent laboratory results reviewed and no new labs.     Review of Systems:  10 systems (general, cardiovascular, respiratory, eyes, ENT, endocrine, GI, , M/S, neurological) were reviewed. Most pertinent finding(s) is/are: she denies chest pain,  Non shortness of breath, . The remaining systems are all unremarkable.    Mental Status Examination:  Appearance:  awake, alert  Attitude:  cooperative   Eye Contact:  unable to assess  Gait and Station: No dizziness or falls  Psychomotor Behavior:  unable to assess  Oriented to:  time, person, and place  Attention Span and Concentration:  Normal  Speech:   clear, coherent and Speaks: English  Mood:  anxious  Affect:  appropriate and in normal range  Associations:  no loose associations  Thought Process:  logical and goal oriented  Thought Content:  no evidence of suicidal ideation or homicidal ideation, no auditory hallucinations present and no visual hallucinations present  Recent and Remote Memory:  intact Not formally assessed. No amnesia.  Fund of Knowledge: appropriate  Insight:  good  Judgment:  intact  Impulse Control:  intact    Suicide Risk Assessment:  Today Lianna Sorto reports that she is having no thoughts to harm herself orother people. In addition, there are notable risk factors for self-harm, including single status, anxiety and mood change. However, risk is mitigated by commitment to family, sobriety, history of seeking help when needed, future oriented, no access to firearms or weapons, denies suicidal intent or plan and denies homicidal ideation, intent, or plan. Therefore, based on all available evidence including the factors cited above, Lianna Sorto does not appear to be at imminent risk for self-harm, does not meet criteria for a 72-hr hold, and therefore remains  appropriate for ongoing outpatient level of care.  A thorough assessment of risk factors related to suicide and self-harm have been reviewed and are noted above. The patient convincingly denies suicidality on several occasions. Local community safety resources printed and reviewed for patient to use if needed. There was no deceit detected, and the patient presented in a manner that was believable.     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  296.31 (F33.0) Major Depressive Disorder, Recurrent Episode, Mild With mixed features  300.02 (F41.1) Generalized Anxiety Disorder    Medical comorbidities include:   Patient Active Problem List    Diagnosis Date Noted     Diabetes mellitus, type 2 (H) 08/07/2020     Priority: Medium     Moderate episode of recurrent major depressive disorder (H) 09/22/2017     Priority: Medium     Controlled substance agreement signed 09/28/2016     Priority: Medium     Hidradenitis suppurativa 11/02/2015     Priority: Medium     Obesity 10/19/2015     Priority: Medium     Anxiety 06/02/2014     Priority: Medium     Pelvic pain in female 11/13/2013     Priority: Medium     Fibromyalgia 05/28/2013     Priority: Medium     History of major depression 02/22/2013     Priority: Medium     Piriformis syndrome 12/12/2012     Priority: Medium     Scapular dyskinesis 03/13/2012     Priority: Medium     Varicose veins of legs 07/13/2011     Priority: Medium     Sacroiliac pain 07/13/2011     Priority: Medium     Right thigh pain 07/13/2011     Priority: Medium     Back muscle spasm 06/06/2011     Priority: Medium     Tension headache 06/06/2011     Priority: Medium     CARDIOVASCULAR SCREENING; LDL GOAL LESS THAN 130 06/06/2011     Priority: Medium     ASCUS on Pap smear 03/22/2011     Priority: Medium     3/22/11: Pap - ASCUS, + HPV 16. Plan colp.  4/7/11:[place]--benign. Repeat pap 6 months. Reminder placed in epic.    11/22/11:Pap--ASCUS +(low risk) HPV type 54. Rpt pap in 1 yr. In , ep, prob list, hx updated. Reminder sent.  2/20/13:Pap--NIL. Pap 1 year. Reminder placed in EPIC  3/18/14: Pap - NIL, Neg HPV. Repeat pap 3 yrs.   6/7/2017 Pap: ASCUS, neg HPV. Plan to repeat Pap+HPV cotesting in 3 yrs (2020)           DDD (degenerative disc disease), cervical 03/10/2011     Priority: Medium     Acne 03/10/2011     Priority: Medium     Attention deficit disorder of adult 09/22/2010     Priority: Medium     Allergic rhinitis 03/30/2007     Priority: Medium     Problem list name updated by automated process. Provider to review         Assessment:    Lianna Sorto reported today that since starting the Vyvanse she has been more attentive and able to stay focused.  However, when she returns home he finds that she starts to become more disorganized and one of the contributing factors to her is managing her 2 children.  She also has continued with binge eating and has been experiencing weight gain.  She is less depressed and has had less anger outbursts.  She also has been smoking less cigarettes.  She will continue with Wellbutrin  mg daily.  For the binge eating as well as attention issues, I increased her Vyvanse to 50 mg daily.  Finally she will continue with propranolol 20 mg up to 3 times daily as needed for breakthrough panic and anxiety.  She requires no refills today.  As she has not made any appointments with her therapist but attends a Bible study group regularly for support..    Medication side effects and alternatives were reviewed. Health promotion activities recommended and reviewed today. All questions addressed. Education and counseling completed regarding risks and benefits of medications and psychotherapy options.    Treatment Plan:        1.  Wellbutrin  mg daily    2.  Increase Vyvanse to 50 mg daily    3.  Attend individual talk therapy when needed    4.  Continue propranolol 20 mg up to 3 times  daily as needed for panic and anxiety breakthrough-no refills today        Continue all other medications as reviewed per electronic medical record today.     Safety plan reviewed. To the Emergency Department as needed or call after hours crisis line at [phone] or [phone]. [place] Crisis Text Line. Text [place] to 420095 or Suicide LifeLine Chat: [employer]s [employer] Services.org/chat/    To schedule individual or family therapy, call [facility] at [phone].    Schedule an appointment with me in 6 weeks or sooner as needed. Call [facility] at [phone] to schedule.    Follow up with primary care provider as planned or for acute medical concerns.    Call the psychiatric nurse line with medication questions or concerns at [phone].    iFood may be used to communicate with your provider, but this is not intended to be used for emergencies.    Crisis Resources:    National Suicide Prevention Lifeline: 836.861.6179 (TTY: 903.920.6668). Call anytime for help.  (www.suicidepreventionlifeline.org)  National [place] on Mental Illness (www.sina.org): 676-946-4496 or 659-491-8956.   Mental Health Association (www.mentalhealth.org): 162.555.8959 or 894-379-5554.  [place] Crisis Text Line: Text [place] to 224875  Suicide LifeLine Chat: suicide[employer]ax[employer].org/chat    Administrative Billing:   Time spent with patient was 30 minutes and greater than 50% of time or 20 minutes was spent in counseling and coordination of care regarding above diagnoses and treatment plan.    Patient Status:  Patient will continue to be seen for ongoing consultation and stabilization.    Signed:   ELIANA Jeffrey-BC   Psychiatry

## 2020-10-28 ENCOUNTER — HOSPITAL ENCOUNTER (OUTPATIENT)
Dept: ULTRASOUND IMAGING | Facility: CLINIC | Age: 40
Discharge: HOME | End: 2020-10-28
Attending: INTERNAL MEDICINE | Admitting: INTERNAL MEDICINE
Payer: COMMERCIAL

## 2020-10-28 DIAGNOSIS — E04.1 THYROID NODULE: ICD-10-CM

## 2020-10-28 PROCEDURE — 76536 US EXAM OF HEAD AND NECK: CPT

## 2020-10-28 ASSESSMENT — ANXIETY QUESTIONNAIRES: GAD7 TOTAL SCORE: 7

## 2020-10-30 DIAGNOSIS — E11.9 TYPE 2 DIABETES MELLITUS WITHOUT COMPLICATION, WITHOUT LONG-TERM CURRENT USE OF INSULIN (H): ICD-10-CM

## 2020-10-30 LAB
CHOLEST SERPL-MCNC: 213 MG/DL
CREAT UR-MCNC: 291 MG/DL
HBA1C MFR BLD: 6.9 % (ref 0–5.6)
HDLC SERPL-MCNC: 67 MG/DL
LDLC SERPL CALC-MCNC: 124 MG/DL
MICROALBUMIN UR-MCNC: 15 MG/L
MICROALBUMIN/CREAT UR: 5.12 MG/G CR (ref 0–25)
NONHDLC SERPL-MCNC: 146 MG/DL
TRIGL SERPL-MCNC: 112 MG/DL

## 2020-10-30 PROCEDURE — 36415 COLL VENOUS BLD VENIPUNCTURE: CPT | Performed by: NURSE PRACTITIONER

## 2020-10-30 PROCEDURE — 83036 HEMOGLOBIN GLYCOSYLATED A1C: CPT | Performed by: NURSE PRACTITIONER

## 2020-10-30 PROCEDURE — 80061 LIPID PANEL: CPT | Performed by: NURSE PRACTITIONER

## 2020-10-30 PROCEDURE — 82043 UR ALBUMIN QUANTITATIVE: CPT | Performed by: NURSE PRACTITIONER

## 2020-10-30 NOTE — PROGRESS NOTES
SUBJECTIVE:   CC: Lianna Sorto is an 40 year old woman who presents for preventive health visit.       Patient has been advised of split billing requirements and indicates understanding: Yes  Healthy Habits:    Getting at least 3 servings of Calcium per day:  Yes    Bi-annual eye exam:  Yes    Dental care twice a year:  Yes    Sleep apnea or symptoms of sleep apnea:  Excessive snoring    Diet:  Diabetic (cutting carbs)    Frequency of exercise:: walking depending on weather.    Duration of exercise:  15-30 minutes    Taking medications regularly:  Yes    Barriers to taking medications:  None    Medication side effects:  None    PHQ-2 Total Score:    Additional concerns today:  Yes (referral to pain managment clinic- fibromyalgia)      PROBLEMS TO ADD ON...      Refill cyclobenzaprine-    Uses for fibromyalgia / muscle spasms  Helps symptoms  No side effects    Diabetes Follow-up      How often are you checking your blood sugar? Not at all - doesn't have a glucometer    What concerns do you have today about your diabetes? None     Do you have any of these symptoms? (Select all that apply)  Weight gain    Have you had a diabetic eye exam in the last 12 months? No        BP Readings from Last 2 Encounters:   20 118/80   20 122/88     Hemoglobin A1C (%)   Date Value   10/30/2020 6.9 (H)   2020 6.7 (H)     LDL Cholesterol Calculated (mg/dL)   Date Value   10/30/2020 124 (H)   2012 115                     Abuse: Current or Past (Physical, Sexual or Emotional) - No  Do you feel safe in your environment? Yes        Social History     Tobacco Use     Smoking status: Current Every Day Smoker     Packs/day: 0.50     Years: 10.00     Pack years: 5.00     Last attempt to quit: 1/3/2007     Years since quittin.8     Smokeless tobacco: Never Used     Tobacco comment: about a pack a week   Substance Use Topics     Alcohol use: Yes     Comment: Avg. twice per month     If you drink alcohol do you  typically have >3 drinks per day or >7 drinks per week? No    Alcohol Use 6/7/2017   Prescreen: >3 drinks/day or >7 drinks/week? The patient does not drink >3 drinks per day nor >7 drinks per week.       Reviewed orders with patient.  Reviewed health maintenance and updated orders accordingly - Yes        History of abnormal Pap smear: YES - updated in Problem List and Health Maintenance accordingly  PAP / HPV Latest Ref Rng & Units 6/7/2017 3/18/2014 3/18/2014   PAP - ASC-US(A) - NIL   HPV 16 DNA NEG Negative - -   HPV 18 DNA NEG Negative - -   OTHER HR HPV NEG Negative - -   HPVSUR RESULT - - Negative  Reference range: Negative  (Note)  INTERPRETIVE INFORMATION: HPV High Risk, Hybrid Capture,  SurePath    The performance characteristics of HPV testing using the  SurePath sample medium were determined by [facility]  in a validation study. The FDA has not approved the  SurePath sample medium for HPV testing. Specimens collected  in SurePath sample medium may produce false-negative  results under certain conditions, e.g., when specimens  exceed stability requirements. For HPV results using an  FDA-approved test, laboratories should collect and  transport specimens according to the instructions of  FDA-approved kits (e.g., ThinPrep medium or HPV Digene  collection kit).    This test detects the high-risk HPV genotypes 16, 18, 31,  33, 35, 39, 45, 51, 52, 56, 58, 59, and 68 associated with  cervical cancer and its precursor lesions. However,  cross-reactions with other genotypes may occur. Results  should be correlated with cytologic and histologic  findings. Sensitivity may be affected by specimen  cellularity.    This test is intended for medical purposes only and is not  valid for the evaluation of suspected sexual abuse or for  other forensic purposes.    HPV testing should not be used for screening or management  of atypical squamous cells of undetermined significance  (ASCUS) in women under age 21.    Test  "developed and characteristics determined by [facility]. See Compliance Statement B: [facility]/CS  Performed by [facility],  [address] [place] [address] [phone]  [email or web address], Jakob Nguyen MD, Lab. Director Test canceled - Lab  error     Reviewed and updated as needed this visit by clinical staff  Tobacco  Allergies  Meds   Med Hx  Surg Hx  Fam Hx  Soc Hx        Reviewed and updated as needed this visit by Provider                    Review of Systems  CONSTITUTIONAL: NEGATIVE for fever, chills, change in weight  INTEGUMENTARU/SKIN: NEGATIVE for worrisome rashes, moles or lesions  EYES: NEGATIVE for vision changes or irritation  ENT: NEGATIVE for ear, mouth and throat problems  RESP: NEGATIVE for significant cough or SOB  BREAST: NEGATIVE for masses, tenderness or discharge  CV: NEGATIVE for chest pain, palpitations or peripheral edema  GI: NEGATIVE for nausea, abdominal pain, heartburn, or change in bowel habits  : NEGATIVE for unusual urinary or vaginal symptoms. Periods are regular.  MUSCULOSKELETAL: NEGATIVE for significant arthralgias or myalgia  NEURO: NEGATIVE for weakness, dizziness or paresthesias  PSYCHIATRIC: NEGATIVE for changes in mood or affect     OBJECTIVE:   /80 (BP Location: Right arm)   Pulse 91   Temp 98.8  F (37.1  C) (Tympanic)   Ht 1.676 m (5' 6\")   Wt 101.2 kg (223 lb)   SpO2 99%   Breastfeeding No   BMI 35.99 kg/m    Physical Exam  GENERAL: healthy, alert and no distress  EYES: Eyes grossly normal to inspection, PERRL and conjunctivae and sclerae normal  HENT: ear canals and TM's normal, nose and mouth without ulcers or lesions  NECK: no adenopathy, no asymmetry, masses, or scars and thyroid normal to palpation  RESP: lungs clear to auscultation - no rales, rhonchi or wheezes  BREAST: normal without masses, tenderness or nipple discharge and no palpable axillary masses or adenopathy  CV: regular rate and rhythm, normal S1 S2, no " S3 or S4, no murmur, click or rub, no peripheral edema and peripheral pulses strong  ABDOMEN: soft, nontender, no hepatosplenomegaly, no masses and bowel sounds normal   (female): normal female external genitalia, normal urethral meatus, vaginal mucosa pink, moist, well rugated, and normal cervix/adnexa/uterus without masses or discharge  MS: no gross musculoskeletal defects noted, no edema  SKIN: no suspicious lesions or rashes  NEURO: Normal strength and tone, mentation intact and speech normal  PSYCH: mentation appears normal, affect normal/bright  Diabetic foot exam: normal DP and PT pulses, no trophic changes or ulcerative lesions, normal sensory exam and normal monofilament exam    Orders Only on 10/30/2020   Component Date Value Ref Range Status     Creatinine Urine 10/30/2020 291  mg/dL Final     Albumin Urine mg/L 10/30/2020 15  mg/L Final     Albumin Urine mg/g Cr 10/30/2020 5.12  0 - 25 mg/g Cr Final     Hemoglobin A1C 10/30/2020 6.9* 0 - 5.6 % Final    Comment: Normal <5.7% Prediabetes 5.7-6.4%  Diabetes 6.5% or higher - adopted from ADA   consensus guidelines.       Cholesterol 10/30/2020 213* <200 mg/dL Final    Desirable:       <200 mg/dl     Triglycerides 10/30/2020 112  <150 mg/dL Final     HDL Cholesterol 10/30/2020 67  >49 mg/dL Final     LDL Cholesterol Calculated 10/30/2020 124* <100 mg/dL Final    Comment: Above desirable:  100-129 mg/dl  Borderline High:  130-159 mg/dL  High:             160-189 mg/dL  Very high:       >189 mg/dl       Non HDL Cholesterol 10/30/2020 146* <130 mg/dL Final    Comment: Above Desirable:  130-159 mg/dl  Borderline high:  160-189 mg/dl  High:             190-219 mg/dl  Very high:       >219 mg/dl           ASSESSMENT/PLAN:       ICD-10-CM    1. Encounter for gynecological examination without abnormal finding  Z01.419 Pap imaged thin layer screen with HPV - recommended age 30 - 65 years (select HPV order below)     HPV High Risk Types DNA Cervical     2. Type 2  "diabetes mellitus without complication, without long-term current use of insulin (H)  E11.9 Advised patient to start monitoring blood sugars twice daily - blood sugar log given to patient.  Discussed metformin - she will think about it.  Encouraged smoking cessation.  Follow up in 6 months.    blood glucose monitoring (NO BRAND SPECIFIED) meter device kit     blood glucose (NO BRAND SPECIFIED) test strip     thin (NO BRAND SPECIFIED) lancets     OFFICE/OUTPT VISIT,EST,LEVL III     3. Morbid obesity (H)  E66.01 Encouraged weight loss.  OFFICE/OUTPT VISIT,EST,LEVL III     4. Back muscle spasm  M62.830 cyclobenzaprine (FLEXERIL) 10 MG tablet     OFFICE/OUTPT VISIT,EST,LEVL III     5. Fibromyalgia  M79.7 PAIN MANAGEMENT REFERRAL     6. Encounter for surveillance of contraceptive pills  Z30.41 Patient reports that her OCP is working well for her - refilled.  levonorgestrel-ethinyl estradiol (SETLAKIN) 0.15-0.03 MG tablet       Patient has been advised of split billing requirements and indicates understanding: Yes by AFR and CMA    COUNSELING:  Reviewed preventive health counseling, as reflected in patient instructions    Estimated body mass index is 35.99 kg/m  as calculated from the following:    Height as of this encounter: 1.676 m (5' 6\").    Weight as of this encounter: 101.2 kg (223 lb).    Weight management plan: Discussed healthy diet and exercise guidelines    She reports that she has been smoking. She has a 5.00 pack-year smoking history. She has never used smokeless tobacco.  Tobacco Cessation Action Plan:   Information offered: Patient not interested at this time      The risks, benefits and treatment options of prescribed medications or other treatments have been discussed with the patient. The patient verbalized their understanding and should call or follow up if no improvement or if they develop further problems.      DANA Amos [facility]  "

## 2020-11-02 ENCOUNTER — OFFICE VISIT (OUTPATIENT)
Dept: FAMILY MEDICINE | Facility: CLINIC | Age: 40
End: 2020-11-02
Payer: COMMERCIAL

## 2020-11-02 VITALS
SYSTOLIC BLOOD PRESSURE: 118 MMHG | OXYGEN SATURATION: 99 % | BODY MASS INDEX: 35.84 KG/M2 | WEIGHT: 223 LBS | HEART RATE: 91 BPM | TEMPERATURE: 98.8 F | HEIGHT: 66 IN | DIASTOLIC BLOOD PRESSURE: 80 MMHG

## 2020-11-02 DIAGNOSIS — Z01.419 ENCOUNTER FOR GYNECOLOGICAL EXAMINATION WITHOUT ABNORMAL FINDING: Primary | ICD-10-CM

## 2020-11-02 DIAGNOSIS — Z30.41 ENCOUNTER FOR SURVEILLANCE OF CONTRACEPTIVE PILLS: ICD-10-CM

## 2020-11-02 DIAGNOSIS — E11.9 TYPE 2 DIABETES MELLITUS WITHOUT COMPLICATION, WITHOUT LONG-TERM CURRENT USE OF INSULIN (H): ICD-10-CM

## 2020-11-02 DIAGNOSIS — M79.7 FIBROMYALGIA: ICD-10-CM

## 2020-11-02 DIAGNOSIS — M62.830 BACK MUSCLE SPASM: ICD-10-CM

## 2020-11-02 DIAGNOSIS — E66.01 MORBID OBESITY (H): ICD-10-CM

## 2020-11-02 PROCEDURE — 87624 HPV HI-RISK TYP POOLED RSLT: CPT | Performed by: NURSE PRACTITIONER

## 2020-11-02 PROCEDURE — 99395 PREV VISIT EST AGE 18-39: CPT | Performed by: NURSE PRACTITIONER

## 2020-11-02 PROCEDURE — G0145 SCR C/V CYTO,THINLAYER,RESCR: HCPCS | Performed by: NURSE PRACTITIONER

## 2020-11-02 PROCEDURE — 99207 PR FOOT EXAM NO CHARGE: CPT | Mod: 25 | Performed by: NURSE PRACTITIONER

## 2020-11-02 PROCEDURE — 99214 OFFICE O/P EST MOD 30 MIN: CPT | Mod: 25 | Performed by: NURSE PRACTITIONER

## 2020-11-02 RX ORDER — PROPRANOLOL HYDROCHLORIDE 20 MG/1
20 TABLET ORAL 3 TIMES DAILY
Qty: 90 TABLET | Refills: 0 | Status: CANCELLED | OUTPATIENT
Start: 2020-11-02

## 2020-11-02 RX ORDER — LEVONORGESTREL AND ETHINYL ESTRADIOL 0.15-0.03
1 KIT ORAL DAILY
Qty: 84 TABLET | Refills: 3 | Status: SHIPPED | OUTPATIENT
Start: 2020-11-02 | End: 2021-11-23

## 2020-11-02 RX ORDER — CYCLOBENZAPRINE HCL 10 MG
10 TABLET ORAL 3 TIMES DAILY PRN
Qty: 30 TABLET | Refills: 5 | Status: SHIPPED | OUTPATIENT
Start: 2020-11-02 | End: 2021-11-30

## 2020-11-02 RX ORDER — LANCETS
EACH MISCELLANEOUS
Qty: 100 EACH | Refills: 11 | Status: SHIPPED | OUTPATIENT
Start: 2020-11-02 | End: 2024-01-29

## 2020-11-02 ASSESSMENT — MIFFLIN-ST. JEOR: SCORE: 1708.27

## 2020-11-04 LAB
COPATH REPORT: NORMAL
PAP: NORMAL

## 2020-11-06 ENCOUNTER — TELEPHONE (OUTPATIENT)
Dept: PALLIATIVE MEDICINE | Facility: CLINIC | Age: 40
End: 2020-11-06

## 2020-11-06 NOTE — TELEPHONE ENCOUNTER

## 2020-11-09 LAB
FINAL DIAGNOSIS: NORMAL
HPV HR 12 DNA CVX QL NAA+PROBE: NEGATIVE
HPV16 DNA SPEC QL NAA+PROBE: NEGATIVE
HPV18 DNA SPEC QL NAA+PROBE: NEGATIVE
SPECIMEN DESCRIPTION: NORMAL
SPECIMEN SOURCE CVX/VAG CYTO: NORMAL

## 2020-11-24 DIAGNOSIS — F50.819 BINGE EATING DISORDER: ICD-10-CM

## 2020-11-24 DIAGNOSIS — F90.0 ADHD (ATTENTION DEFICIT HYPERACTIVITY DISORDER), INATTENTIVE TYPE: ICD-10-CM

## 2020-11-24 RX ORDER — LISDEXAMFETAMINE DIMESYLATE 50 MG/1
50 CAPSULE ORAL EVERY MORNING
Qty: 30 CAPSULE | Refills: 0 | Status: SHIPPED | OUTPATIENT
Start: 2020-11-24 | End: 2020-12-21

## 2020-11-24 NOTE — TELEPHONE ENCOUNTER
Requested Prescriptions   Pending Prescriptions Disp Refills     lisdexamfetamine (VYVANSE) 50 MG capsule 30 capsule 0     Sig: Take 1 capsule (50 mg) by mouth every morning       There is no refill protocol information for this order

## 2020-12-11 ENCOUNTER — VIRTUAL VISIT (OUTPATIENT)
Dept: PALLIATIVE MEDICINE | Facility: CLINIC | Age: 40
End: 2020-12-11
Payer: COMMERCIAL

## 2020-12-11 DIAGNOSIS — M62.838 MUSCLE SPASM: ICD-10-CM

## 2020-12-11 DIAGNOSIS — M62.830 BACK MUSCLE SPASM: Primary | ICD-10-CM

## 2020-12-11 DIAGNOSIS — M79.7 FIBROMYALGIA: ICD-10-CM

## 2020-12-11 DIAGNOSIS — R25.2 MUSCLE CRAMP, NOCTURNAL: ICD-10-CM

## 2020-12-11 PROCEDURE — 99205 OFFICE O/P NEW HI 60 MIN: CPT | Mod: 95 | Performed by: STUDENT IN AN ORGANIZED HEALTH CARE EDUCATION/TRAINING PROGRAM

## 2020-12-11 ASSESSMENT — PAIN SCALES - GENERAL: PAINLEVEL: SEVERE PAIN (7)

## 2020-12-11 NOTE — PROGRESS NOTES
"Lianna Sorto is a 40 year old female who is being evaluated via a billable telephone visit.      The patient has been notified of following:     \"This telephone visit will be conducted via a call between you and your physician/provider. We have found that certain health care needs can be provided without the need for a physical exam.  This service lets us provide the care you need with a short phone conversation.  If a prescription is necessary we can send it directly to your pharmacy.  If lab work is needed we can place an order for that and you can then stop by our lab to have the test done at a later time.    Telephone visits are billed at different rates depending on your insurance coverage. During this emergency period, for some insurers they may be billed the same as an in-person visit.  Please reach out to your insurance provider with any questions.    If during the course of the call the physician/provider feels a telephone visit is not appropriate, you will not be charged for this service.\"    Patient has given verbal consent for Telephone visit?  Yes    What phone number would you like to be contacted at? [phone]    How would you like to obtain your AVS? [facility]ez [facility]  [facility] Consultation    Date of visit: 12/10/2020    Reason for consultation:    Primary Care Provider is Clau Dukes.  Pain medications are being prescribed by ***.    Please see the [facility] [place] health questionnaire which the patient completed and reviewed with me in detail.    Lianna Sorto is a 40 year old female with a history of tension type headache, pelvic pain, depression, anxiety, obesity, hidradenitis suppurative, DM2 and morbid obesity who I was asked to see in consultation by Clau Dukes  for evaluation of pain management for fibromyalgia     Chief Complaint:    No chief complaint on " file.      HISTORY OF PRESENT ILLNESS:  Lianna Sorto is a 40 year old female with history of {PAIN PROBLEM:426728}     ***    Pain Information:   Onset/Progression:  Pain started ***.  Location: ***  Radiation: ***   Pain quality: {PAIN DESCRIPTION:861663}    Pain timing: {PAIN quality:951428}     Pain rating: intensity ranges from {PAIN SCALE:340091} to {PAIN  SCALE:490448}, and averages {PAIN SCALE:049371} on a 0-10 scale.   Aggravating factors include: ***   Relieving factors include:  ***    Red Flags:  Any bowel or bladder incontinence: ***  Any history of cancer: ***  Any history of unexplained weight loss: ***  Any history of smoking: ***      Current Pain Relevant Medications:    - Flexeril 10mg TID PRN  - Propranolol 20mg TID  -     Other Relevant Medications:    buproprion    Anticoagulants:    ***    Previous Pain Relevant Medications: ([facility]--helped; [facility]--Somewhat helpful; [facility]--No help; [facility]--side effects; ?--Unsure if helpful)   NOTE: This medication information taken from patient's intake form, not medical records.    Opiates: ***   NSAIDS: ***    Muscle Relaxants: ***   Anti-migraine mediations: ***   Anti-depressants: ***   Sleep aids:***   Anxiolytics: ***   Neuropathics: ***    Topicals: ***   Other medications not covered above: ***    Other treatments have included:   Pain Clinic:  ***   PT: ***   Psychologist:    Relaxation techniques/biofeedback: ***   Chiropractor:  ***   Acupuncture: ***   Pharmacotherapy:     Opioids: ***    Non-opioids: ***   TENs Unit: ***   Injections: ***   Self-care: ***   Surgeries related to pain: ***    Past Medical History:  Past Medical History:   Diagnosis Date     Abnormal Pap smear of cervix 2011    [place]-Benign     Acute tonsillitis 2008    treated with antibiotics     Condylomata 4/7/2011     Endometritis 2008    s/p SAB, treated with antibiotics     Hidradenitis 7/3/2009     Intussusception (H) 1983    surgically repaired     Unspecified trauma to perineum and  vulva, unspecified as to episode of care in pregnancy     Vaginal and uterine tear with internal bleeding, scarring.       Past Surgical History:  Past Surgical History:   Procedure Laterality Date     C  DELIVERY ONLY  2007,     arrest of dilation at 6 cm, low transverse uterine incision     SURGICAL HISTORY OF -       oral surgery     VAGINA SURGERY      Fell off deck, internal bleeding.     Medications:  Current Outpatient Medications   Medication Sig Dispense Refill     albuterol (PROAIR HFA/PROVENTIL HFA/VENTOLIN HFA) 108 (90 Base) MCG/ACT inhaler Inhale 2 puffs into the lungs 4 times daily 1 Inhaler 0     blood glucose (NO BRAND SPECIFIED) test strip Use to test blood sugar 2 times daily or as directed. To accompany: Blood Glucose Monitor Brands: per insurance. 100 each 11     blood glucose monitoring (NO BRAND SPECIFIED) meter device kit Use to test blood sugar 2 times daily or as directed. Preferred blood glucose meter OR supplies to accompany: Blood Glucose Monitor Brands: per insurance. 1 kit 0     buPROPion (WELLBUTRIN XL) 300 MG 24 hr tablet Take 1 tablet (300 mg) by mouth every morning 30 tablet 1     cetirizine (ZYRTEC) 10 MG tablet Take 10 mg by mouth daily       clindamycin (CLINDAMAX) 1 % external lotion Apply to affected areas twice daily 60 mL 11     cyclobenzaprine (FLEXERIL) 10 MG tablet Take 1 tablet (10 mg) by mouth 3 times daily as needed for muscle spasms 30 tablet 5     fluticasone (FLONASE) 50 MCG/ACT nasal spray Spray 2 sprays into both nostrils daily 16 g 0     levonorgestrel-ethinyl estradiol (SETLAKIN) 0.15-0.03 MG tablet Take 1 tablet by mouth daily 84 tablet 3     lisdexamfetamine (VYVANSE) 50 MG capsule Take 1 capsule (50 mg) by mouth every morning 30 capsule 0     order for DME Equipment being ordered: Dynaflex insert 2 Units 0     propranolol (INDERAL) 20 MG tablet Take 1 tablet (20 mg) by mouth 3 times daily 90 tablet 0     spironolactone (ALDACTONE) 100 MG  "tablet Take 1 tablet (100 mg) by mouth daily 90 tablet 2     spironolactone (ALDACTONE) 50 MG tablet Take 1 tablet (50 mg) by mouth daily Please take with 100 mg of spironolactone 90 tablet 3     thin (NO BRAND SPECIFIED) lancets Use with lanceting device. To accompany: Blood Glucose Monitor Brands: per insurance. 100 each 11     triamcinolone (KENALOG) 0.1 % external cream Apply to affected areas twice daily for 2 weeks, then as needed only 45 g 4     Allergies:     Allergies   Allergen Reactions     Celexa [Citalopram Hydrobromide] GI Disturbance     Morphine      Polymyxin B Other (See Comments)     Polymixin eye drops  \"severe burning\"     Vicodin [Hydrocodone-Acetaminophen]      Stomach upset, \"hearing things\", irritability        Social History:  Home situation: ***  Occupation/Schooling: ***  Tobacco use: ***  Alcohol use: ***  Drug use: ***  History of chemical dependency treatment: ***    Family history:  Family History   Problem Relation Age of Onset     Heart Disease Mother      Arthritis Mother      Neurologic Disorder Mother      C.A.D. Mother      Gastrointestinal Disease Mother      Musculoskeletal Disorder Mother      Diabetes Mother      Coronary Artery Disease Mother         has had 2 heart attacks before the age of 55     Hypertension Mother      Hyperlipidemia Mother      Depression Mother      Mental Illness Mother      Osteoporosis Mother      Heart Disease Maternal Grandmother      Breast Cancer Maternal Grandmother 42     Heart Disease Maternal Grandfather      C.A.D. Maternal Grandfather      Cerebrovascular Disease Maternal Grandfather      Diabetes Maternal Grandfather      Breast Cancer Other      Cerebrovascular Disease Other      C.A.D. Other      Diabetes Other      Hypertension Other      Musculoskeletal Disorder Other      Unknown/Adopted Father      Unknown/Adopted Paternal Grandmother      Unknown/Adopted Paternal Grandfather      Allergies Son      Coronary Artery Disease Other  "        My uncle just  of a heart attack 1n February     Obesity Other      Asthma No family hx of      Cancer - colorectal No family hx of      Prostate Cancer No family hx of      Family history of rheumatological conditions: ***  Family history chronic pain: Yes [***]  Family history of headaches:  Yes  [***]    Review of Systems:  Skin: negative  Eyes: negative  Ears/Nose/Throat: negative  Respiratory: No shortness of breath, dyspnea on exertion  Cardiovascular: negative  Gastrointestinal: negative  Genitourinary: negative  Musculoskeletal: negative  Neurologic: negative  Psychiatric: negative  Hematologic/Lymphatic/Immunologic: negative  Endocrine: negative    Physical Exam:  There were no vitals filed for this visit.  Exam:  Constitutional: Well developed, well nourished, appears stated age.  HEENT: Head atraumatic, normocephalic. Eyes without conjunctival injection or jaundice.  Respiratory: No respiratory distress on RA   Skin: No rash, lesions, or petechiae of exposed skin.   Extremities: No clubbing, cyanosis, or edema.  Psychiatric/mental status: Alert, without lethargy or stupor. Speech fluent. Appropriate affect. Mood normal. Able to follow commands without difficulty.     Musculoskeletal exam:  Gait/Station/Posture: ***     Lumbar spine:  Range of motion within normal limits ***   Rotation/ext to right: {PAINFUL/PAIN FREE:404237}   Rotation/ext to left: {PAINFUL/PAIN FREE:665317}  Myofascial tenderness:  ***  Focal tenderness: No SI joint, gluteal, piriformis, GT, or IT tenderness  SLR: ***  Andrew's maneuver: ***    Cervical spine:  Range of motion within normal limits ***  Self myofascial tenderness: ***  Spurling's without compression negative bilaterally.     Neurologic exam:  CN:  Cranial nerves 2-12 are grossly intact. Speech is fluent    Fibromyalgia Assessment:    Myofascial tenderness:  ***   Tender point survey:  ***2010 ACR Criteria for fibromyalgia - scoring   Widespread pain  "index of > or equal to 7 - ***   Symptoms present for at least 3 months - ***   No other disorder to explain their pain - ***   Symptoms Severity scale score > or equal to 5   Fatigue (0-3) - ***   Waking unrefreshed (0-3) - ***   Cognitive symptoms (0-3) - ***   Somatic Symptoms - ***  (None - 0, Few - 1, Moderate - 2, Great deal - 3)    Patient *** meet the criteria for a diagnosis of Fibromyalgia.     DIRE Score for ongoing opioid management is calculated as follows:   Diagnosis = {Dire Diagnosis:031308}   Intractability = {DIRE Intractability:916672}   Risk    Psych = {DIRE psych:539438}    Chem Hlth = {DIRE chem hlth:222519}   Reliability = {DIRE reliability:849717}   Social = {DIRE social support:873600}   (Psych + Chem hlth + Reliability + Social) = ***     Efficacy = {DIRE efficacy:528340}         DIRE Score = ***        7-13: likely NOT suitable candidate for long-term opioid analgesia       14-21: may be a suitable candidate for long-term opioid analgesia     Opioid Risk Tool (ORT) score is calculated as follows:   Family History of Substance Abuse:    Alcohol = {ORT FHalcohol:281659}   Illegal Drugs = {ORT FHillicits:730155}   Prescription Drugs = {ORT FHRx:904302}     Personal History of Substance Abuse:   Alcohol = {ORT PHalcohol:266136}   Illegal Drugs = {ORT PHillicits:876617}   Prescription Drugs = {ORT PHRx:532745}   Age: {ORT age:089498}     History of Pre-adolescent Sexual Abuse: {ORT sexual abuse:362717}     Psychological Disease: {ORT psych:779351}         ORT Score = ***        0-3: Low risk for opioid abuse       4-7: Moderate risk for opioid abuse       >/= 8: High risk for opioid abuse    [place] Board of Pharmacy Data Base Reviewed:    {YES/NO:266090};   No concern for abuse or misuse of controlled medications based on this report. ***    Diagnostic tests:  MRI of *** was completed on *** showing:  \"***\"    Other Testing (labs, diagnostics) reviewed:   Labs:Hgb A1c 6.9  EKG: " ***    Outside records reviewed***    ASSESSMENT:   Lianna Sorto is a 40 year old female who presents today with the complaints of:    1. {Diagnosis list:949441}  2. ***  3. Mental Health - the patient's mental health concerns, specifically ***, affect her experience of pain and contribute to her clinically significant distress.    Plan:  The following recommendations were given to the patient. Diagnosis, treatment options, risks, benefits, and alternatives were discussed, and all questions were answered. The patient expressed understanding of the plan for management.     I am recommending a multidisciplinary treatment plan to help this patient better manage her pain.  This includes:     Additional Workup:   1. - Diagnostic Studies: ***  2. - Laboratory studies: ***  3. - Urine toxicology screen today: ***   Therapies:   4. - Physical Therapy: ***  5. - Clinical Health Psychologist to address issues of relaxation, behavioral change, coping style, and other factors important to improvement: not at this time***  Medication Management:   6. - ***  Recommendations for PCP:   7. - ***  Further procedures recommended:   8. - ***    Follow up: ***    Total time spent was *** minutes, and more than 50% of face to face time was spent counseling and/or coordination of care regarding principles of multidisciplinary care and medication management, including ***    Marycruz Mayer MD  [facility]

## 2020-12-11 NOTE — PROGRESS NOTES
"Lianna Sorto is a 40 year old female who is being evaluated via a billable telephone visit.      The patient has been notified of following:     \"This telephone visit will be conducted via a call between you and your physician/provider. We have found that certain health care needs can be provided without the need for a physical exam.  This service lets us provide the care you need with a short phone conversation.  If a prescription is necessary we can send it directly to your pharmacy.  If lab work is needed we can place an order for that and you can then stop by our lab to have the test done at a later time.    Telephone visits are billed at different rates depending on your insurance coverage. During this emergency period, for some insurers they may be billed the same as an in-person visit.  Please reach out to your insurance provider with any questions.    If during the course of the call the physician/provider feels a telephone visit is not appropriate, you will not be charged for this service.\"    Patient has given verbal consent for Telephone visit?  Yes    What phone number would you like to be contacted at? [phone]    How would you like to obtain your AVS? [facility]  [facility] Consultation    Date of visit: 12/10/2020    Reason for consultation:    Primary Care Provider is Clau Dukes.  Pain medications are being prescribed by: IVETTE.    Please see the [facility] health questionnaire which the patient completed and reviewed with me in detail.    Lianna Sorto is a 40 year old female with a history of tension type headache, pelvic pain, depression, anxiety, obesity, hidradenitis suppurative, DM2 and morbid obesity who I was asked to see in consultation by Clau Dukes  for evaluation of pain management for fibromyalgia     Chief Complaint:    Chief Complaint "   Patient presents with     Pain     Telephone viist due to Covid-19       HISTORY OF PRESENT ILLNESS:  Lianna Sorto is a 40 year old left hand dominant female with history of widespread pain     Always had foot pain when she was younger - lots of sprains.   In 2009 shortly after she had her youngest son. In 2010 she went in for an exam and had been having lots of rib cage pain. As she was getting examined her physician could feel muscle spasms.   Typically around a 7-8/10 in severity.     The main locations of her pain is the thoracic paraspinal muscle between shoulder blades on the right. Wraps around the inferior border of scapula and the inferior ribs. Can wrap to axillary line. Rarely wraps to front (chest wall).   Constant.  It is an aching pain. Feels like it is very tight and squeezing.   Resting her right arm on the desk is helpful. The more activity she does the worse the spasms get.   Her feet can spasm if she is on it too long - this progresses to bilateral calf muscle twisting and cramping. Her rib cage can spasms if she is leaning on that side too long. Overhead activities are exacerbating the following day as well as activities holding her arms in front of her.   Flexeril and ibuprofen help  Associated symptoms: can have right arm numbness if the right paraspinal mm are overused. This is posterior arm numbness to elbow. When she is having a lot of pain when walking she can feel as if both legs may give out on her (rubbery feeling).  When she has a FM flare she has a lot of allodynia (kids can't touch her skin) all over. This is rare and seems to be triggered by stress. Took a leave of absence in May.     She thinks this may have been triggered with falls when ice skating as a child.    She has had a RUE EMG (1/18/2011) that was unremarkable. She has seen rheumatology for FM and was offered Lyrica, but she doesn't like taking medications and is concerned about the AE    Salt water helps - soaking in  the ocean was very helpful. This was 2013. She was in [place] for 4 days. She had no pain while in water.   Lost 50 lbs with intermittent fasting, gained it back after she stopped that. Weight loss didn't help the pain    She drinks electrolyte water and vitamin water a few times a week. She drinks lots of water.     Red Flags:  Any bowel or bladder incontinence: no (constipation improved with fiber and senna). Has urinary frequency  Any history of cancer: no  Any history of unexplained weight loss: no  Any history of smoking: current (working on quitting - doesn't need help)      Current Pain Relevant Medications:    - Flexeril 10mg TID PRN - takes 1-2x per week. Takes when calf muscle spasms and   - Propranolol 20mg TID PRN for extreme anxiety  -  ibuprofen, naproxen - most helpul    Other Relevant Medications:    buproprion - for mood and quitting smoking    Anticoagulants:    no    Previous Pain Relevant Medications: (H--helped; [facility]--Somewhat helpful; NH--No help; SE--side effects; ?--Unsure if helpful)   NOTE: This medication information taken from patient's intake form, not medical records.    Opiates: tramadol H,   NSAIDS:  Muscle Relaxants: flexeril very helpful for spasms but has constipation  Anti-migraine mediations: celexa, effexor for ADHD - AE of sedation  Anti-depressants: cymbalta - NH  Sleep aids: no, melatonin wakes her up. EtOH and cannabis - don't help with sleep  Anxiolytics:  Neuropathics: gabapentin - NH and AE of fatigue   Topicals: lidocaine patches for feet and back - rash to adhesive when used on back  Other medications not covered above: APAP - mostly NH    Other treatments have included:  Pain Clinic:  no  PT: a few years ago. Cost prohibitive. Does own stretches at home  Psychologist: seeing a psychiatrist (originally referred for this)  Relaxation techniques/biofeedback: no  Chiropractor:  [facility] but now cost prohibitive  Acupuncture: NH  TENs Unit: has one - sometimes helpful, sometimes makes  worse after she removes it  Injections:   - corticosteroid injections in her feet worsened the pain (had very bad plantar fasciitis)  Self-care: massage is helpful but has achy pain afterwards . If insurance covered this she would go twice per week. Self massager was quite helpful but pain returns immediately afterwards  Surgeries related to pain: no    Past Medical History:  Past Medical History:   Diagnosis Date     Abnormal Pap smear of cervix     [place]-Benign     Acute tonsillitis     treated with antibiotics     Condylomata 2011     Endometritis     s/p SAB, treated with antibiotics     Hidradenitis 7/3/2009     Intussusception (H)     surgically repaired     Unspecified trauma to perineum and vulva, unspecified as to episode of care in pregnancy     Vaginal and uterine tear with internal bleeding, scarring.       Past Surgical History:  Past Surgical History:   Procedure Laterality Date     C  DELIVERY ONLY  2007,     arrest of dilation at 6 cm, low transverse uterine incision     SURGICAL HISTORY OF -       oral surgery     VAGINA SURGERY      Fell off deck, internal bleeding.     Medications:  Current Outpatient Medications   Medication Sig Dispense Refill     albuterol (PROAIR HFA/PROVENTIL HFA/VENTOLIN HFA) 108 (90 Base) MCG/ACT inhaler Inhale 2 puffs into the lungs 4 times daily 1 Inhaler 0     blood glucose (NO BRAND SPECIFIED) test strip Use to test blood sugar 2 times daily or as directed. To accompany: Blood Glucose Monitor Brands: per insurance. 100 each 11     blood glucose monitoring (NO BRAND SPECIFIED) meter device kit Use to test blood sugar 2 times daily or as directed. Preferred blood glucose meter OR supplies to accompany: Blood Glucose Monitor Brands: per insurance. 1 kit 0     buPROPion (WELLBUTRIN XL) 300 MG 24 hr tablet Take 1 tablet (300 mg) by mouth every morning 30 tablet 1     cetirizine (ZYRTEC) 10 MG tablet Take 10 mg by mouth daily        "clindamycin (CLINDAMAX) 1 % external lotion Apply to affected areas twice daily 60 mL 11     cyclobenzaprine (FLEXERIL) 10 MG tablet Take 1 tablet (10 mg) by mouth 3 times daily as needed for muscle spasms 30 tablet 5     fluticasone (FLONASE) 50 MCG/ACT nasal spray Spray 2 sprays into both nostrils daily 16 g 0     levonorgestrel-ethinyl estradiol (SETLAKIN) 0.15-0.03 MG tablet Take 1 tablet by mouth daily 84 tablet 3     lisdexamfetamine (VYVANSE) 50 MG capsule Take 1 capsule (50 mg) by mouth every morning 30 capsule 0     order for DME Equipment being ordered: Dynaflex insert 2 Units 0     propranolol (INDERAL) 20 MG tablet Take 1 tablet (20 mg) by mouth 3 times daily 90 tablet 0     spironolactone (ALDACTONE) 100 MG tablet Take 1 tablet (100 mg) by mouth daily 90 tablet 2     spironolactone (ALDACTONE) 50 MG tablet Take 1 tablet (50 mg) by mouth daily Please take with 100 mg of spironolactone 90 tablet 3     thin (NO BRAND SPECIFIED) lancets Use with lanceting device. To accompany: Blood Glucose Monitor Brands: per insurance. 100 each 11     triamcinolone (KENALOG) 0.1 % external cream Apply to affected areas twice daily for 2 weeks, then as needed only 45 g 4     Allergies:     Allergies   Allergen Reactions     Celexa [Citalopram Hydrobromide] GI Disturbance     Morphine      Polymyxin B Other (See Comments)     Polymixin eye drops  \"severe burning\"     Vicodin [Hydrocodone-Acetaminophen]      Stomach upset, \"hearing things\", irritability        Social History:  Home situation: [place], 2 kids 11 and 13 (boys). 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 Mother      " Gastrointestinal Disease Mother      Musculoskeletal Disorder Mother      Diabetes Mother      Coronary Artery Disease Mother         has had 2 heart attacks before the age of 55     Hypertension Mother      Hyperlipidemia Mother      Depression Mother      Mental Illness Mother      Osteoporosis Mother      Heart Disease Maternal Grandmother      Breast Cancer Maternal Grandmother 42     Heart Disease Maternal Grandfather      C.A.D. Maternal Grandfather      Cerebrovascular Disease Maternal Grandfather      Diabetes Maternal Grandfather      Breast Cancer Other      Cerebrovascular Disease Other      C.A.D. Other      Diabetes Other      Hypertension Other      Musculoskeletal Disorder Other      Unknown/Adopted Father      Unknown/Adopted Paternal Grandmother      Unknown/Adopted Paternal Grandfather      Allergies Son      Coronary Artery Disease Other         My uncle just  of a heart attack 1n February     Obesity Other      Asthma No family hx of      Cancer - colorectal No family hx of      Prostate Cancer No family hx of      Family history of rheumatological conditions: mother with FM and RA, uncle with SLE  Family history chronic pain: Yes [mother]    Review of Systems:  Skin: negative  Eyes: negative  Ears/Nose/Throat: sinus issues  Respiratory: No shortness of breath, dyspnea on exertion  Cardiovascular: negative  Gastrointestinal: negative  Genitourinary: urinary frequency  Musculoskeletal: negative  Neurologic: negative  Psychiatric: anxiety, pain and stress triggers anxiety  Hematologic/Lymphatic/Immunologic: negative  Endocrine: negative    Physical Exam:  There were no vitals filed for this visit.  TELEPHONE VISIT        Fibromyalgia Assessment:      2010 ACR Criteria for fibromyalgia - scoring   Widespread pain index of > or equal to 7 - 11+   Symptoms present for at least 3 months - yes   No other disorder to explain their pain - yes   Symptoms Severity scale score > or equal to 5   Fatigue  (0-3) - 3   Waking unrefreshed (0-3) - 3   Cognitive symptoms (0-3) - 1   Somatic Symptoms - 0  (None - 0, Few - 1, Moderate - 2, Great deal - 3)    Patient does meet the criteria for a diagnosis of Fibromyalgia.     Diagnostic tests:  MR CERVICAL SPINE WITHOUT CONTRAST  3/9/2016 3:25 PM      HISTORY: Radiculopathy, cervical region.     TECHNIQUE: Multiplanar multisequence images were obtained through the  cervical spine without contrast.     COMPARISON: 2/25/2011.     FINDINGS: Sagittal images demonstrate normal posterior alignment.  There is no evidence for craniovertebral or cervicomedullary junction  abnormality. The cervical cord is normal in morphology and signal  characteristics. Disc spaces are relatively preserved in height. Bone  marrow signal intensity is normal.     C2-C3: Normal.     C3-C4: Normal.     C4-C5: Normal.     C5-C6: Minimal disc bulge. No stenosis.     C6-C7: Minimal disc bulge. No stenosis     C7-T1: Normal.     Paraspinal soft tissues: Unremarkable as visualized.        IMPRESSION: Minimal degenerative changes. No evidence for any stenosis  or any focal disc herniations.    MR LUMBAR SPINE WITHOUT CONTRAST   3/9/2016 3:40 PM     HISTORY: Bilateral back pain with weakness and right-sided tingling  for two weeks.     TECHNIQUE: Sagittal T1 and T2 and STIR, axial proton density and T2  images.     COMPARISON: None.     FINDINGS: Five functional lumbar vertebral segments are assumed. The  caudal thecal sac contents appear intrinsically normal. Alignment in  the sagittal plane and vertebral bone marrow signal are unremarkable.     T12-L1: Normal.     L1-L2: Normal.     L2-L3: Normal.     L3-L4: Normal signal intensity and no disc space narrowing. However,  there is very mild left central disc bulging which slightly narrows  the left L4 lateral recess and mildly indents the left anterolateral  thecal sac (images 5 and 6 of series 5). No central or foraminal  stenosis. Posterior facets are  normal.     L4-L5: Normal.     L5-S1: Normal.        IMPRESSION:   1. Early degenerative disc bulging asymmetric to the left at L3-L4.  This slightly narrows the left L4 lateral recess.  2. Otherwise unremarkable MRI lumbar spine.    MR THORACIC SPINE WITHOUT CONTRAST 3/9/2016 3:28 PM      HISTORY: Pain in thoracic spine.     TECHNIQUE: Multiplanar multisequence images were obtained through the  thoracic spine without contrast.     FINDINGS: Sagittal images demonstrate normal posterior alignment. The  thoracic cord is normal in morphology and signal characteristics.  There is a small central disc protrusion at T4-T5 without stenosis.  There is also small central disc protrusion at T5-T6 without stenosis.  The thoracic subarachnoid space is otherwise normal. Paraspinal soft  tissues are unremarkable as visualized.        IMPRESSION: Small central disc protrusions at T4-T5 and T5-T6 without  stenosis. No evidence for fracture or malalignment.    Other Testing (labs, diagnostics) reviewed:   Labs:Hgb A1c 6.9, BMP 7/17/2020 WNL      ASSESSMENT:   Lianna Sorto is a 40 year old female who presents today with the complaints of:       Fibromyalgia  Back muscle spasm  Muscle spasm  Muscle cramp, nocturnal    1. Mental Health - the patient's mental health concerns, specifically stress, affect her experience of pain and contribute to her clinically significant distress.    Patient has pain flares consistent with fibromyalgia, describing episodes where she has allover pain with hyperalgesia throughout.  She also has fatigue, feelings of waking unrefreshed but not much cognitive slowing.  Today however, her primary concerns are diffuse muscle spasms and cramping.  The main areas affected are her right periscapular muscles and bilateral feet and calves.  This is somewhat unusual for fibromyalgia diagnosis.  I am wondering if she has a particular subseptic ability to muscle cramping that is exacerbated by her activities and  biomechanics such as using a mouse at work.  I would therefore like to get her magnesium checked.  I would also like her to get her vitamin D checked, as it is winter in [place] and low vitamin D tends to exacerbate fibromyalgia type pain.  For management I would like her to ensure adequate magnesium and potassium intake as well as fluid intake throughout the day.   In terms of medications: She does not like taking medications and is concerned about side effects, especially weight gain and fatigue.  I would therefore like to avoid most medications at this time that are typically used with fibromyalgia including Lyrica.  She had substantial fatigue with Lyrica without benefit.  In this case therefore I think the best medication to start with is low-dose naltrexone.  We discussed that it has little to no adverse effects and is a very safe medication.  We will start with 1 mg/day and can titrate up as needed up to 4 mg/day.    Plan:  The following recommendations were given to the patient. Diagnosis, treatment options, risks, benefits, and alternatives were discussed, and all questions were answered. The patient expressed understanding of the plan for management.     I am recommending a multidisciplinary treatment plan to help this patient better manage her pain.  This includes:     Additional Workup:   1. - Diagnostic Studies: No  2. - Laboratory studies: Vitamin D and magnesium  3. - Urine toxicology screen today: no   4. -Referrals: Consider possible neurology referral in the future, however given that her muscle cramping seems to be fairly isolated in location and related so much to stress and work/biomechanics, will hold off on this.  Therapies:   5. - Physical Therapy: None at this time, consider in the future, co-pays may be a concern  6. - Clinical Health Psychologist to address issues of relaxation, behavioral change, coping style, and other factors important to improvement: not at this time, strongly  consider in the future, however co-pays may be a concern  Medication Management:   7. -Start low-dose naltrexone 1 mg/day.  8. -Recommended adequate magnesium and potassium intake for muscle cramping.  Magnesium supplement no more than 350 mg/day.  Potassium intake should be at least 2600 mg/day.  Recommended adequate fluid intake  Recommendations for PCP:   9. -As above  Further procedures recommended:   10. -Not at this time, consider potential trigger point injections, however prior injections into her foot/ankle were substantially exacerbating and it like to avoid that.    Follow up: 1 month    Total time spent was 60 minutes, and more than 50% of face to face time was spent counseling and/or coordination of care regarding principles of multidisciplinary care and medication management, including in-depth discussion of the likely etiology of fibromyalgia.  Also discussion of possible causes of muscle cramping    Marycruz Mayer MD  [facility]

## 2020-12-11 NOTE — PATIENT INSTRUCTIONS
1.  Start magnesium supplement, no more than 350 mg a day.     2.  Ensure adequate intake of potassium daily.  This should be at least 2600 mg a day.  Good sources of potassium include dried apricots, dried prunes, squash, raisins, lentils, potatoes, orange juice.    3.  Ensure adequate hydration throughout the day.  Sip frequently throughout the day.  Urine should be a light yellow color.    4.  I have ordered Vitamin D and magnesium labs.  Please make a lab appointment to have your blood drawn to check these levels.    5.  Start low-dose naltrexone 1 mg/day.  I have sent this prescription to the [facility] pharmacy.  Contact information for that pharmacy is below:  [facility]   [address] [facility] [address]  Phone: [phone] Fax: [phone]      Follow up in 1 mo    Marycruz Mayer MD  [facility]    ----------------------------------------------------------------  Clinic Number:  [phone]     Call with any questions about your care and for scheduling assistance.     Calls are returned Monday through Friday between 8 AM and 4:30 PM. We usually get back to you within 2 business days depending on the issue/request.    If we are prescribing your medications:    For opioid medication refills, call the clinic or send a [employer] message 7 days in advance.  Please include:    Name of requested medication    Name of the pharmacy.    For non-opioid medications, call your pharmacy directly to request a refill. Please allow 3-4 days to be processed.     Per [place] State Law:    All controlled substance prescriptions must be filled within 30 days of being written.      For those controlled substances allowing refills, pickup must occur within 30 days of last fill.      We believe regular attendance is key to your success in our program!      Any time you are unable to keep your appointment we ask that you call us at least 24 hours in advance to cancel.This  will allow us to offer the appointment time to another patient.     Multiple missed appointments may lead to dismissal from the clinic.

## 2020-12-21 DIAGNOSIS — F50.819 BINGE EATING DISORDER: ICD-10-CM

## 2020-12-21 DIAGNOSIS — F90.0 ADHD (ATTENTION DEFICIT HYPERACTIVITY DISORDER), INATTENTIVE TYPE: ICD-10-CM

## 2020-12-21 RX ORDER — LISDEXAMFETAMINE DIMESYLATE 50 MG
CAPSULE ORAL
Qty: 30 CAPSULE | Refills: 0 | Status: SHIPPED | OUTPATIENT
Start: 2020-12-23 | End: 2021-02-25 | Stop reason: DRUGHIGH

## 2020-12-21 NOTE — TELEPHONE ENCOUNTER
Requested Prescriptions   Pending Prescriptions Disp Refills     VYVANSE 50 MG capsule [Pharmacy Med Name: VYVANSE 50MG CAPS] 30 capsule 0     Sig: TAKE ONE CAPSULE BY MOUTH EVERY MORNING       There is no refill protocol information for this order

## 2020-12-21 NOTE — TELEPHONE ENCOUNTER
Routing refill request to provider for review/approval because:  Drug not on the [facility] refill protocol     Sue Calvillo RN

## 2020-12-28 ENCOUNTER — VIRTUAL VISIT (OUTPATIENT)
Dept: PSYCHOLOGY | Facility: CLINIC | Age: 40
End: 2020-12-28
Payer: COMMERCIAL

## 2020-12-28 DIAGNOSIS — F41.1 GAD (GENERALIZED ANXIETY DISORDER): ICD-10-CM

## 2020-12-28 DIAGNOSIS — F98.8 ATTENTION DEFICIT DISORDER OF ADULT: Primary | ICD-10-CM

## 2020-12-28 DIAGNOSIS — F39 MOOD DISORDER (H): ICD-10-CM

## 2020-12-28 DIAGNOSIS — F33.0 DEPRESSION, MAJOR, RECURRENT, MILD (H): ICD-10-CM

## 2020-12-28 PROCEDURE — 99214 OFFICE O/P EST MOD 30 MIN: CPT | Mod: 95 | Performed by: NURSE PRACTITIONER

## 2020-12-28 RX ORDER — TOPIRAMATE 25 MG/1
25 TABLET, FILM COATED ORAL AT BEDTIME
Qty: 30 TABLET | Refills: 1 | Status: SHIPPED | OUTPATIENT
Start: 2020-12-28 | End: 2021-02-25

## 2020-12-28 RX ORDER — LISDEXAMFETAMINE DIMESYLATE 60 MG/1
60 CAPSULE ORAL EVERY MORNING
Qty: 30 CAPSULE | Refills: 0 | Status: SHIPPED | OUTPATIENT
Start: 2020-12-28 | End: 2021-02-25

## 2020-12-28 RX ORDER — BUPROPION HYDROCHLORIDE 300 MG/1
300 TABLET ORAL EVERY MORNING
Qty: 30 TABLET | Refills: 1 | Status: SHIPPED | OUTPATIENT
Start: 2020-12-28 | End: 2021-02-25 | Stop reason: DRUGHIGH

## 2020-12-28 ASSESSMENT — PATIENT HEALTH QUESTIONNAIRE - PHQ9
SUM OF ALL RESPONSES TO PHQ QUESTIONS 1-9: 13
5. POOR APPETITE OR OVEREATING: NOT AT ALL

## 2020-12-28 ASSESSMENT — ANXIETY QUESTIONNAIRES
GAD7 TOTAL SCORE: 5
5. BEING SO RESTLESS THAT IT IS HARD TO SIT STILL: NOT AT ALL
2. NOT BEING ABLE TO STOP OR CONTROL WORRYING: MORE THAN HALF THE DAYS
IF YOU CHECKED OFF ANY PROBLEMS ON THIS QUESTIONNAIRE, HOW DIFFICULT HAVE THESE PROBLEMS MADE IT FOR YOU TO DO YOUR WORK, TAKE CARE OF THINGS AT HOME, OR GET ALONG WITH OTHER PEOPLE: NOT DIFFICULT AT ALL
3. WORRYING TOO MUCH ABOUT DIFFERENT THINGS: MORE THAN HALF THE DAYS
7. FEELING AFRAID AS IF SOMETHING AWFUL MIGHT HAPPEN: NOT AT ALL
6. BECOMING EASILY ANNOYED OR IRRITABLE: SEVERAL DAYS
1. FEELING NERVOUS, ANXIOUS, OR ON EDGE: NOT AT ALL

## 2020-12-28 NOTE — PROGRESS NOTES
"Lianna Sorto is a 40 year old female who is being evaluated via a billable telephone visit.      The patient has been notified of following:     \"This telephone visit will be conducted via a call between you and your physician/provider. We have found that certain health care needs can be provided without the need for a physical exam.  This service lets us provide the care you need with a short phone conversation.  If a prescription is necessary we can send it directly to your pharmacy.  If lab work is needed we can place an order for that and you can then stop by our lab to have the test done at a later time.    Telephone visits are billed at different rates depending on your insurance coverage. During this emergency period, for some insurers they may be billed the same as an in-person visit.  Please reach out to your insurance provider with any questions.    If during the course of the call the physician/provider feels a telephone visit is not appropriate, you will not be charged for this service.\"    Patient has given verbal consent for Telephone visit?  Yes    What phone number would you like to be contacted at? [phone]    How would you like to obtain your AVS? [employer]    Phone call duration: 30 minutes    Alicia Smith NP          Outpatient Psychiatric Progress Note    Name: Lianna Sorto   : 1980                    Primary Care Provider: DANA Amos CNP   Therapist: None     PHQ-9 scores:  PHQ-9 SCORE 2020 10/27/2020 2020   PHQ-9 Total Score - - -   PHQ-9 Total Score 14 13 13       FAUSTINO-7 scores:  FAUSTINO-7 SCORE 2020 10/27/2020 2020   Total Score - - -   Total Score 11 7 5       Patient Identification:    Patient is a 40 year old year old, single  White American female  who presents for return visit with me.  Patient is currently employed full time. Patient attended the session alone. Patient prefers to be called: \"Lianna\".    Interim History:    I last saw " Lianna Sorto for outpatient psychiatry Return Visit on October 27, 2020.     During that appointment, she reported that since starting the Vyvanse she had been more attentive and able to stay focused.  However, when she returns home he finds that she starts to become more disorganized and one of the contributing factors to her is managing her 2 children.  She also has continued with binge eating and has been experiencing weight gain.  She is less depressed and has had less anger outbursts.  She also has been smoking less cigarettes.  She will continue with Wellbutrin  mg daily.  For the binge eating as well as attention issues, I increased her Vyvanse to 50 mg daily.  Finally she will continue with propranolol 20 mg up to 3 times daily as needed for breakthrough panic and anxiety.  She requires no refills today.  As she has not made any appointments with her therapist but attends a Bible study group regularly for support..    Current medications include:      albuterol (PROAIR HFA/PROVENTIL HFA/VENTOLIN HFA) 108 (90 Base) MCG/ACT inhaler, Inhale 2 puffs into the lungs 4 times daily       blood glucose (NO BRAND SPECIFIED) test strip, Use to test blood sugar 2 times daily or as directed. To accompany: Blood Glucose Monitor Brands: per insurance.       blood glucose monitoring (NO BRAND SPECIFIED) meter device kit, Use to test blood sugar 2 times daily or as directed. Preferred blood glucose meter OR supplies to accompany: Blood Glucose Monitor Brands: per insurance.       buPROPion (WELLBUTRIN XL) 300 MG 24 hr tablet, Take 1 tablet (300 mg) by mouth every morning       cetirizine (ZYRTEC) 10 MG tablet, Take 10 mg by mouth daily       clindamycin (CLINDAMAX) 1 % external lotion, Apply to affected areas twice daily       COMPOUNDED NON-CONTROLLED SUBSTANCE (CMPD RX) - PHARMACY TO MIX COMPOUNDED MEDICATION, Use Naltrexone powder and place 1 mg in a capsule. Pt is to take Naltrexone 1mg per day by mouth.        cyclobenzaprine (FLEXERIL) 10 MG tablet, Take 1 tablet (10 mg) by mouth 3 times daily as needed for muscle spasms       fluticasone (FLONASE) 50 MCG/ACT nasal spray, Spray 2 sprays into both nostrils daily       levonorgestrel-ethinyl estradiol (SETLAKIN) 0.15-0.03 MG tablet, Take 1 tablet by mouth daily       order for DME, Equipment being ordered: Dynaflex insert       propranolol (INDERAL) 20 MG tablet, Take 1 tablet (20 mg) by mouth 3 times daily       spironolactone (ALDACTONE) 100 MG tablet, Take 1 tablet (100 mg) by mouth daily       thin (NO BRAND SPECIFIED) lancets, Use with lanceting device. To accompany: Blood Glucose Monitor Brands: per insurance.       triamcinolone (KENALOG) 0.1 % external cream, Apply to affected areas twice daily for 2 weeks, then as needed only       VYVANSE 50 MG capsule, TAKE ONE CAPSULE BY MOUTH EVERY MORNING    No current facility-administered medications on file prior to visit.        The [place] Prescription Monitoring Program has been reviewed and there are no concerns about diversionary activity for controlled substances at this time.      I was able to review most recent Primary Care Provider, specialty provider, and therapy visit notes that I have access to.     Today, patient reports that she is feeling more stable.  She continues to gain weight - 7 pounds.  She has been trying to intermittently fast.   Other times she has had overeating episodes in the evening.  She tells me that she always feels hungry and she gets nauseated.  BGM = 117 today. She feels like the Vyvanse is helping her stay on task at work.  She has stopped smoking as of two weeks a go.       has a past medical history of Abnormal Pap smear of cervix (2011), Acute tonsillitis (2008), Condylomata (4/7/2011), Endometritis (2008), Hidradenitis (7/3/2009), Intussusception (H) (1983), and Unspecified trauma to perineum and vulva, unspecified as to episode of care in pregnancy (1985). She also has no past  medical history of Basal cell carcinoma or Squamous cell carcinoma.    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      Substance use updates:    She does not drink alcohol  Tobacco use: History Stopped smoking two weeks ago     Vital Signs:   There were no vitals taken for this visit.    Labs:    Most recent laboratory results reviewed and no new labs.     Review of Systems:  10 systems (general, cardiovascular, respiratory, eyes, ENT, endocrine, GI, , M/S, neurological) were reviewed. Most pertinent finding(s) is/are: She reports no chest pain, no shortness of breath,  No skin rashes . The remaining systems are all unremarkable.    Mental Status Examination:  Appearance:  awake, alert  Attitude:  cooperative   Eye Contact:  unable to assess  Gait and Station: No assistive Devices used and No dizziness or falls  Psychomotor Behavior:  unable to assess  Oriented to:  time, person, and place  Attention Span and Concentration:  Normal  Speech:   clear, coherent and Speaks: English  Mood:  better  Affect:  appropriate and in normal range  Associations:  no loose associations  Thought Process:  logical and goal oriented  Thought Content:  no evidence of suicidal ideation or homicidal ideation, no auditory hallucinations present and no visual hallucinations present  Recent and Remote Memory:  intact Not formally assessed. No amnesia.  Fund of Knowledge: appropriate  Insight:  good  Judgment:  intact  Impulse Control:  intact    Suicide Risk Assessment:  Today Lianna Sorto reports that she has no thoughts to harm herself or other people. In addition, there are notable risk factors for self-harm, including anxiety and mood change. However, risk is mitigated by commitment to family, sobriety, history of seeking help when needed, future oriented, denies suicidal intent or plan and denies homicidal ideation, intent, or plan. Therefore, based on all available evidence including  the factors cited above, Lianna Sorto does not appear to be at imminent risk for self-harm, does not meet criteria for a 72-hr hold, and therefore remains appropriate for ongoing outpatient level of care.  A thorough assessment of risk factors related to suicide and self-harm have been reviewed and are noted above. The patient convincingly denies suicidality on several occasions. Local community safety resources printed and reviewed for patient to use if needed. There was no deceit detected, and the patient presented in a manner that was believable.     DSM5 Diagnosis:  Attention-Deficit/Hyperactivity Disorder  314.01 (F90.2) Combined presentation  296.31 (F33.0) Major Depressive Disorder, Recurrent Episode, Mild With mixed features  300.02 (F41.1) Generalized Anxiety Disorder    Medical comorbidities include:   Patient Active Problem List    Diagnosis Date Noted     Morbid obesity (H) 11/02/2020     Priority: Medium     Diabetes mellitus, type 2 (H) 08/07/2020     Priority: Medium     Moderate episode of recurrent major depressive disorder (H) 09/22/2017     Priority: Medium     Controlled substance agreement signed 09/28/2016     Priority: Medium     Hidradenitis suppurativa 11/02/2015     Priority: Medium     Obesity 10/19/2015     Priority: Medium     Anxiety 06/02/2014     Priority: Medium     Pelvic pain in female 11/13/2013     Priority: Medium     Fibromyalgia 05/28/2013     Priority: Medium     History of major depression 02/22/2013     Priority: Medium     Piriformis syndrome 12/12/2012     Priority: Medium     Scapular dyskinesis 03/13/2012     Priority: Medium     Varicose veins of legs 07/13/2011     Priority: Medium     Sacroiliac pain 07/13/2011     Priority: Medium     Right thigh pain 07/13/2011     Priority: Medium     Back muscle spasm 06/06/2011     Priority: Medium     Tension headache 06/06/2011     Priority: Medium     CARDIOVASCULAR SCREENING; LDL GOAL LESS THAN 130 06/06/2011      Priority: Medium     Atypical squamous cells of undetermined significance (ASCUS) on Papanicolaou smear of cervix 03/22/2011     Priority: Medium     3/22/11: Pap - ASCUS, + HPV 16. Plan colp.  4/7/11:Zavalla--benign. Repeat pap 6 months. Reminder placed in epic.   11/22/11:Pap--ASCUS +(low risk) HPV type 54. Rpt pap in 1 yr. In , ep, prob list, hx updated. Reminder sent.  2/20/13:Pap--NIL. Pap 1 year. Reminder placed in EPIC  3/18/14: Pap - NIL, Neg HPV. Repeat pap 3 yrs.   6/7/2017 Pap: ASCUS, neg HPV. Plan to repeat Pap+HPV cotesting in 3 yrs (2020)  11/2/20 NIL Pap, Neg HPV. Plan cotest in 5 years.          DDD (degenerative disc disease), cervical 03/10/2011     Priority: Medium     Acne 03/10/2011     Priority: Medium     Attention deficit disorder of adult 09/22/2010     Priority: Medium     Allergic rhinitis 03/30/2007     Priority: Medium     Problem list name updated by automated process. Provider to review         Assessment:    Lianna Sorto is reporting that she is able to focus and accomplish her work tasks efficiently since being on the Vyvanse.  She is concerned about continued binge eating.  Once she is finished with the 50 mg dose of Vyvanse, I will increase it to 60 mg daily.  She will continue with the Wellbutrin XL at 300 mg daily.  With her concerns for binge eating in the evening I added Topamax 25 mg to be taken in the evening.    Medication side effects and alternatives were reviewed. Health promotion activities recommended and reviewed today. All questions addressed. Education and counseling completed regarding risks and benefits of medications and psychotherapy options.    Treatment Plan:        1. Continue Wellbutrin  mg daily    2. Add  topiramate 25 mg at bedtime    3. Increase Vyvanse to 60 mg daily after you finish the  50 mg dose    4.  Propranolol 20 mg three times daily as needed for anxiety - no refills ordered      Continue all other medications as reviewed per electronic  medical record today.     Safety plan reviewed. To the Emergency Department as needed or call after hours crisis line at [phone] or [phone]. [place] Crisis Text Line. Text [place] to 681060 or Suicide LifeLine Chat: suicideSentilla.org/chat/    To schedule individual or family therapy, call [facility] at [phone].    Schedule an appointment with me in 3 months or sooner as needed. Call [facility] at [phone] to schedule.    Follow up with primary care provider as planned or for acute medical concerns.    Call the psychiatric nurse line with medication questions or concerns at [phone].    [employer] may be used to communicate with your provider, but this is not intended to be used for emergencies.    Crisis Resources:    National Suicide Prevention Lifeline: 958.514.1262 (TTY: 447.232.2065). Call anytime for help.  (www.suicidepreventionlifeline.org)  National [place] on Mental Illness (www.sina.org): 700.924.7737 or 002-438-1818.   Mental Health Association (www.mentalhealth.org): 747.222.4959 or 085-075-1050.  [place] Crisis Text Line: Text [place] to 169969  Suicide LifeLine Chat: suicideSentilla.org/chat    Administrative Billing:   Time spent with patient was 30 minutes and greater than 50% of time or 20 minutes was spent in counseling and coordination of care regarding above diagnoses and treatment plan.    Patient Status:  Patient will continue to be seen for ongoing consultation and stabilization.    Signed:   ELIANA Jeffrey-BC   Psychiatry

## 2020-12-28 NOTE — PATIENT INSTRUCTIONS
1. Continue Wellbutrin  mg daily    2. Add  topiramate 25 mg at bedtime    3. Increase Vyvanse to 60 mg daily after you finish the  50 mg dose    4.  Propranolol 20 mg three times daily as needed for anxiety - no refills ordered      Continue all other medications as reviewed per electronic medical record today.     Safety plan reviewed. To the Emergency Department as needed or call after hours crisis line at [phone] or [phone]. [place] Crisis Text Line. Text [place] to 874581 or Suicide LifeLine Chat: suicidePublisha.org/chat/    To schedule individual or family therapy, call [facility] at [phone].    Schedule an appointment with me in 3 months or sooner as needed. Call [facility] at [phone] to schedule.    Follow up with primary care provider as planned or for acute medical concerns.    Call the psychiatric nurse line with medication questions or concerns at [phone].    [employer] may be used to communicate with your provider, but this is not intended to be used for emergencies.    Crisis Resources:    National Suicide Prevention Lifeline: 444.938.6241 (TTY: 873.959.9211). Call anytime for help.  (www.suicidepreventionlifeline.org)  National [place] on Mental Illness (www.sina.org): 830.793.8573 or 532-956-3383.   Mental Health Association (www.mentalhealth.org): 400.313.4217 or 755-463-5817.  [place] Crisis Text Line: Text [place] to 737403  Suicide LifeLine Chat: suicidePublisha.org/chat

## 2020-12-29 ASSESSMENT — ANXIETY QUESTIONNAIRES: GAD7 TOTAL SCORE: 5

## 2021-02-07 ENCOUNTER — HEALTH MAINTENANCE LETTER (OUTPATIENT)
Age: 41
End: 2021-02-07

## 2021-02-25 ENCOUNTER — VIRTUAL VISIT (OUTPATIENT)
Dept: PSYCHIATRY | Facility: CLINIC | Age: 41
End: 2021-02-25
Payer: COMMERCIAL

## 2021-02-25 DIAGNOSIS — F98.8 ATTENTION DEFICIT DISORDER OF ADULT: ICD-10-CM

## 2021-02-25 DIAGNOSIS — F41.1 GAD (GENERALIZED ANXIETY DISORDER): Primary | ICD-10-CM

## 2021-02-25 DIAGNOSIS — F33.0 MILD RECURRENT MAJOR DEPRESSION (H): ICD-10-CM

## 2021-02-25 PROCEDURE — 99214 OFFICE O/P EST MOD 30 MIN: CPT | Mod: TEL | Performed by: NURSE PRACTITIONER

## 2021-02-25 RX ORDER — LISDEXAMFETAMINE DIMESYLATE 60 MG/1
60 CAPSULE ORAL EVERY MORNING
Qty: 30 CAPSULE | Refills: 0 | Status: SHIPPED | OUTPATIENT
Start: 2021-02-25 | End: 2021-03-30

## 2021-02-25 RX ORDER — BUPROPION HYDROCHLORIDE 150 MG/1
150 TABLET ORAL EVERY MORNING
Qty: 30 TABLET | Refills: 1 | Status: SHIPPED | OUTPATIENT
Start: 2021-02-25 | End: 2021-04-30

## 2021-02-25 ASSESSMENT — PATIENT HEALTH QUESTIONNAIRE - PHQ9
SUM OF ALL RESPONSES TO PHQ QUESTIONS 1-9: 8
5. POOR APPETITE OR OVEREATING: NOT AT ALL

## 2021-02-25 ASSESSMENT — ANXIETY QUESTIONNAIRES
1. FEELING NERVOUS, ANXIOUS, OR ON EDGE: NOT AT ALL
6. BECOMING EASILY ANNOYED OR IRRITABLE: SEVERAL DAYS
3. WORRYING TOO MUCH ABOUT DIFFERENT THINGS: SEVERAL DAYS
7. FEELING AFRAID AS IF SOMETHING AWFUL MIGHT HAPPEN: NOT AT ALL
IF YOU CHECKED OFF ANY PROBLEMS ON THIS QUESTIONNAIRE, HOW DIFFICULT HAVE THESE PROBLEMS MADE IT FOR YOU TO DO YOUR WORK, TAKE CARE OF THINGS AT HOME, OR GET ALONG WITH OTHER PEOPLE: SOMEWHAT DIFFICULT
GAD7 TOTAL SCORE: 3
2. NOT BEING ABLE TO STOP OR CONTROL WORRYING: SEVERAL DAYS
5. BEING SO RESTLESS THAT IT IS HARD TO SIT STILL: NOT AT ALL

## 2021-02-25 NOTE — PROGRESS NOTES
"Lianna is a 40 year old who is being evaluated via a billable telephone visit.      What phone number would you like to be contacted at? [phone]  How would you like to obtain your AVS? [facility]        Outpatient Psychiatric Progress Note    Name: Lianna Sorto   : 1980                    Primary Care Provider: DANA Amos CNP   Therapist: Family therapy    PHQ-9 scores:  PHQ-9 SCORE 10/27/2020 2020 2021   PHQ-9 Total Score - - -   PHQ-9 Total Score 13 13 8       FAUSTINO-7 scores:  FAUSTINO-7 SCORE 10/27/2020 2020 2021   Total Score - - -   Total Score 7 5 3       Patient Identification:    Patient is a 40 year old year old, single  White American female  who presents for return visit with me.  Patient is currently employed full time. Patient attended the session alone. Patient prefers to be called: \" Lianna\".    Interim History:    I last saw Lianna Sorto for outpatient psychiatry Return Visit on 2020.     During that appointment, she reported that she was able to focus and accomplish her work tasks efficiently since being on the Vyvanse.  She is concerned about continued binge eating.  Once she is finished with the 50 mg dose of Vyvanse, I will increase it to 60 mg daily.  She will continue with the Wellbutrin XL at 300 mg daily.  With her concerns for binge eating in the evening I added Topamax 25 mg to be taken in the evening.    .     Current medications include: buPROPion (WELLBUTRIN XL) 300 MG 24 hr tablet, Take 1 tablet (300 mg) by mouth every morning  cetirizine (ZYRTEC) 10 MG tablet, Take 10 mg by mouth daily  clindamycin (CLINDAMAX) 1 % external lotion, Apply to affected areas twice daily  cyclobenzaprine (FLEXERIL) 10 MG tablet, Take 1 tablet (10 mg) by mouth 3 times daily as needed for muscle spasms  fluticasone (FLONASE) 50 MCG/ACT nasal spray, Spray 2 sprays into both nostrils daily  levonorgestrel-ethinyl estradiol (SETLAKIN) 0.15-0.03 MG tablet, " Take 1 tablet by mouth daily  lisdexamfetamine (VYVANSE) 60 MG capsule, Take 1 capsule (60 mg) by mouth every morning Dose increase - refill 28 days after last fill  spironolactone (ALDACTONE) 100 MG tablet, Take 1 tablet (100 mg) by mouth daily  topiramate (TOPAMAX) 25 MG tablet, Take 1 tablet (25 mg) by mouth At Bedtime  triamcinolone (KENALOG) 0.1 % external cream, Apply to affected areas twice daily for 2 weeks, then as needed only  VYVANSE 50 MG capsule, TAKE ONE CAPSULE BY MOUTH EVERY MORNING  albuterol (PROAIR HFA/PROVENTIL HFA/VENTOLIN HFA) 108 (90 Base) MCG/ACT inhaler, Inhale 2 puffs into the lungs 4 times daily (Patient not taking: Reported on 2/25/2021)  blood glucose (NO BRAND SPECIFIED) test strip, Use to test blood sugar 2 times daily or as directed. To accompany: Blood Glucose Monitor Brands: per insurance.  blood glucose monitoring (NO BRAND SPECIFIED) meter device kit, Use to test blood sugar 2 times daily or as directed. Preferred blood glucose meter OR supplies to accompany: Blood Glucose Monitor Brands: per insurance.  COMPOUNDED NON-CONTROLLED SUBSTANCE (CMPD RX) - PHARMACY TO MIX COMPOUNDED MEDICATION, Use Naltrexone powder and place 1 mg in a capsule. Pt is to take Naltrexone 1mg per day by mouth. (Patient not taking: Reported on 2/25/2021)  order for DME, Equipment being ordered: Dynaflex insert  propranolol (INDERAL) 20 MG tablet, Take 1 tablet (20 mg) by mouth 3 times daily (Patient not taking: Reported on 2/25/2021)  thin (NO BRAND SPECIFIED) lancets, Use with lanceting device. To accompany: Blood Glucose Monitor Brands: per insurance.    No current facility-administered medications on file prior to visit.        The [place] Prescription Monitoring Program has been reviewed and there are no concerns about diversionary activity for controlled substances at this time.      I was able to review most recent Primary Care Provider, specialty provider, and therapy visit notes that I have  access to.     Today, patient reports  that when she took topiramate two days in a row she had itching and facial flushing.  She has been more irritable with 300 mg of wellbutrin.  Vyvanse keeps her focused.  She continues to overeat and notices that this occurs when she is confronted with a stressful situation.  She has not gained much weight.  With new management at the clinic she is feeling more appreciated.  Her children's father does not take the children as often but she and her children are in family counseling.  Sometimes she has a hard time falling asleep but is not tired during the day.  She told me that she had dyslexia and was in special education for most of her school life.       has a past medical history of Abnormal Pap smear of cervix (2011), Acute tonsillitis (2008), Condylomata (4/7/2011), Endometritis (2008), Hidradenitis (7/3/2009), Intussusception (H) (1983), and Unspecified trauma to perineum and vulva, unspecified as to episode of care in pregnancy (1985). She also has no past medical history of Basal cell carcinoma or Squamous cell carcinoma.    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    Substance use updates:    She reports no alcohol use  Tobacco use: Yes Cigarettes  Ready to quit?  No  Nicotine Replacement Therapy tried: none  and History one pack will last her one month    Vital Signs:   There were no vitals taken for this visit.    Labs:    Most recent laboratory results reviewed and no new labs.     Review of Systems:  10 systems (general, cardiovascular, respiratory, eyes, ENT, endocrine, GI, , M/S, neurological) were reviewed. Most pertinent finding(s) is/are: some headaches, no chest pain, no shortness of breath. The remaining systems are all unremarkable.    Mental Status Examination:  Appearance:  awake, alert  Attitude:  cooperative   Eye Contact:  unable to assess  Gait and Station: No assistive Devices used and No  dizziness or falls  Psychomotor Behavior:  unable to assess  Oriented to:  time, person, and place  Attention Span and Concentration:  Normal  Speech:   clear, coherent and Speaks: English  Mood:  anxious and depressed  Affect:  appropriate and in normal range  Associations:  no loose associations  Thought Process:  goal oriented  Thought Content:  no evidence of suicidal ideation or homicidal ideation, no auditory hallucinations present and no visual hallucinations present  Recent and Remote Memory:  intact Not formally assessed. No amnesia.  Fund of Knowledge: appropriate  Insight:  good  Judgment:  intact  Impulse Control:  intact    Suicide Risk Assessment:  Today Lianna Sorto reports that she has no thoughts to harm herself or other people. In addition, there are notable risk factors for self-harm, including single status, anxiety and mood change. However, risk is mitigated by commitment to family, sobriety, history of seeking help when needed, future oriented, denies suicidal intent or plan and denies homicidal ideation, intent, or plan. Therefore, based on all available evidence including the factors cited above, Lianna Sorto does not appear to be at imminent risk for self-harm, does not meet criteria for a 72-hr hold, and therefore remains appropriate for ongoing outpatient level of care.  A thorough assessment of risk factors related to suicide and self-harm have been reviewed and are noted above. The patient convincingly denies suicidality on several occasions. Local community safety resources printed and reviewed for patient to use if needed. There was no deceit detected, and the patient presented in a manner that was believable.     DSM5 Diagnosis:  Attention-Deficit/Hyperactivity Disorder  314.01 (F90.2) Combined presentation  296.31 (F33.0) Major Depressive Disorder, Recurrent Episode, Mild _ and With mixed features  300.02 (F41.1) Generalized Anxiety Disorder    Medical comorbidities include:    Patient Active Problem List    Diagnosis Date Noted     Morbid obesity (H) 11/02/2020     Priority: Medium     Diabetes mellitus, type 2 (H) 08/07/2020     Priority: Medium     Moderate episode of recurrent major depressive disorder (H) 09/22/2017     Priority: Medium     Controlled substance agreement signed 09/28/2016     Priority: Medium     Hidradenitis suppurativa 11/02/2015     Priority: Medium     Obesity 10/19/2015     Priority: Medium     Anxiety 06/02/2014     Priority: Medium     Pelvic pain in female 11/13/2013     Priority: Medium     Fibromyalgia 05/28/2013     Priority: Medium     History of major depression 02/22/2013     Priority: Medium     Piriformis syndrome 12/12/2012     Priority: Medium     Scapular dyskinesis 03/13/2012     Priority: Medium     Varicose veins of legs 07/13/2011     Priority: Medium     Sacroiliac pain 07/13/2011     Priority: Medium     Right thigh pain 07/13/2011     Priority: Medium     Back muscle spasm 06/06/2011     Priority: Medium     Tension headache 06/06/2011     Priority: Medium     CARDIOVASCULAR SCREENING; LDL GOAL LESS THAN 130 06/06/2011     Priority: Medium     Atypical squamous cells of undetermined significance (ASCUS) on Papanicolaou smear of cervix 03/22/2011     Priority: Medium     3/22/11: Pap - ASCUS, + HPV 16. Plan colp.  4/7/11:[facility]--benign. Repeat pap 6 months. Reminder placed in epic.   11/22/11:Pap--ASCUS +(low risk) HPV type 54. Rpt pap in 1 yr. In , ep, prob list, hx updated. Reminder sent.  2/20/13:Pap--NIL. Pap 1 year. Reminder placed in EPIC  3/18/14: Pap - NIL, Neg HPV. Repeat pap 3 yrs.   6/7/2017 Pap: ASCUS, neg HPV. Plan to repeat Pap+HPV cotesting in 3 yrs (2020)  11/2/20 NIL Pap, Neg HPV. Plan cotest in 5 years.          DDD (degenerative disc disease), cervical 03/10/2011     Priority: Medium     Acne 03/10/2011     Priority: Medium     Attention deficit disorder of adult 09/22/2010     Priority: Medium     Allergic rhinitis  03/30/2007     Priority: Medium     Problem list name updated by automated process. Provider to review         Assessment:    Lianna Sorto has not noticed much change in her over eating habits but she also has not noticed any weight increase.  She maintains that the Vyvanse is helpful to her in focusing and concentrating to function at work.  Vyvanse will continue at 60 mg daily and we did talk about possibly increasing it at her next visit once we see how the decrease in the dose of Wellbutrin works for her.  She now will take 150 mg daily of Wellbutrin XL.  When she took the topiramate she noticed a flushing of her face and itching on her neck with some sensation of her throat closing.  This has been marked as an allergy for her and it is discontinued.  She will continue with family therapy for her and her children as she balances work with being a single parent.    Medication side effects and alternatives were reviewed. Health promotion activities recommended and reviewed today. All questions addressed. Education and counseling completed regarding risks and benefits of medications and psychotherapy options.    Treatment Plan:        1.  Topiramate discontinued    2.  Decrease Wellbutrin XL to 150 mg daily    3.  Continue Vyvanse 60 mg daily    4.  Continue family based therapies      Continue all other medications as reviewed per electronic medical record today.     Safety plan reviewed. To the Emergency Department as needed or call after hours crisis line at [phone] or [phone]. [place] Crisis Text [place]. Text [place] to 649208 or Suicide LifeLine Chat: suicidepreventionlifeline.org/chat/    To schedule individual or family therapy, call [facility] at [phone].    Schedule an appointment with me in 2 months or sooner as needed. Call [facility] at [phone] to schedule.    Follow up with primary care provider as planned or for acute medical concerns.    Call the  psychiatric nurse line with medication questions or concerns at [phone].    MyChart may be used to communicate with your provider, but this is not intended to be used for emergencies.    Crisis Resources:    National Suicide Prevention Lifeline: 885.537.7046 (TTY: 726.696.9530). Call anytime for help.  (www.suicidepreventionlifeline.org)  National Alpha on Mental Illness (www.sina.org): 835.556.3470 or 024-412-6514.   Mental Health Association (www.mentalhealth.org): 677.521.6247 or 451-572-1699.  [place] Crisis Text [place]: Text [place] to 920260  Suicide LifeLine Chat: suicideSan [email or web address] Springsline.[email or web address]/chat    Administrative Billing:   Time spent with patient was 30 minutes and greater than 50% of time or 20 minutes was spent in counseling and coordination of care regarding above diagnoses and treatment plan.    Patient Status:  Patient will continue to be seen for ongoing consultation and stabilization.    Signed:   ELIANA Jeffrey-BC   Psychiatry

## 2021-02-25 NOTE — PATIENT INSTRUCTIONS
1.  Topiramate discontinued    2.  Decrease Wellbutrin XL to 150 mg daily    3.  Continue Vyvanse 60 mg daily    4.  Continue family based therapies      Continue all other medications as reviewed per electronic medical record today.     Safety plan reviewed. To the Emergency Department as needed or call after hours crisis line at [phone] or [phone]. [place] Crisis Text [place]. Text [place] to 553913 or Suicide LifeLine Chat: [email or web address].[email or web address]/chat/    To schedule individual or family therapy, call [facility] at [phone].    Schedule an appointment with me in 2 months or sooner as needed. Call [facility] at [phone] to schedule.    Follow up with primary care provider as planned or for acute medical concerns.    Call the psychiatric nurse line with medication questions or concerns at [phone].    ThirstyVIPhart may be used to communicate with your provider, but this is not intended to be used for emergencies.    Crisis Resources:    National Suicide Prevention Lifeline: 374.616.7542 (TTY: 517.354.3111). Call anytime for help.  (www.suicidepreventionlifeline.org)  National [place] on Mental Illness (www.sina.org): 564.615.1747 or 527-178-6555.   Mental Health Association (www.mentalhealth.org): 202.816.3292 or 103-623-7980.  [place] Crisis [place]: Text [place] to 614705  Suicide LifeLine Chat: [facility].org/chat

## 2021-02-26 ASSESSMENT — ANXIETY QUESTIONNAIRES: GAD7 TOTAL SCORE: 3

## 2021-03-26 DIAGNOSIS — L73.2 HIDRADENITIS SUPPURATIVA: ICD-10-CM

## 2021-03-26 NOTE — TELEPHONE ENCOUNTER
"Requested Prescriptions   Pending Prescriptions Disp Refills     spironolactone (ALDACTONE) 100 MG tablet [Pharmacy Med Name: SPIRONOLACTONE 100MG TABS] 90 tablet 2     Sig: TAKE ONE TABLET BY MOUTH ONCE DAILY       Diuretics (Including Combos) Protocol Passed - 3/26/2021 11:01 AM        Passed - Blood pressure under 140/90 in past 12 months     BP Readings from Last 3 Encounters:   11/02/20 118/80   02/26/20 122/88   01/21/20 (!) 122/90                 Passed - Recent (12 mo) or future (30 days) visit within the authorizing provider's specialty     Patient has had an office visit with the authorizing provider or a provider within the authorizing providers department within the previous 12 mos or has a future within next 30 days. See \"Patient Info\" tab in inbasket, or \"Choose Columns\" in Meds & Orders section of the refill encounter.              Passed - Medication is active on med list        Passed - Patient is age 18 or older        Passed - No active pregancy on record        Passed - Normal serum creatinine on file in past 12 months     Recent Labs   Lab Test 07/17/20  0707   CR 0.87              Passed - Normal serum potassium on file in past 12 months     Recent Labs   Lab Test 07/17/20  0707   POTASSIUM 3.8                    Passed - Normal serum sodium on file in past 12 months     Recent Labs   Lab Test 07/17/20  0707                 Passed - No positive pregnancy test in past 12 months             "

## 2021-03-29 ENCOUNTER — TELEPHONE (OUTPATIENT)
Dept: PSYCHIATRY | Facility: CLINIC | Age: 41
End: 2021-03-29

## 2021-03-29 DIAGNOSIS — F98.8 ATTENTION DEFICIT DISORDER OF ADULT: ICD-10-CM

## 2021-03-29 RX ORDER — LISDEXAMFETAMINE DIMESYLATE 60 MG/1
60 CAPSULE ORAL EVERY MORNING
Qty: 30 CAPSULE | Refills: 0 | OUTPATIENT
Start: 2021-03-29

## 2021-03-29 NOTE — TELEPHONE ENCOUNTER
Reason for call:  Refills      Patient called regarding (reason for call): prescription    Additional comments: Pt states the pharmacy has likely already requested this refill but she is calling the nursing team as back-up because she is totally out of her Vyvanse.  Her next scheduled appt with Alicia is at the end of April.    lisdexamfetamine (VYVANSE) 60 MG capsule to be refilled at the [facility]    Phone number to reach patient:  Home number on file [phone] (home)    Best Time:  anytime    Can we leave a detailed message on this number?  YES    Travel screening: Not Applicable

## 2021-03-30 DIAGNOSIS — F98.8 ATTENTION DEFICIT DISORDER OF ADULT: ICD-10-CM

## 2021-03-30 RX ORDER — LISDEXAMFETAMINE DIMESYLATE 60 MG/1
60 CAPSULE ORAL EVERY MORNING
Qty: 30 CAPSULE | Refills: 0 | Status: SHIPPED | OUTPATIENT
Start: 2021-03-30 | End: 2021-04-27

## 2021-03-30 RX ORDER — SPIRONOLACTONE 100 MG/1
TABLET, FILM COATED ORAL
Qty: 90 TABLET | Refills: 1 | Status: SHIPPED | OUTPATIENT
Start: 2021-03-30 | End: 2023-04-17

## 2021-04-20 ENCOUNTER — ALLIED HEALTH/NURSE VISIT (OUTPATIENT)
Dept: FAMILY MEDICINE | Facility: CLINIC | Age: 41
End: 2021-04-20
Payer: COMMERCIAL

## 2021-04-20 ENCOUNTER — VIRTUAL VISIT (OUTPATIENT)
Dept: FAMILY MEDICINE | Facility: CLINIC | Age: 41
End: 2021-04-20
Payer: COMMERCIAL

## 2021-04-20 DIAGNOSIS — Z20.818 EXPOSURE TO STREPTOCOCCAL PHARYNGITIS: ICD-10-CM

## 2021-04-20 DIAGNOSIS — Z20.818 EXPOSURE TO STREPTOCOCCAL PHARYNGITIS: Primary | ICD-10-CM

## 2021-04-20 LAB
DEPRECATED S PYO AG THROAT QL EIA: NEGATIVE
SPECIMEN SOURCE: NORMAL
SPECIMEN SOURCE: NORMAL
STREP GROUP A PCR: NOT DETECTED

## 2021-04-20 PROCEDURE — 99207 PR NO CHARGE NURSE ONLY: CPT

## 2021-04-20 PROCEDURE — 87651 STREP A DNA AMP PROBE: CPT | Performed by: NURSE PRACTITIONER

## 2021-04-20 PROCEDURE — 99213 OFFICE O/P EST LOW 20 MIN: CPT | Mod: TEL | Performed by: NURSE PRACTITIONER

## 2021-04-20 PROCEDURE — 99N1174 PR STATISTIC STREP A RAPID: Performed by: NURSE PRACTITIONER

## 2021-04-20 NOTE — PROGRESS NOTES
Lianna is a 40 year old who is being evaluated via a billable telephone visit.      What phone number would you like to be contacted at? [phone]  How would you like to obtain your AVS? MyChart    Assessment & Plan     Exposure to Streptococcal pharyngitis    - Streptococcus A Rapid Scr w Reflx to PCR; Future             FUTURE LABS:       - strep swab    FUTURE APPOINTMENTS:       - Follow-up visit in case of new or worsening symptoms.     There are no Patient Instructions on file for this visit.    No follow-ups on file.    DANA Almeida CNP  [facility]   Lianna is a 40 year old who presents for the following health issues     HPI     Acute Illness  Acute illness concerns: throat pain, exposure to strep  Onset/Duration: 2 days  Symptoms:  Fever: no  Chills/Sweats: no  Headache (location?): YES  Sinus Pressure: no  Conjunctivitis:  no   Ear Pain: no  Rhinorrhea: YES- clear  Congestion: YES- nasal  Sore Throat: YES- mild discomfort  Cough: no  Wheeze: no  Decreased Appetite: no  Nausea: no  Vomiting: no  Diarrhea: no  Dysuria/Freq.: no  Dysuria or Hematuria: no  Fatigue/Achiness: YES- fatigue  Sick/Strep Exposure: YES- children tested pos for strep yesterday   Therapies tried and outcome: ibuprofen- somewhat effective     ADDITIONAL PROVIDER NOTES:   Patient states these can be typical spring allergy symptoms for her, because the exposure to the kids desires testing.     Review of Systems   Constitutional, HEENT, cardiovascular, pulmonary, gi and gu systems are negative, except as otherwise noted.      Objective           Vitals:  No vitals were obtained today due to virtual visit.    Physical Exam   healthy, alert and no distress  PSYCH: Alert and oriented times 3; coherent speech, normal   rate and volume, able to articulate logical thoughts, able   to abstract reason, no tangential thoughts, no hallucinations   or delusions  Her affect is normal  RESP: No  cough, no audible wheezing, able to talk in full sentences  Remainder of exam unable to be completed due to telephone visits                Phone call duration: 6 minutes

## 2021-04-20 NOTE — PATIENT INSTRUCTIONS
Patient Education     Nasal Allergies: Related Problems  Allergies can cause nasal passages to swell. This narrows the air passages. Allergies also cause increased mucus production in the nose. These changes result in nasal allergy symptoms. Common symptoms include itching, sneezing, stuffy nose, and runny nose. Nasal allergies can also cause problems in other parts of the respiratory system. Some of the more common problems are discussed below. If you think you have any of these problems, talk with your healthcare provider about treatment choices.     Sinus infections  Fluid may be trapped in the sinuses. Bacteria may grow in trapped fluid. This causes sinus infection (sinusitis) and pain.   Conjunctivitis  Allergens irritate your eyes, including the lining of the conjunctiva. This is a common eye infection, especially among children. It causes eyes to become red, itchy, puffy, and watery. Some forms of conjunctivitis are highly contagious and can spread rapidly in schools.   Ear problems  The eustachian tube connects the middle ear to nasal passages. Allergies can block this tube, and make the ears feel plugged. Fluid may also build up, leading to an ear infection (otitis media).   Nasal polyps  Allergies cause nasal passages to swell. Constant swelling can lead to formation of a sac that comes from the sinuses called a polyp. Polyps can grow large enough to block nasal passages. Nasal polyps (unlike other polyps) don't cause cancer. But they can cause mild pain.   Asthma  Asthma is inflammation and swelling of the air passages in the lungs. The symptoms are wheezing, shortness of breath, coughing, and chest tightness. Allergies, including nasal allergies, are common in people with asthma.   [email or web address] last reviewed this educational content on 5/1/2019 2000-2021 [employer]. All rights reserved. This information is not intended as a substitute for professional medical care. Always follow your  healthcare professional's instructions.           Patient Education     Pharyngitis (Sore Throat), Report Pending     Pharyngitis (sore throat) is often due to a virus. It can also be caused by strep (streptococcus) bacteria. This is often called strep throat. Both viral and strep infections can cause throat pain that is worse when swallowing, aching all over, headache, and fever. Both types of infections are contagious. They may be spread by coughing, kissing, or touching others after touching your mouth or nose.   A test has been done to find out if you or your child have strep throat. Often a rapid strep test can be done which gives immediate results and treatment can begin right away. A throat culture may also be done. The culture results take longer. If you have a virus, the culture results will be negative. The facility will call with your culture results. Call this facility or your healthcare provider if you were not given your rapid test results for strep. If a test is positive for strep infection, you will need to take an antibiotic. An antibiotic is a medicine used to treat a bacterial infection. A prescription can be called into your pharmacy or a written copy will be given to you at that time. If both tests are negative, you likely have a virus, usually viral pharyngitis. This does not need to be treated with antibiotics. Until you receive the results of the rapid strep test or the throat culture, you should stay home from work. If your child is being tested, he or she should stay home from school.   Home care    Rest at home. Drink plenty of fluids so you won't get dehydrated.    If the test is positive for strep, you or your child should not go to work or school for the first 24 hours of taking the antibiotics or as directed by the healthcare provider. After this time, you or your child will not be contagious. You or your child can then return to work or school when feeling better or as directed by  this facility or your healthcare provider.     Use the antibiotic medicine (often penicillin or amoxicillin) for the full 10 days or as directed by the healthcare provider. Don't stop the medicine even if you or your child feel better. This is very important to make sure the infection is fully treated. It's also important to prevent medicine-resistant germs from growing. Treatment for 10 days is also the best way to prevent rheumatic fever which affects the heart and other parts of the body. If you or your child were given an antibiotic shot, the healthcare provider will tell you if additional antibiotics are needed.    Use throat lozenges or numbing throat sprays to help reduce pain. Gargling with warm salt water will also help reduce throat pain. Dissolve 1/2 teaspoon of salt in 1 glass of warm water. Discuss this treatment with your healthcare provider.    Children can sip on juice or ice pops. Children 5 years and older can also suck on a lollipop or hard candy.    Don't eat salty or spicy foods or give them to your child. These can irritate the throat.  Other medicine for a child:  You can give your child acetaminophen for fever, fussiness, or discomfort. In babies over 6 months of age, you may use ibuprofen instead of acetaminophen. If your child has chronic liver or kidney disease or ever had a stomach ulcer or gastrointestinal bleeding, talk with your child s healthcare provider before giving these medicines. Aspirin should never be used by any child under 18 years of age who has a fever. It may cause severe liver damage. Always contact your child's healthcare provider before giving him or her over-the-counter medicines for the first time.   Other medicine for an adult: You may use acetaminophen or ibuprofen to control pain or fever, unless another medicine was prescribed for this. If you have chronic liver or kidney disease or ever had a stomach ulcer or gastrointestinal bleeding, talk with your healthcare  "provider before using these medicines.   Follow-up care  Follow up with your healthcare provider or our staff if you or your child don't feel or get better within 72 hours or as directed.   When to get medical advice  Call your healthcare provider right away if any of these occur:     Your child has a fever (see \"Fever and children,\" below)    You have a fever of 100.4 F (38 C) or higher, or as directed    New or worsening ear pain, sinus pain, or headache    Painful lumps in the back of neck    Stiff or swollen neck    Lymph nodes are getting larger or swelling of the neck    Can t open mouth wide due to throat pain    Signs of dehydration, such as very dark urine or no urine or sunken eyes    Noisy breathing    Muffled voice    New rash    Symptoms are worse    New symptoms develop  Call 911  Call 911 if you have any of the following symptoms     Can't swallow, especially saliva, with a lot of drooling    Trouble or difficulty breathing or wheezing    Feeling faint or dizzy    Can't talk    Feeling of doom  Prevention  Here are steps you can take to help prevent an infection:     Keep good hand washing habits.    Don t have close contact with people who have sore throats, colds, or other upper respiratory infections.    Don t smoke, and stay away from secondhand smoke.    Stay up to date with of your vaccines.  Fever and children  Use a digital thermometer to check your child s temperature. Don t use a mercury thermometer. There are different kinds and uses of digital thermometers. They include:     Rectal. For children younger than 3 years, a rectal temperature is the most accurate.    Forehead (temporal). This works for children age 3 months and older. If a child under 3 months old has signs of illness, this can be used for a first pass. The provider may want to confirm with a rectal temperature.    Ear (tympanic). Ear temperatures are accurate after 6 months of age, but not before.    Armpit (axillary). This is " the least reliable but may be used for a first pass to check a child of any age with signs of illness. The provider may want to confirm with a rectal temperature.    Mouth (oral). Don t use a thermometer in your child s mouth until he or she is at least 4 years old.  Use the rectal thermometer with care. Follow the product maker s directions for correct use. Insert it gently. Label it and make sure it s not used in the mouth. It may pass on germs from the stool. If you don t feel OK using a rectal thermometer, ask the healthcare provider what type to use instead. When you talk with any healthcare provider about your child s fever, tell him or her which type you used.   Below are guidelines to know if your young child has a fever. Your child s healthcare provider may give you different numbers for your child. Follow your provider s specific instructions.   Fever readings for a baby under 3 months old:     First, ask your child s healthcare provider how you should take the temperature.    Rectal or forehead: 100.4 F (38 C) or higher    Armpit: 99 F (37.2 C) or higher  Fever readings for a child age 3 months to 36 months (3 years):     Rectal, forehead, or ear: 102 F (38.9 C) or higher    Armpit: 101 F (38.3 C) or higher  Call the healthcare provider in these cases:    Repeated temperature of 104 F (40 C) or higher in a child of any age    Fever of 100.4  (38 C) or higher in a baby younger than 3 months    Fever that lasts more than 24 hours in a child under age 2    Fever that lasts for 3 days in a child age 2 or older    [email or web address] last reviewed this educational content on 2/1/2020 2000-2021 [employer]. All rights reserved. This information is not intended as a substitute for professional medical care. Always follow your healthcare professional's instructions.           Patient Education     Self-Care for Sore Throats     Sore throats happen for many reasons, such as colds, allergies, cigarette smoke,  air pollution, and infections caused by viruses or bacteria. In any case, your throat becomes red and sore. Your goal for self-care is to reduce your discomfort while giving your throat a chance to heal.  Moisten and soothe your throat  Tips include the following:    Try a sip of water first thing after waking up.    Keep your throat moist by drinking 6 or more glasses of clear liquids every day.    Run a cool-air humidifier in your room overnight.    Stay away from cigarette smoke.     Check the air quality index,if air pollution gives you a sore throat. On high pollution days, try to limit outdoor time.    Suck on throat lozenges, cough drops, hard candy, ice chips, or frozen fruit-juice bars. Use the sugar-free versions if your diet or medical condition requires them.  Gargle to ease irritation  Gargling every hour or 2 can ease irritation. Try gargling with 1 of these solutions:    1/4 teaspoon of salt in 1/2 cup of warm water    An over-the-counter anesthetic gargle  Use medicine for more relief  Over-the-counter medicine can reduce sore throat symptoms. Ask your pharmacist if you have questions about which medicine to use. To prevent possible medicine interactions, let the pharmacist know what medicines you take. To decrease symptoms:    Ease pain with anesthetic sprays. Aspirin or an aspirin substitute also helps. Remember, never give aspirin to anyone 18 or younger. Don't take aspirin if you are already taking blood thinners.     For sore throats caused by allergies, try antihistamines to block the allergic reaction.  Unless a sore throat is caused by a bacterial infection, antibiotics won t help you.  Prevent future sore throats  Prevention tips include:    Stop smoking or reduce contact with secondhand smoke. Smoke irritates the tender throat lining.    Limit contact with pets and with allergy-causing substances, such as pollen and mold.    Wash your hands often when you re around someone with a sore  throat or cold. This will keep viruses or bacteria from spreading.    Limit outdoor time when air pollution is bad.    Don t strain your vocal cords.  When to call your healthcare provider  Contact your healthcare provider if you have:    Fever of 100.4 F (38.0 C) or higher, or as directed by your healthcare provider    White spots on the throat    Great Trouble swallowing    A skin rash    Recent exposure to someone else with strep bacteria    Severe hoarseness and swollen glands in the neck or jaw  Call 911  Call 911 if any of the following occur:    Trouble breathing or catching your breath    Drooling and problems swallowing    Wheezing    Unable to talk    Feeling dizzy or faint    Feeling of doom  [employer] last reviewed this educational content on 9/1/2019 2000-2021 [employer]. All rights reserved. This information is not intended as a substitute for professional medical care. Always follow your healthcare professional's instructions.

## 2021-04-20 NOTE — RESULT ENCOUNTER NOTE
Jackelyn Dsouza    Your strep is negative. Culture should be done early tomorrow. Would recommend to start an allergy pill in case this is allergies, may help to relieve the congestion. Please let us know if you have any questions.     Take care,    DANA Khan CNP

## 2021-04-21 NOTE — RESULT ENCOUNTER NOTE
Jackelyn Dsouza    Your throat culture results came back negative. Please let us know if you have any questions.     Take care,    DANA Khan CNP

## 2021-04-27 DIAGNOSIS — F98.8 ATTENTION DEFICIT DISORDER OF ADULT: ICD-10-CM

## 2021-04-27 RX ORDER — LISDEXAMFETAMINE DIMESYLATE 60 MG/1
60 CAPSULE ORAL EVERY MORNING
Qty: 30 CAPSULE | Refills: 0 | Status: SHIPPED | OUTPATIENT
Start: 2021-04-27 | End: 2021-04-30

## 2021-04-27 NOTE — TELEPHONE ENCOUNTER
Date of Last Office Visit: 2/25/2021  Date of Next Office Visit: 4/30/2021  No shows since last visit: none  Cancellations since last visit: none    Medication requested: Vyvanse 60 mg capsule Date last ordered: 3/30/2021 Qty: 30 Refills: 0     Review of [place] ?: yes  Medication last filled date: 3/30/30 Qty filled: 0  Other controlled substance on [place] ?: none  If yes, is this a new medication?: none  If yes, name of medication: none and date filled: none    Lapse in medication adherence greater than 5 days?: no  If yes, call patient and gather details: no  Medication refill request verified as identical to current order?: yes  Result of Last DAM, VPA, Li+ Level, CBC, or Carbamazepine Level (at or since last visit): n/a    []Medication refilled per  Medication Refill in Ambulatory Care  policy.  [x]Medication unable to be refilled by RN due to criteria not met as indicated below:    []Eligibility - not seen in the last year   []Supervision - no future appointment   []Compliance - no shows, cancellations or lapse in therapy   []Verification - order discrepancy   [x]Controlled medication   [x]Medication not included in policy   []90-day supply request   []Other

## 2021-04-30 ENCOUNTER — VIRTUAL VISIT (OUTPATIENT)
Dept: PSYCHIATRY | Facility: CLINIC | Age: 41
End: 2021-04-30
Payer: COMMERCIAL

## 2021-04-30 DIAGNOSIS — F33.0 MILD RECURRENT MAJOR DEPRESSION (H): ICD-10-CM

## 2021-04-30 DIAGNOSIS — F98.8 ATTENTION DEFICIT DISORDER OF ADULT: ICD-10-CM

## 2021-04-30 PROCEDURE — 99214 OFFICE O/P EST MOD 30 MIN: CPT | Mod: TEL | Performed by: NURSE PRACTITIONER

## 2021-04-30 RX ORDER — LISDEXAMFETAMINE DIMESYLATE 60 MG/1
60 CAPSULE ORAL EVERY MORNING
Qty: 30 CAPSULE | Refills: 0 | Status: SHIPPED | OUTPATIENT
Start: 2021-04-30 | End: 2021-06-24

## 2021-04-30 RX ORDER — BUPROPION HYDROCHLORIDE 150 MG/1
150 TABLET ORAL EVERY MORNING
Qty: 30 TABLET | Refills: 3 | Status: SHIPPED | OUTPATIENT
Start: 2021-04-30 | End: 2021-07-23

## 2021-04-30 ASSESSMENT — ANXIETY QUESTIONNAIRES
GAD7 TOTAL SCORE: 0
7. FEELING AFRAID AS IF SOMETHING AWFUL MIGHT HAPPEN: NOT AT ALL
1. FEELING NERVOUS, ANXIOUS, OR ON EDGE: NOT AT ALL
3. WORRYING TOO MUCH ABOUT DIFFERENT THINGS: NOT AT ALL
5. BEING SO RESTLESS THAT IT IS HARD TO SIT STILL: NOT AT ALL
2. NOT BEING ABLE TO STOP OR CONTROL WORRYING: NOT AT ALL
6. BECOMING EASILY ANNOYED OR IRRITABLE: NOT AT ALL

## 2021-04-30 ASSESSMENT — PATIENT HEALTH QUESTIONNAIRE - PHQ9
SUM OF ALL RESPONSES TO PHQ QUESTIONS 1-9: 7
5. POOR APPETITE OR OVEREATING: NOT AT ALL

## 2021-04-30 NOTE — PATIENT INSTRUCTIONS
1.  Continue Vyvanse 60 mg daily    2.  Continue Wellbutrin  mg daily      Continue all other medications as reviewed per electronic medical record today.     Safety plan reviewed. To the Emergency Department as needed or call after hours crisis line at [phone] or [phone]. [place] Crisis Text Line. Text [place] to 117237 or Suicide LifeLine Chat: suicideBoomBoom Prints.org/chat/    To schedule individual or family therapy, call [facility] at [phone].    Schedule an appointment with me in 3 months or sooner as needed. Call [facility] at [phone] to schedule.    Follow up with primary care provider as planned or for acute medical concerns.    Call the psychiatric nurse line with medication questions or concerns at [phone].    [employer] may be used to communicate with your provider, but this is not intended to be used for emergencies.    Crisis Resources:    National Suicide Prevention Lifeline: 851.893.2713 (TTY: 990.135.7367). Call anytime for help.  (www.suicidepreventionlifeline.org)  National [place] [employer] Mental Illness (www.[place]ina.org): 993.655.6198 or 232-620-7727.   Mental Health Association (www.mentalhealth.org): 882.763.4827 or 332-834-1509.  [place] Crisis Text Line: Text [place] to 653657  Suicide LifeLine Chat: [employer]oom[employer]oom [employer]s.org/chat

## 2021-04-30 NOTE — PROGRESS NOTES
"Lianna is a 40 year old who is being evaluated via a billable telephone visit.      What phone number would you like to be contacted at? [phone]  How would you like to obtain your AVS? Beto    Location of patient:  Home   Any new OTC medications: No  Recent height or weight: No  Recent BP/HR: No  Alcohol use:  No If yes, how many drinks per week: zero   Current other substance use (including Vaping):  Denies  Any updates with mental health (hospital stay, ER, etc) that Alicia should know about: No  Taking medications every day: Yes  If female: Birth control: Yes  What is the most important thing you would like addressed today: Med refills.             Outpatient Psychiatric Progress Note    Name: Lianna Sorto   : 1980                    Primary Care Provider: DANA Amos CNP   Therapist: Family therapy    PHQ-9 scores:  PHQ-9 SCORE 2020   PHQ-9 Total Score - - -   PHQ-9 Total Score 13 8 7       FAUSTINO-7 scores:  FAUSTINO-7 SCORE 2020   Total Score - - -   Total Score 5 3 0       Patient Identification:    Patient is a 40 year old year old, single  White American female  who presents for return visit with me.  Patient is currently employed full time. Patient attended the session alone. Patient prefers to be called: \" Lianna\".    Interim History:    I last saw Lianna Sorto for outpatient psychiatry Return Visit on 2021.     During that appointment, she had not noticed much change in her over eating habits but she also has not noticed any weight increase.  She maintains that the Vyvanse is helpful to her in focusing and concentrating to function at work.  Vyvanse will continue at 60 mg daily and we did talk about possibly increasing it at her next visit once we see how the decrease in the dose of Wellbutrin works for her.  She now will take 150 mg daily of Wellbutrin XL.  When she took the topiramate she noticed a flushing of her " face and itching on her neck with some sensation of her throat closing.  This has been marked as an allergy for her and it is discontinued.  She will continue with family therapy for her and her children as she balances work with being a single parent. .     Current medications include: blood glucose (NO BRAND SPECIFIED) test strip, Use to test blood sugar 2 times daily or as directed. To accompany: Blood Glucose Monitor Brands: per insurance.  blood glucose monitoring (NO BRAND SPECIFIED) meter device kit, Use to test blood sugar 2 times daily or as directed. Preferred blood glucose meter OR supplies to accompany: Blood Glucose Monitor Brands: per insurance.  buPROPion (WELLBUTRIN XL) 150 MG 24 hr tablet, Take 1 tablet (150 mg) by mouth every morning  cetirizine (ZYRTEC) 10 MG tablet, Take 10 mg by mouth daily  clindamycin (CLINDAMAX) 1 % external lotion, Apply to affected areas twice daily  cyclobenzaprine (FLEXERIL) 10 MG tablet, Take 1 tablet (10 mg) by mouth 3 times daily as needed for muscle spasms  fluticasone (FLONASE) 50 MCG/ACT nasal spray, Spray 2 sprays into both nostrils daily  levonorgestrel-ethinyl estradiol (SETLAKIN) 0.15-0.03 MG tablet, Take 1 tablet by mouth daily  lisdexamfetamine (VYVANSE) 60 MG capsule, Take 1 capsule (60 mg) by mouth every morning Dose increase - refill 28 days after last fill  order for DME, Equipment being ordered: Dynaflex insert  spironolactone (ALDACTONE) 100 MG tablet, TAKE ONE TABLET BY MOUTH ONCE DAILY  thin (NO BRAND SPECIFIED) lancets, Use with lanceting device. To accompany: Blood Glucose Monitor Brands: per insurance.  triamcinolone (KENALOG) 0.1 % external cream, Apply to affected areas twice daily for 2 weeks, then as needed only  albuterol (PROAIR HFA/PROVENTIL HFA/VENTOLIN HFA) 108 (90 Base) MCG/ACT inhaler, Inhale 2 puffs into the lungs 4 times daily (Patient not taking: Reported on 4/20/2021)  COMPOUNDED NON-CONTROLLED SUBSTANCE (CMPD RX) - PHARMACY TO MIX  COMPOUNDED MEDICATION, Use Naltrexone powder and place 1 mg in a capsule. Pt is to take Naltrexone 1mg per day by mouth. (Patient not taking: Reported on 2/25/2021)    No current facility-administered medications on file prior to visit.        The [place] Prescription Monitoring Program has been reviewed and there are no concerns about diversionary activity for controlled substances at this time.      I was able to review most recent Primary Care Provider, specialty provider, and therapy visit notes that I have access to.     Today, patient reports that she has the day off today.  She likes her job now that she has a new boss.  She is sleeping fine.  It can be hard to make herself go to bed.  She has rare incidents of anxiety.  When she ran out of medication for two days she got easily overwhelmed.  She has not been overeating as much.  She denies depression episodes.  She is doing family counseling.       has a past medical history of Abnormal Pap smear of cervix (2011), Acute tonsillitis (2008), Condylomata (4/7/2011), Endometritis (2008), Hidradenitis (7/3/2009), Intussusception (H) (1983), and Unspecified trauma to perineum and vulva, unspecified as to episode of care in pregnancy (1985). She also has no past medical history of Basal cell carcinoma or Squamous cell carcinoma.    Social history updates:    Lianna finds her family supportive    Substance use updates:    She does not drink alcohol  Tobacco use: Yes Cigarettes  Ready to quit?  No  Nicotine Replacement Therapy tried: None    Vital Signs:   There were no vitals taken for this visit.    Labs:    Most recent laboratory results reviewed and no new labs.     Review of Systems:  10 systems (general, cardiovascular, respiratory, eyes, ENT, endocrine, GI, , M/S, neurological) were reviewed. Most pertinent finding(s) is/are: She reports no chest pain, no shortness of breath, no headache pain. The remaining systems are all unremarkable.    Mental Status  Examination:  Appearance:  awake, alert  Attitude:  cooperative   Eye Contact:  Unable to assess  Gait and Station: No assistive Devices used and No dizziness or falls  Psychomotor Behavior:  Unable to assess  Oriented to:  time, person, and place  Attention Span and Concentration:  Normal  Speech:   clear, coherent and Speaks: English  Mood:  anxious, depressed and better  Affect:  appropriate and in normal range  Associations:  no loose associations  Thought Process:  goal oriented  Thought Content:  no evidence of suicidal ideation or homicidal ideation, no auditory hallucinations present and no visual hallucinations present  Recent and Remote Memory:  intact Not formally assessed. No amnesia.  Fund of Knowledge: appropriate  Insight:  good  Judgment:  intact  Impulse Control:  intact    Suicide Risk Assessment:  Today Lianna Sorto reports that she is having no thoughts to want to end her life or to harm other people. In addition, there are notable risk factors for self-harm, including single status, anxiety and mood change. However, risk is mitigated by commitment to family, history of seeking help when needed, future oriented, denies suicidal intent or plan and denies homicidal ideation, intent, or plan. Therefore, based on all available evidence including the factors cited above, Lianna Sorto does not appear to be at imminent risk for self-harm, does not meet criteria for a 72-hr hold, and therefore remains appropriate for ongoing outpatient level of care.  A thorough assessment of risk factors related to suicide and self-harm have been reviewed and are noted above. The patient convincingly denies suicidality on several occasions. Local community safety resources printed and reviewed for patient to use if needed. There was no deceit detected, and the patient presented in a manner that was believable.     DSM5 Diagnosis:  Attention-Deficit/Hyperactivity Disorder  314.00 (F90.0) Predominantly inattentive  presentation  296.89 Bipolar II Disorder With mixed features and mild  300.02 (F41.1) Generalized Anxiety Disorder    Medical comorbidities include:   Patient Active Problem List    Diagnosis Date Noted     Morbid obesity (H) 11/02/2020     Priority: Medium     Diabetes mellitus, type 2 (H) 08/07/2020     Priority: Medium     Moderate episode of recurrent major depressive disorder (H) 09/22/2017     Priority: Medium     Controlled substance agreement signed 09/28/2016     Priority: Medium     Hidradenitis suppurativa 11/02/2015     Priority: Medium     Obesity 10/19/2015     Priority: Medium     Anxiety 06/02/2014     Priority: Medium     Pelvic pain in female 11/13/2013     Priority: Medium     Fibromyalgia 05/28/2013     Priority: Medium     History of major depression 02/22/2013     Priority: Medium     Piriformis syndrome 12/12/2012     Priority: Medium     Scapular dyskinesis 03/13/2012     Priority: Medium     Varicose veins of legs 07/13/2011     Priority: Medium     Sacroiliac pain 07/13/2011     Priority: Medium     Right thigh pain 07/13/2011     Priority: Medium     Back muscle spasm 06/06/2011     Priority: Medium     Tension headache 06/06/2011     Priority: Medium     CARDIOVASCULAR SCREENING; LDL GOAL LESS THAN 130 06/06/2011     Priority: Medium     Atypical squamous cells of undetermined significance (ASCUS) on Papanicolaou smear of cervix 03/22/2011     Priority: Medium     3/22/11: Pap - ASCUS, + HPV 16. Plan colp.  4/7/11:[place]--benign. Repeat pap 6 months. Reminder placed in epic.   11/22/11:Pap--ASCUS +(low risk) HPV type 54. Rpt pap in 1 yr. In , ep, prob list, hx updated. Reminder sent.  2/20/13:Pap--NIL. Pap 1 year. Reminder placed in EPIC  3/18/14: Pap - NIL, Neg HPV. Repeat pap 3 yrs.   6/7/2017 Pap: ASCUS, neg HPV. Plan to repeat Pap+HPV cotesting in 3 yrs (2020)  11/2/20 NIL Pap, Neg HPV. Plan cotest in 5 years.          DDD (degenerative disc disease), cervical 03/10/2011     Priority:  Medium     Acne 03/10/2011     Priority: Medium     Attention deficit disorder of adult 09/22/2010     Priority: Medium     Allergic rhinitis 03/30/2007     Priority: Medium     Problem list name updated by automated process. Provider to review         Assessment:    Lianna Sorto is doing well on her current medication regiment of Vyvanse 60 mg daily and Wellbutrin  mg daily.  With the Vyvanse she is able to perform her job duties and complete tasks at work.  She is more organized at home and is managing her children as best she can as a single parent.  Wellbutrin is helping to give her more motivation and energy to manage her life stressors.  Her depression is minimal..    Medication side effects and alternatives were reviewed. Health promotion activities recommended and reviewed today. All questions addressed. Education and counseling completed regarding risks and benefits of medications and psychotherapy options.    Treatment Plan:        1.  Continue Vyvanse 60 mg daily    2.  Continue Wellbutrin  mg daily      Continue all other medications as reviewed per electronic medical record today.     Safety plan reviewed. To the Emergency Department as needed or call after hours crisis line at [phone] or [phone]. [place] Crisis Text Line. Text [place] to 594544 or Suicide LifeLine Chat: suicidepreventionlifeline.org/chat/    To schedule individual or family therapy, call [facility] at [phone].    Schedule an appointment with me in 3 months or sooner as needed. Call [facility] at [phone] to schedule.    Follow up with primary care provider as planned or for acute medical concerns.    Call the psychiatric nurse line with medication questions or concerns at [phone].    TubeMogulhart may be used to communicate with your provider, but this is not intended to be used for emergencies.    Crisis Resources:    National Suicide Prevention Lifeline: 807.790.6881  (TTY: 865.346.9341). Call anytime for help.  (www.suicidepreventionlifeline.org)  National [place] on Mental Illness (www.sina.org): 325.890.9846 or 463-846-7654.   Mental Health Association (www.mentalhealth.org): 281.670.7478 or 027-948-5978.  [place] Crisis Text Line: Text [place] to 658076  Suicide LifeLine Chat: suicideDatasnap.io.org/chat    Administrative Billing:   Time spent with patient was 30 minutes and greater than 50% of time or 20 minutes was spent in counseling and coordination of care regarding above diagnoses and treatment plan.    Patient Status:  Patient will continue to be seen for ongoing consultation and stabilization.    Signed:   ELIANA Jeffrey-BC   Psychiatry

## 2021-05-01 ASSESSMENT — ANXIETY QUESTIONNAIRES: GAD7 TOTAL SCORE: 0

## 2021-05-03 ENCOUNTER — TELEPHONE (OUTPATIENT)
Dept: FAMILY MEDICINE | Facility: CLINIC | Age: 41
End: 2021-05-03

## 2021-05-03 DIAGNOSIS — E11.9 TYPE 2 DIABETES MELLITUS WITHOUT COMPLICATION, WITHOUT LONG-TERM CURRENT USE OF INSULIN (H): Primary | ICD-10-CM

## 2021-05-03 NOTE — TELEPHONE ENCOUNTER
Patient Quality Outreach Summary      Summary:    Patient is due/failing the following:   A1C and Diabetic Follow-Up Visit    Type of outreach:    Phone, spoke to patient/parent. she was reminded. She will have to call back to schedule.    Questions for provider review:    None                                                                                                                    Alphonso DAVIDSON CMA

## 2021-05-03 NOTE — TELEPHONE ENCOUNTER
Patient is due for a diabetes follow up appointment with me.      Non-fasting labs due:  A1C    Orders were placed, please have lab work done before visit.      Please ask patient to bring in their glucometer so we can download the data.    Please call patient to schedule.  Clau Dukes, CNP

## 2021-05-29 ENCOUNTER — HEALTH MAINTENANCE LETTER (OUTPATIENT)
Age: 41
End: 2021-05-29

## 2021-06-23 DIAGNOSIS — F98.8 ATTENTION DEFICIT DISORDER OF ADULT: ICD-10-CM

## 2021-06-23 NOTE — TELEPHONE ENCOUNTER
Routing refill request to provider for review/approval because:  Drug not on the [facility] refill protocol     Carlos Palma RN

## 2021-06-24 RX ORDER — LISDEXAMFETAMINE DIMESYLATE 60 MG/1
60 CAPSULE ORAL EVERY MORNING
Qty: 30 CAPSULE | Refills: 0 | Status: SHIPPED | OUTPATIENT
Start: 2021-06-24 | End: 2021-07-23

## 2021-06-24 NOTE — TELEPHONE ENCOUNTER
Date of Last Office Visit: 4/30/2021  Date of Next Office Visit: 7/23/2021  No shows since last visit: none  Cancellations since last visit: none    Medication requested: Vyvanse 60 mg capsule Date last ordered: 4/30/2021 Qty: 30 Refills: 0     Review of [place] ?: yes  Medication last filled date: 5/27/2021 Qty filled: 30  Other controlled substance on [place] ?: no  If yes, is this a new medication?: no  If yes, name of medication: no and date filled: no    Lapse in medication adherence greater than 5 days?: no  If yes, call patient and gather details: no  Medication refill request verified as identical to current order?: yes  Result of Last DAM, VPA, Li+ Level, CBC, or Carbamazepine Level (at or since last visit): N/A    []Medication refilled per  Medication Refill in Ambulatory Care  policy.  [x]Medication unable to be refilled by RN due to criteria not met as indicated below:    []Eligibility - not seen in the last year   []Supervision - no future appointment   []Compliance - no shows, cancellations or lapse in therapy   []Verification - order discrepancy   [x]Controlled medication   [x]Medication not included in policy   []90-day supply request   []Other

## 2021-07-23 ENCOUNTER — VIRTUAL VISIT (OUTPATIENT)
Dept: PSYCHIATRY | Facility: CLINIC | Age: 41
End: 2021-07-23
Payer: COMMERCIAL

## 2021-07-23 DIAGNOSIS — F33.0 MILD RECURRENT MAJOR DEPRESSION (H): ICD-10-CM

## 2021-07-23 DIAGNOSIS — F98.8 ATTENTION DEFICIT DISORDER OF ADULT: ICD-10-CM

## 2021-07-23 PROCEDURE — 99214 OFFICE O/P EST MOD 30 MIN: CPT | Mod: 95 | Performed by: NURSE PRACTITIONER

## 2021-07-23 RX ORDER — BUPROPION HYDROCHLORIDE 150 MG/1
150 TABLET ORAL EVERY MORNING
Qty: 30 TABLET | Refills: 3 | Status: SHIPPED | OUTPATIENT
Start: 2021-07-23 | End: 2021-10-15

## 2021-07-23 RX ORDER — LISDEXAMFETAMINE DIMESYLATE 60 MG/1
60 CAPSULE ORAL EVERY MORNING
Qty: 30 CAPSULE | Refills: 0 | Status: SHIPPED | OUTPATIENT
Start: 2021-07-23 | End: 2021-08-20

## 2021-07-23 ASSESSMENT — ANXIETY QUESTIONNAIRES
4. TROUBLE RELAXING: NOT AT ALL
6. BECOMING EASILY ANNOYED OR IRRITABLE: SEVERAL DAYS
GAD7 TOTAL SCORE: 4
2. NOT BEING ABLE TO STOP OR CONTROL WORRYING: SEVERAL DAYS
7. FEELING AFRAID AS IF SOMETHING AWFUL MIGHT HAPPEN: NOT AT ALL
1. FEELING NERVOUS, ANXIOUS, OR ON EDGE: SEVERAL DAYS
5. BEING SO RESTLESS THAT IT IS HARD TO SIT STILL: NOT AT ALL
IF YOU CHECKED OFF ANY PROBLEMS ON THIS QUESTIONNAIRE, HOW DIFFICULT HAVE THESE PROBLEMS MADE IT FOR YOU TO DO YOUR WORK, TAKE CARE OF THINGS AT HOME, OR GET ALONG WITH OTHER PEOPLE: NOT DIFFICULT AT ALL
3. WORRYING TOO MUCH ABOUT DIFFERENT THINGS: SEVERAL DAYS

## 2021-07-23 ASSESSMENT — PATIENT HEALTH QUESTIONNAIRE - PHQ9: SUM OF ALL RESPONSES TO PHQ QUESTIONS 1-9: 7

## 2021-07-23 NOTE — PROGRESS NOTES
________________________________________  Medications Phoned  to Pharmacy [] yes [x]no  Name of Pharmacist:  List Medications, including dose, quantity and instructions    Medications ordered this visit were e-scribed.  Verified by order class [x] yes  [] no  Wellbutrin  mg; Vyvanse 60 mg   Medication changes or discontinuations were communicated to patient's pharmacy: [] yes  [x] no    Dictation completed at time of chart check: [x] yes  [] no    I have checked the documentation for today s encounters and the above information has been reviewed and completed.

## 2021-07-23 NOTE — PROGRESS NOTES
" This video/telephone visit will be conducted via a call between you and your physician/provider. We have found that certain health care needs can be provided without the need for an in-person physical exam. This service lets us provide the care you need with a video /telephone conversation. If a prescription is necessary we can send it directly to your pharmacy. If lab work is needed we can place an order for that and you can then stop by our lab to have the test done at a later time.    Just as we bill insurance for in-person visits, we also bill insurance for video/telephone visits. If you have questions about your insurance coverage, we recommend that you speak with your insurance company.    Patient has given verbal consent for Telephone visit? Yes   Patient would like telephone visit please call: [phone]  PREMA/JESSICA VARGAS CMA     Patient verified allergies, medications and pharmacy via phone. PHQ: 7 somewhat and FAUSTINO: 4 not done verbally with writer.   Patient states she is ready for visit.  [place]  to be [place] by provider.           Outpatient Psychiatric Progress Note    Name: Lianna Sorto   : 1980                    Primary Care Provider: DANA Amos CNP   Therapist: None    PHQ-9 scores:  PHQ-9 SCORE 2021   PHQ-9 Total Score - - -   PHQ-9 Total Score 8 7 7       FAUSTINO-7 scores:  FAUSTINO-7 SCORE 2021   Total Score - - -   Total Score 3 0 4       Patient Identification:    Patient is a 41 year old year old, single  White American female  who presents for return visit with me.  Patient is currently employed full time. Patient attended the session alone. Patient prefers to be called: \" Lianna\".    Interim History:    I last saw Lianna Sorto for outpatient psychiatry Return Visit on 2021.     During that appointment, she reported doing well on her current medication regiment of Vyvanse 60 mg daily and Wellbutrin  mg daily. "  With the Vyvanse she is able to perform her job duties and complete tasks at work.  She is more organized at home and is managing her children as best she can as a single parent.  Wellbutrin is helping to give her more motivation and energy to manage her life stressors.  Her depression is minimal.. .     Current medications include: blood glucose (NO BRAND SPECIFIED) test strip, Use to test blood sugar 2 times daily or as directed. To accompany: Blood Glucose Monitor Brands: per insurance.  blood glucose monitoring (NO BRAND SPECIFIED) meter device kit, Use to test blood sugar 2 times daily or as directed. Preferred blood glucose meter OR supplies to accompany: Blood Glucose Monitor Brands: per insurance.  buPROPion (WELLBUTRIN XL) 150 MG 24 hr tablet, Take 1 tablet (150 mg) by mouth every morning  cetirizine (ZYRTEC) 10 MG tablet, Take 10 mg by mouth daily  clindamycin (CLINDAMAX) 1 % external lotion, Apply to affected areas twice daily  COMPOUNDED NON-CONTROLLED SUBSTANCE (CMPD RX) - PHARMACY TO MIX COMPOUNDED MEDICATION, Use Naltrexone powder and place 1 mg in a capsule. Pt is to take Naltrexone 1mg per day by mouth.  cyclobenzaprine (FLEXERIL) 10 MG tablet, Take 1 tablet (10 mg) by mouth 3 times daily as needed for muscle spasms  fluticasone (FLONASE) 50 MCG/ACT nasal spray, Spray 2 sprays into both nostrils daily  levonorgestrel-ethinyl estradiol (SETLAKIN) 0.15-0.03 MG tablet, Take 1 tablet by mouth daily  lisdexamfetamine (VYVANSE) 60 MG capsule, Take 1 capsule (60 mg) by mouth every morning refill 28 days after last fill  order for DME, Equipment being ordered: Dynaflex insert  spironolactone (ALDACTONE) 100 MG tablet, TAKE ONE TABLET BY MOUTH ONCE DAILY  thin (NO BRAND SPECIFIED) lancets, Use with lanceting device. To accompany: Blood Glucose Monitor Brands: per insurance.  triamcinolone (KENALOG) 0.1 % external cream, Apply to affected areas twice daily for 2 weeks, then as needed only  albuterol (PROAIR  HFA/PROVENTIL HFA/VENTOLIN HFA) 108 (90 Base) MCG/ACT inhaler, Inhale 2 puffs into the lungs 4 times daily (Patient not taking: Reported on 7/23/2021)    No current facility-administered medications on file prior to visit.       The [place] Prescription Monitoring Program has been reviewed and there are no concerns about diversionary activity for controlled substances at this time.      I was able to review most recent Primary Care Provider, specialty provider, and therapy visit notes that I have access to.     Today, patient reports that she has bee spending time with family.  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  Her vyvanse helps her to focus on work tasks.  She gets overwhelmed with getting help around the house.  Occasionally, she overeats.  She stress eats.  Weight is staying steady.   has a past medical history of Abnormal Pap smear of cervix (2011), Acute tonsillitis (2008), Condylomata (4/7/2011), Endometritis (2008), Hidradenitis (7/3/2009), Intussusception (H) (1983), and Unspecified trauma to perineum and vulva, unspecified as to episode of care in pregnancy (1985). She also has no past medical history of Basal cell carcinoma or Squamous cell carcinoma.    Social history updates:    She is social with peers and family.  She identifies low financial stress    Substance use updates:    She reports little alcohol use  Tobacco use: Yes Cigarettes  Ready to quit?  No  Nicotine Replacement Therapy tried: none     Vital Signs:   LMP  (Within Months)     Labs:    Most recent laboratory results reviewed and no new labs.     Review of Systems:  10 systems (general, cardiovascular, respiratory, eyes, ENT, endocrine, GI, , M/S, neurological) were reviewed. Most pertinent finding(s) is/are: She reports no chest pain, no shortness of breath, no headache pain, no joint or back pain. , she has opted out of the vaccine. The remaining systems are all  unremarkable.    Mental Status Examination:  Appearance:  awake, alert  Attitude:  cooperative   Eye Contact:  Unable to assess  Gait and Station: No assistive Devices used and No dizziness or falls  Psychomotor Behavior:  Unable to assess  Oriented to:  time, person, and place  Attention Span and Concentration:  Normal  Speech:   clear, coherent and Speaks: English  Mood:  anxious, depressed and better  Affect:  appropriate and in normal range  Associations:  no loose associations  Thought Process:  logical and goal oriented  Thought Content:  no evidence of suicidal ideation or homicidal ideation, no auditory hallucinations present and no visual hallucinations present  Recent and Remote Memory:  intact Not formally assessed. No amnesia.  Fund of Knowledge: appropriate  Insight:  good  Judgment:  intact  Impulse Control:  intact    Suicide Risk Assessment:  Today Linana Sorto reports that she is having no thoughts to want to end her life or to harm other people. In addition, there are notable risk factors for self-harm, including single status, anxiety and mood change. However, risk is mitigated by commitment to family, history of seeking help when needed, future oriented, denies suicidal intent or plan and denies homicidal ideation, intent, or plan. Therefore, based on all available evidence including the factors cited above, Lianna Sorto does not appear to be at imminent risk for self-harm, does not meet criteria for a 72-hr hold, and therefore remains appropriate for ongoing outpatient level of care.  A thorough assessment of risk factors related to suicide and self-harm have been reviewed and are noted above. The patient convincingly denies suicidality on several occasions. Local community safety resources printed and reviewed for patient to use if needed. There was no deceit detected, and the patient presented in a manner that was believable.     DSM5 Diagnosis:  Attention-Deficit/Hyperactivity Disorder   314.00 (F90.0) Predominantly inattentive presentation  296.31 (F33.0) Major Depressive Disorder, Recurrent Episode, Mild _ and With anxious distress  300.02 (F41.1) Generalized Anxiety Disorder    Medical comorbidities include:   Patient Active Problem List    Diagnosis Date Noted     Morbid obesity (H) 11/02/2020     Priority: Medium     Diabetes mellitus, type 2 (H) 08/07/2020     Priority: Medium     Moderate episode of recurrent major depressive disorder (H) 09/22/2017     Priority: Medium     Controlled substance agreement signed 09/28/2016     Priority: Medium     Hidradenitis suppurativa 11/02/2015     Priority: Medium     Obesity 10/19/2015     Priority: Medium     Anxiety 06/02/2014     Priority: Medium     Pelvic pain in female 11/13/2013     Priority: Medium     Fibromyalgia 05/28/2013     Priority: Medium     History of major depression 02/22/2013     Priority: Medium     Piriformis syndrome 12/12/2012     Priority: Medium     Scapular dyskinesis 03/13/2012     Priority: Medium     Varicose veins of legs 07/13/2011     Priority: Medium     Sacroiliac pain 07/13/2011     Priority: Medium     Right thigh pain 07/13/2011     Priority: Medium     Back muscle spasm 06/06/2011     Priority: Medium     Tension headache 06/06/2011     Priority: Medium     CARDIOVASCULAR SCREENING; LDL GOAL LESS THAN 130 06/06/2011     Priority: Medium     Atypical squamous cells of undetermined significance (ASCUS) on Papanicolaou smear of cervix 03/22/2011     Priority: Medium     3/22/11: Pap - ASCUS, + HPV 16. Plan colp.  4/7/11:[place]--benign. Repeat pap 6 months. Reminder placed in epic.   11/22/11:Pap--ASCUS +(low risk) HPV type 54. Rpt pap in 1 yr. In , ep, prob list, hx updated. Reminder sent.  2/20/13:Pap--NIL. Pap 1 year. Reminder placed in EPIC  3/18/14: Pap - NIL, Neg HPV. Repeat pap 3 yrs.   6/7/2017 Pap: ASCUS, neg HPV. Plan to repeat Pap+HPV cotesting in 3 yrs (2020)  11/2/20 NIL Pap, Neg HPV. Plan cotest in 5  years.          DDD (degenerative disc disease), cervical 03/10/2011     Priority: Medium     Acne 03/10/2011     Priority: Medium     Attention deficit disorder of adult 09/22/2010     Priority: Medium     Allergic rhinitis 03/30/2007     Priority: Medium     Problem list name updated by automated process. Provider to review         Assessment:    Lianna Sorto reported today she is stable on her current medication regiment.  With the Vyvanse she is able to stay focused and organized at her job to be able to perform her duties without incident.  Living at home as a single parent, it can get overwhelming to keep up the house and she has had to put limits on her children in order for them to participate in keeping things organized at home.  At this point she has noticed little or no change in her binge eating but her weight has stayed stable.  Vyvanse will continue at 60 mg daily.  Depression is lessened and she will continue with the Wellbutrin 150 mg daily..  She reported no chest pain, no headaches, no rapid heart rate, and no high blood pressure concerns at this time, that could be related to taking Vyvanse.    Medication side effects and alternatives were reviewed. Health promotion activities recommended and reviewed today. All questions addressed. Education and counseling completed regarding risks and benefits of medications and psychotherapy options.    Treatment Plan:        1.  Continue Vyvanse 60 mg daily    2.  Continue Wellbutrin  mg daily      Continue all other medications as reviewed per electronic medical record today.     Safety plan reviewed. To the Emergency Department as needed or call after hours crisis line at [phone] or [phone]. [place] Crisis Text Line. Text [place] to 860535 or Suicide LifeLine Chat: suicidepreventionlifeline.org/chat/    To schedule individual or family therapy, call [facility] at [phone].    Schedule an appointment with me in 3 months or  sooner as needed. Call [facility] at [phone] to schedule.    Follow up with primary care provider as planned or for acute medical concerns.    Call the psychiatric nurse line with medication questions or concerns at [phone].    Sonora Leatherhart may be used to communicate with your provider, but this is not intended to be used for emergencies.    Crisis Resources:    National Suicide Prevention Lifeline: 906.366.4682 (TTY: 702.714.3739). Call anytime for help.  (www.suicidepreventionlifeline.org)  National [place] on Mental Illness (www.sina.org): 338.227.2513 or 131-171-1025.   Mental Health Association (www.mentalhealth.org): 255.633.1360 or 081-163-3755.  [place] Crisis Text Line: Text [place] to 570801  Suicide LifeLine Chat: suicideVertiFlexline.org/chat    Administrative Billing:   Time spent with patient was spent in counseling and coordination of care regarding above diagnoses and treatment plan.    Patient Status:  Patient will continue to be seen for ongoing consultation and stabilization.    Signed:   ELIANA Jeffrey-BC   Psychiatry

## 2021-07-23 NOTE — PATIENT INSTRUCTIONS
1.  Continue Vyvanse 60 mg daily    2.  Continue Wellbutrin  mg daily      Continue all other medications as reviewed per electronic medical record today.     Safety plan reviewed. To the Emergency Department as needed or call after hours crisis line at [phone] or [phone]. [place] Crisis Text Line. Text [place] to 782778 or Suicide LifeLine Chat: suicideNujira.org/chat/    To schedule individual or family therapy, call [facility] at [phone].    Schedule an appointment with me in 3 months or sooner as needed. Call [facility] at [phone] to schedule.    Follow up with primary care provider as planned or for acute medical concerns.    Call the psychiatric nurse line with medication questions or concerns at [phone].    ViaCube may be used to communicate with your provider, but this is not intended to be used for emergencies.    Crisis Resources:    National Suicide Prevention Lifeline: 336.214.9350 (TTY: 909.968.9068). Call anytime for help.  (www.suicidepreventionlifeline.org)  National [place] on Mental Illness (www.sina.org): 514.796.4787 or 268-429-5635.   Mental Health Association (www.mentalhealth.org): 374.133.8269 or 733-611-8123.  [place] Crisis Text Line: Text [place] to 419399  Suicide LifeLine Chat: suicideNujira.org/chat

## 2021-07-24 ASSESSMENT — ANXIETY QUESTIONNAIRES: GAD7 TOTAL SCORE: 4

## 2021-08-16 ENCOUNTER — TELEPHONE (OUTPATIENT)
Dept: FAMILY MEDICINE | Facility: CLINIC | Age: 41
End: 2021-08-16

## 2021-08-16 DIAGNOSIS — E11.9 TYPE 2 DIABETES MELLITUS WITHOUT COMPLICATION, WITHOUT LONG-TERM CURRENT USE OF INSULIN (H): Primary | ICD-10-CM

## 2021-08-16 NOTE — TELEPHONE ENCOUNTER
Patient is due for a diabetes follow up appointment with me.      Non-fasting labs due:  A1C, Metabolic Panel and hep c screening    Orders were placed, please have lab work done before visit.      Please ask patient to bring in their glucometer so we can download the data.    Please call patient to schedule.  Clau Dukes, CNP

## 2021-08-16 NOTE — LETTER
August 23, 2021      Lianna Sorto  [address] [address]        Dear Clau Dsouza NP  has been reviewing your chart.  It appears that there are aspects of your care that could be improved.  At this time you are due for :Diabetes follow up appointment    Non-fasting labs due:  A1C, Metabolic Panel and hep c screening  - Please call [phone] to schedule appointments .      .  If you could plan to do that in the near future it would be very helpful.  We are trying to help our patients achieve their health care goals.      If you have any questions please feel free to contact the clinic     Thank you,      Sincerely,        DANA Amos CNP

## 2021-08-17 NOTE — TELEPHONE ENCOUNTER
Patient Quality Outreach Summary      Summary:    Patient is due/failing the following:   appointment    Non-fasting labs due:  A1C, Metabolic Panel and hep c screening    Type of outreach:    Phone, left message for patient/parent to call back.                                                                                      Alphonso DAVIDSON CMA

## 2021-08-20 DIAGNOSIS — F98.8 ATTENTION DEFICIT DISORDER OF ADULT: ICD-10-CM

## 2021-08-20 RX ORDER — LISDEXAMFETAMINE DIMESYLATE 60 MG/1
60 CAPSULE ORAL EVERY MORNING
Qty: 30 CAPSULE | Refills: 0 | Status: SHIPPED | OUTPATIENT
Start: 2021-08-20 | End: 2021-09-23

## 2021-08-20 NOTE — TELEPHONE ENCOUNTER
Date of Last Office Visit: 07/23/2021  Date of Next Office Visit: 10/15/2021  No shows since last visit: 0  Cancellations since last visit: 0    Medication requested: lisdexamfetamine 60mg Date last ordered: 07/23/2021 Qty: 30 Refills: 0     [place] of [place] ?: no - this RN does not have access to this provider yet.  Medication last filled date: unknown Qty filled: unknown  Other controlled substance on [place] ?: unknown  If yes, is this a new medication?: na  If yes, name of medication: na and date filled: na    Lapse in medication adherence greater than 5 days?: no  If yes, call patient and gather details: n/a  Medication refill request verified as identical to current order?: yes  Result of Last DAM, VPA, Li+ Level, CBC, or Carbamazepine Level (at or since last visit): N/A    []Medication refilled per  Medication Refill in Ambulatory Care  policy.  [x]Medication unable to be refilled by RN due to criteria not met as indicated below:    []Eligibility - not seen in the last year   []Supervision - no future appointment   []Compliance - no shows, cancellations or lapse in therapy   []Verification - order discrepancy   [x]Controlled medication   []Medication not included in policy   []90-day supply request   []Other

## 2021-08-23 NOTE — TELEPHONE ENCOUNTER
Patient Quality Outreach Summary      Summary:    Patient is due/failing the following:   appointment    Non-fasting labs due:  A1C, Metabolic Panel and hep c screening    Type of outreach:    Sent letter.    Alphonso DAVIDSON CMA

## 2021-09-18 ENCOUNTER — HEALTH MAINTENANCE LETTER (OUTPATIENT)
Age: 41
End: 2021-09-18

## 2021-09-22 DIAGNOSIS — F98.8 ATTENTION DEFICIT DISORDER OF ADULT: ICD-10-CM

## 2021-09-22 DIAGNOSIS — L73.2 HIDRADENITIS SUPPURATIVA: ICD-10-CM

## 2021-09-22 DIAGNOSIS — L30.9 ECZEMA, UNSPECIFIED TYPE: ICD-10-CM

## 2021-09-22 NOTE — TELEPHONE ENCOUNTER
Requested Prescriptions   Pending Prescriptions Disp Refills     clindamycin (CLINDAMAX) 1 % external lotion 60 mL 11     Sig: Apply to affected areas twice daily       There is no refill protocol information for this order        triamcinolone (KENALOG) 0.1 % external cream 45 g 4     Sig: Apply to affected areas twice daily for 2 weeks, then as needed only       There is no refill protocol information for this order        Last office visit: Visit date not found with prescribing provider:  LAKE GALICIA    Future Office Visit:          Madi Feng  [facility]

## 2021-09-22 NOTE — TELEPHONE ENCOUNTER
Sent mychart for pt to make appt and then refill could be sent until her follow up appt.  Jennifer FARIAS RN BSN PHN  [facility]

## 2021-09-23 RX ORDER — LISDEXAMFETAMINE DIMESYLATE 60 MG/1
60 CAPSULE ORAL EVERY MORNING
Qty: 30 CAPSULE | Refills: 0 | Status: SHIPPED | OUTPATIENT
Start: 2021-09-23 | End: 2021-10-15

## 2021-09-23 NOTE — TELEPHONE ENCOUNTER
Date of Last Office Visit: 07/23/2021  Date of Next Office Visit: 10/15/2021  No shows since last visit: 0  Cancellations since last visit: 0    Medication requested: lisdexamfetamine (VYVANSE) 60 MG  Date last ordered: 08/20/2021 Qty: 30 Refills: 0     [place] of [place] ?: yes  Medication last filled date: 08/24/2021 Qty filled: 30  Other controlled substance on [place] ?: no  If yes, is this a new medication?: n/a  If yes, name of medication: n/a and date filled: n/a    Lapse in medication adherence greater than 5 days?: no  If yes, call patient and gather details: n/a  Medication refill request verified as identical to current order?: yes  Result of Last DAM, VPA, Li+ Level, CBC, or Carbamazepine Level (at or since last visit): N/A    Last visit treatment plan:     1.  Continue Vyvanse 60 mg daily    2.  Continue Wellbutrin  mg daily    []Medication refilled per  Medication Refill in Ambulatory Care  policy.  [x]Medication unable to be refilled by RN due to criteria not met as indicated below:    []Eligibility - not seen in the last year   []Supervision - no future appointment   []Compliance - no shows, cancellations or lapse in therapy   []Verification - order discrepancy   []Controlled medication   [x]Medication not included in policy   []90-day supply request   []Other

## 2021-09-28 RX ORDER — TRIAMCINOLONE ACETONIDE 1 MG/G
CREAM TOPICAL
Qty: 45 G | Refills: 0 | Status: CANCELLED | OUTPATIENT
Start: 2021-09-28

## 2021-09-28 RX ORDER — CLINDAMYCIN PHOSPHATE 10 UG/ML
LOTION TOPICAL
Qty: 60 ML | Refills: 0 | Status: CANCELLED | OUTPATIENT
Start: 2021-09-28

## 2021-10-01 DIAGNOSIS — L73.2 HIDRADENITIS SUPPURATIVA: ICD-10-CM

## 2021-10-01 DIAGNOSIS — L30.9 ECZEMA, UNSPECIFIED TYPE: ICD-10-CM

## 2021-10-05 NOTE — TELEPHONE ENCOUNTER
Rena Weinstein, RN  to Lianna Sorto          9/22/21 12:57 PM  Isiah Sullivan,  We received your refill request for the Triamcinolone and the Clindamycin. We have not seen you in clinic or virtually since 3/2020. Refills require that you be seen at least yearly.Please make a follow up appt and we can get your medications refilled until your appt. Let us know when you have made the appt. Take care.  Jennifer FARIAS RN BSN PHN  [facility]    Last read by Lianna Sorto at 11:01 AM on 10/1/2021.

## 2021-10-14 RX ORDER — CLINDAMYCIN PHOSPHATE 10 UG/ML
LOTION TOPICAL
Qty: 60 ML | Refills: 2 | Status: SHIPPED | OUTPATIENT
Start: 2021-10-14 | End: 2021-12-10

## 2021-10-14 RX ORDER — TRIAMCINOLONE ACETONIDE 1 MG/G
CREAM TOPICAL
Qty: 45 G | Refills: 2 | Status: SHIPPED | OUTPATIENT
Start: 2021-10-14 | End: 2021-12-10

## 2021-10-14 NOTE — TELEPHONE ENCOUNTER
Medication is being filled for 1 time refill only due to:  Patient needs to be seen because it has been more than one year since last visit. Pt has follow up in December 2021.  Jennifer FARIAS RN BSN PHN  [facility]

## 2021-10-15 ENCOUNTER — VIRTUAL VISIT (OUTPATIENT)
Dept: PSYCHIATRY | Facility: CLINIC | Age: 41
End: 2021-10-15
Payer: COMMERCIAL

## 2021-10-15 DIAGNOSIS — F98.8 ATTENTION DEFICIT DISORDER OF ADULT: ICD-10-CM

## 2021-10-15 DIAGNOSIS — F33.0 MILD RECURRENT MAJOR DEPRESSION (H): Primary | ICD-10-CM

## 2021-10-15 DIAGNOSIS — F41.1 GAD (GENERALIZED ANXIETY DISORDER): ICD-10-CM

## 2021-10-15 PROCEDURE — 99214 OFFICE O/P EST MOD 30 MIN: CPT | Mod: 95 | Performed by: NURSE PRACTITIONER

## 2021-10-15 RX ORDER — BUPROPION HYDROCHLORIDE 150 MG/1
150 TABLET ORAL EVERY MORNING
Qty: 30 TABLET | Refills: 3 | Status: SHIPPED | OUTPATIENT
Start: 2021-10-15 | End: 2021-12-14

## 2021-10-15 RX ORDER — BUSPIRONE HYDROCHLORIDE 5 MG/1
5 TABLET ORAL 2 TIMES DAILY
Qty: 60 TABLET | Refills: 3 | Status: SHIPPED | OUTPATIENT
Start: 2021-10-15 | End: 2021-11-30

## 2021-10-15 RX ORDER — LISDEXAMFETAMINE DIMESYLATE 60 MG/1
60 CAPSULE ORAL EVERY MORNING
Qty: 30 CAPSULE | Refills: 0 | Status: SHIPPED | OUTPATIENT
Start: 2021-10-15 | End: 2021-11-23

## 2021-10-15 NOTE — PROGRESS NOTES
"Lianna is a 41 year old who is being evaluated via a billable telephone visit.      What phone number would you like to be contacted at? [phone]  How would you like to obtain your AVS? Beto  Phone call duration: 30 minutes    Outpatient Psychiatric Progress Note    Name: Lianna Sorto   : 1980                    Primary Care Provider: DANA Amos CNP   Therapist: none     PHQ-9 scores:  PHQ-9 SCORE 2021   PHQ-9 Total Score - - -   PHQ-9 Total Score 8 7 7       FAUSTINO-7 scores:  FAUSTINO-7 SCORE 2021   Total Score - - -   Total Score 3 0 4       Patient Identification:    Patient is a 41 year old year old, single  White American female  who presents for return visit with me.  Patient is currently employed full time. Patient attended the session alone. Patient prefers to be called: \"Lianna\".    Current medications include: albuterol (PROAIR HFA/PROVENTIL HFA/VENTOLIN HFA) 108 (90 Base) MCG/ACT inhaler, Inhale 2 puffs into the lungs 4 times daily (Patient not taking: Reported on 2021)  blood glucose (NO BRAND SPECIFIED) test strip, Use to test blood sugar 2 times daily or as directed. To accompany: Blood Glucose Monitor Brands: per insurance.  blood glucose monitoring (NO BRAND SPECIFIED) meter device kit, Use to test blood sugar 2 times daily or as directed. Preferred blood glucose meter OR supplies to accompany: Blood Glucose Monitor Brands: per insurance.  buPROPion (WELLBUTRIN XL) 150 MG 24 hr tablet, Take 1 tablet (150 mg) by mouth every morning  cetirizine (ZYRTEC) 10 MG tablet, Take 10 mg by mouth daily  clindamycin (CLINDAMAX) 1 % external lotion, Apply to affected areas twice daily  COMPOUNDED NON-CONTROLLED SUBSTANCE (CMPD RX) - PHARMACY TO MIX COMPOUNDED MEDICATION, Use Naltrexone powder and place 1 mg in a capsule. Pt is to take Naltrexone 1mg per day by mouth.  cyclobenzaprine (FLEXERIL) 10 MG tablet, Take 1 tablet (10 mg) by mouth " 3 times daily as needed for muscle spasms  fluticasone (FLONASE) 50 MCG/ACT nasal spray, Spray 2 sprays into both nostrils daily  levonorgestrel-ethinyl estradiol (SETLAKIN) 0.15-0.03 MG tablet, Take 1 tablet by mouth daily  lisdexamfetamine (VYVANSE) 60 MG capsule, Take 1 capsule (60 mg) by mouth every morning refill 28 days after last fill  order for DME, Equipment being ordered: Dynaflex insert  spironolactone (ALDACTONE) 100 MG tablet, TAKE ONE TABLET BY MOUTH ONCE DAILY  thin (NO BRAND SPECIFIED) lancets, Use with lanceting device. To accompany: Blood Glucose Monitor Brands: per insurance.  triamcinolone (KENALOG) 0.1 % external cream, Apply to affected areas twice daily for 2 weeks, then as needed only    No current facility-administered medications on file prior to visit.       The [place] Prescription Monitoring Program has been reviewed and there are no concerns about diversionary activity for controlled substances at this time.      I was able to review most recent Primary Care Provider, specialty provider, and therapy visit notes that I have access to.     Today, patient reports that her oldest son is difficult to manage.  He is unable to be left alone.  She still wants to find a new job.  She has had two episodes of crying.  We talked about FMLA possibilities.  She denies SI and no SIB.  Lianna has some continued financial stress.  She socializes very little with her peers.  Sleep can be disrupted at times but mostly able to feel rested in the morning.     has a past medical history of Abnormal Pap smear of cervix (2011), Acute tonsillitis (2008), Condylomata (4/7/2011), Endometritis (2008), Hidradenitis (7/3/2009), Intussusception (H) (1983), and Unspecified trauma to perineum and vulva, unspecified as to episode of care in pregnancy (1985). She also has no past medical history of Basal cell carcinoma or Squamous cell carcinoma.    Social history updates:    No changes in Vanesa social history  reported today    Substance use updates:    Some alcohol use  - 2 glasses of wine in 2 months  Tobacco use: No and History Stopped Smoking in September    Vital Signs:   There were no vitals taken for this visit.    Labs:    Most recent laboratory results reviewed and no new labs.     Mental Status Examination:  Appearance:  awake, alert  Attitude:  cooperative   Eye Contact:  Unable to assess  Gait and Station: No assistive Devices used and No dizziness or falls  Psychomotor Behavior:  Unable to assess  Oriented to:  time, person, and place  Attention Span and Concentration:  Normal  Speech:   clear, coherent and Speaks: English  Mood:  anxious, depressed and better  Affect:  intensity is heightened  Associations:  no loose associations  Thought Process:  goal oriented  Thought Content:  no evidence of suicidal ideation or homicidal ideation, no auditory hallucinations present and no visual hallucinations present  Recent and Remote Memory:  intact Not formally assessed. No amnesia.  Fund of Knowledge: appropriate  Insight:  good  Judgment:  intact  Impulse Control:  intact    Suicide Risk Assessment:  Today Lianna Sorto reports that she is having no thoughts to want to end her life or to harm other people. In addition, there are notable risk factors for self-harm, including single status, anxiety and mood change. However, risk is mitigated by commitment to family, history of seeking help when needed, future oriented, denies suicidal intent or plan and denies homicidal ideation, intent, or plan. Therefore, based on all available evidence including the factors cited above, Lianna Sorto does not appear to be at imminent risk for self-harm, does not meet criteria for a 72-hr hold, and therefore remains appropriate for ongoing outpatient level of care.  A thorough assessment of risk factors related to suicide and self-harm have been reviewed and are noted above. The patient convincingly denies suicidality on  several occasions. Local community safety resources printed and reviewed for patient to use if needed. There was no deceit detected, and the patient presented in a manner that was believable.     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  300.02 (F41.1) Generalized Anxiety Disorder    Medical comorbidities include:   Patient Active Problem List    Diagnosis Date Noted     Morbid obesity (H) 11/02/2020     Priority: Medium     Diabetes mellitus, type 2 (H) 08/07/2020     Priority: Medium     Moderate episode of recurrent major depressive disorder (H) 09/22/2017     Priority: Medium     Controlled substance agreement signed 09/28/2016     Priority: Medium     Hidradenitis suppurativa 11/02/2015     Priority: Medium     Obesity 10/19/2015     Priority: Medium     Anxiety 06/02/2014     Priority: Medium     Pelvic pain in female 11/13/2013     Priority: Medium     Fibromyalgia 05/28/2013     Priority: Medium     History of major depression 02/22/2013     Priority: Medium     Piriformis syndrome 12/12/2012     Priority: Medium     Scapular dyskinesis 03/13/2012     Priority: Medium     Varicose veins of legs 07/13/2011     Priority: Medium     Sacroiliac pain 07/13/2011     Priority: Medium     Right thigh pain 07/13/2011     Priority: Medium     Back muscle spasm 06/06/2011     Priority: Medium     Tension headache 06/06/2011     Priority: Medium     CARDIOVASCULAR SCREENING; LDL GOAL LESS THAN 130 06/06/2011     Priority: Medium     Atypical squamous cells of undetermined significance (ASCUS) on Papanicolaou smear of cervix 03/22/2011     Priority: Medium     3/22/11: Pap - ASCUS, + HPV 16. Plan colp.  4/7/11:Pilot--benign. Repeat pap 6 months. Reminder placed in epic.   11/22/11:Pap--ASCUS +(low risk) HPV type 54. Rpt pap  in 1 yr. In , ep, prob list, hx updated. Reminder sent.  2/20/13:Pap--NIL. Pap 1 year. Reminder placed in EPIC  3/18/14: Pap - NIL, Neg HPV. Repeat pap 3 yrs.   6/7/2017 Pap: ASCUS, neg HPV. Plan to repeat Pap+HPV cotesting in 3 yrs (2020)  11/2/20 NIL Pap, Neg HPV. Plan cotest in 5 years.          DDD (degenerative disc disease), cervical 03/10/2011     Priority: Medium     Acne 03/10/2011     Priority: Medium     Attention deficit disorder of adult 09/22/2010     Priority: Medium     Allergic rhinitis 03/30/2007     Priority: Medium     Problem list name updated by automated process. Provider to review         Assessment:    Lianna Sorto reported some ongoing stressors at home managing her son who has some behavioral disturbances.  She admits to having some crying episodes due to family stress and feels like her mood symptoms are more situational than requiring medication changes.  Vyvanse will continue at 60 mg daily as when she takes this she is able to complete job tasks and stay organized.  Wellbutrin continues at 150 mg daily also has a way to improve mental sharpness and lift depression symptoms when she gets overwhelmed..    Medication side effects and alternatives were reviewed. Health promotion activities recommended and reviewed today. All questions addressed. Education and counseling completed regarding risks and benefits of medications and psychotherapy options.    Treatment Plan:      1.  Continue Vyvanse 60 mg daily    2.  Continue Wellbutrin  mg daily        Continue all other medications as reviewed per electronic medical record today.     Safety plan reviewed. To the Emergency Department as needed or call after hours crisis line at [phone] or [phone]. [place] Crisis Text Line. Text [place] to 436801 or Suicide LifeLine Chat: suicidepreventionlifeline.org/chat/    To schedule individual or family therapy, call [facility] at [phone].    Schedule an appointment  with me in 3 months or sooner as needed. Call [facility] at [phone] to schedule.    Follow up with primary care provider as planned or for acute medical concerns.    Call the psychiatric nurse line with medication questions or concerns at [phone].    MyChart may be used to communicate with your provider, but this is not intended to be used for emergencies.    Crisis Resources:    National Suicide Prevention Lifeline: 681.701.7721 (TTY: 932.147.2194). Call anytime for help.  (www.suicidepreventionlifeline.org)  National [place] on Mental Illness (www.sina.org): 215.698.4369 or 527-293-8782.   Mental Health Association (www.mentalhealth.org): 661.709.8936 or 503-486-7430.  [place] Crisis Text Line: Text [place] to 641788  Suicide LifeLine Chat: suicidepreKeyViveline.org/chat    Administrative Billing:   Time spent with patient was 30 minutes and greater than 50% of time or 20 minutes was spent in counseling and coordination of care regarding above diagnoses and treatment plan.    Patient Status:  Patient will continue to be seen for ongoing consultation and stabilization.    Signed:   ELIANA Jeffrey-BC   Psychiatry

## 2021-11-08 ENCOUNTER — TELEPHONE (OUTPATIENT)
Dept: FAMILY MEDICINE | Facility: CLINIC | Age: 41
End: 2021-11-08
Payer: COMMERCIAL

## 2021-11-08 DIAGNOSIS — E11.9 TYPE 2 DIABETES MELLITUS WITHOUT COMPLICATION, WITHOUT LONG-TERM CURRENT USE OF INSULIN (H): Primary | ICD-10-CM

## 2021-11-08 NOTE — TELEPHONE ENCOUNTER
Patient is due for a diabetes follow up appointment with me.      Fasting labs due:  Lipid panel, A1C, Metabolic Panel and Microalbumin    Orders were placed, please have lab work done before visit.      Please ask patient to bring in their glucometer so we can download the data.    Please call patient to schedule.  Clau Dukes, CNP

## 2021-11-10 NOTE — TELEPHONE ENCOUNTER
Patient Quality Outreach Summary      Summary:    Patient is due/failing the following:   Patient is due for a diabetes follow up appointment with me.       Fasting labs due:  Lipid panel, A1C, Metabolic Panel and Microalbumin  Type of outreach:    Sent [employer] message. will postpone to see if she views    Alhponso DAVIDSON CMA

## 2021-11-18 DIAGNOSIS — Z30.41 ENCOUNTER FOR SURVEILLANCE OF CONTRACEPTIVE PILLS: ICD-10-CM

## 2021-11-18 DIAGNOSIS — F98.8 ATTENTION DEFICIT DISORDER OF ADULT: ICD-10-CM

## 2021-11-22 RX ORDER — LISDEXAMFETAMINE DIMESYLATE 60 MG/1
60 CAPSULE ORAL EVERY MORNING
Qty: 30 CAPSULE | Refills: 0 | Status: CANCELLED | OUTPATIENT
Start: 2021-11-22

## 2021-11-23 DIAGNOSIS — F98.8 ATTENTION DEFICIT DISORDER OF ADULT: ICD-10-CM

## 2021-11-23 RX ORDER — LEVONORGESTREL AND ETHINYL ESTRADIOL 0.15-0.03
KIT ORAL
Qty: 91 TABLET | Refills: 3 | Status: SHIPPED | OUTPATIENT
Start: 2021-11-23 | End: 2022-11-21

## 2021-11-23 RX ORDER — LISDEXAMFETAMINE DIMESYLATE 60 MG/1
60 CAPSULE ORAL EVERY MORNING
Qty: 30 CAPSULE | Refills: 0 | Status: SHIPPED | OUTPATIENT
Start: 2021-11-23 | End: 2021-12-14

## 2021-11-23 NOTE — TELEPHONE ENCOUNTER
Date of Last Office Visit: 10/15/21  Date of Next Office Visit: NONE - Routed to scheduling   No shows since last visit: 0  Cancellations since last visit: 0    Medication requested:   lisdexamfetamine (VYVANSE) 60 MG capsule 30 capsule      Date last ordered: 10/15/21 Qty: 30 Refills: 0     Review of [place] ?: RN UNABLE TO VERIFY DUE - WAITING ON PROVIDER APPROVAL   Other controlled substance on [place] ?: UNKNOWN  If yes, is this a new medication?: N/A  If yes, name of medication: N/A and date filled: N/A    Lapse in medication adherence greater than 5 days?: yes    If yes, call patient and gather details: Patient picked up medications later than refill date    Medication refill request verified as identical to current order?: Yes  Result of Last DAM, VPA, Li+ Level, CBC, or Carbamazepine Level (at or since last visit): N/A     Last visit treatment plan: Provider note unfinished    []Medication refilled per  Medication Refill in Ambulatory Care  policy.  [x]Medication unable to be refilled by RN due to criteria not met as indicated below:    []Eligibility - not seen in the last year   []Supervision - no future appointment   []Compliance - no shows, cancellations or lapse in therapy   [x]Verification - order discrepancy   [x]Controlled medication   [x]Medication not included in policy   []90-day supply request   []Other

## 2021-11-23 NOTE — TELEPHONE ENCOUNTER
Birth control refilled    Did not refill the Vyvanse as Alicia Smith is managing this  Clau Dukes, CNP

## 2021-11-24 ENCOUNTER — TELEPHONE (OUTPATIENT)
Dept: PSYCHIATRY | Facility: CLINIC | Age: 41
End: 2021-11-24
Payer: COMMERCIAL

## 2021-11-24 NOTE — TELEPHONE ENCOUNTER
Reason for Call:  medication refill:    Do you use a [facility]?  Name of the pharmacy and phone number for the current request:  [facility]    Name of the medication requested: Pt calling to clarify medication refill. Vyvance is med, only has one pill left.    Other request: none    Can we leave a detailed message on this number? YES    Phone number patient can be reached at: Cell number on file:    Telephone Information:   Mobile [phone]       Best Time: any    Call taken on 11/24/2021 at 7:55 AM by Jatin Lovelace

## 2021-11-24 NOTE — TELEPHONE ENCOUNTER
Called pt who reported the pharmacy contacted her and let her know Rx was in.     Constanza Dia on 11/24/2021 at 9:47 AM

## 2021-11-29 ENCOUNTER — LAB (OUTPATIENT)
Dept: LAB | Facility: CLINIC | Age: 41
End: 2021-11-29
Payer: COMMERCIAL

## 2021-11-29 DIAGNOSIS — T88.7XXA MEDICATION SIDE EFFECTS: ICD-10-CM

## 2021-11-29 DIAGNOSIS — E11.9 TYPE 2 DIABETES MELLITUS WITHOUT COMPLICATION, WITHOUT LONG-TERM CURRENT USE OF INSULIN (H): ICD-10-CM

## 2021-11-29 LAB
ANION GAP SERPL CALCULATED.3IONS-SCNC: 4 MMOL/L (ref 3–14)
BUN SERPL-MCNC: 16 MG/DL (ref 7–30)
CALCIUM SERPL-MCNC: 9.3 MG/DL (ref 8.5–10.1)
CHLORIDE BLD-SCNC: 105 MMOL/L (ref 94–109)
CHOLEST SERPL-MCNC: 217 MG/DL
CO2 SERPL-SCNC: 29 MMOL/L (ref 20–32)
CREAT SERPL-MCNC: 0.83 MG/DL (ref 0.52–1.04)
CREAT UR-MCNC: 287 MG/DL
FASTING STATUS PATIENT QL REPORTED: YES
GFR SERPL CREATININE-BSD FRML MDRD: 89 ML/MIN/1.73M2
GLUCOSE BLD-MCNC: 152 MG/DL (ref 70–99)
HBA1C MFR BLD: 7.7 % (ref 0–5.6)
HCV AB SERPL QL IA: NONREACTIVE
HDLC SERPL-MCNC: 59 MG/DL
LDLC SERPL CALC-MCNC: 121 MG/DL
MICROALBUMIN UR-MCNC: 15 MG/L
MICROALBUMIN/CREAT UR: 5.23 MG/G CR (ref 0–25)
NONHDLC SERPL-MCNC: 158 MG/DL
POTASSIUM BLD-SCNC: 4 MMOL/L (ref 3.4–5.3)
SODIUM SERPL-SCNC: 138 MMOL/L (ref 133–144)
TRIGL SERPL-MCNC: 186 MG/DL

## 2021-11-29 PROCEDURE — 86803 HEPATITIS C AB TEST: CPT

## 2021-11-29 PROCEDURE — 36415 COLL VENOUS BLD VENIPUNCTURE: CPT

## 2021-11-29 PROCEDURE — 82043 UR ALBUMIN QUANTITATIVE: CPT

## 2021-11-29 PROCEDURE — 80048 BASIC METABOLIC PNL TOTAL CA: CPT

## 2021-11-29 PROCEDURE — 80061 LIPID PANEL: CPT

## 2021-11-29 PROCEDURE — 83036 HEMOGLOBIN GLYCOSYLATED A1C: CPT

## 2021-11-30 ENCOUNTER — OFFICE VISIT (OUTPATIENT)
Dept: FAMILY MEDICINE | Facility: CLINIC | Age: 41
End: 2021-11-30
Payer: COMMERCIAL

## 2021-11-30 VITALS
DIASTOLIC BLOOD PRESSURE: 88 MMHG | OXYGEN SATURATION: 97 % | RESPIRATION RATE: 16 BRPM | BODY MASS INDEX: 36.64 KG/M2 | WEIGHT: 228 LBS | SYSTOLIC BLOOD PRESSURE: 114 MMHG | TEMPERATURE: 97.6 F | HEART RATE: 93 BPM | HEIGHT: 66 IN

## 2021-11-30 DIAGNOSIS — E11.9 TYPE 2 DIABETES MELLITUS WITHOUT COMPLICATION, WITHOUT LONG-TERM CURRENT USE OF INSULIN (H): Primary | ICD-10-CM

## 2021-11-30 DIAGNOSIS — E66.01 MORBID OBESITY (H): ICD-10-CM

## 2021-11-30 DIAGNOSIS — M62.830 BACK MUSCLE SPASM: ICD-10-CM

## 2021-11-30 DIAGNOSIS — K21.9 GASTROESOPHAGEAL REFLUX DISEASE WITHOUT ESOPHAGITIS: ICD-10-CM

## 2021-11-30 PROCEDURE — 99214 OFFICE O/P EST MOD 30 MIN: CPT | Performed by: NURSE PRACTITIONER

## 2021-11-30 RX ORDER — METFORMIN HCL 500 MG
500 TABLET, EXTENDED RELEASE 24 HR ORAL
Qty: 90 TABLET | Refills: 3 | Status: SHIPPED | OUTPATIENT
Start: 2021-11-30 | End: 2023-04-17

## 2021-11-30 RX ORDER — CYCLOBENZAPRINE HCL 10 MG
10 TABLET ORAL 3 TIMES DAILY PRN
Qty: 30 TABLET | Refills: 5 | Status: SHIPPED | OUTPATIENT
Start: 2021-11-30 | End: 2022-03-23

## 2021-11-30 RX ORDER — FAMOTIDINE 20 MG/1
20 TABLET, FILM COATED ORAL 2 TIMES DAILY
Qty: 60 TABLET | Refills: 3 | Status: SHIPPED | OUTPATIENT
Start: 2021-11-30 | End: 2022-04-06

## 2021-11-30 ASSESSMENT — MIFFLIN-ST. JEOR: SCORE: 1721.98

## 2021-11-30 NOTE — PATIENT INSTRUCTIONS
For acid reflux:  Start famotidine 20 mg twice daily      For diabetes:  Start metformin 500 mg once daily - take with dinner.    Follow up in 3 months.

## 2021-11-30 NOTE — PROGRESS NOTES
Assessment & Plan     Type 2 diabetes mellitus without complication, without long-term current use of insulin (H)  Poorly controlled.  Patient has never been medication for her diabetes before.  Recommend starting Metformin 500 mg daily with dinner.  Patient should continue to monitor her blood sugars.  She is to follow-up with me again in 3 months for recheck of her A1c.  - metFORMIN (GLUCOPHAGE-XR) 500 MG 24 hr tablet; Take 1 tablet (500 mg) by mouth daily (with dinner)    Morbid obesity (H)  Patient has gained 25 pounds over the last 2 years.  Encouraged weight loss.    Back muscle spasm  Stable.  - cyclobenzaprine (FLEXERIL) 10 MG tablet; Take 1 tablet (10 mg) by mouth 3 times daily as needed for muscle spasms    Gastroesophageal reflux disease without esophagitis  Discussed dietary triggers.  Recommend famotidine 20 mg twice daily.  - famotidine (PEPCID) 20 MG tablet; Take 1 tablet (20 mg) by mouth 2 times daily      Return in about 3 months (around 2/28/2022) for Diabetes Recheck.    The risks, benefits and treatment options of prescribed medications or other treatments have been discussed with the patient. The patient verbalized their understanding and should call or follow up if no improvement or if they develop further problems.    DANA Amos CNP  [facility]            Cleopatra Dsouza is a 41 year old who presents for the following health issues     Flexeril refill.   For back spasms and fibromyalgia.    Labs done on 11/29/2021.    GERD:  Feels as of the spironolactone is causing heart burn, feels like it smells like menthol and makes it worse. Taking pepcid to help with heart burn from meds (hard time taking due to heart burn).     History of Present Illness       Diabetes:   She presents for follow up of diabetes.  She is checking home blood glucose a few times a month. She checks blood glucose before meals and at bedtime.  Blood glucose is never over 200 and never  "under 70. She is aware of hypoglycemia symptoms including shakiness, dizziness, weakness, blurred vision and confusion. She has no concerns regarding her diabetes at this time.  She is not experiencing numbness or burning in feet, excessive thirst, blurry vision, weight changes or redness, sores or blisters on feet. The patient has not had a diabetic eye exam in the last 12 months.         She eats 0-1 servings of fruits and vegetables daily.She consumes 1 sweetened beverage(s) daily.She exercises with enough effort to increase her heart rate 9 or less minutes per day.  She exercises with enough effort to increase her heart rate 3 or less days per week.   She is taking medications regularly.      BP Readings from Last 2 Encounters:   11/30/21 114/88   11/02/20 118/80     Hemoglobin A1C (%)   Date Value   11/29/2021 7.7 (H)   10/30/2020 6.9 (H)   07/17/2020 6.7 (H)     LDL Cholesterol Calculated (mg/dL)   Date Value   11/29/2021 121 (H)   10/30/2020 124 (H)   07/13/2012 115       Wt Readings from Last 4 Encounters:   11/30/21 103.4 kg (228 lb)   11/02/20 101.2 kg (223 lb)   02/26/20 96 kg (211 lb 9.6 oz)   01/21/20 92.1 kg (203 lb)                     Review of Systems   Constitutional, HEENT, cardiovascular, pulmonary, gi and gu systems are negative, except as otherwise noted.      Objective    /88   Pulse 93   Temp 97.6  F (36.4  C) (Tympanic)   Resp 16   Ht 1.67 m (5' 5.75\")   Wt 103.4 kg (228 lb)   SpO2 97%   BMI 37.08 kg/m    Body mass index is 37.08 kg/m .  Physical Exam   GENERAL: healthy, alert and no distress  NECK: no adenopathy, no asymmetry, masses, or scars and thyroid normal to palpation  RESP: lungs clear to auscultation - no rales, rhonchi or wheezes  CV: regular rate and rhythm, normal S1 S2, no S3 or S4, no murmur, click or rub, no peripheral edema and peripheral pulses strong  ABDOMEN: soft, nontender, no hepatosplenomegaly, no masses and bowel sounds normal  MS: no gross " musculoskeletal defects noted, no edema    Lab on 11/29/2021   Component Date Value Ref Range Status     Hemoglobin A1C 11/29/2021 7.7* 0.0 - 5.6 % Final    Normal <5.7%   Prediabetes 5.7-6.4%    Diabetes 6.5% or higher     Note: Adopted from ADA consensus guidelines.     Sodium 11/29/2021 138  133 - 144 mmol/L Final     Potassium 11/29/2021 4.0  3.4 - 5.3 mmol/L Final     Chloride 11/29/2021 105  94 - 109 mmol/L Final     Carbon Dioxide (CO2) 11/29/2021 29  20 - 32 mmol/L Final     Anion Gap 11/29/2021 4  3 - 14 mmol/L Final     Urea Nitrogen 11/29/2021 16  7 - 30 mg/dL Final     Creatinine 11/29/2021 0.83  0.52 - 1.04 mg/dL Final     Calcium 11/29/2021 9.3  8.5 - 10.1 mg/dL Final     Glucose 11/29/2021 152* 70 - 99 mg/dL Final     GFR Estimate 11/29/2021 89  >60 mL/min/1.73m2 Final    As of July 11, 2021, eGFR is calculated by the CKD-EPI creatinine equation, without race adjustment. eGFR can be influenced by muscle mass, exercise, and diet. The reported eGFR is an estimation only and is only applicable if the renal function is stable.     Hepatitis C Antibody 11/29/2021 Nonreactive  Nonreactive Final     Cholesterol 11/29/2021 217* <200 mg/dL Final     Triglycerides 11/29/2021 186* <150 mg/dL Final     Direct Measure HDL 11/29/2021 59  >=50 mg/dL Final     LDL Cholesterol Calculated 11/29/2021 121* <=100 mg/dL Final     Non HDL Cholesterol 11/29/2021 158* <130 mg/dL Final     Patient Fasting > 8hrs? 11/29/2021 Yes   Final     Creatinine Urine mg/dL 11/29/2021 287  mg/dL Final     Albumin Urine mg/L 11/29/2021 15  mg/L Final     Albumin Urine mg/g Cr 11/29/2021 5.23  0.00 - 25.00 mg/g Cr Final

## 2021-12-10 ENCOUNTER — OFFICE VISIT (OUTPATIENT)
Dept: DERMATOLOGY | Facility: CLINIC | Age: 41
End: 2021-12-10
Payer: COMMERCIAL

## 2021-12-10 VITALS — SYSTOLIC BLOOD PRESSURE: 135 MMHG | DIASTOLIC BLOOD PRESSURE: 86 MMHG | OXYGEN SATURATION: 97 % | HEART RATE: 104 BPM

## 2021-12-10 DIAGNOSIS — L73.2 HIDRADENITIS SUPPURATIVA: ICD-10-CM

## 2021-12-10 DIAGNOSIS — L30.9 ECZEMA, UNSPECIFIED TYPE: ICD-10-CM

## 2021-12-10 PROCEDURE — 99213 OFFICE O/P EST LOW 20 MIN: CPT | Performed by: PHYSICIAN ASSISTANT

## 2021-12-10 RX ORDER — TRIAMCINOLONE ACETONIDE 1 MG/G
CREAM TOPICAL
Qty: 45 G | Refills: 6 | Status: SHIPPED | OUTPATIENT
Start: 2021-12-10

## 2021-12-10 RX ORDER — CLINDAMYCIN PHOSPHATE 10 UG/ML
LOTION TOPICAL
Qty: 60 ML | Refills: 11 | Status: SHIPPED | OUTPATIENT
Start: 2021-12-10 | End: 2023-03-23

## 2021-12-10 NOTE — LETTER
12/10/2021         RE: Lianna Sorto  [address]  [facility] [address]        Dear Colleague,    Thank you for referring your patient, Lianna Sorto, to the [facility]. Please see a copy of my visit note below.    Lianna Sorto is an extremely pleasant 41 year old year old female patient here today for recheck hidradenitis and eczema. She notes she uses bpo was and clindamycin lotion. She also notes she will use triamcinolone if needed for eczema. Her skin has been well controlled with topical medications.  Patient has no other skin complaints today.  Remainder of the HPI, Meds, PMH, Allergies, FH, and SH was reviewed in chart.    Past Medical History:   Diagnosis Date     Abnormal Pap smear of cervix     [place]-Benign     Acute tonsillitis     treated with antibiotics     Condylomata 2011     Endometritis     s/p SAB, treated with antibiotics     Hidradenitis 7/3/2009     Intussusception (H)     surgically repaired     Unspecified trauma to perineum and vulva, unspecified as to episode of care in pregnancy     Vaginal and uterine tear with internal bleeding, scarring.         Past Surgical History:   Procedure Laterality Date     C  DELIVERY ONLY  2009    arrest of dilation at 6 cm, low transverse uterine incision     SURGICAL HISTORY OF -       oral surgery     VAGINA SURGERY      Fell off deck, internal bleeding.        Family History   Problem Relation Age of Onset     Heart Disease Mother      Arthritis Mother      Neurologic Disorder Mother      C.A.D. Mother      Gastrointestinal Disease Mother      Musculoskeletal Disorder Mother      Diabetes Mother      Coronary Artery Disease Mother         has had 2 heart attacks before the age of 55     Hypertension Mother      Hyperlipidemia Mother      Depression Mother      Mental Illness Mother      Osteoporosis Mother      Heart Disease Maternal Grandmother      Breast Cancer  Maternal Grandmother 42     Heart Disease Maternal Grandfather      C.A.D. Maternal Grandfather      Cerebrovascular Disease Maternal Grandfather      Diabetes Maternal Grandfather      Breast Cancer Other      Cerebrovascular Disease Other      C.A.D. Other      Diabetes Other      Hypertension Other      Musculoskeletal Disorder Other      Unknown/Adopted Father      Unknown/Adopted Paternal Grandmother      Unknown/Adopted Paternal Grandfather      Allergies Son      Coronary Artery Disease Other         My uncle just  of a heart attack 1n February     Obesity Other      Asthma No family hx of      Cancer - colorectal No family hx of      Prostate Cancer No family hx of        Social History     Socioeconomic History     Marital status:      Spouse name: Not on file     Number of children: 2     Years of education: Not on file     Highest education level: Not on file   Occupational History     Occupation:      Employer: [employer]   Tobacco Use     Smoking status: Former Smoker     Packs/day: 0.50     Years: 10.00     Pack years: 5.00     Quit date: 9/15/2021     Years since quittin.2     Smokeless tobacco: Never Used     Tobacco comment: about a pack a week   Vaping Use     Vaping Use: Never used   Substance and Sexual Activity     Alcohol use: Yes     Comment: Avg. twice per month     Drug use: No     Sexual activity: Not Currently     Partners: Male     Birth control/protection: Pill     Comment: , committed new partner   Other Topics Concern     Parent/sibling w/ CABG, MI or angioplasty before 65F 55M? Yes     Comment: my mom, 5 of her brothers and her dad all had heart attacks   Social History Narrative     Not on file     Social Determinants of Health     Financial Resource Strain: Not on file   Food Insecurity: Not on file   Transportation Needs: Not on file   Physical Activity: Not on file   Stress: Not on file   Social Connections: Not on file    Intimate Partner Violence: Not on file   Housing Stability: Not on file       Outpatient Encounter Medications as of 12/10/2021   Medication Sig Dispense Refill     blood glucose (NO BRAND SPECIFIED) test strip Use to test blood sugar 2 times daily or as directed. To accompany: Blood Glucose Monitor Brands: per insurance. 100 each 11     blood glucose monitoring (NO BRAND SPECIFIED) meter device kit Use to test blood sugar 2 times daily or as directed. Preferred blood glucose meter OR supplies to accompany: Blood Glucose Monitor Brands: per insurance. 1 kit 0     buPROPion (WELLBUTRIN XL) 150 MG 24 hr tablet Take 1 tablet (150 mg) by mouth every morning 30 tablet 3     cetirizine (ZYRTEC) 10 MG tablet Take 10 mg by mouth daily       chlorhexidine (HIBICLENS) 4 % liquid Use to wash groin daily. 118 mL 11     clindamycin (CLINDAMAX) 1 % external lotion Apply to affected areas twice daily 60 mL 11     cyclobenzaprine (FLEXERIL) 10 MG tablet Take 1 tablet (10 mg) by mouth 3 times daily as needed for muscle spasms 30 tablet 5     famotidine (PEPCID) 20 MG tablet Take 1 tablet (20 mg) by mouth 2 times daily 60 tablet 3     fluticasone (FLONASE) 50 MCG/ACT nasal spray Spray 2 sprays into both nostrils daily 16 g 0     lisdexamfetamine (VYVANSE) 60 MG capsule Take 1 capsule (60 mg) by mouth every morning refill 28 days after last fill 30 capsule 0     metFORMIN (GLUCOPHAGE-XR) 500 MG 24 hr tablet Take 1 tablet (500 mg) by mouth daily (with dinner) 90 tablet 3     order for DME Equipment being ordered: Dynaflex insert 2 Units 0     SETLAKIN 0.15-0.03 MG tablet TAKE 1 TABLET BY MOUTH DAILY 91 tablet 3     spironolactone (ALDACTONE) 100 MG tablet TAKE ONE TABLET BY MOUTH ONCE DAILY 90 tablet 1     thin (NO BRAND SPECIFIED) lancets Use with lanceting device. To accompany: Blood Glucose Monitor Brands: per insurance. 100 each 11     triamcinolone (KENALOG) 0.1 % external cream Apply to affected areas twice daily for 2 weeks,  then as needed only 45 g 6     [DISCONTINUED] clindamycin (CLINDAMAX) 1 % external lotion Apply to affected areas twice daily 60 mL 2     [DISCONTINUED] triamcinolone (KENALOG) 0.1 % external cream Apply to affected areas twice daily for 2 weeks, then as needed only 45 g 2     No facility-administered encounter medications on file as of 12/10/2021.             O:   NAD, WDWN, Alert & Oriented, Mood & Affect wnl, Vitals stable   Here today alone   /86 (BP Location: Right arm, Cuff Size: Adult Large)   Pulse 104   SpO2 97%    General appearance normal   Vitals stable   Alert, oriented and in no acute distress     No visible inflammatory nodules   Mild eczematous plaques on hands    Eyes: Conjunctivae/lids:Normal     ENT: Lips: normal    MSK:Normal    Pulm: Breathing Normal    Neuro/Psych: Orientation:Alert and Orientedx3 ; Mood/Affect:normal   A/P:  1. Hidradenitis Suppurativa  Sent in hibiclens.   Apply clindamycin lotion twice daily as needed.   2. Hand eczema   Continue moisturizers 2-3 times daily.   Apply tac if needed.    Return in one year or sooner if needed.       Again, thank you for allowing me to participate in the care of your patient.        Sincerely,        Angie Beal PA-C

## 2021-12-13 NOTE — PROGRESS NOTES
Lianna Sorto is an extremely pleasant 41 year old year old female patient here today for recheck hidradenitis and eczema. She notes she uses bpo was and clindamycin lotion. She also notes she will use triamcinolone if needed for eczema. Her skin has been well controlled with topical medications.  Patient has no other skin complaints today.  Remainder of the HPI, Meds, PMH, Allergies, FH, and SH was reviewed in chart.    Past Medical History:   Diagnosis Date     Abnormal Pap smear of cervix     [place]-Benign     Acute tonsillitis     treated with antibiotics     Condylomata 2011     Endometritis     s/p SAB, treated with antibiotics     Hidradenitis 7/3/2009     Intussusception (H)     surgically repaired     Unspecified trauma to perineum and vulva, unspecified as to episode of care in pregnancy     Vaginal and uterine tear with internal bleeding, scarring.         Past Surgical History:   Procedure Laterality Date     C  DELIVERY ONLY  2009    arrest of dilation at 6 cm, low transverse uterine incision     SURGICAL HISTORY OF -       oral surgery     VAGINA SURGERY      Fell off deck, internal bleeding.        Family History   Problem Relation Age of Onset     Heart Disease Mother      Arthritis Mother      Neurologic Disorder Mother      C.A.D. Mother      Gastrointestinal Disease Mother      Musculoskeletal Disorder Mother      Diabetes Mother      Coronary Artery Disease Mother         has had 2 heart attacks before the age of 55     Hypertension Mother      Hyperlipidemia Mother      Depression Mother      Mental Illness Mother      Osteoporosis Mother      Heart Disease Maternal Grandmother      Breast Cancer Maternal Grandmother 42     Heart Disease Maternal Grandfather      C.A.D. Maternal Grandfather      Cerebrovascular Disease Maternal Grandfather      Diabetes Maternal Grandfather      Breast Cancer Other      Cerebrovascular Disease Other      C.A.D. Other       Diabetes Other      Hypertension Other      Musculoskeletal Disorder Other      Unknown/Adopted Father      Unknown/Adopted Paternal Grandmother      Unknown/Adopted Paternal Grandfather      Allergies Son      Coronary Artery Disease Other         My uncle just  of a heart attack 1n February     Obesity Other      Asthma No family hx of      Cancer - colorectal No family hx of      Prostate Cancer No family hx of        Social History     Socioeconomic History     Marital status:      Spouse name: Not on file     Number of children: 2     Years of education: Not on file     Highest education level: Not on file   Occupational History     Occupation:      Employer: [employer]   Tobacco Use     Smoking status: Former Smoker     Packs/day: 0.50     Years: 10.00     Pack years: 5.00     Quit date: 9/15/2021     Years since quittin.2     Smokeless tobacco: Never Used     Tobacco comment: about a pack a week   Vaping Use     Vaping Use: Never used   Substance and Sexual Activity     Alcohol use: Yes     Comment: Avg. twice per month     Drug use: No     Sexual activity: Not Currently     Partners: Male     Birth control/protection: Pill     Comment: , committed new partner   Other Topics Concern     Parent/sibling w/ CABG, MI or angioplasty before 65F 55M? Yes     Comment: my mom, 5 of her brothers and her dad all had heart attacks   Social History Narrative     Not on file     Social Determinants of Health     Financial Resource Strain: Not on file   Food Insecurity: Not on file   Transportation Needs: Not on file   Physical Activity: Not on file   Stress: Not on file   Social Connections: Not on file   Intimate Partner Violence: Not on file   Housing Stability: Not on file       Outpatient Encounter Medications as of 12/10/2021   Medication Sig Dispense Refill     blood glucose (NO BRAND SPECIFIED) test strip Use to test blood sugar 2 times daily or as directed.  To accompany: Blood Glucose Monitor Brands: per insurance. 100 each 11     blood glucose monitoring (NO BRAND SPECIFIED) meter device kit Use to test blood sugar 2 times daily or as directed. Preferred blood glucose meter OR supplies to accompany: Blood Glucose Monitor Brands: per insurance. 1 kit 0     buPROPion (WELLBUTRIN XL) 150 MG 24 hr tablet Take 1 tablet (150 mg) by mouth every morning 30 tablet 3     cetirizine (ZYRTEC) 10 MG tablet Take 10 mg by mouth daily       chlorhexidine (HIBICLENS) 4 % liquid Use to wash groin daily. 118 mL 11     clindamycin (CLINDAMAX) 1 % external lotion Apply to affected areas twice daily 60 mL 11     cyclobenzaprine (FLEXERIL) 10 MG tablet Take 1 tablet (10 mg) by mouth 3 times daily as needed for muscle spasms 30 tablet 5     famotidine (PEPCID) 20 MG tablet Take 1 tablet (20 mg) by mouth 2 times daily 60 tablet 3     fluticasone (FLONASE) 50 MCG/ACT nasal spray Spray 2 sprays into both nostrils daily 16 g 0     lisdexamfetamine (VYVANSE) 60 MG capsule Take 1 capsule (60 mg) by mouth every morning refill 28 days after last fill 30 capsule 0     metFORMIN (GLUCOPHAGE-XR) 500 MG 24 hr tablet Take 1 tablet (500 mg) by mouth daily (with dinner) 90 tablet 3     order for DME Equipment being ordered: Dynaflex insert 2 Units 0     SETLAKIN 0.15-0.03 MG tablet TAKE 1 TABLET BY MOUTH DAILY 91 tablet 3     spironolactone (ALDACTONE) 100 MG tablet TAKE ONE TABLET BY MOUTH ONCE DAILY 90 tablet 1     thin (NO BRAND SPECIFIED) lancets Use with lanceting device. To accompany: Blood Glucose Monitor Brands: per insurance. 100 each 11     triamcinolone (KENALOG) 0.1 % external cream Apply to affected areas twice daily for 2 weeks, then as needed only 45 g 6     [DISCONTINUED] clindamycin (CLINDAMAX) 1 % external lotion Apply to affected areas twice daily 60 mL 2     [DISCONTINUED] triamcinolone (KENALOG) 0.1 % external cream Apply to affected areas twice daily for 2 weeks, then as needed only  45 g 2     No facility-administered encounter medications on file as of 12/10/2021.             O:   NAD, WDWN, Alert & Oriented, Mood & Affect wnl, Vitals stable   Here today alone   /86 (BP Location: Right arm, Cuff Size: Adult Large)   Pulse 104   SpO2 97%    General appearance normal   Vitals stable   Alert, oriented and in no acute distress     No visible inflammatory nodules   Mild eczematous plaques on hands    Eyes: Conjunctivae/lids:Normal     ENT: Lips: normal    MSK:Normal    Pulm: Breathing Normal    Neuro/Psych: Orientation:Alert and Orientedx3 ; Mood/Affect:normal   A/P:  1. Hidradenitis Suppurativa  Sent in hibiclens.   Apply clindamycin lotion twice daily as needed.   2. Hand eczema   Continue moisturizers 2-3 times daily.   Apply tac if needed.    Return in one year or sooner if needed.

## 2021-12-14 ENCOUNTER — VIRTUAL VISIT (OUTPATIENT)
Dept: PSYCHIATRY | Facility: CLINIC | Age: 41
End: 2021-12-14
Payer: COMMERCIAL

## 2021-12-14 DIAGNOSIS — F33.0 MILD RECURRENT MAJOR DEPRESSION (H): ICD-10-CM

## 2021-12-14 DIAGNOSIS — F98.8 ATTENTION DEFICIT DISORDER OF ADULT: ICD-10-CM

## 2021-12-14 PROCEDURE — 99214 OFFICE O/P EST MOD 30 MIN: CPT | Mod: 95 | Performed by: NURSE PRACTITIONER

## 2021-12-14 RX ORDER — LISDEXAMFETAMINE DIMESYLATE 60 MG/1
60 CAPSULE ORAL EVERY MORNING
Qty: 30 CAPSULE | Refills: 0 | Status: SHIPPED | OUTPATIENT
Start: 2021-12-14 | End: 2022-01-24

## 2021-12-14 RX ORDER — BUPROPION HYDROCHLORIDE 150 MG/1
150 TABLET ORAL EVERY MORNING
Qty: 30 TABLET | Refills: 3 | Status: SHIPPED | OUTPATIENT
Start: 2021-12-14 | End: 2022-03-07

## 2021-12-14 NOTE — PATIENT INSTRUCTIONS
1.  Continue Vyvanse 60 mg daily    2.  Buspirone discontinued    3.  Continue Wellbutrin  mg daily        Continue all other medications as reviewed per electronic medical record today.     Safety plan reviewed. To the Emergency Department as needed or call after hours crisis line at [phone] or [phone]. [place] Crisis Text Line. Text [place] to 023884 or Suicide LifeLine Chat: suicideHazelTree.org/chat/    To schedule individual or family therapy, call [facility] at [phone].    Schedule an appointment with me in 3 months or sooner as needed. Call [facility] at [phone] to schedule.    Follow up with primary care provider as planned or for acute medical concerns.    Call the psychiatric nurse line with medication questions or concerns at [phone].    [employer] may be used to communicate with your provider, but this is not intended to be used for emergencies.    Crisis Resources:    National Suicide Prevention Lifeline: 122.384.4237 (TTY: 667.130.5537). Call anytime for help.  (www.suicidepreventionlifeline.org)  [employer] [place] [employer]ness (www.[place].org): 703.161.3982 or 224-395-8717.   Mental Health Association (www.mentalhealth.org): 534.892.9223 or 683-472-1397.  [place] Crisis Text Line: Text [place] to 388787  Suicide LifeLine Chat: suicideHazelTree.org/chat    Administrative Billing:   Time spent with patient was 30 minutes and greater than 50% of time or 20 minutes was spent in counseling and coordination of care regarding above diagnoses and treatment plan.

## 2021-12-14 NOTE — PROGRESS NOTES
"Lianna is a 41 year old who is being evaluated via a billable telephone visit.      What phone number would you like to be contacted at? [phone]  How would you like to obtain your AVS? Mandapadmini  Phone call duration: j25 minutes      Outpatient Psychiatric Progress Note    Name: Lianna Sorto   : 1980                    Primary Care Provider: DANA Amos CNP   Therapist: none     PHQ-9 scores:  PHQ-9 SCORE 2021   PHQ-9 Total Score - - -   PHQ-9 Total Score 8 7 7       FAUSTINO-7 scores:  FAUSTINO-7 SCORE 2021   Total Score - - -   Total Score 3 0 4       Patient Identification:    Patient is a 41 year old year old, single  White American female  who presents for return visit with me.  Patient is currently employed full time. Patient attended the session alone. Patient prefers to be called: \"Lianna\".    Current medications include: blood glucose (NO BRAND SPECIFIED) test strip, Use to test blood sugar 2 times daily or as directed. To accompany: Blood Glucose Monitor Brands: per insurance.  blood glucose monitoring (NO BRAND SPECIFIED) meter device kit, Use to test blood sugar 2 times daily or as directed. Preferred blood glucose meter OR supplies to accompany: Blood Glucose Monitor Brands: per insurance.  buPROPion (WELLBUTRIN XL) 150 MG 24 hr tablet, Take 1 tablet (150 mg) by mouth every morning  cetirizine (ZYRTEC) 10 MG tablet, Take 10 mg by mouth daily  chlorhexidine (HIBICLENS) 4 % liquid, Use to wash groin daily.  clindamycin (CLINDAMAX) 1 % external lotion, Apply to affected areas twice daily  cyclobenzaprine (FLEXERIL) 10 MG tablet, Take 1 tablet (10 mg) by mouth 3 times daily as needed for muscle spasms  famotidine (PEPCID) 20 MG tablet, Take 1 tablet (20 mg) by mouth 2 times daily  fluticasone (FLONASE) 50 MCG/ACT nasal spray, Spray 2 sprays into both nostrils daily  lisdexamfetamine (VYVANSE) 60 MG capsule, Take 1 capsule (60 mg) by mouth every " morning refill 28 days after last fill  metFORMIN (GLUCOPHAGE-XR) 500 MG 24 hr tablet, Take 1 tablet (500 mg) by mouth daily (with dinner)  order for DME, Equipment being ordered: Dynaflex insert  SETLAKIN 0.15-0.03 MG tablet, TAKE 1 TABLET BY MOUTH DAILY  spironolactone (ALDACTONE) 100 MG tablet, TAKE ONE TABLET BY MOUTH ONCE DAILY  thin (NO BRAND SPECIFIED) lancets, Use with lanceting device. To accompany: Blood Glucose Monitor Brands: per insurance.  triamcinolone (KENALOG) 0.1 % external cream, Apply to affected areas twice daily for 2 weeks, then as needed only    No current facility-administered medications on file prior to visit.       The [place] Prescription Monitoring Program has been reviewed and there are no concerns about diversionary activity for controlled substances at this time.      I was able to review most recent Primary Care Provider, specialty provider, and therapy visit notes that I have access to.     Today, patient reports that the cancer clinic will be getting new providers.  For two weeks she had neck and shoulder pain with jaw tightness with new  Medication trial.  She stopped the buspirone.  Lianna continues to overeat at times, related to emotional stress.   She reports no sleep concerns.  Focus and concentration are stable.  Depression is episodic and mild.     has a past medical history of Abnormal Pap smear of cervix (2011), Acute tonsillitis (2008), Condylomata (4/7/2011), Endometritis (2008), Hidradenitis (7/3/2009), Intussusception (H) (1983), and Unspecified trauma to perineum and vulva, unspecified as to episode of care in pregnancy (1985).She has no past medical history of Basal cell carcinoma or Squamous cell carcinoma.    Social history updates:    She is a single parent with mild financial stress    Substance use updates:    No alcohol use reported  Tobacco use: Yes E-cigarettes  Ready to quit?  No  Nicotine Replacement Therapy tried: none     Vital Signs:   There were no  vitals taken for this visit.    Labs:    Most recent laboratory results reviewed and no new labs.     Mental Status Examination:  Appearance:  awake, alert  Attitude:  cooperative   Eye Contact:  unable to assess  Gait and Station: No assistive Devices used and No dizziness or falls  Psychomotor Behavior:  unable to assess  Oriented to:  time, person, and place  Attention Span and Concentration:  Normal  Speech:   clear, coherent and Speaks: English  Mood:  anxious, depressed and better  Affect:  appropriate and in normal range  Associations:  no loose associations  Thought Process:  goal oriented  Thought Content:  no evidence of suicidal ideation or homicidal ideation, no auditory hallucinations present and no visual hallucinations present  Recent and Remote Memory:  intact Not formally assessed. No amnesia.  Fund of Knowledge: appropriate  Insight:  good  Judgment:  intact  Impulse Control:  intact    Suicide Risk Assessment:  Today Lianna Sorto reports that she has no thoughts to want to harm herself or other people. In addition, there are notable risk factors for self-harm, including single status, anxiety and mood change. However, risk is mitigated by commitment to family, history of seeking help when needed, future oriented, denies suicidal intent or plan and denies homicidal ideation, intent, or plan. Therefore, based on all available evidence including the factors cited above, Lianna Sorto does not appear to be at imminent risk for self-harm, does not meet criteria for a 72-hr hold, and therefore remains appropriate for ongoing outpatient level of care.  A thorough assessment of risk factors related to suicide and self-harm have been reviewed and are noted above. The patient convincingly denies suicidality on several occasions. Local community safety resources printed and reviewed for patient to use if needed. There was no deceit detected, and the patient presented in a manner that was believable.      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  296.31 (F33.0) Major Depressive Disorder, Recurrent Episode, Mild _ and With mixed features  300.02 (F41.1) Generalized Anxiety Disorder  307.51 (F50.8) Binge-Eating Disorder In partial remission  Severity: Mild    Medical comorbidities include:   Patient Active Problem List    Diagnosis Date Noted     Morbid obesity (H) 11/02/2020     Priority: Medium     Diabetes mellitus, type 2 (H) 08/07/2020     Priority: Medium     Moderate episode of recurrent major depressive disorder (H) 09/22/2017     Priority: Medium     Controlled substance agreement signed 09/28/2016     Priority: Medium     Hidradenitis suppurativa 11/02/2015     Priority: Medium     Anxiety 06/02/2014     Priority: Medium     Pelvic pain in female 11/13/2013     Priority: Medium     Fibromyalgia 05/28/2013     Priority: Medium     Piriformis syndrome 12/12/2012     Priority: Medium     Scapular dyskinesis 03/13/2012     Priority: Medium     Varicose veins of legs 07/13/2011     Priority: Medium     Sacroiliac pain 07/13/2011     Priority: Medium     Back muscle spasm 06/06/2011     Priority: Medium     Tension headache 06/06/2011     Priority: Medium     CARDIOVASCULAR SCREENING; LDL GOAL LESS THAN 130 06/06/2011     Priority: Medium     Atypical squamous cells of undetermined significance (ASCUS) on Papanicolaou smear of cervix 03/22/2011     Priority: Medium     3/22/11: Pap - ASCUS, + HPV 16. Plan colp.  4/7/11:[place]--benign. Repeat pap 6 months. Reminder placed in epic.   11/22/11:Pap--ASCUS +(low risk) HPV type 54. Rpt pap in 1 yr. In HM, ep, prob list, hx updated. Reminder sent.  2/20/13:Pap--NIL. Pap 1 year. Reminder placed in EPIC  3/18/14: Pap - NIL, Neg HPV. Repeat pap 3 yrs.   6/7/2017 Pap: ASCUS, neg HPV. Plan to repeat Pap+HPV cotesting in 3 yrs (2020)  11/2/20 NIL Pap, Neg HPV. Plan cotest in 5 years.          DDD  (degenerative disc disease), cervical 03/10/2011     Priority: Medium     Acne 03/10/2011     Priority: Medium     Attention deficit disorder of adult 09/22/2010     Priority: Medium     Allergic rhinitis 03/30/2007     Priority: Medium     Problem list name updated by automated process. Provider to review         Assessment:    Lianna Sorto decided to stop taking the buspirone as she found it ineffective in controlling her anxiety.  Vyvanse is keeping her  Steady so as to function at work and at home.  Wellbutrin XL at 150 mg daily will help with episodic depression episodes that come about during stressful situations.  .  Medication side effects and alternatives were reviewed. Health promotion activities recommended and reviewed today. All questions addressed. Education and counseling completed regarding risks and benefits of medications and psychotherapy options.    Treatment Plan:      1.  Continue Vyvanse 60 mg daily    2.  Buspirone discontinued    3.  Continue Wellbutrin  mg daily        Continue all other medications as reviewed per electronic medical record today.     Safety plan reviewed. To the Emergency Department as needed or call after hours crisis line at [phone] or [phone]. [place] Crisis Text [place]. Text [place] to 947289 or Suicide LifeLine Chat: suicidepreventionlifeline.org/chat/    To schedule individual or family therapy, call [facility] at [phone].    Schedule an appointment with me in 3 months or sooner as needed. Call [facility] at [phone] to schedule.    Follow up with primary care provider as planned or for acute medical concerns.    Call the psychiatric nurse line with medication questions or concerns at [phone].    [employer] may be used to communicate with your provider, but this is not intended to be used for emergencies.    Crisis Resources:    National Suicide Prevention Lifeline: 304.456.9229 (TTY: 224.837.5752). Call  anytime for help.  (www.suicidepreventionlifeline.org)  National [place] on Mental Illness (www.sina.org): 281.248.1939 or 218-327-8234.   Mental Health Association (www.mentalhealth.org): 280.749.2771 or 279-620-6462.  [place] Crisis Text Line: Text [place] to 716466  Suicide LifeLine Chat: suicideVeratect.org/chat    Administrative Billing:   Time spent with patient was spent in counseling and coordination of care regarding above diagnoses and treatment plan.    Patient Status:  Patient will continue to be seen for ongoing consultation and stabilization.    Signed:   ELIANA Jeffrey-BC   Psychiatry

## 2021-12-21 NOTE — PATIENT INSTRUCTIONS
1.  Continue Vyvanse 60 mg daily    2.  Continue Wellbutrin  mg daily        Continue all other medications as reviewed per electronic medical record today.     Safety plan reviewed. To the Emergency Department as needed or call after hours crisis line at [phone] or [phone]. [place] Crisis Text [place]. Text [place] to 318033 or Suicide LifeLine Chat: suicideArchetype Media.org/chat/    To schedule individual or family therapy, call [facility] at [phone].    Schedule an appointment with me in 3 months or sooner as needed. Call [facility] at [phone] to schedule.    Follow up with primary care provider as planned or for acute medical concerns.    Call the psychiatric nurse line with medication questions or concerns at [phone].    [employer] may be used to communicate with your provider, but this is not intended to be used for emergencies.    Crisis Resources:    National Suicide Prevention Lifeline: 120.671.1039 (TTY: 113.839.2521). Call anytime for help.  (www.suicidepreventionlifeline.org)  National [place] on Mental Illness (www.sina.org): 441.724.3656 or 076-605-2211.   Mental Health Association (www.mentalhealth.org): 700.614.9553 or 762-990-4134.  [place] Crisis Text [place]: Text [place] to 486737  Suicide LifeLine Chat: suicideArchetype Media.org/chat

## 2022-01-08 ENCOUNTER — HEALTH MAINTENANCE LETTER (OUTPATIENT)
Age: 42
End: 2022-01-08

## 2022-01-23 ENCOUNTER — HOSPITAL ENCOUNTER (EMERGENCY)
Facility: CLINIC | Age: 42
Discharge: HOME | End: 2022-01-23
Attending: PHYSICIAN ASSISTANT | Admitting: PHYSICIAN ASSISTANT
Payer: COMMERCIAL

## 2022-01-23 VITALS
HEIGHT: 65 IN | WEIGHT: 225 LBS | DIASTOLIC BLOOD PRESSURE: 75 MMHG | RESPIRATION RATE: 22 BRPM | BODY MASS INDEX: 37.49 KG/M2 | TEMPERATURE: 97.6 F | OXYGEN SATURATION: 98 % | SYSTOLIC BLOOD PRESSURE: 112 MMHG | HEART RATE: 93 BPM

## 2022-01-23 DIAGNOSIS — S61.219A LACERATION OF MULTIPLE SITES OF RIGHT HAND AND FINGERS, INITIAL ENCOUNTER: ICD-10-CM

## 2022-01-23 DIAGNOSIS — S61.411A LACERATION OF MULTIPLE SITES OF RIGHT HAND AND FINGERS, INITIAL ENCOUNTER: ICD-10-CM

## 2022-01-23 PROCEDURE — 12032 INTMD RPR S/A/T/EXT 2.6-7.5: CPT | Performed by: PHYSICIAN ASSISTANT

## 2022-01-23 PROCEDURE — 99213 OFFICE O/P EST LOW 20 MIN: CPT | Mod: 25 | Performed by: PHYSICIAN ASSISTANT

## 2022-01-23 PROCEDURE — 250N000009 HC RX 250

## 2022-01-23 PROCEDURE — G0463 HOSPITAL OUTPT CLINIC VISIT: HCPCS | Mod: 25 | Performed by: PHYSICIAN ASSISTANT

## 2022-01-23 PROCEDURE — 250N000013 HC RX MED GY IP 250 OP 250 PS 637: Performed by: PHYSICIAN ASSISTANT

## 2022-01-23 RX ORDER — CEPHALEXIN 500 MG/1
500 CAPSULE ORAL 2 TIMES DAILY
Qty: 10 CAPSULE | Refills: 0 | Status: SHIPPED | OUTPATIENT
Start: 2022-01-23 | End: 2022-01-28

## 2022-01-23 RX ORDER — CEPHALEXIN 500 MG/1
500 CAPSULE ORAL ONCE
Status: COMPLETED | OUTPATIENT
Start: 2022-01-23 | End: 2022-01-23

## 2022-01-23 RX ADMIN — CEPHALEXIN 500 MG: 500 CAPSULE ORAL at 22:54

## 2022-01-23 ASSESSMENT — ENCOUNTER SYMPTOMS: WOUND: 1

## 2022-01-23 ASSESSMENT — MIFFLIN-ST. JEOR: SCORE: 1696.47

## 2022-01-24 ENCOUNTER — PATIENT OUTREACH (OUTPATIENT)
Dept: FAMILY MEDICINE | Facility: CLINIC | Age: 42
End: 2022-01-24
Payer: COMMERCIAL

## 2022-01-24 NOTE — ED NOTES
Chart accessed to assist pt with a referral for hand therapy. Pt instructed to call primary provider for hand therapy referral.

## 2022-01-24 NOTE — ED PROVIDER NOTES
"  History     Chief Complaint   Patient presents with     Laceration     R hand      HPI  Lianna Sorto is a 41 year old female who presents today with laceration to the right 2nd and 3rd fingers after a mirror slipped and cut fingers. Patient states significant pain, but denies any concerns for foreign body.  Patient states some tingling, but no numbness.  Last tetanus was in 2019.    Allergies:  Allergies   Allergen Reactions     Topiramate Rash     Celexa [Citalopram Hydrobromide] GI Disturbance     Morphine      Polymyxin B Other (See Comments)     Polymixin eye drops  \"severe burning\"     Vicodin [Hydrocodone-Acetaminophen]      Stomach upset, \"hearing things\", irritability        Problem List:    Patient Active Problem List    Diagnosis Date Noted     Morbid obesity (H) 11/02/2020     Priority: Medium     Diabetes mellitus, type 2 (H) 08/07/2020     Priority: Medium     Moderate episode of recurrent major depressive disorder (H) 09/22/2017     Priority: Medium     Controlled substance agreement signed 09/28/2016     Priority: Medium     Hidradenitis suppurativa 11/02/2015     Priority: Medium     Anxiety 06/02/2014     Priority: Medium     Pelvic pain in female 11/13/2013     Priority: Medium     Fibromyalgia 05/28/2013     Priority: Medium     Piriformis syndrome 12/12/2012     Priority: Medium     Scapular dyskinesis 03/13/2012     Priority: Medium     Varicose veins of legs 07/13/2011     Priority: Medium     Sacroiliac pain 07/13/2011     Priority: Medium     Back muscle spasm 06/06/2011     Priority: Medium     Tension headache 06/06/2011     Priority: Medium     CARDIOVASCULAR SCREENING; LDL GOAL LESS THAN 130 06/06/2011     Priority: Medium     Atypical squamous cells of undetermined significance (ASCUS) on Papanicolaou smear of cervix 03/22/2011     Priority: Medium     3/22/11: Pap - ASCUS, + HPV 16. Plan colp.  4/7/11:[place]--benign. Repeat pap 6 months. Reminder placed in epic.   11/22/11:Pap--ASCUS " +(low risk) HPV type 54. Rpt pap in 1 yr. In , ep, prob list, hx updated. Reminder sent.  13:Pap--NIL. Pap 1 year. Reminder placed in EPIC  3/18/14: Pap - NIL, Neg HPV. Repeat pap 3 yrs.   2017 Pap: ASCUS, neg HPV. Plan to repeat Pap+HPV cotesting in 3 yrs ()  20 NIL Pap, Neg HPV. Plan cotest in 5 years.          DDD (degenerative disc disease), cervical 03/10/2011     Priority: Medium     Acne 03/10/2011     Priority: Medium     Attention deficit disorder of adult 2010     Priority: Medium     Allergic rhinitis 2007     Priority: Medium     Problem list name updated by automated process. Provider to review          Past Medical History:    Past Medical History:   Diagnosis Date     Abnormal Pap smear of cervix      Acute tonsillitis      Condylomata 2011     Endometritis      Hidradenitis 7/3/2009     Intussusception (H)      Unspecified trauma to perineum and vulva, unspecified as to episode of care in pregnancy 1985       Past Surgical History:    Past Surgical History:   Procedure Laterality Date     SURGICAL HISTORY OF -       oral surgery     VAGINA SURGERY  1985    Fell off deck, internal bleeding.     [facility]  DELIVERY ONLY  2009    arrest of dilation at 6 cm, low transverse uterine incision       Family History:    Family History   Problem Relation Age of Onset     Heart Disease Mother      Arthritis Mother      Neurologic Disorder Mother      C.A.D. Mother      Gastrointestinal Disease Mother      Musculoskeletal Disorder Mother      Diabetes Mother      Coronary Artery Disease Mother         has had 2 heart attacks before the age of 55     Hypertension Mother      Hyperlipidemia Mother      Depression Mother      Mental Illness Mother      Osteoporosis Mother      Heart Disease Maternal Grandmother      Breast Cancer Maternal Grandmother 42     Heart Disease Maternal Grandfather      C.A.D. Maternal Grandfather      Cerebrovascular Disease  Maternal Grandfather      Diabetes Maternal Grandfather      Breast Cancer Other      Cerebrovascular Disease Other      C.A.D. Other      Diabetes Other      Hypertension Other      Musculoskeletal Disorder Other      Unknown/Adopted Father      Unknown/Adopted Paternal Grandmother      Unknown/Adopted Paternal Grandfather      Allergies Son      Coronary Artery Disease Other         My uncle just  of a heart attack 1n February     Obesity Other      Asthma No family hx of      Cancer - colorectal No family hx of      Prostate Cancer No family hx of        Social History:  Marital Status:   [4]  Social History     Tobacco Use     Smoking status: Former Smoker     Packs/day: 0.50     Years: 10.00     Pack years: 5.00     Quit date: 9/15/2021     Years since quittin.3     Smokeless tobacco: Never Used     Tobacco comment: about a pack a week   Vaping Use     Vaping Use: Never used   Substance Use Topics     Alcohol use: Yes     Comment: Avg. twice per month     Drug use: No        Medications:    cephALEXin (KEFLEX) 500 MG capsule  blood glucose (NO BRAND SPECIFIED) test strip  blood glucose monitoring (NO BRAND SPECIFIED) meter device kit  buPROPion (WELLBUTRIN XL) 150 MG 24 hr tablet  cetirizine (ZYRTEC) 10 MG tablet  chlorhexidine (HIBICLENS) 4 % liquid  clindamycin (CLINDAMAX) 1 % external lotion  cyclobenzaprine (FLEXERIL) 10 MG tablet  famotidine (PEPCID) 20 MG tablet  fluticasone (FLONASE) 50 MCG/ACT nasal spray  lisdexamfetamine (VYVANSE) 60 MG capsule  metFORMIN (GLUCOPHAGE-XR) 500 MG 24 hr tablet  order for DME  SETLAKIN 0.15-0.03 MG tablet  spironolactone (ALDACTONE) 100 MG tablet  thin (NO BRAND SPECIFIED) lancets  triamcinolone (KENALOG) 0.1 % external cream          Review of Systems   Skin: Positive for wound (lacerations to 2nd and 3rd fingers right hand ).   All other systems reviewed and are negative.      Physical Exam   BP: 112/75  Pulse: 93  Temp: 97.6  F (36.4  C)  Resp: 22  "(encourgaed to slow breathing)  Height: 165.1 cm (5' 5\")  Weight: 102.1 kg (225 lb)  SpO2: 98 %      Physical Exam  Vitals and nursing note reviewed.   Constitutional:       General: She is in acute distress.      Appearance: Normal appearance. She is normal weight. She is not ill-appearing, toxic-appearing or diaphoretic.   Cardiovascular:      Pulses: Normal pulses.   Skin:     General: Skin is warm.      Capillary Refill: Capillary refill takes less than 2 seconds.      Comments: 2 flap lacerations to the dorsum of the right hand on the second and third fingers. 2nd finger flap laceration 5cm in length with no bony or tendon visualized. No nailbed injury. 3rd finger with 1.5cm flap laceration. Patient neurovascularly intact with full range of motion with and without resistance.  No foreign body and no concerns for bony involvement   Neurological:      General: No focal deficit present.      Mental Status: She is alert and oriented to person, place, and time.      Sensory: No sensory deficit.      Motor: No weakness.      Gait: Gait normal.   Psychiatric:         Mood and Affect: Mood normal.         Behavior: Behavior normal.         Thought Content: Thought content normal.         Judgment: Judgment normal.             ED Course                 [facility]    -Laceration Repair    Date/Time: 1/23/2022 10:17 PM  Performed by: Farrah Fraser PA-C  Authorized by: Farrah Fraser PA-C     Risks, benefits and alternatives discussed.      ANESTHESIA (see MAR for exact dosages):     Anesthesia method:  Local infiltration    Local anesthetic:  Bupivacaine 0.5% w/o epi  LACERATION DETAILS     Location:  Finger    Finger location: right 2nd and 3rd fingers     Wound length (cm): 1.5cm laceration to dorsum of 3rd finger and 5cm laceration to the 2nd finger.    REPAIR TYPE:     Repair type:  Intermediate      EXPLORATION:     Wound exploration: wound explored through full range of " motion and entire depth of wound probed and visualized      Wound extent: no foreign body, no nerve damage, no tendon damage and no underlying fracture      Contaminated: no      TREATMENT:     Area cleansed with:  Hibiclens and saline    Amount of cleaning:  Extensive    Irrigation method:  Syringe    SKIN REPAIR     Repair method:  Sutures    Suture size:  5-0    Suture material:  Nylon    Suture technique:  Simple interrupted    Number of sutures:  21    APPROXIMATION     Approximation:  Close    POST-PROCEDURE DETAILS     Dressing:  Bulky dressing        PROCEDURE  Describe Procedure: Patient with normal capillary refill pre and post suture placement.   Patient Tolerance:  Patient tolerated the procedure well with no immediate complications               Critical Care time:  none               No results found for this or any previous visit (from the past 24 hour(s)).    Medications   cephALEXin (KEFLEX) capsule 500 mg (has no administration in time range)       Assessments & Plan (with Medical Decision Making)     I have reviewed the nursing notes.    I have reviewed the findings, diagnosis, plan and need for follow up with the patient.    Lianna Sorto is a 41 year old female who presents today with laceration to the right 2nd and 3rd fingers after a mirror slipped and cut fingers. Patient states significant pain, but denies any concerns for foreign body.  Patient states some tingling, but no numbness.  Last tetanus was in 2019. Patient is left hand dominant.     See exam findings and image above.  2 lacerations to the second and third finger of the right hand.  20 1 sutures placed in total to the 2 fingers.  Bacitracin and bulky dressing applied.  Patient sent home with aluminum foam finger splints to use for the next few days to prevent damage to the sutures.  Patient also given referral for hand specialist for further evaluation management due to extensive flap laceration with no obvious findings for  tendon involvement, but would like recheck to make sure wound is healing well.  Patient is given single dose of Keflex here in the emergency department and sent home with prescription to use for the next 5 days prophylactically to prevent secondary infection.  Tylenol and ibuprofen, ice, rest, elevate and return to the emergency department if symptoms worsen or change or signs or symptoms of infection occur.  These were discussed with patient and given on discharge paperwork.  Patient discharged in stable condition in agreement with plan.  Sutures out in 11 days    Discharge Medication List as of 1/23/2022 10:28 PM      START taking these medications    Details   cephALEXin (KEFLEX) 500 MG capsule Take 1 capsule (500 mg) by mouth 2 times daily for 5 days, Disp-10 capsule, R-0, E-Prescribe             Final diagnoses:   Laceration of multiple sites of right hand and fingers, initial encounter       1/23/2022   [facility] EMERGENCY DEPT     Farrah Fraser PA-C  01/23/22 0938

## 2022-01-24 NOTE — ED TRIAGE NOTES
Moving large mirror and it broke she cut her hand on the mirror    Bleeding controlled at triage    Thinks up to date with Tdap

## 2022-01-24 NOTE — DISCHARGE INSTRUCTIONS
After care instructions:  Keep wound clean and dry for the next 24-48 hours  Sutures out in 11 days  Signs of infection discussed today  Apply anti-bacterial ointment for 3 days  May return to work as long as wound is kept clean and dry  Discussed the probability of scarring  Active range of motion exercises encouraged  Aluminum finger splint to apply tomorrow after you remove bulky dressing; wear for next 2-3 days.     Use Medication as directed      Return to clinic sooner or go to Emergency Room if symptoms worsen or change or signs of infection occur (redness, red streaking, warmth, increased pain, increased swelling, purulent drainage, or fevers occur.)    May use acetaminophen, ibuprofen as needed.    Patient voiced understanding of instructions given.      Follow up with orthopedic specialist for recheck of fingers and laceration in 2-4 days. Referral placed.

## 2022-01-24 NOTE — ED PROVIDER NOTES
Called patient at 10:50am to see how she was doing this morning.  She states sensation is feeling good in the fingers and she has not taken off the dressing yet, but feels like the pain is well managed and under control.  She is planning to change the dressings today.  She states she will follow up with her provider or the orthopedic specialist in a day or 2 for recheck of the wound.  Informed her that if there is pain out of proportion, persistent significant numbness, or discoloration to the finger that would be concerning for blood flow concerns that she needs to be seen in the emergency department right away since the wound was so deep and there were several sutures placed however I did reassure her that last night she had good capillary refill.  She states that she will return or follow-up if she has any concerns.  Patient was very happy that I called to check in on her this morning.    AMY Kowalski Jenna L, PA-C  01/24/22 [id number]

## 2022-01-25 ENCOUNTER — OFFICE VISIT (OUTPATIENT)
Dept: FAMILY MEDICINE | Facility: CLINIC | Age: 42
End: 2022-01-25
Payer: COMMERCIAL

## 2022-01-25 VITALS
DIASTOLIC BLOOD PRESSURE: 90 MMHG | TEMPERATURE: 98.6 F | SYSTOLIC BLOOD PRESSURE: 136 MMHG | RESPIRATION RATE: 14 BRPM | WEIGHT: 225 LBS | HEIGHT: 65 IN | BODY MASS INDEX: 37.49 KG/M2 | OXYGEN SATURATION: 96 % | HEART RATE: 105 BPM

## 2022-01-25 DIAGNOSIS — S61.212A LACERATION OF RIGHT MIDDLE FINGER WITHOUT FOREIGN BODY WITHOUT DAMAGE TO NAIL, INITIAL ENCOUNTER: ICD-10-CM

## 2022-01-25 DIAGNOSIS — S61.210A LACERATION OF RIGHT INDEX FINGER WITHOUT FOREIGN BODY WITHOUT DAMAGE TO NAIL, INITIAL ENCOUNTER: Primary | ICD-10-CM

## 2022-01-25 PROCEDURE — 99214 OFFICE O/P EST MOD 30 MIN: CPT | Performed by: NURSE PRACTITIONER

## 2022-01-25 ASSESSMENT — PATIENT HEALTH QUESTIONNAIRE - PHQ9: SUM OF ALL RESPONSES TO PHQ QUESTIONS 1-9: 7

## 2022-01-25 ASSESSMENT — MIFFLIN-ST. JEOR: SCORE: 1696.47

## 2022-01-25 ASSESSMENT — PAIN SCALES - GENERAL: PAINLEVEL: MODERATE PAIN (5)

## 2022-01-25 NOTE — TELEPHONE ENCOUNTER
"  ED for acute condition Discharge Protocol    \"Hi, my name is Erin Whiteside RN, a registered nurse, and I am calling from [facility].  I am calling to follow up and see how things are going for you after your recent emergency visit.\"    Tell me how you are doing now that you are home?\" fine      Discharge Instructions    \"Let's review your discharge instructions.  What is/are the follow-up recommendations?  Pt. Response: has followed up already    \"Has an appointment with your primary care provider been scheduled?\"  Yes. (confirm and remind to bring meds)    Medications    \"Tell me what changed about your medicines when you discharged?\"    none    \"What questions do you have about your medications?\"   None        Call Summary    \"What questions or concerns do you have about your recent visit and your follow-up care?\"     none    \"If you have questions or things don't continue to improve, we encourage you contact us through the main clinic number (give number).  Even if the clinic is not open, triage nurses are available 24/7 to help you.     We would like you to know that our clinic has extended hours (provide information).  We also have urgent care (provide details on closest location and hours/contact info)\"    \"Thank you for your time and take care!\"                "

## 2022-01-25 NOTE — PATIENT INSTRUCTIONS
Patient Education     Suture Care    Sutures or stitches are used to close wounds. Sutures also help stop bleeding and speed healing. To help your wound heal, follow the tips on this handout.  Types of sutures  Some sutures need to be removed by a healthcare provider. These are called nonabsorbable sutures. Others dissolve on their own and will not need to be removed These are called absorbable sutures. Sometimes strips of tape, staples, or skin glue (adhesives) are used. You ll be told what kind of sutures you have.  The type of sutures you have are called: ______________________________________  Keep sutures clean    Don't do things that could cause dirt or sweat to get on your sutures. If needed, cover your sutures with a bandage to protect them.    Don t pick at scabs. They help protect the wound.    Don t wash the area around your sutures unless your healthcare provider says it s OK. Then, follow their instructions for washing and drying.    Keep sutures dry    Keep your sutures out of water.    Take a sponge bath to prevent getting your sutures and wound wet, unless your healthcare provider tells you otherwise.    Ask your provider when can you take a shower or bathe.    Ask your provider about the best way to keep your sutures dry when bathing or showering.    If sutures get damp, pat them dry.    Changing your dressing  Leave the dressing in place until you are told to remove it or change it. Change it only as directed, using clean hands:    After the first ___hours, change your dressing every ___hours.    Change your dressing if it gets wet or dirty. Apply antibiotic ointment again if directed by your provider.  Other tips    To help wounds on an arm or leg heal, use the affected limb as little as possible.    To help reduce swelling and throbbing, raise the area with sutures above your heart.    To help prevent itching, cover sutures with gauze. If sutures itch, try not to scratch them.    For pain relief,  try acetaminophen or ibuprofen. Don t use aspirin. It can increase bleeding.    For skin glue or skin tape: Don't pick or scratch the area. The tape or glue will fall out on its own in several days.    For absorbable sutures: These sutures dissolve on their own. Your provider can tell you how long this will take. You don't need to return to have your sutures removed.      For removable sutures or staples: You will need to be seen to have your sutures removed. Call your provider or return to have your sutures removed in ________days.    When to call your healthcare provider  Call your healthcare provider if you notice any of the following signs:    Increased soreness, pain, or tenderness after 24 hours    A red streak, increased redness, or puffiness near the wound    White, yellowish, or bad smelling discharge from the wound    Bleeding that can t be stopped by applying pressure    Adhesive strips fall off or stitches dissolve before the wound heals    Fever of 100.4 F (38.0 C) or higher, or as directed by your provider  [employer] last reviewed this educational content on 6/1/2019 2000-2021 [employer]. All rights reserved. This information is not intended as a substitute for professional medical care. Always follow your healthcare professional's instructions.    Patient Education     Hand Laceration: All Closures  A laceration is a cut through the skin. Deep cuts usually require stitches. Minor cuts may be closed with surgical tape or skin adhesive.    X-rays may be done if something may have entered the skin through the cut, such as broken glass. You may also be given a tetanus shot if you are not up to date on this vaccination and the object that cut you may carry tetanus.     Home care    Follow all instructions for taking medicines that your healthcare provider may prescribe.  ? Your healthcare provider may prescribe an antibiotic. This is to help prevent infection. Take the medicine every day  until it's gone or you are told to stop. You should not have any left over.  ? Your healthcare provider may prescribe medicine for pain. Know how and when to take this medicine.    The healthcare provider may prescribe medicines for pain. Follow instructions for taking them.    Follow the healthcare provider s instructions on how to care for the cut.    Keep the wound clean and dry. Don't get the wound wet until you are told it's OK to do so. If the bandage gets wet, remove it. Gently pat the wound dry with a clean cloth. Then put on a clean, dry bandage.    To help prevent infection, wash your hands with soap and water before and after caring for the wound.     Caring for stitches: Once you no longer need to keep the stitches dry, clean the wound daily. First, remove the bandage. Then wash the area gently with soap and warm water, or as directed by the healthcare provider. Use a wet cotton swab to loosen and remove any blood or crust that forms. After cleaning, apply a thin layer of antibiotic ointment if advised. Then put on a new bandage unless you are told not to.    Caring for skin glue: Don t put any liquid, ointment, or cream on the wound while the glue is in place. It's OK to shower but don't submerge under water for at least 7 days. Avoid activities that cause heavy sweating. Protect the wound from sunlight. Don't scratch, rub, or pick at the adhesive film. Don't place tape directly over the film. The glue should peel off by itself within 5 to 10 days. Call your provider if you have skin blistering or excessive itching.    Caring for surgical tape: Keep the area dry. If it gets wet, blot it dry with a clean towel. Surgical tape usually falls off within 7 to 10 days. If it has not fallen off after 10 days, you can take it off yourself. Put mineral oil or petroleum jelly on a cotton ball and gently rub the tape until it's removed.    Shower as usual once you can get the wound wet, but don't soak the wound in  water. This means no tub baths or swimming.    Check the wound daily for signs of infection listed below. Even with proper treatment, a wound infection may sometimes occur.    Follow-up care  Follow up with your healthcare provider, or as advised. If you have stitches, be sure to return as directed to have them removed.   When to seek medical advice  Call your healthcare provider right away if any of these occur:    Wound bleeding not controlled by direct pressure    Signs of infection, including increasing pain in the wound, increasing wound redness or swelling, or pus or bad odor coming from the wound    Fever of 100.4 F (38. C) or higher, or as directed by your healthcare provider    Chills    Stitches come apart or fall out or surgical tape falls off before 7 days    Wound edges reopen    Wound changes colors    Numbness or weakness in the affected hand     Decreased movement of the hand  Ricarda last reviewed this educational content on 6/1/2020 2000-2021 [employer]. All rights reserved. This information is not intended as a substitute for professional medical care. Always follow your healthcare professional's instructions.

## 2022-01-25 NOTE — PROGRESS NOTES
"  Assessment & Plan     Laceration of right index finger without foreign body without damage to nail, initial encounter    Laceration of right middle finger without foreign body without damage to nail, initial encounter    Well approximated sutures in place for acute laceration of the right index and middle finger. 21 sutures total in place. No acute signs of infection. No acute drainage noted. Assisted in re-dressing wounds. High risk for poor healing due to diabetes. Monitor for necrosis.                BMI:   Estimated body mass index is 37.44 kg/m  as calculated from the following:    Height as of this encounter: 1.651 m (5' 5\").    Weight as of this encounter: 102.1 kg (225 lb).       Return in about 9 days (around 2/3/2022) for ongoing symptoms if not improving.    DANA Pichardo CNP  [facility]    Cleopatra Dsouza is a 41 year old who presents for the following health issues     HPI     ED/UC Followup:    Facility:  [facility] Emergency Dept  Date of visit: 1/23/22  Reason for visit: Laceration of multiple sites of right hand and fingers  Current Status: doing much better     See ER notes from 1/23. Follow up laceration. Doing well. Denies loss of feeling/ reduced sensation of the finger. Taking antibiotics and doing well. ROM reduced mildly in the right index finger due to swelling. Strength is equal.     PMH of type 2 diabetes, obesity, and depression.     Review of Systems   Constitutional, HEENT, cardiovascular, pulmonary, gi and gu systems are negative, except as otherwise noted.      Objective    BP (!) 136/90 (BP Location: Right arm, Patient Position: Chair, Cuff Size: Adult Large)   Pulse 105   Temp 98.6  F (37  C) (Tympanic)   Resp 14   Ht 1.651 m (5' 5\")   Wt 102.1 kg (225 lb)   LMP  (LMP Unknown)   SpO2 96%   Breastfeeding No   BMI 37.44 kg/m    Body mass index is 37.44 kg/m .     Physical Exam   GENERAL: healthy, alert and no " distress  SKIN: laceration of the right index and middle finger. See photo. Good capilliary refill. ROM limited due to swelling. Sensation intact.

## 2022-02-03 ENCOUNTER — OFFICE VISIT (OUTPATIENT)
Dept: FAMILY MEDICINE | Facility: CLINIC | Age: 42
End: 2022-02-03
Payer: COMMERCIAL

## 2022-02-03 VITALS
BODY MASS INDEX: 37.49 KG/M2 | HEIGHT: 65 IN | TEMPERATURE: 97.1 F | DIASTOLIC BLOOD PRESSURE: 90 MMHG | HEART RATE: 95 BPM | RESPIRATION RATE: 14 BRPM | WEIGHT: 225 LBS | SYSTOLIC BLOOD PRESSURE: 130 MMHG | OXYGEN SATURATION: 98 %

## 2022-02-03 DIAGNOSIS — S61.210A LACERATION OF RIGHT INDEX FINGER WITHOUT FOREIGN BODY WITHOUT DAMAGE TO NAIL, INITIAL ENCOUNTER: ICD-10-CM

## 2022-02-03 DIAGNOSIS — Z48.02 VISIT FOR SUTURE REMOVAL: Primary | ICD-10-CM

## 2022-02-03 DIAGNOSIS — S61.212A LACERATION OF RIGHT MIDDLE FINGER WITHOUT FOREIGN BODY WITHOUT DAMAGE TO NAIL, INITIAL ENCOUNTER: ICD-10-CM

## 2022-02-03 PROCEDURE — 99213 OFFICE O/P EST LOW 20 MIN: CPT | Performed by: NURSE PRACTITIONER

## 2022-02-03 ASSESSMENT — PAIN SCALES - GENERAL: PAINLEVEL: NO PAIN (1)

## 2022-02-03 ASSESSMENT — MIFFLIN-ST. JEOR: SCORE: 1696.47

## 2022-02-03 NOTE — PROGRESS NOTES
"  Assessment & Plan       ICD-10-CM    1. Visit for suture removal  Z48.02 SUTURE REMOVAL TRAY   2. Laceration of right index finger without foreign body without damage to nail, initial encounter  S61.210A SUTURE REMOVAL TRAY   3. Laceration of right middle finger without foreign body without damage to nail, initial encounter  S61.212A SUTURE REMOVAL TRAY     Significant improvement noted of the lacerations of the right middle and index finger. Removed sutures today. Good approximation noted nearly over entire wound. Placed Steri-Strips to help with ongoing healing. Continue moisturization. Follow-up if symptoms are not improving. No acute signs of infection.             BMI:   Estimated body mass index is 37.44 kg/m  as calculated from the following:    Height as of this encounter: 1.651 m (5' 5\").    Weight as of this encounter: 102.1 kg (225 lb).           Return in about 5 days (around 2/8/2022) for ongoing symptoms if not improving.    DANA Pichardo [facility]   Lianna is a 41 year old who presents for the following health issues     HPI   Chief Complaint   Patient presents with     RECHECK     fingers on right hand possibly remove stitches     Patient presenting to recheck laceration on the right hand/fingers secondary to near falling. Patient saw me 2 days after injury occurred on 1/25. Patient has noted progressive improvement in the wound. She denies any bruising or drainage coming from the suture sites. She has not had any systemic symptoms of fever, body aches, or chills and swelling has improved.  Would like to have sutures removed today if possible.        Review of Systems   Constitutional, HEENT, cardiovascular, pulmonary, gi and gu systems are negative, except as otherwise noted.      Objective    BP (!) 130/90 (BP Location: Right arm, Patient Position: Chair, Cuff Size: Adult Large)   Pulse 95   Temp 97.1  F (36.2  C) (Tympanic)   Resp 14   " "Ht 1.651 m (5' 5\")   Wt 102.1 kg (225 lb)   LMP  (LMP Unknown)   SpO2 98%   Breastfeeding No   BMI 37.44 kg/m    Body mass index is 37.44 kg/m .  Physical Exam   GENERAL: healthy, alert and no distress    SKIN: sutures removed today. Steri strips applied. Good wound approximation noted. No acute erythremia or signs of infection present.    Procedures                      "

## 2022-02-21 DIAGNOSIS — F98.8 ATTENTION DEFICIT DISORDER OF ADULT: ICD-10-CM

## 2022-02-21 NOTE — TELEPHONE ENCOUNTER
Date of Last Office Visit: 12/14/21  Date of Next Office Visit: 3/7/22  No shows since last visit: 0  Cancellations since last visit: 0    Medication requested: lisdexamfetamine (VYVANSE) 60 MG capsule Date last ordered: 1/24/22 Qty: 30 Refills: 0     Review of [place] ?: yes  Medication last filled date: 1/24/22 Qty filled: 30  Other controlled substance on [place] ?: yes   If yes, is this a new medication?: No       Lapse in medication adherence greater than 5 days?: No  Medication refill request verified as identical to current order?: Yes  Result of Last DAM, VPA, Li+ Level, CBC, or Carbamazepine Level (at or since last visit): N/A    Last visit treatment plan:     1.  Continue Vyvanse 60 mg daily    2.  Buspirone discontinued    3.  Continue Wellbutrin  mg daily         Continue all other medications as reviewed per electronic medical record today.     Safety plan reviewed. To the Emergency Department as needed or call after hours crisis line at [phone] or [phone]. [place] Crisis Text Line. Text [place] to 731305 or Suicide LifeLine Chat: suicidepreventionlifeline.org/chat/    To schedule individual or family therapy, call [facility] at [phone].    Schedule an appointment with me in 3 months or sooner as needed. Call [facility] at [phone] to schedule.      []Medication refilled per  Medication Refill in Ambulatory Care  policy.  [x]Medication unable to be refilled by RN due to criteria not met as indicated below:    []Eligibility - not seen in the last year   []Supervision - no future appointment   []Compliance - no shows, cancellations or lapse in therapy   []Verification - order discrepancy   [x]Controlled medication   [x]Medication not included in policy   []90-day supply request   []Other

## 2022-02-22 RX ORDER — LISDEXAMFETAMINE DIMESYLATE 60 MG/1
60 CAPSULE ORAL EVERY MORNING
Qty: 30 CAPSULE | Refills: 0 | Status: SHIPPED | OUTPATIENT
Start: 2022-02-22 | End: 2022-03-07

## 2022-03-04 NOTE — PROGRESS NOTES
"[facility] Collaborative Care Psychiatry Service  March 7, 2022      Behavioral Health Clinician Progress Note    Patient Name: Lianna Sorto      Telemedicine Visit: The patient's condition can be safely assessed and treated via synchronous audio and visual telemedicine encounter.      Reason for Telemedicine Visit: Services only offered telehealth    Originating Site (Patient Location): Patient's home    Distant Site (Provider Location): Provider Remote Setting- Home Office    Consent:  The patient/guardian has verbally consented to: the potential risks and benefits of telemedicine (video visit) versus in person care; bill my insurance or make self-payment for services provided; and responsibility for payment of non-covered services.     Mode of Communication:  phone    As the provider I attest to compliance with applicable laws and regulations related to telemedicine.    \"We have found that certain health care needs can be provided without the need for a face to face visit.? This service lets us provide the care you need with a phone conversation.?      I will have full access to your [facility] medical record during this entire phone call.?? I will be taking notes for your medical record.    Since this is like an office visit, we will bill your insurance company for this service.?      There are potential benefits and risks of telephone visits (e.g. limits to patient confidentiality) that differ from in-person visits.?Confidentiality still applies for telephone services, and nobody will record the visit.? It is important to be in a quiet, private space that is free of distractions (including cell phone or other devices) during the visit.??      If during the course of the call I believe a telephone visit is not appropriate, you will not be charged for this service\"     Consent has been obtained for this service by care team member: Yes          Service Type:  Individual      Service Location:   " "Phone call (patient / identified key support person reached)     Session Start Time: 815am  Session End Time: 838am      Session Length: 16 - 37      Attendees: Patient    Visit Activities (Refresh list every visit): [facility] Only    Diagnostic Assessment Date: 10/15/2019 Alicia Mcphersonsaratheresa- paperwork is sent to pt to update next meeting, pt is aware of this    Treatment Plan Review Date: 06/07/2022  See Flowsheets for today's PHQ-9 and FAUSTINO-7 results  Previous PHQ-9:   PHQ-9 SCORE 4/30/2021 7/23/2021 1/25/2022   PHQ-9 Total Score - - -   PHQ-9 Total Score 7 7 7     Previous FAUSTINO-7:   FAUSTINO-7 SCORE 2/25/2021 4/30/2021 7/23/2021   Total Score - - -   Total Score 3 0 4       DATA  Extended Session (60+ minutes): No  Interactive Complexity: No  Crisis: No  [facility] Patient: No    Treatment Objective(s) Addressed in This Session:  Target Behavior(s): disease management/lifestyle changes diet, focus    Attention Problems: will develop coping skills to effectively manage attention issues    Current Stressors / Issues:  Worse/Better/Same: Pt was on Vyvanse and they are over eating. Pt states that their anxiety and depression is related to the situation like deadlines and being around crowds.   Side Effects: none  Mood: \"good when things are running smooth unless stressful and then experience anxiety.\"  Appetite: is over eating and is down to 2 meals and a snack   Sleep: gets 6 hour a night  Pregnancy: No  Suicidality: Pt denies  Self-harm: Pt denies   Substance Use: e-cig occasional   Caffeine: 10oz cup on weekends, 1 cup at work   Therapist: no therapy   Interventions: supportive therapy and encourage pt to locate healthy recipes and keep food record  Medication Questions/Requests: none        Progress on Treatment Objective(s) / Homework:  Satisfactory progress - ACTION (Actively working towards change); Intervened by reinforcing change plan / affirming steps taken    Motivational Interviewing    MI Intervention: Co-Developed Goal: " improve diet and eating, Expressed Empathy/Understanding, Supported Autonomy, Collaboration, Evocation, Permission to raise concern or advise, Open-ended questions, Reflections: simple and complex, Change talk (evoked) and Reframe     Change Talk Expressed by the Patient: Reasons to change Need to change Committment to change Activation Taking steps    Provider Response to Change Talk: E - Evoked more info from patient about behavior change, A - Affirmed patient's thoughts, decisions, or attempts at behavior change, R - Reflected patient's change talk and S - Summarized patient's change talk statements    Also provided psychoeducation about behavioral health condition, symptoms, and treatment options    Care Plan review completed: Yes    Medication Review:  No changes to current psychiatric medication(s)    Medication Compliance:  Yes    Changes in Health Issues:   None reported    Chemical Use Review:   Substance Use: Chemical use reviewed, no active concerns identified      Tobacco Use: No current tobacco use.      Assessment: Current Emotional / Mental Status (status of significant symptoms):  Risk status (Self / Other harm or suicidal ideation)  Patient denies a history of suicidal ideation, suicide attempts, self-injurious behavior, homicidal ideation, homicidal behavior and and other safety concerns  Patient denies current fears or concerns for personal safety.  Patient denies current or recent suicidal ideation or behaviors.  Patient denies current or recent homicidal ideation or behaviors.  Patient denies current or recent self injurious behavior or ideation. Pt denies   Patient denies other safety concerns.  A safety and risk management plan has not been developed at this time, however patient was encouraged to call [facility] should there be a change in any of these risk factors.    Appearance:     Eye Contact:     Psychomotor Behavior: Normal   Attitude:   Cooperative    Orientation:   All  Speech   Rate / Production: Normal    Volume:  Normal   Mood:    Normal  Affect:    Appropriate   Thought Content:  Clear   Thought Form:  Coherent  Logical   Insight:    Good     Diagnoses:  1. Moderate episode of recurrent major depressive disorder (H)    2. FAUSTINO (generalized anxiety disorder)        Collateral Reports Completed:  Communicated with:   [facility]    Plan: (Homework, other):  Patient was given information about behavioral services and instructed to schedule a follow up appointment with the [facility] in conjunction with next [facility] appointment.  in 1 month.  She was also given information about mental health symptoms and treatment options .  CD Recommendations: No indications of CD issues. Mariam Turcios, JOSE, [facility]        ______________________________________________________________________    [facility] Psychiatry Service    Patient's Name: Lianna Sorto  YOB: 1980    Date of Creation: 03/07/2022  Date Treatment Plan Last Reviewed/Revised: 03/07/2022    DSM5 Diagnoses: 296.32 (F33.1) Major Depressive Disorder, Recurrent Episode, Moderate _ or 300.02 (F41.1) Generalized Anxiety Disorder  Psychosocial / Contextual Factors: ADHD, diet, stressful work   PROMIS (reviewed every 90 days): 32    Referral / Collaboration:  Referral to another professional/service is not indicated at this time.    Anticipated number of session for this episode of care: 4-6  Anticipation frequency of session: Monthly  Anticipated Duration of each session: 16-37 minutes  Treatment plan will be reviewed in 90 days or when goals have been changed.       MeasurableTreatment Goal(s) related to diagnosis / functional impairment(s)  Goal 1: Patient will eat healthier    I will know I've met my goal when I am eating healthier.      Objective #A (Patient Action)    Patient will make healthier eating choices monthly and  report progress each meeting.   Status: New - Date: 3/7/2022     Intervention(s)  Therapist will encourage pt to research healthy meals that their family would eat and to possibly use an meagan to track food choices through CBT and MI. [facility] will provide support and education.     Patient has reviewed and agreed to the above plan.      ROMMEL Quevedo  March 7, 2022

## 2022-03-05 ENCOUNTER — HEALTH MAINTENANCE LETTER (OUTPATIENT)
Age: 42
End: 2022-03-05

## 2022-03-07 ENCOUNTER — VIRTUAL VISIT (OUTPATIENT)
Dept: BEHAVIORAL HEALTH | Facility: CLINIC | Age: 42
End: 2022-03-07
Payer: COMMERCIAL

## 2022-03-07 ENCOUNTER — VIRTUAL VISIT (OUTPATIENT)
Dept: PSYCHIATRY | Facility: CLINIC | Age: 42
End: 2022-03-07
Payer: COMMERCIAL

## 2022-03-07 DIAGNOSIS — F98.8 ATTENTION DEFICIT DISORDER OF ADULT: ICD-10-CM

## 2022-03-07 DIAGNOSIS — F50.819 BINGE EATING DISORDER: ICD-10-CM

## 2022-03-07 DIAGNOSIS — F41.1 GAD (GENERALIZED ANXIETY DISORDER): ICD-10-CM

## 2022-03-07 DIAGNOSIS — F33.1 MODERATE EPISODE OF RECURRENT MAJOR DEPRESSIVE DISORDER (H): Primary | ICD-10-CM

## 2022-03-07 DIAGNOSIS — F90.0 ADHD (ATTENTION DEFICIT HYPERACTIVITY DISORDER), INATTENTIVE TYPE: ICD-10-CM

## 2022-03-07 DIAGNOSIS — F33.0 MILD RECURRENT MAJOR DEPRESSION (H): Primary | ICD-10-CM

## 2022-03-07 PROCEDURE — 99214 OFFICE O/P EST MOD 30 MIN: CPT | Mod: 95 | Performed by: NURSE PRACTITIONER

## 2022-03-07 PROCEDURE — 90832 PSYTX W PT 30 MINUTES: CPT | Mod: 95 | Performed by: COUNSELOR

## 2022-03-07 RX ORDER — BUPROPION HYDROCHLORIDE 150 MG/1
150 TABLET ORAL EVERY MORNING
Qty: 30 TABLET | Refills: 3 | Status: SHIPPED | OUTPATIENT
Start: 2022-03-07 | End: 2022-06-23

## 2022-03-07 RX ORDER — LISDEXAMFETAMINE DIMESYLATE 60 MG/1
60 CAPSULE ORAL EVERY MORNING
Qty: 30 CAPSULE | Refills: 0 | Status: SHIPPED | OUTPATIENT
Start: 2022-03-07 | End: 2022-04-22

## 2022-03-07 ASSESSMENT — COLUMBIA-SUICIDE SEVERITY RATING SCALE - C-SSRS
6. HAVE YOU EVER DONE ANYTHING, STARTED TO DO ANYTHING, OR PREPARED TO DO ANYTHING TO END YOUR LIFE?: NO
2. HAVE YOU ACTUALLY HAD ANY THOUGHTS OF KILLING YOURSELF?: NO
TOTAL  NUMBER OF INTERRUPTED ATTEMPTS LIFETIME: NO
ATTEMPT LIFETIME: NO
TOTAL  NUMBER OF ABORTED OR SELF INTERRUPTED ATTEMPTS LIFETIME: NO

## 2022-03-07 NOTE — PATIENT INSTRUCTIONS
1.  Continue Vyvanse 60 mg aily    2.  Continue Wellbutrin  mg daily        Continue all other medications as reviewed per electronic medical record today.     Safety plan reviewed. To the Emergency Department as needed or call after hours crisis line at [phone] or [phone]. [place] Crisis Text Line. Text [place] to 744125 or Suicide LifeLine Chat: suicideFarmainstant.org/chat/    To schedule individual or family therapy, call [facility] at [phone].    Schedule an appointment with me in 3 months or sooner as needed. Call [facility] at [phone] to schedule.    Follow up with primary care provider as planned or for acute medical concerns.    Call the psychiatric nurse line with medication questions or concerns at [phone].    Mobifusion may be used to communicate with your provider, but this is not intended to be used for emergencies.    Crisis Resources:    National Suicide Prevention Lifeline: 605.785.4414 (TTY: 795.116.5292). Call anytime for help.  (www.suicidepreventionlifeline.org)  National [place] on Mental Illness (www.sian.org): 326.646.5949 or 855-677-8722.   Mental Health Association (www.mentalhealth.org): 432.267.7957 or 027-313-5219.  [place] Crisis Text [place]: Text [place] to 371547  Suicide LifeLine Chat: suicideFarmainstant.org/chat

## 2022-03-21 ENCOUNTER — TELEPHONE (OUTPATIENT)
Dept: FAMILY MEDICINE | Facility: CLINIC | Age: 42
End: 2022-03-21

## 2022-03-21 DIAGNOSIS — E11.9 TYPE 2 DIABETES MELLITUS WITHOUT COMPLICATION, WITHOUT LONG-TERM CURRENT USE OF INSULIN (H): Primary | ICD-10-CM

## 2022-03-21 DIAGNOSIS — M62.830 BACK MUSCLE SPASM: ICD-10-CM

## 2022-03-21 NOTE — TELEPHONE ENCOUNTER
Routing refill request to provider for review/approval because:  Drug not on the [facility] refill protocol     Pending Prescriptions:                       Disp   Refills    cyclobenzaprine (FLEXERIL) 10 MG tablet [P*30 tab*5        Sig: Take 1 tablet (10 mg) by mouth 3 times daily as           needed for muscle spasms    Angelica Rosario RN on 3/21/2022 at 2:22 PM

## 2022-03-21 NOTE — TELEPHONE ENCOUNTER
Patient Quality Outreach    Patient is due for the following:   Diabetes -  A1C and Diabetic Follow-Up Visit    NEXT STEPS:   Schedule a office visit for labs and diabetes    Type of outreach:    Sent [employer] message. will postpone a few days to see if patient has viewed      Questions for provider review:    None     Alphonso Ruiz, CMA

## 2022-03-21 NOTE — LETTER
April 5, 2022      Lianna Sorto  [address] [place] [address]        Dear Lianna,       Your healthcare team cares about your health. To provide you with the best care,   we have reviewed your chart and based on our findings, we see that you are due to:     - DIABETES FOLLOW UP: Schedule a diabetic follow up appointment as a Office Visit. Patients with diabetes should generally see their provider every 3-6 months.    Schedule a DIABETIC EYE EXAM.  This exam is done with an optometrist. You can schedule this appointment with your eye doctor.  If you need a referral, please let us know.    If you have already completed these items, please contact the clinic via phone or   [facility] so your care team can review and update your records. Thank you for   choosing [facility] for your healthcare needs. For any questions,   concerns, or to schedule an appointment please contact the clinic.       Healthy Regards,      Your [facility] Care Team

## 2022-03-23 RX ORDER — CYCLOBENZAPRINE HCL 10 MG
10 TABLET ORAL 3 TIMES DAILY PRN
Qty: 30 TABLET | Refills: 5 | Status: SHIPPED | OUTPATIENT
Start: 2022-03-23 | End: 2023-07-21

## 2022-03-24 NOTE — TELEPHONE ENCOUNTER
Patient Quality Outreach    Patient is due for the following:   Diabetes -  A1C and Diabetic Follow-Up Visit    NEXT STEPS:   Schedule a lab only visit for Diabetes recheck office visit for Diabetes recheck.    Type of outreach:    Phone, left message for patient/parent to call back.  Will post phone for one week.    Questions for provider review:    None     Brittany Mora CMA

## 2022-04-03 DIAGNOSIS — K21.9 GASTROESOPHAGEAL REFLUX DISEASE WITHOUT ESOPHAGITIS: ICD-10-CM

## 2022-04-05 NOTE — TELEPHONE ENCOUNTER
Patient Quality Outreach    Patient is due for the following:   Diabetes -  A1C    NEXT STEPS:   Schedule a office visit for non-fasting labs: A1C, have lab work done prior to office visit. Also due for diabetic eye exam.    Type of outreach:    Sent letter.      Questions for provider review:    None     Nallely Coulter MA

## 2022-04-06 RX ORDER — FAMOTIDINE 20 MG/1
TABLET, FILM COATED ORAL
Qty: 60 TABLET | Refills: 3 | Status: SHIPPED | OUTPATIENT
Start: 2022-04-06 | End: 2022-08-10

## 2022-04-21 DIAGNOSIS — F98.8 ATTENTION DEFICIT DISORDER OF ADULT: ICD-10-CM

## 2022-04-21 NOTE — TELEPHONE ENCOUNTER
Vyvanse 60mg        Last written prescription date:         Last fill quantity: 30, # refills:         Last office visit:         Future office visit:         Is this a controlled substance?  No       Routing refill request to provider for review/approval because:     Thank You!  María Salazar  Certified Pharmacy Technician  [facility]  P: [phone] F:[phone]

## 2022-04-22 DIAGNOSIS — F98.8 ATTENTION DEFICIT DISORDER OF ADULT: ICD-10-CM

## 2022-04-22 RX ORDER — LISDEXAMFETAMINE DIMESYLATE 60 MG/1
60 CAPSULE ORAL EVERY MORNING
Qty: 30 CAPSULE | Refills: 0 | Status: SHIPPED | OUTPATIENT
Start: 2022-04-22 | End: 2022-05-20

## 2022-04-22 RX ORDER — LISDEXAMFETAMINE DIMESYLATE 60 MG/1
60 CAPSULE ORAL EVERY MORNING
Qty: 30 CAPSULE | Refills: 0 | Status: CANCELLED | OUTPATIENT
Start: 2022-04-22

## 2022-05-19 DIAGNOSIS — F98.8 ATTENTION DEFICIT DISORDER OF ADULT: ICD-10-CM

## 2022-05-20 RX ORDER — LISDEXAMFETAMINE DIMESYLATE 60 MG/1
60 CAPSULE ORAL EVERY MORNING
Qty: 30 CAPSULE | Refills: 0 | Status: SHIPPED | OUTPATIENT
Start: 2022-05-20 | End: 2022-06-23 | Stop reason: DRUGHIGH

## 2022-06-19 ENCOUNTER — HEALTH MAINTENANCE LETTER (OUTPATIENT)
Age: 42
End: 2022-06-19

## 2022-06-23 ENCOUNTER — VIRTUAL VISIT (OUTPATIENT)
Dept: PSYCHIATRY | Facility: CLINIC | Age: 42
End: 2022-06-23
Payer: COMMERCIAL

## 2022-06-23 ENCOUNTER — VIRTUAL VISIT (OUTPATIENT)
Dept: BEHAVIORAL HEALTH | Facility: CLINIC | Age: 42
End: 2022-06-23
Payer: COMMERCIAL

## 2022-06-23 DIAGNOSIS — F41.1 GAD (GENERALIZED ANXIETY DISORDER): ICD-10-CM

## 2022-06-23 DIAGNOSIS — F90.0 ADHD (ATTENTION DEFICIT HYPERACTIVITY DISORDER), INATTENTIVE TYPE: Primary | ICD-10-CM

## 2022-06-23 DIAGNOSIS — F33.0 MILD RECURRENT MAJOR DEPRESSION (H): ICD-10-CM

## 2022-06-23 DIAGNOSIS — F33.1 MODERATE EPISODE OF RECURRENT MAJOR DEPRESSIVE DISORDER (H): Primary | ICD-10-CM

## 2022-06-23 PROCEDURE — 99214 OFFICE O/P EST MOD 30 MIN: CPT | Mod: 25 | Performed by: NURSE PRACTITIONER

## 2022-06-23 PROCEDURE — 90832 PSYTX W PT 30 MINUTES: CPT | Mod: 95 | Performed by: COUNSELOR

## 2022-06-23 RX ORDER — BUPROPION HYDROCHLORIDE 150 MG/1
150 TABLET ORAL EVERY MORNING
Qty: 30 TABLET | Refills: 3 | Status: SHIPPED | OUTPATIENT
Start: 2022-06-23 | End: 2022-08-24 | Stop reason: DRUGHIGH

## 2022-06-23 RX ORDER — LISDEXAMFETAMINE DIMESYLATE 70 MG/1
70 CAPSULE ORAL EVERY MORNING
Qty: 30 CAPSULE | Refills: 0 | Status: SHIPPED | OUTPATIENT
Start: 2022-06-23 | End: 2022-07-18

## 2022-06-23 NOTE — PROGRESS NOTES
"Lianna is a 42 year old who is being evaluated via a billable telephone visit.      What phone number would you like to be contacted at? [phone]  How would you like to obtain your AVS? Beto Catherine VF    Phone call duration: 25 minutes- through [facility]               Outpatient Psychiatric Progress Note    Name: Lianna Sorto   : 1980                    Primary Care Provider: DANA Amos CNP   Therapist: none     PHQ-9 scores:  PHQ-9 SCORE 2021   PHQ-9 Total Score - - -   PHQ-9 Total Score 7 7 7       FAUSTINO-7 scores:  FAUSTINO-7 SCORE 2021   Total Score - - -   Total Score 3 0 4       Patient Identification:    Patient is a 42 year old year old, single  White Choose not to answer female  who presents for return visit with me.  Patient is currently employed full time. Patient attended the session alone. Patient prefers to be called: \"Lianna\".    Current medications include: blood glucose (NO BRAND SPECIFIED) test strip, Use to test blood sugar 2 times daily or as directed. To accompany: Blood Glucose Monitor Brands: per insurance.  blood glucose monitoring (NO BRAND SPECIFIED) meter device kit, Use to test blood sugar 2 times daily or as directed. Preferred blood glucose meter OR supplies to accompany: Blood Glucose Monitor Brands: per insurance.  buPROPion (WELLBUTRIN XL) 150 MG 24 hr tablet, Take 1 tablet (150 mg) by mouth every morning  cetirizine (ZYRTEC) 10 MG tablet, Take 10 mg by mouth daily  chlorhexidine (HIBICLENS) 4 % liquid, Use to wash groin daily.  clindamycin (CLINDAMAX) 1 % external lotion, Apply to affected areas twice daily  cyclobenzaprine (FLEXERIL) 10 MG tablet, TAKE 1 TABLET (10 MG) BY MOUTH 3 TIMES DAILY AS NEEDED FOR MUSCLE SPASMS  famotidine (PEPCID) 20 MG tablet, TAKE ONE TABLET BY MOUTH TWICE A DAY  fluticasone (FLONASE) 50 MCG/ACT nasal spray, Spray 2 sprays into both nostrils daily  lisdexamfetamine (VYVANSE) 60 " MG capsule, Take 1 capsule (60 mg) by mouth every morning refill 28 days after last fill  metFORMIN (GLUCOPHAGE-XR) 500 MG 24 hr tablet, Take 1 tablet (500 mg) by mouth daily (with dinner)  order for DME, Equipment being ordered: Dynaflex insert  SETLAKIN 0.15-0.03 MG tablet, TAKE 1 TABLET BY MOUTH DAILY  spironolactone (ALDACTONE) 100 MG tablet, TAKE ONE TABLET BY MOUTH ONCE DAILY  thin (NO BRAND SPECIFIED) lancets, Use with lanceting device. To accompany: Blood Glucose Monitor Brands: per insurance.  triamcinolone (KENALOG) 0.1 % external cream, Apply to affected areas twice daily for 2 weeks, then as needed only    No current facility-administered medications on file prior to visit.       The [place] Prescription Monitoring Program has been reviewed and there are no concerns about diversionary activity for controlled substances at this time.      I was able to review most recent Primary Care Provider, specialty provider, and therapy visit notes that I have access to.     Today, patient reports that her move came about unexpectedly.  Now she lives in [place].  Her anxiety level is high.  Her job is mentally exhausting.   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  She has been sleeping 3-4 hours of sleep a night.  She is trying to eat more healthy.  She has not been losing weight.  Her Vyvanse wears off after work.  She gets indecisive and this  Takes it longer to move.  She is starting projects and trish finishing.  Her fibromyalgia flares  Up when overusing her muscles.  Her feet and ankles hurt and swell.  She has plantar fascitis.  Surgery is needed.       has a past medical history of Abnormal Pap smear of cervix (2011), Acute tonsillitis (2008), Condylomata (4/7/2011), Endometritis (2008), Hidradenitis (7/3/2009), Intussusception (H) (1983), and Unspecified trauma to perineum and vulva, unspecified as to episode of care in pregnancy (1985).    She has no past medical  history of Basal cell carcinoma or Squamous cell carcinoma.    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      Substance use updates:    No alcohol use  Tobacco use: Yes Cigarettes and E-cigarettes  Ready to quit?  No  Nicotine Replacement Therapy tried: none     Vital Signs:   There were no vitals taken for this visit.    Labs:    Most recent laboratory results reviewed and no new labs.     Mental Status Examination:  Appearance: awake, alert  Attitude: cooperative  Eye Contact:  unable to assess  Gait and Station: No assistive Devices used and No dizziness or falls  Psychomotor Behavior:  unable to assess  Oriented to:  time, person, and place  Attention Span and Concentration:  Normal  Speech:   clear, coherent and Speaks: English  Mood:  anxious  Affect:  intensity is heightened  Associations:  no loose associations  Thought Process:  goal oriented  Thought Content:  no evidence of suicidal ideation or homicidal ideation, no auditory hallucinations present and no visual hallucinations present  Recent and Remote Memory:  intact Not formally assessed. No amnesia.  Fund of Knowledge: appropriate  Insight:  good  Judgment:  intact  Impulse Control:  intact    Suicide Risk Assessment:  Today Lianna Sorto reports no thoughts to harm themself or others. In addition, there are notable risk factors for self-harm, including single status and anxiety. However, risk is mitigated by commitment to family, history of seeking help when needed, future oriented, denies suicidal intent or plan and denies homicidal ideation, intent, or plan. Therefore, based on all available evidence including the factors cited above, Lianna Sorto does not appear to be at imminent risk for self-harm, does not meet criteria for a 72-hr hold, and therefore remains appropriate for ongoing outpatient level of care.  A thorough assessment of risk factors related to suicide and  self-harm have been reviewed and are noted above. The patient convincingly denies suicidality on several occasions. Local community safety resources printed and reviewed for patient to use if needed. There was no deceit detected, and the patient presented in a manner that was believable.     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  Recurrent      Medical comorbidities include:   Patient Active Problem List    Diagnosis Date Noted     Morbid obesity (H) 11/02/2020     Priority: Medium     Diabetes mellitus, type 2 (H) 08/07/2020     Priority: Medium     Moderate episode of recurrent major depressive disorder (H) 09/22/2017     Priority: Medium     Controlled substance agreement signed 09/28/2016     Priority: Medium     Hidradenitis suppurativa 11/02/2015     Priority: Medium     Anxiety 06/02/2014     Priority: Medium     Pelvic pain in female 11/13/2013     Priority: Medium     Fibromyalgia 05/28/2013     Priority: Medium     Piriformis syndrome 12/12/2012     Priority: Medium     Scapular dyskinesis 03/13/2012     Priority: Medium     Varicose veins of legs 07/13/2011     Priority: Medium     Sacroiliac pain 07/13/2011     Priority: Medium     Back muscle spasm 06/06/2011     Priority: Medium     Tension headache 06/06/2011     Priority: Medium     CARDIOVASCULAR SCREENING; LDL GOAL LESS THAN 130 06/06/2011     Priority: Medium     Atypical squamous cells of undetermined significance (ASCUS) on Papanicolaou smear of cervix 03/22/2011     Priority: Medium     3/22/11: Pap - ASCUS, + HPV 16. Plan colp.  4/7/11:[place]--benign. Repeat pap 6 months. Reminder placed in epic.   11/22/11:Pap--ASCUS +(low risk) HPV type 54. Rpt pap in 1 yr. In HM, ep, prob  list, hx updated. Reminder sent.  2/20/13:Pap--NIL. Pap 1 year. Reminder placed in EPIC  3/18/14: Pap - NIL, Neg HPV. Repeat pap 3 yrs.   6/7/2017 Pap: ASCUS, neg HPV. Plan to repeat Pap+HPV cotesting in 3 yrs (2020)  11/2/20 NIL Pap, Neg HPV. Plan cotest in 5 years.          DDD (degenerative disc disease), cervical 03/10/2011     Priority: Medium     Acne 03/10/2011     Priority: Medium     Attention deficit disorder of adult 09/22/2010     Priority: Medium     Allergic rhinitis 03/30/2007     Priority: Medium     Problem list name updated by automated process. Provider to review         Assessment:    Lianna Sorto is in the process of moving.  This occurred unexpectedly as a three-bedroom residents opened up for her in the housing market.  She has family members and friends helping her with her move but it is still creating increased lack of focus and concentration within her.  She tells me her Vyvanse does not seem to be lasting throughout the day.  I increased the dose to maximum at 70 mg daily with instructions to send me a blood pressure reading in 1 week.  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  Sleep continues to be disrupted with the move.  She now has a new bed and hopefully, her fibromyalgia and Planter fasciitis symptoms will lessen to allow her to rest at night.  Wellbutrin will continue at 150 mg daily as her depression seems manageable at this time..    Medication side effects and alternatives were reviewed. Health promotion activities recommended and reviewed today. All questions addressed. Education and counseling completed regarding risks and benefits of medications and psychotherapy options.    Treatment Plan:        1.  Increase Vyvanse to 70 mg daily    2.  Wellbutrin  mg daily    3.  Talk therapy when able to in order to process life stressors.        Continue all other medications as reviewed per electronic medical record today.     Safety plan reviewed. To the  Emergency Department as needed or call after hours crisis line at [phone] or [phone]. [place] Crisis Text Line. Text [place] to 350730 or Suicide LifeLine Chat: suicidePlanandoo.org/chat/    To schedule individual or family therapy, call [facility] at [phone]    Schedule an appointment with me in 2 months or sooner as needed. Call [facility] at [phone] to schedule.    Follow up with primary care provider as planned or for acute medical concerns.    Call the psychiatric nurse line with medication questions or concerns at [phone]    MyChart may be used to communicate with your provider, but this is not intended to be used for emergencies.    Crisis Resources:    National Suicide Prevention Lifeline: 721.617.4362 (TTY: 453.777.6530). Call anytime for help.  (www.suicidepreventionlifeline.org)  National [place] on Mental Illness (www.sina.org): 737.807.3576 or 445-424-4949.   Mental Health Association (www.mentalhealth.org): 204.392.5720 or 622-788-4470.  [place] Crisis Text Line: Text [place] to 304181  Suicide LifeLine Chat: suicidePlanandoo.org/chat    Administrative Billing:   Time spent with patient includes counseling and coordination of care regarding above diagnoses and treatment plan.    Patient Status:  Patient will continue to be seen for ongoing consultation and stabilization.    Signed:   ELIANA Jeffrey-BC   Psychiatry

## 2022-06-23 NOTE — PROGRESS NOTES
"[facility] Collaborative Care Psychiatry Service  2022      Behavioral Health Clinician Progress Note    Patient Name: Lianna Sorto      Telemedicine Visit: The patient's condition can be safely assessed and treated via synchronous audio and visual telemedicine encounter.      Reason for Telemedicine Visit: Services only offered telehealth    Originating Site (Patient Location): Patient's home    Distant Site (Provider Location): Provider Remote Setting- Home Office    Consent:  The patient/guardian has verbally consented to: the potential risks and benefits of telemedicine (video visit) versus in person care; bill my insurance or make self-payment for services provided; and responsibility for payment of non-covered services.   Service Modality: Phone Visit:      Provider verified identity through the following two step process.  Patient provided:  Patient  and Patient is known previously to provider       Mode of Communication:  phone    As the provider I attest to compliance with applicable laws and regulations related to telemedicine.    \"We have found that certain health care needs can be provided without the need for a face to face visit.? This service lets us provide the care you need with a phone conversation.?      I will have full access to your [facility] medical record during this entire phone call.?? I will be taking notes for your medical record.    Since this is like an office visit, we will bill your insurance company for this service.?      There are potential benefits and risks of telephone visits (e.g. limits to patient confidentiality) that differ from in-person visits.?Confidentiality still applies for telephone services, and nobody will record the visit.? It is important to be in a quiet, private space that is free of distractions (including cell phone or other devices) during the visit.??      If during the course of the call I believe a telephone visit is not " "appropriate, you will not be charged for this service\"     Consent has been obtained for this service by care team member: Yes          Service Type:  Individual      Service Location:   Phone call (patient / identified key support person reached)     Session Start Time: 818am  Session End Time: 850am      Session Length: 16 - 37      Attendees: Patient     Visit Activities (Refresh list every visit): [facility] Only    Diagnostic Assessment Date: 10/15/2019 Alicia Smith, will update DA in next couple meetings  Treatment Plan Review Date: 06/07/2022  See Flowsheets for today's PHQ-9 and FAUSTINO-7 results  Previous PHQ-9:   PHQ-9 SCORE 4/30/2021 7/23/2021 1/25/2022   PHQ-9 Total Score - - -   PHQ-9 Total Score 7 7 7     Previous FAUSTINO-7:   FAUSTINO-7 SCORE 2/25/2021 4/30/2021 7/23/2021   Total Score - - -   Total Score 3 0 4       DATA  Extended Session (60+ minutes): No  Interactive Complexity: No  Crisis: No  [facility] Patient: No    Treatment Objective(s) Addressed in This Session:  Target Behavior(s): disease management/lifestyle changes diet, focus     Anxiety: will experience a reduction in anxiety and will develop healthy cognitive patterns and beliefs  Attention Problems: will develop coping skills to effectively manage attention issues    Current Stressors / Issues:   update: Pt states that they have moved and have a deadline to get things cleaned out of the old apartment. Pt has not been managing stress the best and are losing sleep. Pt will have family helping them to come tonight to help. Pt reports that they will start tasks in each room and move around due to the ADHD. Pt says a simple task will take a couple hours. Pt says that they are packing after work and the medication isn't kicking in since it's after. Pt has a prayer group and this has helped them feel grounded.   Stressors: moving and deciding what to keep   Side Effects: none  Mood: frustration with the boys    Appetite: is still over eating, though may have " lost weight since moving  Sleep: getting 3 hours in the last few days due to work and trying to move things     Suicidality: Pt denies   Self-harm: Pt denies  Substance Use: e-cig occasional   Caffeine: coffee and then soda possibly   Therapist: not interested in this time   Interventions:  Encourage pt to locate recipes and healthy meal plan as they want to   Medication Questions/Requests: moved to new place that is bigger- a goal, does it contribute to muscle spasms- before moving noticed more spasms in legs          Progress on Treatment Objective(s) / Homework:  Satisfactory progress - ACTION (Actively working towards change); Intervened by reinforcing change plan / affirming steps taken    Motivational Interviewing    MI Intervention: Co-Developed Goal: improve diet and eating, Expressed Empathy/Understanding, Supported Autonomy, Collaboration, Evocation, Permission to raise concern or advise, Open-ended questions, Reflections: simple and complex, Change talk (evoked) and Reframe     Change Talk Expressed by the Patient: Reasons to change Need to change Committment to change Activation Taking steps    Provider Response to Change Talk: E - Evoked more info from patient about behavior change, A - Affirmed patient's thoughts, decisions, or attempts at behavior change, R - Reflected patient's change talk and S - Summarized patient's change talk statements    Also provided psychoeducation about behavioral health condition, symptoms, and treatment options    Care Plan review completed: Yes    Medication Review:  No changes to current psychiatric medication(s)    Medication Compliance:  Yes    Changes in Health Issues:   None reported    Chemical Use Review:   Substance Use: Chemical use reviewed, no active concerns identified      Tobacco Use: No current tobacco use.      Assessment: Current Emotional / Mental Status (status of significant symptoms):  Risk status (Self / Other harm or suicidal ideation)  Patient  denies a history of suicidal ideation, suicide attempts, self-injurious behavior, homicidal ideation, homicidal behavior and and other safety concerns  Patient denies current fears or concerns for personal safety.  Patient denies current or recent suicidal ideation or behaviors.  Patient denies current or recent homicidal ideation or behaviors.  Patient denies current or recent self injurious behavior or ideation.  Patient denies other safety concerns.  A safety and risk management plan has not been developed at this time, however patient was encouraged to call [place] [phone] should there be a change in any of these risk factors.    Appearance:   Unable to assess-telephone visit  Eye Contact:   Unable to assess-telephone visit  Psychomotor Behavior: Normal   Attitude:   Cooperative   Orientation:   All  Speech   Rate / Production: Normal    Volume:  Normal   Mood:    Normal  Affect:    Appropriate   Thought Content:  Clear   Thought Form:  Coherent  Logical   Insight:    Good     Diagnoses:  1. Moderate episode of recurrent major depressive disorder (H)    2. FAUSTINO (generalized anxiety disorder)        Collateral Reports Completed:  Communicated [facility]:   [facility]    Plan: (Homework, other):  Patient was given information about behavioral services and instructed to schedule a follow up appointment with the [facility] in conjunction with next [facility] appointment.  as needed.  She was also given information about mental health symptoms and treatment options .  CD Recommendations: No indications of CD issues.   JOSE Quevedo, [facility] 06/23/2022      ______________________________________________________________________    [facility] Collaborative Care Psychiatry Service    Patient's Name: Lianna Sorto  YOB: 1980    Date of Creation: 03/07/2022  Date Treatment Plan Last Reviewed/Revised: 06/23/2022    DSM5 Diagnoses: 296.32 (F33.1) Major  Depressive Disorder, Recurrent Episode, Moderate _ or 300.02 (F41.1) Generalized Anxiety Disorder  Psychosocial / Contextual Factors: ADHD, diet, stressful work   PROMIS (reviewed every 90 days): 32, will update next meeting     Referral / Collaboration:  Referral to another professional/service is not indicated at this time.    Anticipated number of session for this episode of care: 4-6  Anticipation frequency of session: Monthly  Anticipated Duration of each session: 16-37 minutes  Treatment plan will be reviewed in 90 days or when goals have been changed.       MeasurableTreatment Goal(s) related to diagnosis / functional impairment(s)  Goal 1: Patient will eat healthier    I will know I've met my goal when I am eating healthier.      Objective #A (Patient Action)    Patient will make healthier eating choices monthly and report progress each meeting.   Status: Completed - Date: 06/23/2022     Intervention(s)  Therapist will encourage pt to research healthy meals that their family would eat and to possibly use an meagan to track food choices through CBT and MI. [facility] will provide support and education.     Patient has reviewed and agreed to the above plan.      ROMMEL Quevedo  June 23, 2022

## 2022-06-26 NOTE — TELEPHONE ENCOUNTER
Attempted to contact patient to discuss refill request, LVM for her to call back. Please transfer to RN if she calls.    Problem: Falls - Risk of  Goal: *Absence of Falls  Description: Document [place] Fall Risk and appropriate interventions in the flowsheet.   Outcome: Progressing Towards Goal  Note: Fall Risk Interventions:  Mobility Interventions: Bed/chair exit alarm,OT consult for ADLs,Patient to call before getting OOB,PT Consult for mobility concerns         Medication Interventions: Bed/chair exit alarm,Patient to call before getting OOB,Teach patient to arise slowly    Elimination Interventions: Bed/chair exit alarm,Call light in reach    History of Falls Interventions: Bed/chair exit alarm

## 2022-07-18 ENCOUNTER — TELEPHONE (OUTPATIENT)
Dept: PSYCHIATRY | Facility: CLINIC | Age: 42
End: 2022-07-18

## 2022-07-18 DIAGNOSIS — F90.0 ADHD (ATTENTION DEFICIT HYPERACTIVITY DISORDER), INATTENTIVE TYPE: ICD-10-CM

## 2022-07-18 NOTE — TELEPHONE ENCOUNTER
Called patient. Patient said that she is going out of town on Thursday and needs the medication filled. Patient said that she has some propranolol left over from her previous prescription. She says that she has the 20mg propranolol available. Patient also said that she is willing to decrease her dose of Vyvanse if need be. Pt. will be out of town (out of the [place] for 4 days.

## 2022-07-18 NOTE — TELEPHONE ENCOUNTER
Called patient and left voice message with provider's directives.    Date of Last Office Visit: 6/23/2022  Date of Next Office Visit: 8/24/2022  No shows since last visit: none  Cancellations since last visit: none    Medication requested: Vyvanse 70 mg capsule Date last ordered: 6/23/2022 Qty: 0 Refills: 0     [place] of [place] ?: yes  Prescriptions  Total: 12  Private Pay: 0  Showing 1-12 of 12 Items View   15 Items    1 of 1   Filled  Drug  QTY  Days  Prescriber     06/23/2022 Vyvanse 70 Mg Capsule   30.00 30 Vi The    05/22/2022 Vyvanse 60 Mg Capsule   30.00 30 Vi The    04/24/2022 Vyvanse 60 Mg Capsule   30.00 30 Vi The    03/23/2022 Vyvanse 60 Mg Capsule   30.00 30 Vi The    02/22/2022 Vyvanse 60 Mg Capsule   30.00 30 Vi The    01/24/2022 Vyvanse 60 Mg Capsule   30.00 30 Vi The         Lapse in medication adherence greater than 5 days?: no  If yes, call patient and gather details: no  Medication refill request verified as identical to current order?: yes  Result of Last DAM, VPA, Li+ Level, CBC, or Carbamazepine Level (at or since last visit): N/A    Last visit treatment plan: Treatment Plan:          1.  Increase Vyvanse to 70 mg daily    2.  Wellbutrin  mg daily    3.  Talk therapy when able to in order to process life stressors.         Continue all other medications as reviewed per electronic medical record today.     Safety plan reviewed. To the Emergency Department as needed or call after hours crisis line at [phone] or [phone]. [place] Crisis Text Line. Text [place] to 894038 or Suicide LifeLine Chat: suicidepreventionlifeline.org/chat/    To schedule individual or family therapy, call [facility] at [phone]    Schedule an appointment with me in 2 months or sooner as needed. Call [facility] at [phone] to schedule.    Follow up with primary care provider as planned or for acute medical concerns.    Call the psychiatric nurse line with medication  questions or concerns at [phone]    Pocket Change [employer] may be used to communicate with your provider, but this is not intended to be used for emergencies.      []Medication refilled per  Medication Refill in Ambulatory Care  policy.  [x]Medication unable to be refilled by RN due to criteria not met as indicated below:    []Eligibility - not seen in the last year   []Supervision - no future appointment   []Compliance - no shows, cancellations or lapse in therapy   []Verification - order discrepancy   [x]Controlled medication   [x]Medication not included in policy   []90-day supply request   []Other

## 2022-07-18 NOTE — TELEPHONE ENCOUNTER
Patient returning nurse call. She is leaving Thursday and needs medications before then. Verified phone number: [phone].

## 2022-07-18 NOTE — TELEPHONE ENCOUNTER
Pt returned call, nurse unavailable.  She is willing to go back to lower dose if necessary, but wants to make sure she gets a refill right away, as she is going out of town Wednesday.

## 2022-07-18 NOTE — TELEPHONE ENCOUNTER
Alicia Smith NP  Psych Rn - [facility] 9 minutes ago (2:42 PM)     VT    Please have her recheck her blood pressure again in 1 week.  The diastolic is a little for being on stimulants.  Alicia    Message text       Called patient, no answer, left voice message.

## 2022-07-19 RX ORDER — LISDEXAMFETAMINE DIMESYLATE 70 MG/1
70 CAPSULE ORAL EVERY MORNING
Qty: 30 CAPSULE | Refills: 0 | Status: SHIPPED | OUTPATIENT
Start: 2022-07-19 | End: 2022-08-19

## 2022-07-20 ENCOUNTER — TELEPHONE (OUTPATIENT)
Dept: PSYCHIATRY | Facility: CLINIC | Age: 42
End: 2022-07-20

## 2022-07-20 DIAGNOSIS — F90.0 ADHD (ATTENTION DEFICIT HYPERACTIVITY DISORDER), INATTENTIVE TYPE: ICD-10-CM

## 2022-07-20 NOTE — TELEPHONE ENCOUNTER
RN notified patient. She was grateful for the early fill.    .Anita Nam RN on 7/20/2022 at 2:12 PM

## 2022-07-20 NOTE — TELEPHONE ENCOUNTER
"RN spoke to patient who stated the pharmacy will not let her pick this up \"early\" so she will need a verbal OKAY from Provider. She was speaking with Jade at the pharmacy.         Anita Nam RN on 7/20/2022 at 1:25 PM        "

## 2022-07-20 NOTE — TELEPHONE ENCOUNTER
Pt called requesting refill due to being out of state. Pt is requesting a call back from RN, Pt needs prescription filled today.

## 2022-07-20 NOTE — TELEPHONE ENCOUNTER
Reason for call:  Other   Patient called regarding (reason for call): call back  Additional comments: pt needs to pickup medication 7/20 leaving for traveling tmr    Phone number to reach patient:  Home number on file [phone] (home)    Best Time:  any    Can we leave a detailed message on this number?  YES    Travel screening: Not Applicable

## 2022-07-20 NOTE — TELEPHONE ENCOUNTER
RN left message stating to check with pharmacy for refills that were sent to pharmacy 7/19/22    Anita Nam RN on 7/20/2022 at 1:05 PM

## 2022-07-20 NOTE — TELEPHONE ENCOUNTER
I spoke to Michelle and the pharmacy to approve filling Lianna's Vyvanse today.  Please notify patient.  Alicia

## 2022-08-10 DIAGNOSIS — K21.9 GASTROESOPHAGEAL REFLUX DISEASE WITHOUT ESOPHAGITIS: ICD-10-CM

## 2022-08-10 RX ORDER — FAMOTIDINE 20 MG/1
TABLET, FILM COATED ORAL
Qty: 60 TABLET | Refills: 0 | Status: SHIPPED | OUTPATIENT
Start: 2022-08-10

## 2022-08-10 NOTE — TELEPHONE ENCOUNTER
Medication is being filled for 1 time refill only due to:  Needs follow up appointment.     Reminder placed on pharmacy notes.        LUANN Cavanaugh

## 2022-08-19 ENCOUNTER — MYC REFILL (OUTPATIENT)
Dept: PSYCHIATRY | Facility: CLINIC | Age: 42
End: 2022-08-19

## 2022-08-19 DIAGNOSIS — F90.0 ADHD (ATTENTION DEFICIT HYPERACTIVITY DISORDER), INATTENTIVE TYPE: ICD-10-CM

## 2022-08-19 RX ORDER — LISDEXAMFETAMINE DIMESYLATE 70 MG/1
70 CAPSULE ORAL EVERY MORNING
Qty: 30 CAPSULE | Refills: 0 | Status: SHIPPED | OUTPATIENT
Start: 2022-08-19 | End: 2022-10-19 | Stop reason: DRUGHIGH

## 2022-08-19 NOTE — TELEPHONE ENCOUNTER
Date of Last Office Visit: 6/23/22  Date of Next Office Visit: 8/24/22  No shows since last visit: 0  Cancellations since last visit: 0    Medication requested: lisdexamfetamine (VYVANSE) 70 MG capsule Date last ordered: 7/19/22 Qty: 30 Refills: 0     [place] of [place] ?: Unable to verify - pending provider approval     Lapse in medication adherence greater than 5 days?: NO  Medication refill request verified as identical to current order?: yes  Result of Last DAM, VPA, Li+ Level, CBC, or Carbamazepine Level (at or since last visit): N/A      Last visit treatment plan: 1.  Increase Vyvanse to 70 mg daily    2.  Wellbutrin  mg daily    3.  Talk therapy when able to in order to process life stressors.         Continue all other medications as reviewed per electronic medical record today.     Safety plan reviewed. To the Emergency Department as needed or call after hours crisis line at [phone] or [phone]. [place] Crisis Text Line. Text [place] to 132445 or Suicide LifeLine Chat: suicidepreventionlifeline.org/chat/    To schedule individual or family therapy, call [facility] at [phone]    Schedule an appointment with me in 2 months or sooner as needed. Call [facility] at [phone] to schedule.      []Medication refilled per  Medication Refill in Ambulatory Care  policy.  [x]Medication unable to be refilled by RN due to criteria not met as indicated below:    []Eligibility - not seen in the last year   []Supervision - no future appointment   []Compliance - no shows, cancellations or lapse in therapy   []Verification - order discrepancy   [x]Controlled medication   [x]Medication not included in policy   []90-day supply request   []Other

## 2022-08-24 ENCOUNTER — VIRTUAL VISIT (OUTPATIENT)
Dept: PSYCHIATRY | Facility: CLINIC | Age: 42
End: 2022-08-24
Payer: COMMERCIAL

## 2022-08-24 ENCOUNTER — VIRTUAL VISIT (OUTPATIENT)
Dept: BEHAVIORAL HEALTH | Facility: CLINIC | Age: 42
End: 2022-08-24
Payer: COMMERCIAL

## 2022-08-24 DIAGNOSIS — F90.0 ADHD (ATTENTION DEFICIT HYPERACTIVITY DISORDER), INATTENTIVE TYPE: ICD-10-CM

## 2022-08-24 DIAGNOSIS — F41.1 GAD (GENERALIZED ANXIETY DISORDER): Primary | ICD-10-CM

## 2022-08-24 DIAGNOSIS — F33.0 MILD RECURRENT MAJOR DEPRESSION (H): ICD-10-CM

## 2022-08-24 PROCEDURE — 99214 OFFICE O/P EST MOD 30 MIN: CPT | Mod: 95 | Performed by: NURSE PRACTITIONER

## 2022-08-24 PROCEDURE — 90791 PSYCH DIAGNOSTIC EVALUATION: CPT | Mod: 52 | Performed by: COUNSELOR

## 2022-08-24 RX ORDER — PROPRANOLOL HYDROCHLORIDE 20 MG/1
20 TABLET ORAL 2 TIMES DAILY
Qty: 60 TABLET | Refills: 3 | Status: SHIPPED | OUTPATIENT
Start: 2022-08-24 | End: 2022-11-21

## 2022-08-24 RX ORDER — LISDEXAMFETAMINE DIMESYLATE 60 MG/1
60 CAPSULE ORAL EVERY MORNING
Qty: 30 CAPSULE | Refills: 0 | Status: SHIPPED | OUTPATIENT
Start: 2022-09-09 | End: 2022-10-20

## 2022-08-24 RX ORDER — BUPROPION HYDROCHLORIDE 100 MG/1
100 TABLET, EXTENDED RELEASE ORAL DAILY
Qty: 30 TABLET | Refills: 3 | Status: SHIPPED | OUTPATIENT
Start: 2022-08-24 | End: 2022-11-21

## 2022-08-24 RX ORDER — DEXTROAMPHETAMINE SACCHARATE, AMPHETAMINE ASPARTATE, DEXTROAMPHETAMINE SULFATE AND AMPHETAMINE SULFATE 2.5; 2.5; 2.5; 2.5 MG/1; MG/1; MG/1; MG/1
10 TABLET ORAL DAILY PRN
Qty: 30 TABLET | Refills: 0 | Status: SHIPPED | OUTPATIENT
Start: 2022-08-24 | End: 2022-11-21
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
## 2022-08-24 NOTE — PATIENT INSTRUCTIONS
1.  Propranolol 20 mg twice daily  2.  Discontinue Wellbutrin XL  3.  Wellbutrin  mg daily  4.  Decrease Vyvanse to 60 mg daily-begin September 9  5.  Add Adderall 10 mg daily in the afternoon as needed for attentional deficits    Continue all other medications as reviewed per electronic medical record today.   Safety plan reviewed. To the Emergency Department as needed or call after hours crisis line at [phone] or [phone]. [place] Crisis Text Line. Text [place] to 689416 or Suicide LifeLine Chat: suicideAmerican Giant.org/chat/  To schedule individual or family therapy, call [facility] at [phone]  Schedule an appointment with me in October or sooner as needed. Call [facility] at [phone] to schedule.  Follow up with primary care provider as planned or for acute medical concerns.  Call the psychiatric nurse line with medication questions or concerns at [phone]  MyChart may be used to communicate with your provider, but this is not intended to be used for emergencies.    Crisis Resources:    National Suicide Prevention Lifeline: 986.933.9193 (TTY: 604.268.3424). Call anytime for help.  (www.suicidepreventionlifeline.org)  National [place] on Mental Illness (www.[place]ina.org): 515.776.9443 or 044-876-0576.   Mental Health Association (www.mentalhealth.org): 500.723.7791 or 215-476-7746.  [place] Crisis Text Line: Text [place] to 467844  Suicide LifeLine Chat: suicideAmerican Giant.org/chat

## 2022-08-24 NOTE — PROGRESS NOTES
"Lianna is a 42 year old who is being evaluated via a billable telephone visit.      What phone number would you like to be contacted at? [phone]  How would you like to obtain your AVS? Beto PATEL    Phone call duration: 30 minutes             Outpatient Psychiatric Progress Note    Name: Lianna Sorto   : 1980                    Primary Care Provider: DANA Amos CNP   Therapist: None    PHQ-9 scores:  PHQ-9 SCORE 2021   PHQ-9 Total Score - - -   PHQ-9 Total Score 7 7 7       FAUSTINO-7 scores:  FAUSTINO-7 SCORE 2021   Total Score - - -   Total Score 3 0 4       Patient Identification:    Patient is a 42 year old year old, single  White Choose not to answer female  who presents for return visit with me.  Patient is currently employed full time. Patient attended the session alone. Patient prefers to be called: \" Lianna\".    Current medications include: blood glucose (NO BRAND SPECIFIED) test strip, Use to test blood sugar 2 times daily or as directed. To accompany: Blood Glucose Monitor Brands: per insurance.  blood glucose monitoring (NO BRAND SPECIFIED) meter device kit, Use to test blood sugar 2 times daily or as directed. Preferred blood glucose meter OR supplies to accompany: Blood Glucose Monitor Brands: per insurance.  buPROPion (WELLBUTRIN XL) 150 MG 24 hr tablet, Take 1 tablet (150 mg) by mouth every morning  cetirizine (ZYRTEC) 10 MG tablet, Take 10 mg by mouth daily  chlorhexidine (HIBICLENS) 4 % liquid, Use to wash groin daily.  clindamycin (CLINDAMAX) 1 % external lotion, Apply to affected areas twice daily  cyclobenzaprine (FLEXERIL) 10 MG tablet, TAKE 1 TABLET (10 MG) BY MOUTH 3 TIMES DAILY AS NEEDED FOR MUSCLE SPASMS  famotidine (PEPCID) 20 MG tablet, TAKE ONE TABLET BY MOUTH TWICE A DAY  fluticasone (FLONASE) 50 MCG/ACT nasal spray, Spray 2 sprays into both nostrils daily  lisdexamfetamine (VYVANSE) 70 MG capsule, Take 1 " capsule (70 mg) by mouth every morning  metFORMIN (GLUCOPHAGE-XR) 500 MG 24 hr tablet, Take 1 tablet (500 mg) by mouth daily (with dinner)  order for DME, Equipment being ordered: Dynaflex insert  SETLAKIN 0.15-0.03 MG tablet, TAKE 1 TABLET BY MOUTH DAILY  spironolactone (ALDACTONE) 100 MG tablet, TAKE ONE TABLET BY MOUTH ONCE DAILY  thin (NO BRAND SPECIFIED) lancets, Use with lanceting device. To accompany: Blood Glucose Monitor Brands: per insurance.  triamcinolone (KENALOG) 0.1 % external cream, Apply to affected areas twice daily for 2 weeks, then as needed only    No current facility-administered medications on file prior to visit.       The [place] Prescription Monitoring Program has been reviewed and there are no concerns about diversionary activity for controlled substances at this time.      I was able to review most recent Primary Care Provider, specialty provider, and therapy visit notes that I have access to.     Today, patient reports that she has been working long hours. She sleeps better when she does not have to work so much.  She gets  No more than 6 hours daily.  She eats two meals daily.  She always feels full but does not lose weight.  The vyvanse is not lasting longer,  [place]   She started taking the propranolol again,  It seems to stabilize the anxiety.  Her blood pressure was 137/83.  The medication helps her anxiety.  She has not experienced any dizziness or light headedness.  She feels like she cannot keep up with stuff.       has a past medical history of Abnormal Pap smear of cervix (2011), Acute tonsillitis (2008), Condylomata (4/7/2011), Endometritis (2008), Hidradenitis (7/3/2009), Intussusception (H) (1983), and Unspecified trauma to perineum and vulva, unspecified as to episode of care in pregnancy (1985).    She has no past medical history of Basal cell carcinoma or Squamous cell carcinoma.    Social history  updates:    Lianna lives with her children.  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    Substance use updates:    No alcohol use reported  Tobacco use: No    Vital Signs:   There were no vitals taken for this visit.    Labs:    Most recent laboratory results reviewed and no new labs.     Mental Status Examination:  Appearance: awake, alert  Attitude: cooperative  Eye Contact:  Unable to assess  Gait and Station: No assistive Devices used and No dizziness or falls  Psychomotor Behavior:  Unable to assess  Oriented to:  time, person, and place  Attention Span and Concentration:  Normal  Speech:   clear, coherent and Speaks: English  Mood:  anxious and depressed  Affect:  mood congruent  Associations:  no loose associations  Thought Process:  goal oriented  Thought Content:  no evidence of suicidal ideation or homicidal ideation, no auditory hallucinations present and no visual hallucinations present  Recent and Remote Memory:  intact Not formally assessed. No amnesia.  Fund of Knowledge: appropriate  Insight:  good  Judgment:  intact  Impulse Control:  intact    Suicide Risk Assessment:  Today Lianna Sorto reports no thoughts to harm themself or others. In addition, there are notable risk factors for self-harm, including single status and anxiety. However, risk is mitigated by commitment to family, history of seeking help when needed, future oriented, denies suicidal intent or plan and denies homicidal ideation, intent, or plan. Therefore, based on all available evidence including the factors cited above, Lianna Sorto does not appear to be at imminent risk for self-harm, does not meet criteria for a 72-hr hold, and therefore remains appropriate for ongoing outpatient level of care.  A thorough assessment of risk factors related to suicide and self-harm have been reviewed and are noted above. The patient convincingly denies suicidality on several occasions. Local community safety  resources printed and reviewed for patient to use if needed. There was no deceit detected, and the patient presented in a manner that was believable.     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  296.31 (F33.0) Major Depressive Disorder, Recurrent Episode, Mild _ and With mixed features  300.02 (F41.1) Generalized Anxiety Disorder    Medical comorbidities include:   Patient Active Problem List    Diagnosis Date Noted     Morbid obesity (H) 11/02/2020     Priority: Medium     Diabetes mellitus, type 2 (H) 08/07/2020     Priority: Medium     Moderate episode of recurrent major depressive disorder (H) 09/22/2017     Priority: Medium     Controlled substance agreement signed 09/28/2016     Priority: Medium     Hidradenitis suppurativa 11/02/2015     Priority: Medium     Anxiety 06/02/2014     Priority: Medium     Pelvic pain in female 11/13/2013     Priority: Medium     Fibromyalgia 05/28/2013     Priority: Medium     Piriformis syndrome 12/12/2012     Priority: Medium     Scapular dyskinesis 03/13/2012     Priority: Medium     Varicose veins of legs 07/13/2011     Priority: Medium     Sacroiliac pain 07/13/2011     Priority: Medium     Back muscle spasm 06/06/2011     Priority: Medium     Tension headache 06/06/2011     Priority: Medium     CARDIOVASCULAR SCREENING; LDL GOAL LESS THAN 130 06/06/2011     Priority: Medium     Atypical squamous cells of undetermined significance (ASCUS) on Papanicolaou smear of cervix 03/22/2011     Priority: Medium     3/22/11: Pap - ASCUS, + HPV 16. Plan colp.  4/7/11:[place]--benign. Repeat pap 6 months. Reminder placed in epic.   11/22/11:Pap--ASCUS +(low risk) HPV type 54. Rpt pap in 1 yr. In , ep, prob list, hx updated. Reminder sent.  2/20/13:Pap--NIL. Pap 1 year. Reminder placed in EPIC  3/18/14: Pap - NIL, Neg HPV. Repeat pap 3 yrs.   6/7/2017 Pap: ASCUS, neg HPV. Plan to repeat Pap+HPV cotesting in 3 yrs  (2020)  11/2/20 NIL Pap, Neg HPV. Plan cotest in 5 years.          DDD (degenerative disc disease), cervical 03/10/2011     Priority: Medium     Acne 03/10/2011     Priority: Medium     Attention deficit disorder of adult 09/22/2010     Priority: Medium     Allergic rhinitis 03/30/2007     Priority: Medium     Problem list name updated by automated process. Provider to review         Assessment:    Lianna Sorto reported in today ongoing stress related to her work.  She has periods of irritability.  The Wellbutrin XL was discontinued and Wellbutrin SR will taken at 100 mg daily to decrease agitation.  Propranolol will continue twice daily to help with anxiety.  Vyvanse is decreased to 60 mg daily with concerns of elevated blood pressure and higher anxiety.  She will begin that lower dose on September 9.  Instant release Adderall was added to take in the afternoon as needed for attentional issues as Lianna reported that the Vyvanse is not as effective as it once was to last through the day at work.    Medication side effects and alternatives were reviewed. Health promotion activities recommended and reviewed today. All questions addressed. Education and counseling completed regarding risks and benefits of medications and psychotherapy options.    Treatment Plan:        1.  Propranolol 20 mg twice daily    2.  Discontinue Wellbutrin XL    3.  Wellbutrin  mg daily    4.  Decrease Vyvanse to 60 mg daily-begin September 9    5.  Add Adderall 10 mg daily in the afternoon as needed for attentional deficits        Continue all other medications as reviewed per electronic medical record today.     Safety plan reviewed. To the Emergency Department as needed or call after hours crisis line at [phone] or [phone]. [place] Crisis Text [place]. Text [place] to 483786 or Suicide LifeLine Chat: suicidepreventionlifeline.org/chat/    To schedule individual or family therapy, call [facility] at  [phone]    Schedule an appointment with me in October or sooner as needed. Call [facility] at [phone] to schedule.    Follow up with primary care provider as planned or for acute medical concerns.    Call the psychiatric nurse line with medication questions or concerns at [phone]    MyChart may be used to communicate with your provider, but this is not intended to be used for emergencies.    Crisis Resources:    National Suicide Prevention Lifeline: 789.276.6443 (TTY: 315.654.1668). Call anytime for help.  (www.suicidepreventionlifeline.org)  National [place] on Mental Illness (www.sina.org): 792.336.7132 or 395-259-1057.   Mental Health Association (www.mentalhealth.org): 555.144.2591 or 456-644-2881.  [place] Crisis Text [place]: Text [place] to 419100  Suicide LifeLine Chat: suicideJumpSeatline.org/chat    Administrative Billing:   Time spent with patient includes counseling and coordination of care regarding above diagnoses and treatment plan.    Patient Status:  Patient will continue to be seen for ongoing consultation and stabilization.    Signed:   ELIANA Jeffrey-BC   Psychiatry

## 2022-10-18 DIAGNOSIS — F90.0 ADHD (ATTENTION DEFICIT HYPERACTIVITY DISORDER), INATTENTIVE TYPE: ICD-10-CM

## 2022-10-18 RX ORDER — LISDEXAMFETAMINE DIMESYLATE 70 MG/1
70 CAPSULE ORAL EVERY MORNING
Qty: 30 CAPSULE | Refills: 0 | Status: CANCELLED | OUTPATIENT
Start: 2022-10-18

## 2022-10-19 DIAGNOSIS — F90.0 ADHD (ATTENTION DEFICIT HYPERACTIVITY DISORDER), INATTENTIVE TYPE: ICD-10-CM

## 2022-10-19 NOTE — CONFIDENTIAL NOTE
Date of Last Office Visit: 8/24/22  Date of Next Office Visit: 11/21/22  No shows since last visit: 0  Cancellations since last visit: 0     Medication requested: lisdexamfetamine (VYVANSE) 60 MG capsule Date last ordered:9/9/22 Qty: 30 Refills: 0     [place] of [place] ?: yes  Medication last filled date: 9/21/22 Qty filled: 30  Other controlled substance on [place] ?: NO        Lapse in medication adherence greater than 5 days?: no  If yes, call patient and gather details:   Medication refill request verified as identical to current order?: yes  Result of Last DAM, VPA, Li+ Level, CBC, or Carbamazepine Level (at or since last visit): NA     Last visit treatment plan:     1.  Continue Vyvanse 60 mg aily    2.  Continue Wellbutrin  mg daily         Continue all other medications as reviewed per electronic medical record today.     Safety plan reviewed. To the Emergency Department as needed or call after hours crisis line at [phone] or [phone]. [place] Crisis Text [place]. Text [place] to 212718 or Suicide LifeLine Chat: suicidepreventionlifeline.org/chat/    To schedule individual or family therapy, call [facility] at [phone].    Schedule an appointment with me in 3 months or sooner as needed. Call [facility] at [phone] to schedule        []?Medication refilled per  Medication Refill in Ambulatory Care  policy.  [x]?Medication unable to be refilled by RN due to criteria not met as indicated below:               []?Eligibility - not seen in the last year              []?Supervision - no future appointment              []?Compliance - no shows, cancellations or lapse in therapy              []?Verification - order discrepancy              [x]?Controlled medication              []?Medication not included in policy              []?90-day supply request              []?Other

## 2022-10-20 RX ORDER — LISDEXAMFETAMINE DIMESYLATE 60 MG/1
60 CAPSULE ORAL EVERY MORNING
Qty: 30 CAPSULE | Refills: 0 | Status: SHIPPED | OUTPATIENT
Start: 2022-10-20 | End: 2022-11-21

## 2022-10-26 ENCOUNTER — TELEPHONE (OUTPATIENT)
Dept: FAMILY MEDICINE | Facility: CLINIC | Age: 42
End: 2022-10-26

## 2022-10-26 NOTE — TELEPHONE ENCOUNTER
Patient Quality Outreach    Patient is due for the following:   Diabetes -  A1C, Eye Exam, Diabetic Follow-Up Visit and Foot Exam  Physical Preventive Adult Physical      Topic Date Due     Hepatitis B Vaccine (1 of 3 - 3-dose series) Never done     COVID-19 Vaccine (1) Never done     Pneumococcal Vaccine (1 - PCV) Never done     Flu Vaccine (1) 09/01/2022       Next Steps:   Schedule a Adult Preventative    Type of outreach:    Sent [employer] message.    Next Steps:  Reach out within 90 days via [employer].    Max number of attempts reached: No. Will try again in 90 days if patient still on fail list.    Questions for provider review:    None     Filomena Muniz MA

## 2022-11-19 ENCOUNTER — HEALTH MAINTENANCE LETTER (OUTPATIENT)
Age: 42
End: 2022-11-19

## 2022-11-19 DIAGNOSIS — F90.0 ADHD (ATTENTION DEFICIT HYPERACTIVITY DISORDER), INATTENTIVE TYPE: ICD-10-CM

## 2022-11-19 RX ORDER — LISDEXAMFETAMINE DIMESYLATE 60 MG/1
60 CAPSULE ORAL EVERY MORNING
Qty: 30 CAPSULE | Refills: 0 | Status: CANCELLED | OUTPATIENT
Start: 2022-11-19

## 2022-11-21 ENCOUNTER — VIRTUAL VISIT (OUTPATIENT)
Dept: PSYCHIATRY | Facility: CLINIC | Age: 42
End: 2022-11-21
Payer: COMMERCIAL

## 2022-11-21 DIAGNOSIS — F90.0 ADHD (ATTENTION DEFICIT HYPERACTIVITY DISORDER), INATTENTIVE TYPE: ICD-10-CM

## 2022-11-21 DIAGNOSIS — F33.0 MILD RECURRENT MAJOR DEPRESSION (H): ICD-10-CM

## 2022-11-21 DIAGNOSIS — F41.1 GAD (GENERALIZED ANXIETY DISORDER): Primary | ICD-10-CM

## 2022-11-21 PROCEDURE — 99214 OFFICE O/P EST MOD 30 MIN: CPT | Mod: 95 | Performed by: NURSE PRACTITIONER

## 2022-11-21 RX ORDER — BUPROPION HYDROCHLORIDE 100 MG/1
100 TABLET, EXTENDED RELEASE ORAL DAILY
Qty: 30 TABLET | Refills: 3 | Status: SHIPPED | OUTPATIENT
Start: 2022-11-21 | End: 2023-04-17

## 2022-11-21 RX ORDER — DEXTROAMPHETAMINE SACCHARATE, AMPHETAMINE ASPARTATE, DEXTROAMPHETAMINE SULFATE AND AMPHETAMINE SULFATE 2.5; 2.5; 2.5; 2.5 MG/1; MG/1; MG/1; MG/1
10 TABLET ORAL DAILY PRN
Qty: 30 TABLET | Refills: 0 | Status: SHIPPED | OUTPATIENT
Start: 2022-11-21 | End: 2023-07-27

## 2022-11-21 RX ORDER — LISDEXAMFETAMINE DIMESYLATE 60 MG/1
60 CAPSULE ORAL EVERY MORNING
Qty: 30 CAPSULE | Refills: 0 | Status: SHIPPED | OUTPATIENT
Start: 2022-11-21 | End: 2023-02-21

## 2022-11-21 RX ORDER — LISDEXAMFETAMINE DIMESYLATE 60 MG/1
60 CAPSULE ORAL EVERY MORNING
Qty: 30 CAPSULE | Refills: 0 | Status: SHIPPED | OUTPATIENT
Start: 2023-01-16 | End: 2023-04-17

## 2022-11-21 RX ORDER — LISDEXAMFETAMINE DIMESYLATE 60 MG/1
60 CAPSULE ORAL EVERY MORNING
Qty: 30 CAPSULE | Refills: 0 | Status: SHIPPED | OUTPATIENT
Start: 2022-12-19 | End: 2023-03-23

## 2022-11-21 RX ORDER — PROPRANOLOL HYDROCHLORIDE 20 MG/1
20 TABLET ORAL 2 TIMES DAILY
Qty: 60 TABLET | Refills: 3 | Status: SHIPPED | OUTPATIENT
Start: 2022-11-21 | End: 2023-04-17

## 2022-11-21 NOTE — PATIENT INSTRUCTIONS
1.  Vyvanse 60 mg daily  2.  Adderall 10 mg daily as needed for breakthrough inattention  3.  Wellbutrin  mg daily  4.  Propranolol 20 mg twice daily as needed for anxiety  5.  Please monitor your blood pressure and pulse regularly- if blood pressure greater than 160/90 and pulse greater than 90, please contact me immediately for dose adjustments in your stimulants  6.  Talk therapy, when you feeling need to, for processing life stressors    Continue all other medications as reviewed per electronic medical record today.   Safety plan reviewed. To the Emergency Department as needed or call after hours crisis line at [phone] or [phone]. [place] Crisis Text [place]. Text [place] to 744185 or Suicide LifeLine Chat: XODIS.[email or web address]/chat/  To schedule individual or family therapy, call [facility] at [phone]  Schedule an appointment with me in 3 months or sooner as needed. Call [facility] at [phone] to schedule.  Follow up with primary care provider as planned or for acute medical concerns.  Call the psychiatric nurse line with medication questions or concerns at [phone]  MyChart may be used to communicate with your provider, but this is not intended to be used for emergencies.    Crisis Resources:    National Suicide Prevention Lifeline: 306.360.4295 (TTY: 118.363.2799). Call anytime for help.  (www.suicidepreventionlifeline.org)  National [place] on Mental Illness (www.sina.org): 250.360.3616 or 191-166-4639.   Mental Health Association (www.mentalhealth.org): 709.586.4151 or 345-487-0206.  [place] Crisis Text Line: Text [place] to 064852  Suicide LifeLine Chat: XODIS.org/chat

## 2022-11-21 NOTE — PROGRESS NOTES
"Lianna is a 42 year old who is being evaluated via a billable telephone visit.      What phone number would you like to be contacted at? [phone]  How would you like to obtain your AVS? Beto  Phone call duration: 25 minutes             Outpatient Psychiatric Progress Note    Name: Lianna Sorto   : 1980                    Primary Care Provider: DANA Amos CNP   Therapist: None    PHQ-9 scores:  PHQ-9 SCORE 2021   PHQ-9 Total Score - - -   PHQ-9 Total Score 7 7 7       FAUSTINO-7 scores:  FAUSTINO-7 SCORE 2021   Total Score - - -   Total Score 3 0 4       Patient Identification:    Patient is a 42 year old year old, single  White Choose not to answer female  who presents for return visit with me.  Patient is currently employed full time. Patient attended the session alone. Patient prefers to be called: \" Lianna\".    Current medications include: amphetamine-dextroamphetamine (ADDERALL) 10 MG tablet, Take 1 tablet (10 mg) by mouth daily as needed (attentional deficits)  blood glucose (NO BRAND SPECIFIED) test strip, Use to test blood sugar 2 times daily or as directed. To accompany: Blood Glucose Monitor Brands: per insurance.  blood glucose monitoring (NO BRAND SPECIFIED) meter device kit, Use to test blood sugar 2 times daily or as directed. Preferred blood glucose meter OR supplies to accompany: Blood Glucose Monitor Brands: per insurance.  buPROPion (WELLBUTRIN SR) 100 MG 12 hr tablet, Take 1 tablet (100 mg) by mouth daily  cetirizine (ZYRTEC) 10 MG tablet, Take 10 mg by mouth daily  chlorhexidine (HIBICLENS) 4 % liquid, Use to wash groin daily.  clindamycin (CLINDAMAX) 1 % external lotion, Apply to affected areas twice daily  cyclobenzaprine (FLEXERIL) 10 MG tablet, TAKE 1 TABLET (10 MG) BY MOUTH 3 TIMES DAILY AS NEEDED FOR MUSCLE SPASMS  famotidine (PEPCID) 20 MG tablet, TAKE ONE TABLET BY MOUTH TWICE A DAY  fluticasone (FLONASE) 50 MCG/ACT nasal " spray, Spray 2 sprays into both nostrils daily  lisdexamfetamine (VYVANSE) 60 MG capsule, Take 1 capsule (60 mg) by mouth every morning  metFORMIN (GLUCOPHAGE-XR) 500 MG 24 hr tablet, Take 1 tablet (500 mg) by mouth daily (with dinner)  order for DME, Equipment being ordered: Dynaflex insert  propranolol (INDERAL) 20 MG tablet, Take 1 tablet (20 mg) by mouth 2 times daily  SETLAKIN 0.15-0.03 MG tablet, TAKE 1 TABLET BY MOUTH DAILY  spironolactone (ALDACTONE) 100 MG tablet, TAKE ONE TABLET BY MOUTH ONCE DAILY  thin (NO BRAND SPECIFIED) lancets, Use with lanceting device. To accompany: Blood Glucose Monitor Brands: per insurance.  triamcinolone (KENALOG) 0.1 % external cream, Apply to affected areas twice daily for 2 weeks, then as needed only    No current facility-administered medications on file prior to visit.       The [place] Prescription Monitoring Program has been reviewed and there are no concerns about diversionary activity for controlled substances at this time.      I was able to review most recent Primary Care Provider, specialty provider, and therapy visit notes that I have access to.     Today, patient reports that she has today off from work.  At her job, she has been short staffed.  She had COVID a few weeks ago.  At that time she had more anxiety and was irritable.  She has not checked her blood pressure for a while.  She does not take the propranolol regularly.  She takes the IR adderall infrequently.  Her wellbutrin is helping her to stabilize her mood.  At this point she desires no changes in her medications.  She does admit that the Vyvanse helps her to remain focused and stays on task at work regularly.   has a past medical history of Abnormal Pap smear of cervix (2011), Acute tonsillitis (2008), Condylomata (4/7/2011), Endometritis (2008), Hidradenitis (7/3/2009), Intussusception (H) (1983), and Unspecified trauma to perineum and vulva, unspecified as to episode of care in pregnancy  (1985).    She has no past medical history of Basal cell carcinoma or Squamous cell carcinoma.    Social history updates:    Lianna lives with her sons.  She works full-time at the [employer].    Substance use updates:    No alcohol use reported  Tobacco use: No    Vital Signs:   There were no vitals taken for this visit.    Labs:    Most recent laboratory results reviewed and no new labs.     Mental Status Examination:  Appearance: awake, alert  Attitude: cooperative  Eye Contact:  Unable to assess  Gait and Station: No assistive Devices used and No dizziness or falls  Psychomotor Behavior:  Unable to assess  Oriented to:  time, person, and place  Attention Span and Concentration:  Normal  Speech:   clear, coherent and Speaks: English  Mood:  anxious, depressed and better  Affect:  mood congruent  Associations:  no loose associations  Thought Process:  goal oriented  Thought Content:  no evidence of suicidal ideation or homicidal ideation, no auditory hallucinations present and no visual hallucinations present  Recent and Remote Memory:  intact Not formally assessed. No amnesia.  Fund of Knowledge: appropriate  Insight:  good  Judgment:  intact  Impulse Control:  intact    Suicide Risk Assessment:  Today Lianna Sorto reports no thoughts to harm themself or others. In addition, there are notable risk factors for self-harm, including single status, anxiety and mood change. However, risk is mitigated by commitment to family, history of seeking help when needed, denies suicidal intent or plan, no family history of suicide, safety plan in place and denies homicidal ideation, intent, or plan. Therefore, based on all available evidence including the factors cited above, Lianna Sorto does not appear to be at imminent risk for self-harm, does not meet criteria for a 72-hr hold, and therefore remains appropriate for ongoing outpatient level of care.  A thorough assessment of risk factors related to suicide  and self-harm have been reviewed and are noted above. The patient convincingly denies suicidality on several occasions. Local community safety resources printed and reviewed for patient to use if needed. There was no deceit detected, and the patient presented in a manner that was believable.     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  296.31 (F33.0) Major Depressive Disorder, Recurrent Episode, Mild _ and With mixed features  300.02 (F41.1) Generalized Anxiety Disorder    Medical comorbidities include:   Patient Active Problem List    Diagnosis Date Noted     Morbid obesity (H) 11/02/2020     Priority: Medium     Diabetes mellitus, type 2 (H) 08/07/2020     Priority: Medium     Moderate episode of recurrent major depressive disorder (H) 09/22/2017     Priority: Medium     Controlled substance agreement signed 09/28/2016     Priority: Medium     Hidradenitis suppurativa 11/02/2015     Priority: Medium     Anxiety 06/02/2014     Priority: Medium     Pelvic pain in female 11/13/2013     Priority: Medium     Fibromyalgia 05/28/2013     Priority: Medium     Piriformis syndrome 12/12/2012     Priority: Medium     Scapular dyskinesis 03/13/2012     Priority: Medium     Varicose veins of legs 07/13/2011     Priority: Medium     Sacroiliac pain 07/13/2011     Priority: Medium     Back muscle spasm 06/06/2011     Priority: Medium     Tension headache 06/06/2011     Priority: Medium     CARDIOVASCULAR SCREENING; LDL GOAL LESS THAN 130 06/06/2011     Priority: Medium     Atypical squamous cells of undetermined significance (ASCUS) on Papanicolaou smear of cervix 03/22/2011     Priority: Medium     3/22/11: Pap - ASCUS, + HPV 16. Plan colp.  4/7/11:[place]--benign. Repeat pap 6 months. Reminder placed in epic.   11/22/11:Pap--ASCUS +(low risk) HPV type 54. Rpt pap in 1 yr. In , ep, prob list, hx updated. Reminder sent.  2/20/13:Pap--NIL. Pap 1 year.  Reminder placed in EPIC  3/18/14: Pap - NIL, Neg HPV. Repeat pap 3 yrs.   6/7/2017 Pap: ASCUS, neg HPV. Plan to repeat Pap+HPV cotesting in 3 yrs (2020)  11/2/20 NIL Pap, Neg HPV. Plan cotest in 5 years.          DDD (degenerative disc disease), cervical 03/10/2011     Priority: Medium     Acne 03/10/2011     Priority: Medium     Attention deficit disorder of adult 09/22/2010     Priority: Medium     Allergic rhinitis 03/30/2007     Priority: Medium     Problem list name updated by automated process. Provider to review         Assessment:    Lianna Sorto reports feeling stable on her current medication regiment.  The lower dose of Wellbutrin is working well in controlling her symptoms of depression.  Anxiety waxes and wanes but she is able to manage this without incident.  It seems to be somewhat related to work stress.  When she takes Vyvanse she reports being better able to focus and be more productive at work.  This is also true at home.  Sometimes she takes the instant release Adderall and notices that her stamina lasts longer.  She reports no sleep concerns today.  The instant release Adderall is used rarely..    Medication side effects and alternatives were reviewed. Health promotion activities recommended and reviewed today. All questions addressed. Education and counseling completed regarding risks and benefits of medications and psychotherapy options.    Treatment Plan:        1.  Vyvanse 60 mg daily    2.  Adderall 10 mg daily as needed for breakthrough inattention    3.  Wellbutrin  mg daily    4.  Propranolol 20 mg twice daily as needed for anxiety    5.  Please monitor your blood pressure and pulse regularly- if blood pressure greater than 160/90 and pulse greater than 90, please contact me immediately for dose adjustments in your stimulants    6.  Talk therapy, when you feeling need to, for processing life stressors        Continue all other medications as reviewed per electronic medical  record today.     Safety plan reviewed. To the Emergency Department as needed or call after hours crisis line at [phone] or [phone]. [place] Crisis Text Line. Text [place] to 817462 or Suicide LifeLine Chat: suicideSCL.org/chat/    To schedule individual or family therapy, call [facility] at [phone]    Schedule an appointment with me in 3 months or sooner as needed. Call [facility] at [phone] to schedule.    Follow up with primary care provider as planned or for acute medical concerns.    Call the psychiatric nurse line with medication questions or concerns at [phone]    MyChart may be used to communicate with your provider, but this is not intended to be used for emergencies.    Crisis Resources:    National Suicide Prevention Lifeline: 641.602.2517 (TTY: 927.848.4014). Call anytime for help.  (www.suicidepreventionlifeline.org)  National [place] on Mental Illness (www.sina.org): 675.635.8642 or 716-604-0802.   Mental Health Association (www.mentalhealth.org): 594.955.1292 or 597-181-7549.  [place] Crisis Text Line: Text [place] to 595753  Suicide LifeLine Chat: suicideSCL.org/chat    Administrative Billing:   Time spent with patient includes counseling and coordination of care regarding above diagnoses and treatment plan.    Patient Status:  Patient will continue to be seen for ongoing consultation and stabilization.    Signed:   ELIANA Jeffrey-BC   Psychiatry

## 2023-02-09 ENCOUNTER — TELEPHONE (OUTPATIENT)
Dept: FAMILY MEDICINE | Facility: CLINIC | Age: 43
End: 2023-02-09
Payer: COMMERCIAL

## 2023-02-09 NOTE — TELEPHONE ENCOUNTER
Patient Quality Outreach    Patient is due for the following:   Diabetes -  A1C, LDL (Fasting), Eye Exam, Microalbumin and Foot Exam  Depression  -  PHQ-9 needed  Physical Preventive Adult Physical      Topic Date Due     Hepatitis B Vaccine (1 of 3 - 3-dose series) Never done     COVID-19 Vaccine (1) Never done     Pneumococcal Vaccine (1 - PCV) Never done     Flu Vaccine (1) 09/01/2022       Next Steps:   Schedule a Adult Preventative    Type of outreach:    Sent [employer] message.    Next Steps:  Reach out within 90 days via [employer].    Max number of attempts reached: No. Will try again in 90 days if patient still on fail list.    Questions for provider review:    None     Filomena Muniz MA

## 2023-02-21 DIAGNOSIS — F90.0 ADHD (ATTENTION DEFICIT HYPERACTIVITY DISORDER), INATTENTIVE TYPE: ICD-10-CM

## 2023-02-21 RX ORDER — LISDEXAMFETAMINE DIMESYLATE 60 MG/1
60 CAPSULE ORAL EVERY MORNING
Qty: 30 CAPSULE | Refills: 0 | Status: SHIPPED | OUTPATIENT
Start: 2023-02-21 | End: 2023-04-17

## 2023-02-21 NOTE — TELEPHONE ENCOUNTER
Patient already has 2 refills of propranolol at pharmacy. Confirmed with them. Will just send Vyvanse.     Date of Last Office Visit: 11/21/23  Date of Next Office Visit: 4/17/23  No shows since last visit: 0  Cancellations since last visit: 0    Medication requested: lisdexamfetamine (VYVANSE) 60 MG capsule Date last ordered: 1/16/23 Qty: 30 Refills: 0     Review of [place] ?: Yes  Medication last filled date: 1/20/23 Qty filled: 30  Other controlled substance on [place] ?: no    Lapse in medication adherence greater than 5 days?: No  If yes, call patient and gather details: na  Medication refill request verified as identical to current order?: yes  Result of Last DAM, VPA, Li+ Level, CBC, or Carbamazepine Level (at or since last visit): N/A      Last visit treatment plan:     1.  Vyvanse 60 mg daily    2.  Adderall 10 mg daily as needed for breakthrough inattention    3.  Wellbutrin  mg daily    4.  Propranolol 20 mg twice daily as needed for anxiety    5.  Please monitor your blood pressure and pulse regularly- if blood pressure greater than 160/90 and pulse greater than 90, please contact me immediately for dose adjustments in your stimulants    6.  Talk therapy, when you feeling need to, for processing life stressors       []Medication refilled per  Medication Refill in Ambulatory Care  policy.  [x]Medication unable to be refilled by RN due to criteria not met as indicated below:    []Eligibility - not seen in the last year   []Supervision - no future appointment   []Compliance - no shows, cancellations or lapse in therapy   []Verification - order discrepancy   [x]Controlled medication   [x]Medication not included in policy   []90-day supply request   []Other

## 2023-02-21 NOTE — TELEPHONE ENCOUNTER
Reason for call:  Medication   If this is a refill request, has the caller requested the refill from the pharmacy already? Yes  Will the patient be using a [facility]? Yes  Name of the pharmacy and phone number for the current request:   [facility] - [place], [place] - [address]    Name of the medication requested:   lisdexamfetamine (VYVANSE) 60 MG capsule  propranolol (INDERAL) 20 MG tablet    Other request: is out of med's needs refill    Phone number to reach patient:  Home number on file [phone] (home)    Best Time:  any    Can we leave a detailed message on this number?  YES    Travel screening: Not Applicable

## 2023-03-23 DIAGNOSIS — L73.2 HIDRADENITIS SUPPURATIVA: ICD-10-CM

## 2023-03-23 RX ORDER — CLINDAMYCIN PHOSPHATE 10 UG/ML
LOTION TOPICAL
Qty: 60 ML | Refills: 1 | Status: SHIPPED | OUTPATIENT
Start: 2023-03-23 | End: 2024-01-29

## 2023-03-23 NOTE — TELEPHONE ENCOUNTER
> 1 year since seen. Needs appointment. Letter sent and note sent to pharmacy as well. Jeanette Gutierrez RN

## 2023-03-23 NOTE — LETTER
[facility]  [address] [address]  Phone: [phone]    2023    Lianna Sorto                                                                                                              [address] [address]            Dear Ms. Sorto,    We recently provided you with a medication refill. Prescription medications require routine follow-up appointments with your Dermatology Provider.      Per [facility] [place] medication refill protocol, you do need to be seen at least annually to renew a prescription while on prescribed medication(s). A prescription is valid for only one year before it expires.      At this time we ask that: You schedule a routine follow up Dermatology office visit to follow your  Hidradenitis suppurativa and renew your now  Clindamycin lotion prescription.    Your prescription: Has  as it is over 1 year old. You have been given a one time gwen refill of medication.     We are currently booking Dermatology appointments into July, so please schedule follow up Dermatology appointment as soon as possible to avoid going without medication.    [phone] to schedule or you may schedule via My chart. You may be seen for follow up with any of our 3 Dermatology Providers via telephone or virtual video if you prefer.     Thank you,      Angie MONSALVE / merlene

## 2023-04-09 ENCOUNTER — HEALTH MAINTENANCE LETTER (OUTPATIENT)
Age: 43
End: 2023-04-09

## 2023-04-17 ENCOUNTER — VIRTUAL VISIT (OUTPATIENT)
Dept: PSYCHIATRY | Facility: CLINIC | Age: 43
End: 2023-04-17
Payer: COMMERCIAL

## 2023-04-17 DIAGNOSIS — F41.1 GAD (GENERALIZED ANXIETY DISORDER): Primary | ICD-10-CM

## 2023-04-17 DIAGNOSIS — F90.0 ADHD (ATTENTION DEFICIT HYPERACTIVITY DISORDER), INATTENTIVE TYPE: ICD-10-CM

## 2023-04-17 DIAGNOSIS — F33.0 MILD RECURRENT MAJOR DEPRESSION (H): ICD-10-CM

## 2023-04-17 PROCEDURE — 99214 OFFICE O/P EST MOD 30 MIN: CPT | Mod: 95 | Performed by: NURSE PRACTITIONER

## 2023-04-17 RX ORDER — LISDEXAMFETAMINE DIMESYLATE 60 MG/1
60 CAPSULE ORAL EVERY MORNING
Qty: 30 CAPSULE | Refills: 0 | Status: SHIPPED | OUTPATIENT
Start: 2023-05-19 | End: 2023-08-21

## 2023-04-17 RX ORDER — PROPRANOLOL HYDROCHLORIDE 20 MG/1
20 TABLET ORAL 2 TIMES DAILY
Qty: 60 TABLET | Refills: 3 | Status: SHIPPED | OUTPATIENT
Start: 2023-04-17 | End: 2023-09-18

## 2023-04-17 RX ORDER — LISDEXAMFETAMINE DIMESYLATE 60 MG/1
60 CAPSULE ORAL EVERY MORNING
Qty: 30 CAPSULE | Refills: 0 | Status: SHIPPED | OUTPATIENT
Start: 2023-04-21 | End: 2023-06-22

## 2023-04-17 ASSESSMENT — PATIENT HEALTH QUESTIONNAIRE - PHQ9
SUM OF ALL RESPONSES TO PHQ QUESTIONS 1-9: 5
SUM OF ALL RESPONSES TO PHQ QUESTIONS 1-9: 5
10. IF YOU CHECKED OFF ANY PROBLEMS, HOW DIFFICULT HAVE THESE PROBLEMS MADE IT FOR YOU TO DO YOUR WORK, TAKE CARE OF THINGS AT HOME, OR GET ALONG WITH OTHER PEOPLE: NOT DIFFICULT AT ALL

## 2023-04-17 NOTE — PROGRESS NOTES
"Virtual Visit Details    Type of service:  Telephone Visit   Phone call duration: 27 minutes              Outpatient Psychiatric Progress Note    Name: Lianna Sorto   : 1980                    Primary Care Provider: DANA Amos CNP   Therapist: None    PHQ-9 scores:      2021     8:00 AM 2022     9:04 AM 2023     8:11 AM   PHQ-9 SCORE   PHQ-9 Total Score MyChart   5 (Mild depression)   PHQ-9 Total Score 7 7 5       FAUSTINO-7 scores:      2021     8:38 AM 2021     8:14 AM 2021     8:00 AM   FAUSTINO-7 SCORE   Total Score 3 0 4       Patient Identification:    Patient is a 42 year old year old, single  White Choose not to answer female  who presents for return visit with me.  Patient is currently employed full time. Patient attended the session alone. Patient prefers to be called: \" Laurie\".    Current medications include: amphetamine-dextroamphetamine (ADDERALL) 10 MG tablet, Take 1 tablet (10 mg) by mouth daily as needed (attentional deficits)  blood glucose (NO BRAND SPECIFIED) test strip, Use to test blood sugar 2 times daily or as directed. To accompany: Blood Glucose Monitor Brands: per insurance.  blood glucose monitoring (NO BRAND SPECIFIED) meter device kit, Use to test blood sugar 2 times daily or as directed. Preferred blood glucose meter OR supplies to accompany: Blood Glucose Monitor Brands: per insurance.  cetirizine (ZYRTEC) 10 MG tablet, Take 10 mg by mouth daily  chlorhexidine (HIBICLENS) 4 % liquid, Use to wash groin daily.  clindamycin (CLINDAMAX) 1 % external lotion, Apply to affected areas twice daily  cyclobenzaprine (FLEXERIL) 10 MG tablet, TAKE 1 TABLET (10 MG) BY MOUTH 3 TIMES DAILY AS NEEDED FOR MUSCLE SPASMS  famotidine (PEPCID) 20 MG tablet, TAKE ONE TABLET BY MOUTH TWICE A DAY  fluticasone (FLONASE) 50 MCG/ACT nasal spray, Spray 2 sprays into both nostrils daily  lisdexamfetamine (VYVANSE) 60 MG capsule, Take 1 capsule (60 mg) by mouth every " morning  lisdexamfetamine (VYVANSE) 60 MG capsule, Take 1 capsule (60 mg) by mouth every morning  lisdexamfetamine (VYVANSE) 60 MG capsule, Take 1 capsule (60 mg) by mouth every morning Fill January 16  propranolol (INDERAL) 20 MG tablet, Take 1 tablet (20 mg) by mouth 2 times daily  SETLAKIN 0.15-0.03 MG tablet, TAKE 1 TABLET BY MOUTH DAILY  thin (NO BRAND SPECIFIED) lancets, Use with lanceting device. To accompany: Blood Glucose Monitor Brands: per insurance.  triamcinolone (KENALOG) 0.1 % external cream, Apply to affected areas twice daily for 2 weeks, then as needed only  buPROPion (WELLBUTRIN SR) 100 MG 12 hr tablet, Take 1 tablet (100 mg) by mouth daily (Patient not taking: Reported on 4/17/2023)  metFORMIN (GLUCOPHAGE-XR) 500 MG 24 hr tablet, Take 1 tablet (500 mg) by mouth daily (with dinner) (Patient not taking: Reported on 4/17/2023)  order for DME, Equipment being ordered: Dynaflex insert  spironolactone (ALDACTONE) 100 MG tablet, TAKE ONE TABLET BY MOUTH ONCE DAILY (Patient not taking: Reported on 4/17/2023)    No current facility-administered medications on file prior to visit.       The [place] Prescription Monitoring Program has been reviewed and there are no concerns about diversionary activity for controlled substances at this time.      I was able to review most recent Primary Care Provider, specialty provider, and therapy visit notes that I have access to.     Today, patient reports she is taking the day off.  Her son turns 16 today. His best friend committed suicide.   This brought her closer to her son.  They are communicating better.  Work is stressful as they are needing a new provider.  She stopped taking the wellbutrin - she felt like she has more energy and more alert without it.  When she takes the as needed adderall, she feels her jaw tensing up.       has a past medical history of Abnormal Pap smear of cervix (2011), Acute tonsillitis (2008), Condylomata (4/7/2011), Endometritis  (2008), Hidradenitis (7/3/2009), Intussusception (H) (1983), and Unspecified trauma to perineum and vulva, unspecified as to episode of care in pregnancy (1985).    She has no past medical history of Basal cell carcinoma or Squamous cell carcinoma.    Social history updates:    Rent is going up and she is having financial stress.      Substance use updates:    No alcohol use reported  Tobacco use: No    Vital Signs:   There were no vitals taken for this visit.    Labs:    Most recent laboratory results reviewed and no new labs.     Mental Status Examination:  Appearance: awake, alert  Attitude: cooperative  Eye Contact:  Unable to assess  Gait and Station: No dizziness or falls  Psychomotor Behavior:  Unable to assess  Oriented to:  time, person, and place  Attention Span and Concentration:  Normal  Speech:   vtspeech: clear, coherent and Speaks: English  Mood:  : anxious and depressed  Affect:  : mood congruent  Associations:  no loose associations  Thought Process:  goal oriented  Thought Content:  no evidence of suicidal ideation or homicidal ideation, no auditory hallucinations present and no visual hallucinations present  Recent and Remote Memory:  intact Not formally assessed. No amnesia.  Fund of Knowledge: appropriate  Insight:  good  Judgment: good  Impulse Control:  good    Suicide Risk Assessment:  Today Lianna Sorto reports no thoughts to harm themself or others. In addition, there are notable risk factors for self-harm, including single status, anxiety and mood change. However, risk is mitigated by commitment to family, history of seeking help when needed, future oriented, denies suicidal intent or plan and denies homicidal ideation, intent, or plan. Therefore, based on all available evidence including the factors cited above, Lianna Sorto does not appear to be at imminent risk for self-harm, does not meet criteria for a 72-hr hold, and therefore remains appropriate for ongoing outpatient level of  care.  A thorough assessment of risk factors related to suicide and self-harm have been reviewed and are noted above. The patient convincingly denies suicidality on several occasions. Local community safety resources printed and reviewed for patient to use if needed. There was no deceit detected, and the patient presented in a manner that was believable.     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  300.02 (F41.1) Generalized Anxiety Disorder    Medical comorbidities include:   Patient Active Problem List    Diagnosis Date Noted     Morbid obesity (H) 11/02/2020     Priority: Medium     Diabetes mellitus, type 2 (H) 08/07/2020     Priority: Medium     Moderate episode of recurrent major depressive disorder (H) 09/22/2017     Priority: Medium     Controlled substance agreement signed 09/28/2016     Priority: Medium     Hidradenitis suppurativa 11/02/2015     Priority: Medium     Anxiety 06/02/2014     Priority: Medium     Pelvic pain in female 11/13/2013     Priority: Medium     Fibromyalgia 05/28/2013     Priority: Medium     Piriformis syndrome 12/12/2012     Priority: Medium     Scapular dyskinesis 03/13/2012     Priority: Medium     Varicose veins of legs 07/13/2011     Priority: Medium     Sacroiliac pain 07/13/2011     Priority: Medium     Back muscle spasm 06/06/2011     Priority: Medium     Tension headache 06/06/2011     Priority: Medium     CARDIOVASCULAR SCREENING; LDL GOAL LESS THAN 130 06/06/2011     Priority: Medium     Atypical squamous cells of undetermined significance (ASCUS) on Papanicolaou smear of cervix 03/22/2011     Priority: Medium     3/22/11: Pap - ASCUS, + HPV 16. Plan colp.  4/7/11:Rankin--benign. Repeat pap 6 months. Reminder placed in epic.   11/22/11:Pap--ASCUS +(low risk) HPV type 54. Rpt pap in 1 yr. In  HM, ep, prob list, hx updated. Reminder sent.  2/20/13:Pap--NIL. Pap 1 year. Reminder placed in EPIC  3/18/14: Pap - NIL, Neg HPV. Repeat pap 3 yrs.   6/7/2017 Pap: ASCUS, neg HPV. Plan to repeat Pap+HPV cotesting in 3 yrs (2020)  11/2/20 NIL Pap, Neg HPV. Plan cotest in 5 years.          DDD (degenerative disc disease), cervical 03/10/2011     Priority: Medium     Acne 03/10/2011     Priority: Medium     Attention deficit disorder of adult 09/22/2010     Priority: Medium     Allergic rhinitis 03/30/2007     Priority: Medium     Problem list name updated by automated process. Provider to review         Assessment:    Lianna Sorto reported that when she takes the instant release Adderall that sometimes her jaw gets stiff and.  She will continue with it as ordered but no refills given today.  Vyvanse will continue at 60 mg daily as this is helpful in making sure she stays organized and focused at work.  She discontinued the Wellbutrin as she found herself feeling more sedated and overmedicated when she took it.  Propranolol will continue at 20 mg twice daily to help with breakthrough anxiety symptoms.  She tells me she has been getting along better with her 16-year-old son who recently lost a friend to suicide..    Medication side effects and alternatives were reviewed. Health promotion activities recommended and reviewed today. All questions addressed. Education and counseling completed regarding risks and benefits of medications and psychotherapy options.    Treatment Plan:        1.  Adderall 10 mg daily as needed for attentional deficits-no refills requested today    2.  Continue Vyvanse 60 mg daily    3.  Wellbutrin XL discontinued    4.  Continue propranolol 20 mg twice daily    5.  Next visit will be in person        Continue all other medications as reviewed per electronic medical record today.     Safety plan reviewed. To the Emergency Department as needed or call after hours crisis line at [phone] or  [phone]. [place] Crisis Text [place]. Text [place] to 206722 or Suicide LifeLine Chat: suicidethesixtyone.org/chat/    To schedule individual or family therapy, call [facility] at [phone]    Schedule an appointment with me in 3 months or sooner as needed. Call [facility] at [phone] to schedule.    Follow up with primary care provider as planned or for acute medical concerns.    Call the psychiatric nurse line with medication questions or concerns at [phone]    MyChart may be used to communicate with your provider, but this is not intended to be used for emergencies.    Crisis Resources:    National Suicide Prevention Lifeline: 375.498.2967 (TTY: 326.588.1905). Call anytime for help.  (www.suicidepreventionKoutline.org)  National [place] on Mental Illness (www.sina.org): 637.797.3591 or 560-108-4416.   Mental Health Association (www.mentalhealth.org): 845.189.9492 or 413-527-3062.  [place] Crisis [place]: Text [place] to 331365  Suicide LifeLine Chat: suicidethesixtyone.org/chat    Administrative Billing:   Time spent with patient includes counseling and coordination of care regarding above diagnoses and treatment plan.    Patient Status:  This is a continuous care patient and medications will be prescribed by the psychiatric provider until further indicated.    Signed:   ELIANA Jeffrey-BC   Psychiatry       Answers for HPI/ROS submitted by the patient on 4/17/2023  If you checked off any problems, how difficult have these problems made it for you to do your work, take care of things at home, or get along with other people?: Not difficult at all  PHQ9 TOTAL SCORE: 5

## 2023-04-17 NOTE — NURSING NOTE
Is the patient currently in the state of [place]? YES    Visit mode:TELEPHONE    If the visit is dropped, the patient can be reconnected by: TELEPHONE VISIT: Phone number: [phone]    Will anyone else be joining the visit? NO      How would you like to obtain your AVS? MyChart    Are changes needed to the allergy or medication list? YES: Pt states not taking some medications, pt states taking kids multi vitamin     Reason for visit: Telephone visit    AMANDA Houston/CMA

## 2023-04-17 NOTE — PATIENT INSTRUCTIONS
"Patient Education   The Panel Psychiatry Program  What to Expect  Here's what to expect in the Panel Psychiatry Program.   About the program  You'll be meeting with a psychiatric doctor to check your mental health. A psychiatric doctor helps you deal with troubling thoughts and feelings by giving you medicine. They'll make sure you know the plan for your care. You may see them for a long time. When you're feeling better, they may refer you back to seeing your family doctor.   If you have any questions, we'll be glad to talk to you.  About visits  Be open  At your visits, please talk openly about your problems. It may feel hard, but it's the best way for us to help you.  Cancelling visits  If you can't come to your visit, please call us right away at [phone]. If you don't cancel at least 24 hours (1 full day) before your visit, that's \"late cancellation.\"  Not showing up for your visits  Being very late is the same as not showing up. You'll be a \"no show\" if:  You're more than 15 minutes late for a 30-minute (half hour) visit.  You're more than 30 minutes late for a 60-minute (full hour) visit.  If you cancel late or don't show up 2 times within 6 months, we may end your care.  Getting help between visits  If you need help between visits, you can call us Monday to Friday from 8 a.m. to 4:30 p.m. at [phone].  Emergency care  Call 911 or go to the nearest emergency department if your life or someone else's life is in danger.  Call 988 anytime to reach the national Suicide and Crisis hotline.  Medicine refills  To refill your medicine, call your pharmacy. You can also call [facility] at [phone], Monday to Friday, 8 a.m. to 4:30 p.m. It can take 1 to 3 business days to get a refill.   Forms, letters, and tests  You may have papers to fill out, like FMLA, short-term disability, and workability. We can help you with these forms at your visits, but you must have an " appointment. You may need more than 1 visit for this, to be in an intensive therapy program, or both.  Before we can give you medicine for ADHD, we may refer you to get tested for it or confirm it another way.  We may not be able to give you an emotional support animal letter.  We don't do mental health checks ordered by the court.   We don't do mental health testing, but we can refer you to get tested.   Thank you for choosing us for your care.  For informational purposes only. Not to replace the advice of your health care provider. Copyright   2022 [facility]. All rights reserved. [employer] [id number] - 12/22.  Treatment plan:    1.  Adderall 10 mg daily as needed for attentional deficits-no refills requested today  2.  Continue Vyvanse 60 mg daily  3.  Wellbutrin XL discontinued  4.  Continue propranolol 20 mg twice daily  5.  Next visit will be in person    Continue all other medications as reviewed per electronic medical record today.   Safety plan reviewed. To the Emergency Department as needed or call after hours crisis line at [phone] or [phone]. [place] Crisis Text [place]. Text [place] to 311665 or Suicide LifeLine Chat: suicidepreventionlifeline.org/chat/  To schedule individual or family therapy, call [facility] at [phone]  Schedule an appointment with me in 3 months or sooner as needed. Call [facility] at [phone] to schedule.  Follow up with primary care provider as planned or for acute medical concerns.  Call the psychiatric nurse line with medication questions or concerns at [phone]  MyChart may be used to communicate with your provider, but this is not intended to be used for emergencies.    Crisis Resources:    National Suicide Prevention Lifeline: 603.426.6509 (TTY: 956.933.8016). Call anytime for help.  (www.suicidepreventionlifeline.org)  National [place] on Mental Illness (www.sina.org): 314.208.5414 or 632-204-3165.   Mental  Health Association (www.mentalhealth.org): 129-351-2854 or 661-738-2427.  [place] Crisis Text Line: Text [place] to 087191  Suicide LifeLine Chat: suicidepreventionlifeline.org/chat

## 2023-06-21 DIAGNOSIS — F90.0 ADHD (ATTENTION DEFICIT HYPERACTIVITY DISORDER), INATTENTIVE TYPE: ICD-10-CM

## 2023-06-21 NOTE — TELEPHONE ENCOUNTER
Date of Last Office Visit: 4/172023  Date of Next Office Visit: NON SCHEDULED - [facility] PLEASE CALL PATIENT TO SCHEDULE A RECHECK   No shows since last visit: 0  Cancellations since last visit: 0    Medication requested: lisdexamfetamine (VYVANSE) 60 MG capsule Date last ordered: 5/19/2023 Qty: 30 Refills: 0         [place] of [place] ?: THIS N DOES NOT HAVE ACCESS TO   Medication last sold date:5/20/2023 Qty filled: 30 - PER PHARMACY  Other controlled substance on [place] ?:   If yes, is this a new medication?:   If yes, name of medication:  and date filled:     Lapse in medication adherence greater than 5 days?: NO  If yes, call patient and gather details:   Medication refill request verified as identical to current order?: YES  Result of Last DAM, VPA, Li+ Level, CBC, or Carbamazepine Level (at or since last visit): N/A    Last visit treatment plan: 4/17/2023    Treatment Plan:          1.  Adderall 10 mg daily as needed for attentional deficits-no refills requested today    2.  Continue Vyvanse 60 mg daily    3.  Wellbutrin XL discontinued    4.  Continue propranolol 20 mg twice daily    5.  Next visit will be in person         Continue all other medications as reviewed per electronic medical record today.     Safety plan reviewed. To the Emergency Department as needed or call after hours crisis line at [phone] or [phone]. [place] Crisis Text Line. Text [place] to 914559 or Suicide LifeLine Chat: suicidepreventionlifeline.org/chat/    To schedule individual or family therapy, call [facility] at [phone]    Schedule an appointment with me in 3 months or sooner as needed. Call [facility] at [phone] to schedule.    Follow up with primary care provider as planned or for acute medical concerns.    Call the psychiatric nurse line with medication questions or concerns at [phone]    MyChart may be used to communicate with your provider, but this is not intended to be  used for emergencies.    []Medication refilled per  Medication Refill in Ambulatory Care  policy.  [x]Medication unable to be refilled by RN due to criteria not met as indicated below:    []Eligibility - not seen in the last year   [x]Supervision - no future appointment   []Compliance - no shows, cancellations or lapse in therapy   []Verification - order discrepancy   [x]Controlled medication   []Medication not included in policy   []90-day supply request   [x]Other - LPN PROCESSED

## 2023-06-22 RX ORDER — LISDEXAMFETAMINE DIMESYLATE 60 MG/1
60 CAPSULE ORAL EVERY MORNING
Qty: 30 CAPSULE | Refills: 0 | Status: SHIPPED | OUTPATIENT
Start: 2023-06-22 | End: 2023-09-25

## 2023-07-20 DIAGNOSIS — M62.830 BACK MUSCLE SPASM: ICD-10-CM

## 2023-07-21 RX ORDER — CYCLOBENZAPRINE HCL 10 MG
TABLET ORAL
Qty: 30 TABLET | Refills: 5 | Status: SHIPPED | OUTPATIENT
Start: 2023-07-21

## 2023-07-24 DIAGNOSIS — F90.0 ADHD (ATTENTION DEFICIT HYPERACTIVITY DISORDER), INATTENTIVE TYPE: ICD-10-CM

## 2023-07-26 NOTE — TELEPHONE ENCOUNTER
Date of Last Office Visit: 4/17/23  Date of Next Office Visit: 9/25/23  No shows since last visit: 0  Cancellations since last visit: x 1 on 7/25/23 - cancelled per provider    Medication requested: amphetamine-dextroamphetamine (ADDERALL) 10 MG tablet  Date last ordered: 11/21/22 Qty: 30 Refills: 0     amphetamine-dextroamphetamine (ADDERALL) 10 MG tablet 30 tablet 0 11/21/2022  No   Sig - Route: Take 1 tablet (10 mg) by mouth daily as needed (attentional deficits) - Oral   Sent to pharmacy as: Amphetamine-Dextroamphetamine 10 MG Oral Tablet (ADDERALL)   Class: E-Prescribe   Earliest Fill Date: 11/21/2022       [place] of [place] ?: yes  Medication last sold date: 11/21/22 Qty filled: 30  Other controlled substance on [place] ?: yes  If yes, is this a new medication?: no  If yes, name of medication: NA and date filled: NA        Lapse in medication adherence greater than 5 days?: NO, prescribed PRN  If yes, call patient and gather details: NA  Medication refill request verified as identical to current order?: yes  Result of Last DAM, VPA, Li+ Level, CBC, or Carbamazepine Level (at or since last visit): N/A    Last visit treatment plan: Treatment Plan:        1.  Adderall 10 mg daily as needed for attentional deficits-no refills requested today  2.  Continue Vyvanse 60 mg daily  3.  Wellbutrin XL discontinued  4.  Continue propranolol 20 mg twice daily  5.  Next visit will be in person    []Medication refilled per  Medication Refill in Ambulatory Care  policy.  [x]Medication unable to be refilled by RN due to criteria not met as indicated below:    []Eligibility - not seen in the last year   []Supervision - no future appointment   []Compliance - no shows, cancellations or lapse in therapy   []Verification - order discrepancy   [x]Controlled medication   [x]Medication not included in policy   []90-day supply request   [x]Other: LPN is processing request

## 2023-07-27 RX ORDER — DEXTROAMPHETAMINE SACCHARATE, AMPHETAMINE ASPARTATE, DEXTROAMPHETAMINE SULFATE AND AMPHETAMINE SULFATE 2.5; 2.5; 2.5; 2.5 MG/1; MG/1; MG/1; MG/1
10 TABLET ORAL DAILY PRN
Qty: 30 TABLET | Refills: 0 | Status: SHIPPED | OUTPATIENT
Start: 2023-07-27 | End: 2023-12-18

## 2023-08-21 ENCOUNTER — MYC REFILL (OUTPATIENT)
Dept: PSYCHIATRY | Facility: CLINIC | Age: 43
End: 2023-08-21
Payer: COMMERCIAL

## 2023-08-21 DIAGNOSIS — F90.0 ADHD (ATTENTION DEFICIT HYPERACTIVITY DISORDER), INATTENTIVE TYPE: ICD-10-CM

## 2023-08-22 ENCOUNTER — MYC MEDICAL ADVICE (OUTPATIENT)
Dept: PSYCHIATRY | Facility: CLINIC | Age: 43
End: 2023-08-22
Payer: COMMERCIAL

## 2023-08-22 ENCOUNTER — TELEPHONE (OUTPATIENT)
Dept: PSYCHIATRY | Facility: CLINIC | Age: 43
End: 2023-08-22
Payer: COMMERCIAL

## 2023-08-22 DIAGNOSIS — F90.0 ADHD (ATTENTION DEFICIT HYPERACTIVITY DISORDER), INATTENTIVE TYPE: Primary | ICD-10-CM

## 2023-08-22 RX ORDER — LISDEXAMFETAMINE DIMESYLATE 50 MG/1
50 CAPSULE ORAL EVERY MORNING
Qty: 30 CAPSULE | Refills: 0 | Status: SHIPPED | OUTPATIENT
Start: 2023-08-22 | End: 2023-09-18

## 2023-08-22 RX ORDER — LISDEXAMFETAMINE DIMESYLATE 60 MG/1
60 CAPSULE ORAL EVERY MORNING
Qty: 30 CAPSULE | Refills: 0 | Status: SHIPPED | OUTPATIENT
Start: 2023-08-22 | End: 2023-09-25

## 2023-08-22 NOTE — TELEPHONE ENCOUNTER
Patient returned call. She states none of those other pharmacies are close enough for her.     She further states that she was wanting to reduce her dose to 50 mg anyway. She states her [facility] pharmacy does have the 50 mg capsules in supply.    Donell'd alternate dose request for provider. Will notify patient of provider recommendation once received.     Next visit: 9/25/2023  Last visit: 7/25/2023 (cancelled by provider); 4/17/2023    Assessment:     Lianna Sroto reported that when she takes the instant release Adderall that sometimes her jaw gets stiff and.  She will continue with it as ordered but no refills given today.  Vyvanse will continue at 60 mg daily as this is helpful in making sure she stays organized and focused at work.  She discontinued the Wellbutrin as she found herself feeling more sedated and overmedicated when she took it.  Propranolol will continue at 20 mg twice daily to help with breakthrough anxiety symptoms.  She tells me she has been getting along better with her 16-year-old son who recently lost a friend to suicide..     Medication side effects and alternatives were reviewed. Health promotion activities recommended and reviewed today. All questions addressed. Education and counseling completed regarding risks and benefits of medications and psychotherapy options.     Treatment Plan:        1.  Adderall 10 mg daily as needed for attentional deficits-no refills requested today  2.  Continue Vyvanse 60 mg daily  3.  Wellbutrin XL discontinued  4.  Continue propranolol 20 mg twice daily  5.  Next visit will be in person     Continue all other medications as reviewed per electronic medical record today.   Safety plan reviewed. To the Emergency Department as needed or call after hours crisis line at [phone] or [phone]. [place] Crisis Text Line. Text [place] to 665754 or Suicide LifeLine Chat: suicidepreventionlifeline.org/chat/  To schedule individual or family therapy, call  [facility] at [phone]  Schedule an appointment with me in 3 months or sooner as needed

## 2023-08-22 NOTE — TELEPHONE ENCOUNTER
Reason for call:  Medication   If this is a refill request, has the caller requested the refill from the pharmacy already? N/A  Will the patient be using a [facility]? Yes  Name of the pharmacy and phone number for the current request:   [facility] [place] [address]   [phone]     Name of the medication requested: lisdexamfetamine (VYVANSE) 60 MG capsule     Other request: Pt asking if Vyanse can be lowered to 50mg as the pharmacy only has that in stock and not the 60mg. Pt reports being out of Vyanase as of today.     Phone number to reach patient:  Home number on file [phone] (home)    Best Time:  asap    Can we leave a detailed message on this number?  YES    Travel screening: Not Applicable

## 2023-08-22 NOTE — CONFIDENTIAL NOTE
Date of Last Office Visit: 4/17/23  Date of Next Office Visit: 9/25/23  No shows since last visit: 0  Cancellations since last visit: 1, per provider     Medication requested: lisdexamfetamine (VYVANSE) 60 MG capsule Date last ordered: 6/22/23 Qty: 30 Refills: 0     [place] of [place] ?: writer not a delegate for provider     Lapse in medication adherence greater than 5 days?: no, last filled 7/22/23 per pharmacy   If yes, call patient and gather details: na  Medication refill request verified as identical to current order?: yes  Result of Last DAM, VPA, Li+ Level, CBC, or Carbamazepine Level (at or since last visit): N/A     Last visit treatment plan:   1.  Adderall 10 mg daily as needed for attentional deficits-no refills requested today  2.  Continue Vyvanse 60 mg daily  3.  Wellbutrin XL discontinued  4.  Continue propranolol 20 mg twice daily  5.  Next visit will be in person     Continue all other medications as reviewed per electronic medical record today.   Safety plan reviewed. To the Emergency Department as needed or call after hours crisis line at [phone] or [phone]. [place] Crisis Text Line. Text [place] to 758743 or Suicide LifeLine Chat: suicidepreventionlifeline.org/chat/  To schedule individual or family therapy, call [facility] at [phone]  Schedule an appointment with me in 3 months or sooner as needed. Call [facility] at [phone] to schedule.  Follow up with primary care provider as planned or for acute medical concerns.  Call the psychiatric nurse line with medication questions or concerns at [phone]  MyChart may be used to communicate with your provider, but this is not intended to be used for emergencies     []Medication refilled per  Medication Refill in Ambulatory Care  policy.  [x]Medication unable to be refilled by RN due to criteria not met as indicated below:               []Eligibility - not seen in the last year               []Supervision - no future appointment              []Compliance - no shows, cancellations or lapse in therapy              []Verification - order discrepancy              []Controlled medication              []Medication not included in policy              []90-day supply request              [x]Other out of scope of CMA

## 2023-08-22 NOTE — TELEPHONE ENCOUNTER
Received message from patient requesting an change in lisdexamfetamine (VYVANSE) 60 MG capsule dose related to supply barriers at her [place] pharmacy. RN called [facility] pharmacy and they state the following [place] pharmacies may have a supply of medication if patient prefers.     [place] - 30 caps  [place] - 30 caps  [place] - 40 caps  [place] - 58 caps    Amounts will need to be verified if patient chooses to fill the 60 mg at an alternate location.    RN called patient but the call went to a generic VM. RN left a general message to call the clinic back regarding a medication concern at [phone] or check her [employer] for message RN sent.     Awaiting a return call or response to [employer] message. Routing update to provider to review potential 50 mg dose change.    Stacie Cha RN on 8/22/2023 at 12:15 PM

## 2023-09-10 ENCOUNTER — HEALTH MAINTENANCE LETTER (OUTPATIENT)
Age: 43
End: 2023-09-10

## 2023-09-18 DIAGNOSIS — F41.1 GAD (GENERALIZED ANXIETY DISORDER): ICD-10-CM

## 2023-09-18 DIAGNOSIS — F90.0 ADHD (ATTENTION DEFICIT HYPERACTIVITY DISORDER), INATTENTIVE TYPE: ICD-10-CM

## 2023-09-18 NOTE — TELEPHONE ENCOUNTER
Date of Last Office Visit: 04/17/2023  Date of Next Office Visit: 09/25/2023  No shows since last visit: 0  Cancellations since last visit: 1-07/25/2023 reason unavailable     Medication requested: lisdexamfetamine (VYVANSE) 50 MG capsule  Date last ordered: 08/22/2023 Qty: 30 Refills: 0    Medication requested: propranolol (INDERAL) 20 MG tablet  Date last ordered: 07/21/23 Qty: 60 Refills: 0     [place] of [place] ?: yes  Medication last sold date: 08/22/2023 Qty filled: 30  Other controlled substance on [place] ?: yes  If yes, is this a new medication?: no  If yes, name of medication: na and date filled: na    Lapse in medication adherence greater than 5 days?: no  If yes, call patient and gather details: na  Medication refill request verified as identical to current order?: yes  Result of Last DAM, VPA, Li+ Level, CBC, or Carbamazepine Level (at or since last visit): N/A    Last visit treatment plan:       []Medication refilled per  Medication Refill in Ambulatory Care  policy.  [x]Medication unable to be refilled by RN due to criteria not met as indicated below:    []Eligibility - not seen in the last year   []Supervision - no future appointment   []Compliance - no shows, cancellations or lapse in therapy   []Verification - order discrepancy   [x]Controlled medication   []Medication not included in policy   []90-day supply request   [x]Other LPN reviewed

## 2023-09-21 RX ORDER — LISDEXAMFETAMINE DIMESYLATE 50 MG/1
50 CAPSULE ORAL EVERY MORNING
Qty: 30 CAPSULE | Refills: 0 | Status: SHIPPED | OUTPATIENT
Start: 2023-09-21 | End: 2023-09-25

## 2023-09-21 RX ORDER — PROPRANOLOL HYDROCHLORIDE 20 MG/1
20 TABLET ORAL 2 TIMES DAILY
Qty: 60 TABLET | Refills: 3 | Status: SHIPPED | OUTPATIENT
Start: 2023-09-21 | End: 2023-12-18

## 2023-09-25 ENCOUNTER — OFFICE VISIT (OUTPATIENT)
Dept: PSYCHIATRY | Facility: CLINIC | Age: 43
End: 2023-09-25
Payer: COMMERCIAL

## 2023-09-25 VITALS
HEART RATE: 82 BPM | BODY MASS INDEX: 38.61 KG/M2 | DIASTOLIC BLOOD PRESSURE: 87 MMHG | WEIGHT: 232 LBS | SYSTOLIC BLOOD PRESSURE: 131 MMHG

## 2023-09-25 DIAGNOSIS — F90.0 ADHD (ATTENTION DEFICIT HYPERACTIVITY DISORDER), INATTENTIVE TYPE: ICD-10-CM

## 2023-09-25 DIAGNOSIS — F41.1 GAD (GENERALIZED ANXIETY DISORDER): Primary | ICD-10-CM

## 2023-09-25 DIAGNOSIS — F33.0 MILD RECURRENT MAJOR DEPRESSION (H): ICD-10-CM

## 2023-09-25 PROCEDURE — 99214 OFFICE O/P EST MOD 30 MIN: CPT | Performed by: NURSE PRACTITIONER

## 2023-09-25 RX ORDER — LISDEXAMFETAMINE DIMESYLATE 40 MG/1
40 CAPSULE ORAL EVERY MORNING
Qty: 30 CAPSULE | Refills: 0 | Status: SHIPPED | OUTPATIENT
Start: 2023-11-20 | End: 2023-12-18

## 2023-09-25 RX ORDER — LISDEXAMFETAMINE DIMESYLATE 40 MG/1
40 CAPSULE ORAL EVERY MORNING
Qty: 30 CAPSULE | Refills: 0 | Status: SHIPPED | OUTPATIENT
Start: 2023-10-23 | End: 2023-12-18

## 2023-09-25 RX ORDER — LISDEXAMFETAMINE DIMESYLATE 40 MG/1
40 CAPSULE ORAL EVERY MORNING
Qty: 30 CAPSULE | Refills: 0 | Status: SHIPPED | OUTPATIENT
Start: 2023-09-25 | End: 2023-12-18

## 2023-09-25 NOTE — PROGRESS NOTES
Mental Health and Collaborative Care Psychiatry Service Rooming Note        Seen by provider ragini. Vials and Wgt per nursing.    Is there any chance you are pregnant? About 1 mos ago  Do you use birth control? Oral birth control      Care team has reviewed attendance agreement with patient. Patient advised that two failed appointments within 6 months may lead to termination of current episode of care.         Helen Perdomo LPN  September 25, 2023  11:12 AM

## 2023-09-25 NOTE — PATIENT INSTRUCTIONS
"Patient Education   The [facility]  What to Expect  Here's what to expect in the [facility] Psychiatry Program.   About the program  You'll be meeting with a psychiatric doctor to check your mental health. A psychiatric doctor helps you deal with troubling thoughts and feelings by giving you medicine. They'll make sure you know the plan for your care. You may see them for a long time. When you're feeling better, they may refer you back to seeing your family doctor.   If you have any questions, we'll be glad to talk to you.  About visits  Be open  At your visits, please talk openly about your problems. It may feel hard, but it's the best way for us to help you.  Cancelling visits  If you can't come to your visit, please call us right away at [phone]. If you don't cancel at least 24 hours (1 full day) before your visit, that's \"late cancellation.\"  Not showing up for your visits  Being very late is the same as not showing up. You'll be a \"no show\" if:  You're more than 15 minutes late for a 30-minute (half hour) visit.  You're more than 30 minutes late for a 60-minute (full hour) visit.  If you cancel late or don't show up 2 times within 6 months, we may end your care.  Getting help between visits  If you need help between visits, you can call us Monday to Friday from 8 a.m. to 4:30 p.m. at [phone].  Emergency care  Call 911 or go to the nearest emergency department if your life or someone else's life is in danger.  Call 988 anytime to reach the national Suicide and Crisis hotline.  Medicine refills  To refill your medicine, call your pharmacy. You can also call [facility] at [phone], Monday to Friday, 8 a.m. to 4:30 p.m. It can take 1 to 3 business days to get a refill.   Forms, letters, and tests  You may have papers to fill out, like FMLA, short-term disability, and workability. We can help you with these forms at your visits, but you must have an " appointment. You may need more than 1 visit for this, to be in an intensive therapy program, or both.  Before we can give you medicine for ADHD, we may refer you to get tested for it or confirm it another way.  We may not be able to give you an emotional support animal letter.  We don't do mental health checks ordered by the court.   We don't do mental health testing, but we can refer you to get tested.   Thank you for choosing us for your care.  For informational purposes only. Not to replace the advice of your health care provider. Copyright   2022 [employer]. All rights reserved. [place] [id number] - 12/22.         Treatment Plan:      1.  Decrease Vyvanse to 40 mg daily  2.  Continue Adderall 5 to 10 mg daily as needed for breakthrough attention deficits  3.  Continue propranolol 20 mg twice daily  4.  Monitor blood pressure frequently and notify me if it reads above 160/90 with a pulse greater than 90    Continue all other medications as reviewed per electronic medical record today.   Safety plan reviewed. To the Emergency Department as needed or call after hours crisis line at [phone] or [phone]. [place] Crisis Text Line. Text [place] to 039107 or Suicide LifeLine Chat: suicidepreventionGreen Spirit Farmsline.org/chat/  To schedule individual or family therapy, call [facility] at [phone]  Schedule an appointment with me in 3 months or sooner as needed. Call [facility] at [phone] to schedule.  Follow up with primary care provider as planned or for acute medical concerns.  Call the psychiatric nurse line with medication questions or concerns at [phone]  MyChart may be used to communicate with your provider, but this is not intended to be used for emergencies.    Crisis Resources:    National Suicide Prevention Lifeline: 710.494.2887 (TTY: 100.125.1419). Call anytime for help.  (www.suicidepreventionlifeline.org)  National [place] on Mental Illness  (www.sina.org): 779-018-3834 or 162-177-5579.   Mental Health Association (www.mentalhealth.org): 365.720.7911 or 712-530-9250.  [place] Crisis Text Line: Text [place] to 052408  Suicide LifeLine Chat: suicidepreventionlifeline.org/chat

## 2023-09-25 NOTE — PROGRESS NOTES
"         Outpatient Psychiatric Progress Note    Name: Lianna Sorot   : 1980                    Primary Care Provider: DANA Amos CNP   Therapist: None    PHQ-9 scores:      2022     9:04 AM 2023     8:11 AM 2023     5:32 PM   PHQ-9 SCORE   PHQ-9 Total Score MyChart  5 (Mild depression) 9 (Mild depression)   PHQ-9 Total Score 7 5 9       FAUSTINO-7 scores:      2021     8:14 AM 2021     8:00 AM 2023     5:34 PM   FAUSTINO-7 SCORE   Total Score   9 (mild anxiety)   Total Score 0 4 9       Patient Identification:    Patient is a 43 year old year old, single  White Choose not to answer female  who presents for return visit with me.  Patient is currently employed full time. Patient attended the session alone. Patient prefers to be called: \" Lianna\".    Current medications include: amphetamine-dextroamphetamine (ADDERALL) 10 MG tablet, Take 1 tablet (10 mg) by mouth daily as needed (attentional deficits)  blood glucose (NO BRAND SPECIFIED) test strip, Use to test blood sugar 2 times daily or as directed. To accompany: Blood Glucose Monitor Brands: per insurance.  blood glucose monitoring (NO BRAND SPECIFIED) meter device kit, Use to test blood sugar 2 times daily or as directed. Preferred blood glucose meter OR supplies to accompany: Blood Glucose Monitor Brands: per insurance.  cetirizine (ZYRTEC) 10 MG tablet, Take 10 mg by mouth daily  chlorhexidine (HIBICLENS) 4 % liquid, Use to wash groin daily.  clindamycin (CLINDAMAX) 1 % external lotion, Apply to affected areas twice daily  cyclobenzaprine (FLEXERIL) 10 MG tablet, TAKE ONE TABLET BY MOUTH THREE TIMES A DAY AS NEEDED FOR MUSCLE SPASMS  famotidine (PEPCID) 20 MG tablet, TAKE ONE TABLET BY MOUTH TWICE A DAY  fluticasone (FLONASE) 50 MCG/ACT nasal spray, Spray 2 sprays into both nostrils daily  lisdexamfetamine (VYVANSE) 50 MG capsule, Take 1 capsule (50 mg) by mouth every morning  lisdexamfetamine (VYVANSE) 60 MG capsule, " Take 1 capsule (60 mg) by mouth every morning  lisdexamfetamine (VYVANSE) 60 MG capsule, Take 1 capsule (60 mg) by mouth every morning  lisdexamfetamine (VYVANSE) 60 MG capsule, Take 1 capsule (60 mg) by mouth every morning  order for DME, Equipment being ordered: Dynaflex insert  propranolol (INDERAL) 20 MG tablet, Take 1 tablet (20 mg) by mouth 2 times daily  SETLAKIN 0.15-0.03 MG tablet, TAKE 1 TABLET BY MOUTH DAILY  thin (NO BRAND SPECIFIED) lancets, Use with lanceting device. To accompany: Blood Glucose Monitor Brands: per insurance.  triamcinolone (KENALOG) 0.1 % external cream, Apply to affected areas twice daily for 2 weeks, then as needed only    No current facility-administered medications on file prior to visit.       The [place] Prescription Monitoring Program has been reviewed and there are no concerns about diversionary activity for controlled substances at this time.      I was able to review most recent Primary Care Provider, specialty provider, and therapy visit notes that I have access to.     Today, patient reports that she has had several readings of elevated blood pressures but now finds that it has lowered recently.  She has had difficulties picking up the Vyvanse and now would like to take a lower dose to see if that would be easier for her to purchase.  Being a single parent has been stressful for her, but she has been able to manage things at home and at work as well.  When she is not working she mostly isolates to her home.  Sleeping fluctuates.       has a past medical history of Abnormal Pap smear of cervix (2011), Acute tonsillitis (2008), Condylomata (4/7/2011), Endometritis (2008), Hidradenitis (7/3/2009), Intussusception (H) (1983), and Unspecified trauma to perineum and vulva, unspecified as to episode of care in pregnancy (1985).    She has no past medical history of Basal cell carcinoma or Squamous cell carcinoma.    Social history updates:    Lianna lives with her 2 children.   She works full-time at a [employer] in [facility] [facility].    Substance use updates:    No alcohol use reported  Tobacco use: Yes Cigarettes  Ready to quit?  No  Nicotine Replacement Therapy tried: None    Vital Signs:   There were no vitals taken for this visit.    Labs:    Most recent laboratory results reviewed and no new labs.     Mental Status Examination:  Appearance: awake, alert and casual dress  Attitude: cooperative  Eye Contact:  adequate  Gait and Station: No dizziness or falls  Psychomotor Behavior:  no evidence of tardive dyskinesia, dystonia, or tics and intact station, gait and muscle tone  Oriented to:  time, person, and place  Attention Span and Concentration:  Normal  Speech:   vtspeech: clear, coherent and Speaks: English  Mood:  anxious and depressed  Affect:  mood congruent  Associations:  no loose associations  Thought Process:  goal oriented  Thought Content:  no evidence of suicidal ideation or homicidal ideation, no auditory hallucinations present, and no visual hallucinations present  Recent and Remote Memory:  intact Not formally assessed. No amnesia.  Fund of Knowledge: appropriate  Insight:  good  Judgment: good  Impulse Control:  good    Suicide Risk Assessment:  Today Lianna Sorto reports no thoughts to harm themself or others. In addition, there are notable risk factors for self-harm, including single status, anxiety, withdrawing, and mood change. However, risk is mitigated by commitment to family, history of seeking help when needed, future oriented, denies suicidal intent or plan, and denies homicidal ideation, intent, or plan. Therefore, based on all available evidence including the factors cited above, Lianna Sorto does not appear to be at imminent risk for self-harm, does not meet criteria for a 72-hr hold, and therefore remains appropriate for ongoing outpatient level of care.  A thorough assessment of risk factors related to suicide and self-harm have been  reviewed and are noted above. The patient convincingly denies suicidality on several occasions. Local community safety resources printed and reviewed for patient to use if needed. There was no deceit detected, and the patient presented in a manner that was believable.     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  296.31 (F33.0) Major Depressive Disorder, Recurrent Episode, Mild _ and With mixed features  300.02 (F41.1) Generalized Anxiety Disorder    Medical comorbidities include:   Patient Active Problem List    Diagnosis Date Noted    Morbid obesity (H) 11/02/2020     Priority: Medium    Diabetes mellitus, type 2 (H) 08/07/2020     Priority: Medium    Moderate episode of recurrent major depressive disorder (H) 09/22/2017     Priority: Medium    Controlled substance agreement signed 09/28/2016     Priority: Medium    Hidradenitis suppurativa 11/02/2015     Priority: Medium    Anxiety 06/02/2014     Priority: Medium    Pelvic pain in female 11/13/2013     Priority: Medium    Fibromyalgia 05/28/2013     Priority: Medium    Piriformis syndrome 12/12/2012     Priority: Medium    Scapular dyskinesis 03/13/2012     Priority: Medium    Varicose veins of legs 07/13/2011     Priority: Medium    Sacroiliac pain 07/13/2011     Priority: Medium    Back muscle spasm 06/06/2011     Priority: Medium    Tension headache 06/06/2011     Priority: Medium    CARDIOVASCULAR SCREENING; LDL GOAL LESS THAN 130 06/06/2011     Priority: Medium    Atypical squamous cells of undetermined significance (ASCUS) on Papanicolaou smear of cervix 03/22/2011     Priority: Medium     3/22/11: Pap - ASCUS, + HPV 16. Plan colp.  4/7/11:Economy--benign. Repeat pap 6 months. Reminder placed in epic.   11/22/11:Pap--ASCUS +(low risk) HPV type 54. Rpt pap in 1 yr. In , ep, prob list, hx updated. Reminder sent.  2/20/13:Pap--NIL. Pap 1 year. Reminder placed in EPIC  3/18/14: Pap - NIL,  Neg HPV. Repeat pap 3 yrs.   6/7/2017 Pap: ASCUS, neg HPV. Plan to repeat Pap+HPV cotesting in 3 yrs (2020)  11/2/20 NIL Pap, Neg HPV. Plan cotest in 5 years.           DDD (degenerative disc disease), cervical 03/10/2011     Priority: Medium    Acne 03/10/2011     Priority: Medium    Attention deficit disorder of adult 09/22/2010     Priority: Medium    Allergic rhinitis 03/30/2007     Priority: Medium     Problem list name updated by automated process. Provider to review           Assessment:    Lianna Sorto has been out of her Vyvanse for several days now.  Without this medication she has noticed increased irritability with less motivation.  She has been having difficulties purchasing this medication through most pharmacies where she lives.  She is requesting to lower the dose to 40 mg daily.  Adderall will continue as needed for breakthrough attention deficits.  She will continue propranolol 20 mg twice daily as she reports that, since taking it, her heart rate and blood pressure have decreased.  I asked her to monitor her blood pressure frequently and to notify me with parameters that may be concerning when taking a stimulant medication.  She denies any chest pain or dizziness today..    Medication side effects and alternatives were reviewed. Health promotion activities recommended and reviewed today. All questions addressed. Education and counseling completed regarding risks and benefits of medications and psychotherapy options.    Treatment Plan:      1.  Decrease Vyvanse to 40 mg daily  2.  Continue Adderall 5 to 10 mg daily as needed for breakthrough attention deficits  3.  Continue propranolol 20 mg twice daily  4.  Monitor blood pressure frequently and notify me if it reads above 160/90 with a pulse greater than 90    Continue all other medications as reviewed per electronic medical record today.   Safety plan reviewed. To the Emergency Department as needed or call after hours crisis line at  [phone] or [phone]. [place] Crisis Text Line. Text [place] to 854917 or Suicide LifeLine Chat: suicideOnTheGo Platforms.org/chat/  To schedule individual or family therapy, call [facility] at [phone]  Schedule an appointment with me in 3 months or sooner as needed. Call [facility] at [phone] to schedule.  Follow up with primary care provider as planned or for acute medical concerns.  Call the psychiatric nurse line with medication questions or concerns at [phone]  MyChart may be used to communicate with your provider, but this is not intended to be used for emergencies.    Crisis Resources:    National Suicide Prevention Lifeline: 831.933.2860 (TTY: 808.785.1585). Call anytime for help.  (www.suicidepreventionlifeline.org)  National [place] on Mental Illness (www.sina.org): 510.950.6315 or 148-934-5907.   Mental Health Association (www.mentalhealth.org): 602.690.4951 or 528-892-5012.  [place] Crisis Text [place]: Text [place] to 156441  Suicide LifeLine Chat: suicideOnTheGo Platforms.org/chat    Administrative Billing:   Time spent with patient includes counseling and coordination of care regarding above diagnoses and treatment plan.    Patient Status:  This is a continuous care patient and medications will be prescribed by the psychiatric provider until further indicated.    Signed:   ELIANA Jeffrey-BC   Psychiatry

## 2023-09-29 ENCOUNTER — ANESTHESIA (OUTPATIENT)
Dept: SURGERY | Facility: CLINIC | Age: 43
End: 2023-09-29
Payer: COMMERCIAL

## 2023-09-29 ENCOUNTER — HOSPITAL ENCOUNTER (OUTPATIENT)
Facility: CLINIC | Age: 43
End: 2023-09-29
Attending: SURGERY | Admitting: SURGERY
Payer: COMMERCIAL

## 2023-09-29 ENCOUNTER — ANESTHESIA EVENT (OUTPATIENT)
Dept: SURGERY | Facility: CLINIC | Age: 43
End: 2023-09-29
Payer: COMMERCIAL

## 2023-09-29 ENCOUNTER — APPOINTMENT (OUTPATIENT)
Dept: CT IMAGING | Facility: CLINIC | Age: 43
End: 2023-09-29
Attending: STUDENT IN AN ORGANIZED HEALTH CARE EDUCATION/TRAINING PROGRAM
Payer: COMMERCIAL

## 2023-09-29 ENCOUNTER — HOSPITAL ENCOUNTER (OUTPATIENT)
Facility: CLINIC | Age: 43
Discharge: HOME | End: 2023-09-29
Attending: STUDENT IN AN ORGANIZED HEALTH CARE EDUCATION/TRAINING PROGRAM | Admitting: STUDENT IN AN ORGANIZED HEALTH CARE EDUCATION/TRAINING PROGRAM
Payer: COMMERCIAL

## 2023-09-29 VITALS
BODY MASS INDEX: 37.28 KG/M2 | DIASTOLIC BLOOD PRESSURE: 96 MMHG | OXYGEN SATURATION: 100 % | HEIGHT: 66 IN | RESPIRATION RATE: 14 BRPM | TEMPERATURE: 98 F | SYSTOLIC BLOOD PRESSURE: 140 MMHG | WEIGHT: 232 LBS | HEART RATE: 92 BPM

## 2023-09-29 DIAGNOSIS — K35.209 ACUTE APPENDICITIS WITH GENERALIZED PERITONITIS, WITHOUT GANGRENE OR ABSCESS, UNSPECIFIED WHETHER PERFORATION PRESENT: Primary | ICD-10-CM

## 2023-09-29 DIAGNOSIS — K35.30 ACUTE APPENDICITIS WITH LOCALIZED PERITONITIS, WITHOUT PERFORATION, ABSCESS, OR GANGRENE: ICD-10-CM

## 2023-09-29 LAB
ALBUMIN SERPL BCG-MCNC: 3.7 G/DL (ref 3.5–5.2)
ALBUMIN UR-MCNC: 30 MG/DL
ALP SERPL-CCNC: 77 U/L (ref 35–104)
ALT SERPL W P-5'-P-CCNC: 22 U/L (ref 0–50)
ANION GAP SERPL CALCULATED.3IONS-SCNC: 13 MMOL/L (ref 7–15)
APPEARANCE UR: CLEAR
AST SERPL W P-5'-P-CCNC: 20 U/L (ref 0–45)
BACTERIA #/AREA URNS HPF: ABNORMAL /HPF
BASOPHILS # BLD AUTO: 0.1 10E3/UL (ref 0–0.2)
BASOPHILS NFR BLD AUTO: 0 %
BILIRUB SERPL-MCNC: 0.5 MG/DL
BILIRUB UR QL STRIP: NEGATIVE
BUN SERPL-MCNC: 10.4 MG/DL (ref 6–20)
CALCIUM SERPL-MCNC: 9.2 MG/DL (ref 8.6–10)
CHLORIDE SERPL-SCNC: 96 MMOL/L (ref 98–107)
COLOR UR AUTO: YELLOW
CREAT SERPL-MCNC: 0.64 MG/DL (ref 0.51–0.95)
DEPRECATED HCO3 PLAS-SCNC: 22 MMOL/L (ref 22–29)
EGFRCR SERPLBLD CKD-EPI 2021: >90 ML/MIN/1.73M2
EOSINOPHIL # BLD AUTO: 0 10E3/UL (ref 0–0.7)
EOSINOPHIL NFR BLD AUTO: 0 %
ERYTHROCYTE [DISTWIDTH] IN BLOOD BY AUTOMATED COUNT: 13.1 % (ref 10–15)
GLUCOSE SERPL-MCNC: 224 MG/DL (ref 70–99)
GLUCOSE UR STRIP-MCNC: >499 MG/DL
HCG UR QL: NEGATIVE
HCT VFR BLD AUTO: 45.2 % (ref 35–47)
HGB BLD-MCNC: 15.2 G/DL (ref 11.7–15.7)
HGB UR QL STRIP: ABNORMAL
HYALINE CASTS: 6 /LPF
IMM GRANULOCYTES # BLD: 0.1 10E3/UL
IMM GRANULOCYTES NFR BLD: 1 %
KETONES UR STRIP-MCNC: 80 MG/DL
LEUKOCYTE ESTERASE UR QL STRIP: NEGATIVE
LIPASE SERPL-CCNC: 22 U/L (ref 13–60)
LYMPHOCYTES # BLD AUTO: 2.3 10E3/UL (ref 0.8–5.3)
LYMPHOCYTES NFR BLD AUTO: 11 %
MCH RBC QN AUTO: 29 PG (ref 26.5–33)
MCHC RBC AUTO-ENTMCNC: 33.6 G/DL (ref 31.5–36.5)
MCV RBC AUTO: 86 FL (ref 78–100)
MONOCYTES # BLD AUTO: 1.3 10E3/UL (ref 0–1.3)
MONOCYTES NFR BLD AUTO: 6 %
MUCOUS THREADS #/AREA URNS LPF: PRESENT /LPF
NEUTROPHILS # BLD AUTO: 17.3 10E3/UL (ref 1.6–8.3)
NEUTROPHILS NFR BLD AUTO: 82 %
NITRATE UR QL: NEGATIVE
NRBC # BLD AUTO: 0 10E3/UL
NRBC BLD AUTO-RTO: 0 /100
PH UR STRIP: 5 [PH] (ref 5–7)
PLATELET # BLD AUTO: 346 10E3/UL (ref 150–450)
POTASSIUM SERPL-SCNC: 4.2 MMOL/L (ref 3.4–5.3)
PROT SERPL-MCNC: 6.8 G/DL (ref 6.4–8.3)
RBC # BLD AUTO: 5.25 10E6/UL (ref 3.8–5.2)
RBC URINE: <1 /HPF
SODIUM SERPL-SCNC: 131 MMOL/L (ref 135–145)
SP GR UR STRIP: 1.01 (ref 1–1.03)
SQUAMOUS EPITHELIAL: <1 /HPF
UROBILINOGEN UR STRIP-MCNC: NORMAL MG/DL
WBC # BLD AUTO: 21.1 10E3/UL (ref 4–11)
WBC URINE: 1 /HPF

## 2023-09-29 PROCEDURE — 81003 URINALYSIS AUTO W/O SCOPE: CPT | Performed by: STUDENT IN AN ORGANIZED HEALTH CARE EDUCATION/TRAINING PROGRAM

## 2023-09-29 PROCEDURE — 36415 COLL VENOUS BLD VENIPUNCTURE: CPT | Performed by: STUDENT IN AN ORGANIZED HEALTH CARE EDUCATION/TRAINING PROGRAM

## 2023-09-29 PROCEDURE — 85025 COMPLETE CBC W/AUTO DIFF WBC: CPT | Performed by: STUDENT IN AN ORGANIZED HEALTH CARE EDUCATION/TRAINING PROGRAM

## 2023-09-29 PROCEDURE — 83690 ASSAY OF LIPASE: CPT | Performed by: STUDENT IN AN ORGANIZED HEALTH CARE EDUCATION/TRAINING PROGRAM

## 2023-09-29 PROCEDURE — 250N000013 HC RX MED GY IP 250 OP 250 PS 637: Performed by: SURGERY

## 2023-09-29 PROCEDURE — 710N000012 HC RECOVERY PHASE 2, PER MINUTE: Performed by: SURGERY

## 2023-09-29 PROCEDURE — 250N000011 HC RX IP 250 OP 636: Mod: JZ | Performed by: STUDENT IN AN ORGANIZED HEALTH CARE EDUCATION/TRAINING PROGRAM

## 2023-09-29 PROCEDURE — 96361 HYDRATE IV INFUSION ADD-ON: CPT | Mod: 59 | Performed by: STUDENT IN AN ORGANIZED HEALTH CARE EDUCATION/TRAINING PROGRAM

## 2023-09-29 PROCEDURE — 271N000001 HC OR GENERAL SUPPLY NON-STERILE: Performed by: SURGERY

## 2023-09-29 PROCEDURE — 99204 OFFICE O/P NEW MOD 45 MIN: CPT | Mod: 57 | Performed by: SURGERY

## 2023-09-29 PROCEDURE — 360N000076 HC SURGERY LEVEL 3, PER MIN: Performed by: SURGERY

## 2023-09-29 PROCEDURE — 258N000003 HC RX IP 258 OP 636: Performed by: NURSE ANESTHETIST, CERTIFIED REGISTERED

## 2023-09-29 PROCEDURE — 74177 CT ABD & PELVIS W/CONTRAST: CPT

## 2023-09-29 PROCEDURE — 710N000009 HC RECOVERY PHASE 1, LEVEL 1, PER MIN: Performed by: SURGERY

## 2023-09-29 PROCEDURE — 96365 THER/PROPH/DIAG IV INF INIT: CPT | Mod: 59 | Performed by: STUDENT IN AN ORGANIZED HEALTH CARE EDUCATION/TRAINING PROGRAM

## 2023-09-29 PROCEDURE — 250N000011 HC RX IP 250 OP 636: Performed by: STUDENT IN AN ORGANIZED HEALTH CARE EDUCATION/TRAINING PROGRAM

## 2023-09-29 PROCEDURE — 250N000025 HC SEVOFLURANE, PER MIN: Performed by: SURGERY

## 2023-09-29 PROCEDURE — 250N000011 HC RX IP 250 OP 636: Performed by: SURGERY

## 2023-09-29 PROCEDURE — 272N000001 HC OR GENERAL SUPPLY STERILE: Performed by: SURGERY

## 2023-09-29 PROCEDURE — 81025 URINE PREGNANCY TEST: CPT | Performed by: STUDENT IN AN ORGANIZED HEALTH CARE EDUCATION/TRAINING PROGRAM

## 2023-09-29 PROCEDURE — 250N000011 HC RX IP 250 OP 636: Performed by: NURSE ANESTHETIST, CERTIFIED REGISTERED

## 2023-09-29 PROCEDURE — 99285 EMERGENCY DEPT VISIT HI MDM: CPT | Performed by: STUDENT IN AN ORGANIZED HEALTH CARE EDUCATION/TRAINING PROGRAM

## 2023-09-29 PROCEDURE — 44970 LAPAROSCOPY APPENDECTOMY: CPT | Performed by: SURGERY

## 2023-09-29 PROCEDURE — 250N000009 HC RX 250: Performed by: SURGERY

## 2023-09-29 PROCEDURE — 99285 EMERGENCY DEPT VISIT HI MDM: CPT | Mod: 25 | Performed by: STUDENT IN AN ORGANIZED HEALTH CARE EDUCATION/TRAINING PROGRAM

## 2023-09-29 PROCEDURE — 258N000003 HC RX IP 258 OP 636: Performed by: STUDENT IN AN ORGANIZED HEALTH CARE EDUCATION/TRAINING PROGRAM

## 2023-09-29 PROCEDURE — 370N000017 HC ANESTHESIA TECHNICAL FEE, PER MIN: Performed by: SURGERY

## 2023-09-29 PROCEDURE — 250N000009 HC RX 250: Performed by: NURSE ANESTHETIST, CERTIFIED REGISTERED

## 2023-09-29 PROCEDURE — 80053 COMPREHEN METABOLIC PANEL: CPT | Performed by: STUDENT IN AN ORGANIZED HEALTH CARE EDUCATION/TRAINING PROGRAM

## 2023-09-29 PROCEDURE — 250N000009 HC RX 250: Performed by: STUDENT IN AN ORGANIZED HEALTH CARE EDUCATION/TRAINING PROGRAM

## 2023-09-29 PROCEDURE — 88304 TISSUE EXAM BY PATHOLOGIST: CPT | Mod: TC | Performed by: SURGERY

## 2023-09-29 PROCEDURE — 96375 TX/PRO/DX INJ NEW DRUG ADDON: CPT | Mod: 59 | Performed by: STUDENT IN AN ORGANIZED HEALTH CARE EDUCATION/TRAINING PROGRAM

## 2023-09-29 RX ORDER — LIDOCAINE HYDROCHLORIDE 20 MG/ML
INJECTION, SOLUTION INFILTRATION; PERINEURAL PRN
Status: DISCONTINUED | OUTPATIENT
Start: 2023-09-29 | End: 2023-09-29

## 2023-09-29 RX ORDER — KETOROLAC TROMETHAMINE 30 MG/ML
INJECTION, SOLUTION INTRAMUSCULAR; INTRAVENOUS PRN
Status: DISCONTINUED | OUTPATIENT
Start: 2023-09-29 | End: 2023-09-29

## 2023-09-29 RX ORDER — NALOXONE HYDROCHLORIDE 0.4 MG/ML
0.2 INJECTION, SOLUTION INTRAMUSCULAR; INTRAVENOUS; SUBCUTANEOUS
Status: DISCONTINUED | OUTPATIENT
Start: 2023-09-29 | End: 2023-09-29 | Stop reason: HOSPADM

## 2023-09-29 RX ORDER — ONDANSETRON 4 MG/1
4 TABLET, ORALLY DISINTEGRATING ORAL EVERY 30 MIN PRN
Status: DISCONTINUED | OUTPATIENT
Start: 2023-09-29 | End: 2023-09-29 | Stop reason: HOSPADM

## 2023-09-29 RX ORDER — DOCUSATE SODIUM 100 MG/1
100 CAPSULE, LIQUID FILLED ORAL 2 TIMES DAILY
Refills: 0 | COMMUNITY
Start: 2023-09-29 | End: 2024-01-29

## 2023-09-29 RX ORDER — HYDROXYZINE HYDROCHLORIDE 25 MG/1
25 TABLET, FILM COATED ORAL EVERY 6 HOURS PRN
Status: DISCONTINUED | OUTPATIENT
Start: 2023-09-29 | End: 2023-09-29 | Stop reason: HOSPADM

## 2023-09-29 RX ORDER — NALOXONE HYDROCHLORIDE 0.4 MG/ML
0.4 INJECTION, SOLUTION INTRAMUSCULAR; INTRAVENOUS; SUBCUTANEOUS
Status: DISCONTINUED | OUTPATIENT
Start: 2023-09-29 | End: 2023-09-29 | Stop reason: HOSPADM

## 2023-09-29 RX ORDER — SODIUM CHLORIDE, SODIUM LACTATE, POTASSIUM CHLORIDE, CALCIUM CHLORIDE 600; 310; 30; 20 MG/100ML; MG/100ML; MG/100ML; MG/100ML
INJECTION, SOLUTION INTRAVENOUS CONTINUOUS PRN
Status: DISCONTINUED | OUTPATIENT
Start: 2023-09-29 | End: 2023-09-29

## 2023-09-29 RX ORDER — CEFAZOLIN SODIUM/WATER 2 G/20 ML
2 SYRINGE (ML) INTRAVENOUS
Status: DISCONTINUED | OUTPATIENT
Start: 2023-09-29 | End: 2023-09-29 | Stop reason: HOSPADM

## 2023-09-29 RX ORDER — LIDOCAINE HYDROCHLORIDE AND EPINEPHRINE 10; 10 MG/ML; UG/ML
INJECTION, SOLUTION INFILTRATION; PERINEURAL PRN
Status: DISCONTINUED | OUTPATIENT
Start: 2023-09-29 | End: 2023-09-29 | Stop reason: HOSPADM

## 2023-09-29 RX ORDER — KETAMINE HYDROCHLORIDE 10 MG/ML
INJECTION INTRAMUSCULAR; INTRAVENOUS PRN
Status: DISCONTINUED | OUTPATIENT
Start: 2023-09-29 | End: 2023-09-29

## 2023-09-29 RX ORDER — TRAMADOL HYDROCHLORIDE 50 MG/1
50 TABLET ORAL EVERY 6 HOURS PRN
Status: DISCONTINUED | OUTPATIENT
Start: 2023-09-29 | End: 2023-09-29 | Stop reason: HOSPADM

## 2023-09-29 RX ORDER — MEPERIDINE HYDROCHLORIDE 25 MG/ML
12.5 INJECTION INTRAMUSCULAR; INTRAVENOUS; SUBCUTANEOUS EVERY 5 MIN PRN
Status: DISCONTINUED | OUTPATIENT
Start: 2023-09-29 | End: 2023-09-29 | Stop reason: HOSPADM

## 2023-09-29 RX ORDER — ONDANSETRON 2 MG/ML
4 INJECTION INTRAMUSCULAR; INTRAVENOUS EVERY 30 MIN PRN
Status: DISCONTINUED | OUTPATIENT
Start: 2023-09-29 | End: 2023-09-29 | Stop reason: HOSPADM

## 2023-09-29 RX ORDER — CEFAZOLIN SODIUM/WATER 2 G/20 ML
2 SYRINGE (ML) INTRAVENOUS SEE ADMIN INSTRUCTIONS
Status: DISCONTINUED | OUTPATIENT
Start: 2023-09-29 | End: 2023-09-29 | Stop reason: HOSPADM

## 2023-09-29 RX ORDER — DEXAMETHASONE SODIUM PHOSPHATE 4 MG/ML
4 INJECTION, SOLUTION INTRA-ARTICULAR; INTRALESIONAL; INTRAMUSCULAR; INTRAVENOUS; SOFT TISSUE
Status: DISCONTINUED | OUTPATIENT
Start: 2023-09-29 | End: 2023-09-29 | Stop reason: HOSPADM

## 2023-09-29 RX ORDER — FENTANYL CITRATE 50 UG/ML
INJECTION, SOLUTION INTRAMUSCULAR; INTRAVENOUS PRN
Status: DISCONTINUED | OUTPATIENT
Start: 2023-09-29 | End: 2023-09-29

## 2023-09-29 RX ORDER — PROPOFOL 10 MG/ML
INJECTION, EMULSION INTRAVENOUS PRN
Status: DISCONTINUED | OUTPATIENT
Start: 2023-09-29 | End: 2023-09-29

## 2023-09-29 RX ORDER — HYDRALAZINE HYDROCHLORIDE 20 MG/ML
2.5-5 INJECTION INTRAMUSCULAR; INTRAVENOUS EVERY 10 MIN PRN
Status: DISCONTINUED | OUTPATIENT
Start: 2023-09-29 | End: 2023-09-29 | Stop reason: HOSPADM

## 2023-09-29 RX ORDER — GLYCOPYRROLATE 0.2 MG/ML
INJECTION, SOLUTION INTRAMUSCULAR; INTRAVENOUS PRN
Status: DISCONTINUED | OUTPATIENT
Start: 2023-09-29 | End: 2023-09-29

## 2023-09-29 RX ORDER — BUPIVACAINE HYDROCHLORIDE 5 MG/ML
INJECTION, SOLUTION PERINEURAL PRN
Status: DISCONTINUED | OUTPATIENT
Start: 2023-09-29 | End: 2023-09-29 | Stop reason: HOSPADM

## 2023-09-29 RX ORDER — CEFAZOLIN SODIUM 1 G/3ML
INJECTION, POWDER, FOR SOLUTION INTRAMUSCULAR; INTRAVENOUS PRN
Status: DISCONTINUED | OUTPATIENT
Start: 2023-09-29 | End: 2023-09-29

## 2023-09-29 RX ORDER — TRAMADOL HYDROCHLORIDE 50 MG/1
50 TABLET ORAL EVERY 6 HOURS PRN
Qty: 10 TABLET | Refills: 0 | Status: SHIPPED | OUTPATIENT
Start: 2023-09-29 | End: 2023-10-02

## 2023-09-29 RX ORDER — ALBUTEROL SULFATE 0.83 MG/ML
2.5 SOLUTION RESPIRATORY (INHALATION) EVERY 4 HOURS PRN
Status: DISCONTINUED | OUTPATIENT
Start: 2023-09-29 | End: 2023-09-29 | Stop reason: HOSPADM

## 2023-09-29 RX ORDER — IOPAMIDOL 755 MG/ML
100 INJECTION, SOLUTION INTRAVASCULAR ONCE
Status: COMPLETED | OUTPATIENT
Start: 2023-09-29 | End: 2023-09-29

## 2023-09-29 RX ORDER — CEFOXITIN 2 G/1
2 INJECTION, POWDER, FOR SOLUTION INTRAVENOUS EVERY 6 HOURS
Status: DISCONTINUED | OUTPATIENT
Start: 2023-09-29 | End: 2023-09-29

## 2023-09-29 RX ORDER — SODIUM CHLORIDE, SODIUM LACTATE, POTASSIUM CHLORIDE, CALCIUM CHLORIDE 600; 310; 30; 20 MG/100ML; MG/100ML; MG/100ML; MG/100ML
INJECTION, SOLUTION INTRAVENOUS CONTINUOUS
Status: DISCONTINUED | OUTPATIENT
Start: 2023-09-29 | End: 2023-09-29 | Stop reason: HOSPADM

## 2023-09-29 RX ORDER — ONDANSETRON 2 MG/ML
INJECTION INTRAMUSCULAR; INTRAVENOUS PRN
Status: DISCONTINUED | OUTPATIENT
Start: 2023-09-29 | End: 2023-09-29

## 2023-09-29 RX ORDER — CEFOTETAN DISODIUM 2 G/20ML
2 INJECTION, POWDER, FOR SOLUTION INTRAMUSCULAR; INTRAVENOUS EVERY 12 HOURS
Status: DISCONTINUED | OUTPATIENT
Start: 2023-09-29 | End: 2023-09-29 | Stop reason: HOSPADM

## 2023-09-29 RX ORDER — DEXAMETHASONE SODIUM PHOSPHATE 4 MG/ML
INJECTION, SOLUTION INTRA-ARTICULAR; INTRALESIONAL; INTRAMUSCULAR; INTRAVENOUS; SOFT TISSUE PRN
Status: DISCONTINUED | OUTPATIENT
Start: 2023-09-29 | End: 2023-09-29

## 2023-09-29 RX ORDER — FENTANYL CITRATE 50 UG/ML
50 INJECTION, SOLUTION INTRAMUSCULAR; INTRAVENOUS EVERY 5 MIN PRN
Status: DISCONTINUED | OUTPATIENT
Start: 2023-09-29 | End: 2023-09-29 | Stop reason: HOSPADM

## 2023-09-29 RX ORDER — HYDROMORPHONE HCL IN WATER/PF 6 MG/30 ML
0.4 PATIENT CONTROLLED ANALGESIA SYRINGE INTRAVENOUS EVERY 5 MIN PRN
Status: DISCONTINUED | OUTPATIENT
Start: 2023-09-29 | End: 2023-09-29 | Stop reason: HOSPADM

## 2023-09-29 RX ADMIN — SODIUM CHLORIDE, POTASSIUM CHLORIDE, SODIUM LACTATE AND CALCIUM CHLORIDE: 600; 310; 30; 20 INJECTION, SOLUTION INTRAVENOUS at 18:58

## 2023-09-29 RX ADMIN — TRAMADOL HYDROCHLORIDE 50 MG: 50 TABLET, COATED ORAL at 20:59

## 2023-09-29 RX ADMIN — FENTANYL CITRATE 100 MCG: 50 INJECTION, SOLUTION INTRAMUSCULAR; INTRAVENOUS at 19:00

## 2023-09-29 RX ADMIN — PROPOFOL 200 MG: 10 INJECTION, EMULSION INTRAVENOUS at 19:06

## 2023-09-29 RX ADMIN — MIDAZOLAM 2 MG: 1 INJECTION INTRAMUSCULAR; INTRAVENOUS at 18:58

## 2023-09-29 RX ADMIN — DEXAMETHASONE SODIUM PHOSPHATE 4 MG: 4 INJECTION, SOLUTION INTRA-ARTICULAR; INTRALESIONAL; INTRAMUSCULAR; INTRAVENOUS; SOFT TISSUE at 19:06

## 2023-09-29 RX ADMIN — ONDANSETRON 4 MG: 2 INJECTION INTRAMUSCULAR; INTRAVENOUS at 19:06

## 2023-09-29 RX ADMIN — SODIUM CHLORIDE 68 ML: 9 INJECTION, SOLUTION INTRAVENOUS at 15:43

## 2023-09-29 RX ADMIN — KETAMINE HYDROCHLORIDE 10 MG: 10 INJECTION INTRAMUSCULAR; INTRAVENOUS at 19:04

## 2023-09-29 RX ADMIN — PROPOFOL 100 MG: 10 INJECTION, EMULSION INTRAVENOUS at 19:24

## 2023-09-29 RX ADMIN — IOPAMIDOL 100 ML: 755 INJECTION, SOLUTION INTRAVENOUS at 15:43

## 2023-09-29 RX ADMIN — GLYCOPYRROLATE 0.2 MG: 0.2 INJECTION, SOLUTION INTRAMUSCULAR; INTRAVENOUS at 19:06

## 2023-09-29 RX ADMIN — CEFAZOLIN 2 G: 1 INJECTION, POWDER, FOR SOLUTION INTRAMUSCULAR; INTRAVENOUS at 18:59

## 2023-09-29 RX ADMIN — CEFOTETAN DISODIUM 2 G: 2 INJECTION, POWDER, FOR SOLUTION INTRAMUSCULAR; INTRAVENOUS at 16:56

## 2023-09-29 RX ADMIN — FENTANYL CITRATE 100 MCG: 50 INJECTION, SOLUTION INTRAMUSCULAR; INTRAVENOUS at 19:41

## 2023-09-29 RX ADMIN — KETAMINE HYDROCHLORIDE 40 MG: 10 INJECTION INTRAMUSCULAR; INTRAVENOUS at 19:06

## 2023-09-29 RX ADMIN — PROPOFOL 100 MG: 10 INJECTION, EMULSION INTRAVENOUS at 19:27

## 2023-09-29 RX ADMIN — KETOROLAC TROMETHAMINE 30 MG: 30 INJECTION, SOLUTION INTRAMUSCULAR at 19:30

## 2023-09-29 RX ADMIN — ONDANSETRON 4 MG: 2 INJECTION INTRAMUSCULAR; INTRAVENOUS at 14:29

## 2023-09-29 RX ADMIN — SODIUM CHLORIDE 1000 ML: 9 INJECTION, SOLUTION INTRAVENOUS at 14:31

## 2023-09-29 RX ADMIN — PROPOFOL 100 MG: 10 INJECTION, EMULSION INTRAVENOUS at 19:38

## 2023-09-29 RX ADMIN — ROCURONIUM BROMIDE 50 MG: 50 INJECTION, SOLUTION INTRAVENOUS at 19:06

## 2023-09-29 RX ADMIN — PROPOFOL 100 MG: 10 INJECTION, EMULSION INTRAVENOUS at 19:40

## 2023-09-29 RX ADMIN — LIDOCAINE HYDROCHLORIDE 50 MG: 20 INJECTION, SOLUTION INFILTRATION; PERINEURAL at 19:03

## 2023-09-29 RX ADMIN — SUGAMMADEX 200 MG: 100 INJECTION, SOLUTION INTRAVENOUS at 19:35

## 2023-09-29 ASSESSMENT — LIFESTYLE VARIABLES: TOBACCO_USE: 1

## 2023-09-29 ASSESSMENT — ACTIVITIES OF DAILY LIVING (ADL)
ADLS_ACUITY_SCORE: 35

## 2023-09-29 NOTE — INTERVAL H&P NOTE
"I have reviewed the surgical (or preoperative) H&P that is linked to this encounter, and examined the patient. There are no significant changes    Clinical Conditions Present on Arrival:  Clinically Significant Risk Factors Present on Admission         # Hyponatremia: Lowest Na = 131 mmol/L in last 30 days, will monitor as appropriate          # Obesity: Estimated body mass index is 37.45 kg/m  as calculated from the following:    Height as of this encounter: 1.676 m (5' 6\").    Weight as of this encounter: 105.2 kg (232 lb).       "

## 2023-09-29 NOTE — ED TRIAGE NOTES
Triage Assessment       Row Name 09/29/23 [id number]       Triage Assessment (Adult)    Airway WDL WDL       Respiratory WDL    Respiratory WDL WDL       Skin Circulation/Temperature WDL    Skin Circulation/Temperature WDL WDL       Cardiac WDL    Cardiac WDL WDL       Peripheral/Neurovascular WDL    Peripheral Neurovascular WDL WDL       Cognitive/Neuro/Behavioral WDL    Cognitive/Neuro/Behavioral WDL WDL

## 2023-09-29 NOTE — ED PROVIDER NOTES
"  History     Chief Complaint   Patient presents with    Abdominal Pain     HPI  Lianna Sorto is a 43 year old female who has degenerative disc disease, fibromyalgia, hidradenitis suppurativa, depression, type 2 diabetes who presents to the emergency department for evaluation of abdominal pain.  Patient states that last night she had the acute onset of right lower quadrant abdominal pain.  She states that the pain was sharp and initially she had some radiating pain into her upper abdomen.  She states that symptoms have been persistent today, however pain is no longer radiating.  She states that it is painful to the touch in the right lower abdomen.  Pain is worse with movement.  She had a bowel movement this morning.  She states the BM was loose, she took some milk of magnesia yesterday to see if this would help her symptoms and it did not.  She denies dysuria, urgency or frequency.  No fever or chills.  She has had nausea with nonbloody emesis.  She has not had any appetite.  His only had a banana and ibuprofen today.  She denies alcohol or drugs.  Abdominal surgical history notable for .  She still has a gallbladder and appendix.  Her last menstrual cycle was a month ago.  She has no vaginal bleeding or discharge.  States she has not been sexually active for 4 years.  She does report a history of ovarian cysts.  Recent travel.  She denies having any other symptoms.    Allergies:  Allergies   Allergen Reactions    Topiramate Rash    Celexa [Citalopram Hydrobromide] GI Disturbance    Morphine     Polymyxin B Other (See Comments)     Polymixin eye drops  \"severe burning\"    Vicodin [Hydrocodone-Acetaminophen]      Stomach upset, \"hearing things\", irritability        Problem List:    Patient Active Problem List    Diagnosis Date Noted    Morbid obesity (H) 2020     Priority: Medium    Diabetes mellitus, type 2 (H) 2020     Priority: Medium    Moderate episode of recurrent major depressive " disorder (H) 09/22/2017     Priority: Medium    Controlled substance agreement signed 09/28/2016     Priority: Medium    Hidradenitis suppurativa 11/02/2015     Priority: Medium    Anxiety 06/02/2014     Priority: Medium    Pelvic pain in female 11/13/2013     Priority: Medium    Fibromyalgia 05/28/2013     Priority: Medium    Piriformis syndrome 12/12/2012     Priority: Medium    Scapular dyskinesis 03/13/2012     Priority: Medium    Varicose veins of legs 07/13/2011     Priority: Medium    Sacroiliac pain 07/13/2011     Priority: Medium    Back muscle spasm 06/06/2011     Priority: Medium    Tension headache 06/06/2011     Priority: Medium    CARDIOVASCULAR SCREENING; LDL GOAL LESS THAN 130 06/06/2011     Priority: Medium    Atypical squamous cells of undetermined significance (ASCUS) on Papanicolaou smear of cervix 03/22/2011     Priority: Medium     3/22/11: Pap - ASCUS, + HPV 16. Plan colp.  4/7/11:[place]--benign. Repeat pap 6 months. Reminder placed in epic.   11/22/11:Pap--ASCUS +(low risk) HPV type 54. Rpt pap in 1 yr. In , ep, prob list, hx updated. Reminder sent.  2/20/13:Pap--NIL. Pap 1 year. Reminder placed in EPIC  3/18/14: Pap - NIL, Neg HPV. Repeat pap 3 yrs.   6/7/2017 Pap: ASCUS, neg HPV. Plan to repeat Pap+HPV cotesting in 3 yrs (2020)  11/2/20 NIL Pap, Neg HPV. Plan cotest in 5 years.         DDD (degenerative disc disease), cervical 03/10/2011     Priority: Medium    Acne 03/10/2011     Priority: Medium    Attention deficit disorder of adult 09/22/2010     Priority: Medium    Allergic rhinitis 03/30/2007     Priority: Medium     Problem list name updated by automated process. Provider to review          Past Medical History:    Past Medical History:   Diagnosis Date    Abnormal Pap smear of cervix 2011    Acute tonsillitis 2008    Condylomata 4/7/2011    Endometritis 2008    Hidradenitis 7/3/2009    Intussusception (H) 1983    Unspecified trauma to perineum and vulva, unspecified as to episode of  care in pregnancy        Past Surgical History:    Past Surgical History:   Procedure Laterality Date    SURGICAL HISTORY OF -       oral surgery    VAGINA SURGERY  1985    Fell off deck, internal bleeding.    ZZC  DELIVERY ONLY  2007,     arrest of dilation at 6 cm, low transverse uterine incision       Family History:    Family History   Problem Relation Age of Onset    Heart Disease Mother     Arthritis Mother     Neurologic Disorder Mother     C.A.D. Mother     Gastrointestinal Disease Mother     Musculoskeletal Disorder Mother     Diabetes Mother     Coronary Artery Disease Mother         has had 2 heart attacks before the age of 55    Hypertension Mother     Hyperlipidemia Mother     Depression Mother     Mental Illness Mother     Osteoporosis Mother     Heart Disease Maternal Grandmother     Breast Cancer Maternal Grandmother 42    Heart Disease Maternal Grandfather     C.A.D. Maternal Grandfather     Cerebrovascular Disease Maternal Grandfather     Diabetes Maternal Grandfather     Breast Cancer Other     Cerebrovascular Disease Other     C.A.D. Other     Diabetes Other     Hypertension Other     Musculoskeletal Disorder Other     Unknown/Adopted Father     Unknown/Adopted Paternal Grandmother     Unknown/Adopted Paternal Grandfather     Allergies Son     Coronary Artery Disease Other         My uncle just  of a heart attack 1n February    Obesity Other     Asthma No family hx of     Cancer - colorectal No family hx of     Prostate Cancer No family hx of        Social History:  Marital Status:   [4]  Social History     Tobacco Use    Smoking status: Some Days     Packs/day: 0.50     Years: 10.00     Pack years: 5.00     Types: Cigarettes     Last attempt to quit: 9/15/2021     Years since quittin.0    Smokeless tobacco: Never    Tobacco comments:     23 half a pack a week     about a pack a week   Vaping Use    Vaping Use: Never used   Substance Use Topics    Alcohol  "use: Yes     Comment: Avg. twice per month    Drug use: No        Medications:    amphetamine-dextroamphetamine (ADDERALL) 10 MG tablet  blood glucose (NO BRAND SPECIFIED) test strip  blood glucose monitoring (NO BRAND SPECIFIED) meter device kit  cetirizine (ZYRTEC) 10 MG tablet  chlorhexidine (HIBICLENS) 4 % liquid  clindamycin (CLINDAMAX) 1 % external lotion  cyclobenzaprine (FLEXERIL) 10 MG tablet  famotidine (PEPCID) 20 MG tablet  fluticasone (FLONASE) 50 MCG/ACT nasal spray  lisdexamfetamine (VYVANSE) 40 MG capsule  [START ON 10/23/2023] lisdexamfetamine (VYVANSE) 40 MG capsule  [START ON 11/20/2023] lisdexamfetamine (VYVANSE) 40 MG capsule  order for DME  propranolol (INDERAL) 20 MG tablet  SETLAKIN 0.15-0.03 MG tablet  thin (NO BRAND SPECIFIED) lancets  triamcinolone (KENALOG) 0.1 % external cream          Review of Systems  See HPI  Physical Exam   BP: (!) 132/101  Pulse: 98  Temp: 97.9  F (36.6  C)  Resp: 20  Height: 167.6 cm (5' 6\")  Weight: 105.2 kg (232 lb)  SpO2: 96 %      Physical Exam  /89   Pulse 82   Temp 97.9  F (36.6  C) (Oral)   Resp 20   Ht 1.676 m (5' 6\")   Wt 105.2 kg (232 lb)   LMP 08/25/2023 (Approximate)   SpO2 97%   BMI 37.45 kg/m    General: alert, interactive, in no apparent distress  Head: atraumatic  Nose: no rhinorrhea or epistaxis  Ears: no external auditory canal discharge or bleeding.    Eyes: Sclera nonicteric. Conjunctiva noninjected. PERRL, EOMI  Mouth: no tonsillar erythema, edema, or exudate.  Moist mucous membranes  Neck: supple, moving spontaneously no midline cervical tenderness  Lungs: No increased work of breathing.  Clear to auscultation bilaterally.  CV: RRR, peripheral pulses palpable and symmetric  Abdomen: soft, tender to palpation in the right lower quadrant with guarding.  No rebound.  Negative psoas and obturator sign.  Extremities: Warm and well-perfused.  No edema or calf tenderness.  Skin: no rash or diaphoresis  Neuro: CN II-XII grossly intact, " strength 5/5 in UE and LEs bilaterally, sensation intact to light touch in UE and LEs bilaterally;     ED Course                 Procedures           Critical Care time:  none         Results for orders placed or performed during the hospital encounter of 09/29/23 (from the past 24 hour(s))   CBC with platelets differential    Narrative    The following orders were created for panel order CBC with platelets differential.  Procedure                               Abnormality         Status                     ---------                               -----------         ------                     CBC with platelets and d...[[id number]]  Abnormal            Final result                 Please view results for these tests on the individual orders.   Lipase   Result Value Ref Range    Lipase 22 13 - 60 U/L   CBC with platelets and differential   Result Value Ref Range    WBC Count 21.1 (H) 4.0 - 11.0 10e3/uL    RBC Count 5.25 (H) 3.80 - 5.20 10e6/uL    Hemoglobin 15.2 11.7 - 15.7 g/dL    Hematocrit 45.2 35.0 - 47.0 %    MCV 86 78 - 100 fL    MCH 29.0 26.5 - 33.0 pg    MCHC 33.6 31.5 - 36.5 g/dL    RDW 13.1 10.0 - 15.0 %    Platelet Count 346 150 - 450 10e3/uL    % Neutrophils 82 %    % Lymphocytes 11 %    % Monocytes 6 %    % Eosinophils 0 %    % Basophils 0 %    % Immature Granulocytes 1 %    NRBCs per 100 WBC 0 <1 /100    Absolute Neutrophils 17.3 (H) 1.6 - 8.3 10e3/uL    Absolute Lymphocytes 2.3 0.8 - 5.3 10e3/uL    Absolute Monocytes 1.3 0.0 - 1.3 10e3/uL    Absolute Eosinophils 0.0 0.0 - 0.7 10e3/uL    Absolute Basophils 0.1 0.0 - 0.2 10e3/uL    Absolute Immature Granulocytes 0.1 <=0.4 10e3/uL    Absolute NRBCs 0.0 10e3/uL   UA with Microscopic reflex to Culture    Specimen: Urine, Midstream   Result Value Ref Range    Color Urine Yellow Colorless, Straw, Light Yellow, Yellow    Appearance Urine Clear Clear    Glucose Urine >499 (A) Negative mg/dL    Bilirubin Urine Negative Negative    Ketones Urine 80 (A) Negative  mg/dL    Specific Gravity Urine 1.011 1.003 - 1.035    Blood Urine Small (A) Negative    pH Urine 5.0 5.0 - 7.0    Protein Albumin Urine 30 (A) Negative mg/dL    Urobilinogen Urine Normal Normal, 2.0 mg/dL    Nitrite Urine Negative Negative    Leukocyte Esterase Urine Negative Negative    Bacteria Urine Few (A) None Seen /HPF    Mucus Urine Present (A) None Seen /LPF    RBC Urine <1 <=2 /HPF    WBC Urine 1 <=5 /HPF    Squamous Epithelials Urine <1 <=1 /HPF    Hyaline Casts Urine 6 (H) <=2 /LPF    Narrative    Urine Culture not indicated   HCG qualitative urine (UPT)   Result Value Ref Range    hCG Urine Qualitative Negative Negative   CT Abdomen Pelvis w Contrast    Narrative    CT ABDOMEN AND PELVIS WITH CONTRAST 9/29/2023 3:52 PM    CLINICAL HISTORY: Right lower quadrant pain.    TECHNIQUE: CT scan of the abdomen and pelvis was performed following  injection of IV contrast. Multiplanar reformats were obtained. Dose  reduction techniques were used.  CONTRAST: 100 mL Isovue-370  COMPARISON: CT of the abdomen and pelvis performed 11/29/2013.    FINDINGS:   LOWER CHEST: The visualized lung bases are clear.    HEPATOBILIARY: Hepatic steatosis. No hepatic masses. No calcified  gallstones.    PANCREAS: Normal.    SPLEEN: Normal.    ADRENAL GLANDS: Normal.    KIDNEYS/BLADDER: Unremarkable. No hydronephrosis.    BOWEL: The appendix is thickened, measuring up to 1.1 cm, with mild  periappendiceal inflammatory stranding. Findings are consistent with  acute appendicitis. No associated fluid collections to suggest  abscess. No bowel obstruction.    PELVIC ORGANS: Trace amount of free fluid in the pelvis is  nonspecific, but likely related to appendicitis.    LYMPH NODES: No enlarged lymph nodes are identified in the abdomen or  pelvis. A few mildly prominent mesenteric lymph nodes are noted in the  right lower quadrant, likely reactive.    VASCULATURE: Unremarkable.    ADDITIONAL FINDINGS: None.    MUSCULOSKELETAL:  Unremarkable.      Impression    IMPRESSION:   1.  Acute appendicitis. No evidence for abscess.  2.  Trace free fluid in the pelvis is nonspecific, but also likely  related to appendicitis.  3.  Hepatic steatosis.    JANICE HURST MD         SYSTEM ID:  [id number]   Comprehensive metabolic panel   Result Value Ref Range    Sodium 131 (L) 135 - 145 mmol/L    Potassium 4.2 3.4 - 5.3 mmol/L    Carbon Dioxide (CO2) 22 22 - 29 mmol/L    Anion Gap 13 7 - 15 mmol/L    Urea Nitrogen 10.4 6.0 - 20.0 mg/dL    Creatinine 0.64 0.51 - 0.95 mg/dL    GFR Estimate >90 >60 mL/min/1.73m2    Calcium 9.2 8.6 - 10.0 mg/dL    Chloride 96 (L) 98 - 107 mmol/L    Glucose 224 (H) 70 - 99 mg/dL    Alkaline Phosphatase 77 35 - 104 U/L    AST 20 0 - 45 U/L    ALT 22 0 - 50 U/L    Protein Total 6.8 6.4 - 8.3 g/dL    Albumin 3.7 3.5 - 5.2 g/dL    Bilirubin Total 0.5 <=1.2 mg/dL       Medications   ondansetron (ZOFRAN) injection 4 mg (4 mg Intravenous $Given 9/29/23 1429)   cefoTEtan (CEFOTAN) 2 g vial to attach to  ml bag (0 g Intravenous Stopped 9/29/23 1726)   sodium chloride 0.9% BOLUS 1,000 mL ( Intravenous Rate/Dose Verify 9/29/23 1609)   iopamidol (ISOVUE-370) solution 100 mL (100 mLs Intravenous $Given 9/29/23 1543)   sodium chloride 0.9 % bag 500mL for CT scan flush use (68 mLs As instructed $Given 9/29/23 1543)       Assessments & Plan (with Medical Decision Making)     I have reviewed the nursing notes.    I have reviewed the findings, diagnosis, plan and need for follow up with the patient.  Medical Decision Making  Lianna Sorto is a 43 year old female who has degenerative disc disease, fibromyalgia, hidradenitis suppurativa, depression, type 2 diabetes who presents to the emergency department for evaluation of abdominal pain.  Vital signs reviewed and notable for hypertension, otherwise reassuring.  We will plan to check CBC, CMP, lipase, UA, pregnancy test and CT of the abdomen pelvis with IV contrast.  Will treat symptoms  with Zofran and a fluid bolus.  Patient is offered pain medication, however she does not like the way narcotics feel and would like to see if she can go without.  She had ibuprofen less than 2 hours ago, therefore cannot have Toradol.  She will let us know if she wants to try pain medications.    4:16 PM  Patient's work-up is reviewed.  CT is notable for acute appendicitis.  There is no perforation or abscess.  Lab work-up is notable for leukocytosis of 21, otherwise reassuring.  She is not pregnant.  Patient was reassessed and her pain is minimal as long as she is not moving.  She is informed that she has appendicitis.  She is again offered something for pain and she declined.  I talked to Dr. Jose De Jesus Barrios who is planning for appendectomy.  Antibiotics have been ordered by him to be given preoperatively and nursing was instructed to give these.  Patient is n.p.o. and has not anything to eat since half a banana this morning.  She is agreeable with the plan will go to the OR when ready.    New Prescriptions    No medications on file       Final diagnoses:   Acute appendicitis with localized peritonitis, without perforation, abscess, or gangrene       9/29/2023   [facility] EMERGENCY DEPT       Josh Mascorro MD  09/29/23 1618       Josh Mascorro MD  09/29/23 0925

## 2023-09-29 NOTE — ANESTHESIA PREPROCEDURE EVALUATION
"Anesthesia Pre-Procedure Evaluation    Patient: Lianna Sorto   MRN: [id number] : 1980        Procedure : Procedure(s):  APPENDECTOMY, LAPAROSCOPIC          Past Medical History:   Diagnosis Date    Abnormal Pap smear of cervix     [facility]-Benign    Acute tonsillitis     treated with antibiotics    Condylomata 2011    Endometritis 2008    s/p SAB, treated with antibiotics    Hidradenitis 7/3/2009    Intussusception (H)     surgically repaired    Unspecified trauma to perineum and vulva, unspecified as to episode of care in pregnancy     Vaginal and uterine tear with internal bleeding, scarring.        Past Surgical History:   Procedure Laterality Date    SURGICAL HISTORY OF -       oral surgery    VAGINA SURGERY      Fell off deck, internal bleeding.    [facility]  DELIVERY ONLY  2009    arrest of dilation at 6 cm, low transverse uterine incision      Allergies   Allergen Reactions    Topiramate Rash    Celexa [Citalopram Hydrobromide] GI Disturbance    Morphine     Polymyxin B Other (See Comments)     Polymixin eye drops  \"severe burning\"    Vicodin [Hydrocodone-Acetaminophen]      Stomach upset, \"hearing things\", irritability       Social History     Tobacco Use    Smoking status: Some Days     Packs/day: 0.50     Years: 10.00     Pack years: 5.00     Types: Cigarettes     Last attempt to quit: 9/15/2021     Years since quittin.0    Smokeless tobacco: Never    Tobacco comments:     23 half a pack a week     about a pack a week   Substance Use Topics    Alcohol use: Yes     Comment: Avg. twice per month      Wt Readings from Last 1 Encounters:   23 105.2 kg (232 lb)        Anesthesia Evaluation   Pt has had prior anesthetic.         ROS/MED HX  ENT/Pulmonary:     (+)           allergic rhinitis,     tobacco use, Current use,                      Neurologic:       Cardiovascular:     (+)  - -   -  - -                                 Previous cardiac testing "   Echo: Date: Results:    Stress Test:  Date: Results:    ECG Reviewed:  Date: 4/16 Results:  Sinus  Rhythm   WITHIN NORMAL LIMITS  Cath:  Date: Results:      METS/Exercise Tolerance:     Hematologic:       Musculoskeletal:       GI/Hepatic:       Renal/Genitourinary:       Endo:     (+)  type II DM,             Obesity,       Psychiatric/Substance Use:     (+) psychiatric history depression       Infectious Disease:       Malignancy:       Other:            Physical Exam    Airway        Mallampati: I   TM distance: > 3 FB   Neck ROM: full   Mouth opening: > 3 cm    Respiratory Devices and Support         Dental       (+) Minor Abnormalities - some fillings, tiny chips      Cardiovascular   cardiovascular exam normal          Pulmonary   pulmonary exam normal                OUTSIDE LABS:  CBC:   Lab Results   Component Value Date    WBC 21.1 (H) 09/29/2023    WBC 8.0 07/29/2013    HGB 15.2 09/29/2023    HGB 12.9 07/29/2013    HCT 45.2 09/29/2023    HCT 38.9 07/29/2013     09/29/2023     07/29/2013     BMP:   Lab Results   Component Value Date     (L) 09/29/2023     11/29/2021    POTASSIUM 4.2 09/29/2023    POTASSIUM 4.0 11/29/2021    CHLORIDE 96 (L) 09/29/2023    CHLORIDE 105 11/29/2021    CO2 22 09/29/2023    CO2 29 11/29/2021    BUN 10.4 09/29/2023    BUN 16 11/29/2021    CR 0.64 09/29/2023    CR 0.83 11/29/2021     (H) 09/29/2023     (H) 11/29/2021     COAGS: No results found for: PTT, INR, FIBR  POC:   Lab Results   Component Value Date     (H) 05/21/2009    HCG Negative 09/29/2023     HEPATIC:   Lab Results   Component Value Date    ALBUMIN 3.7 09/29/2023    PROTTOTAL 6.8 09/29/2023    ALT 22 09/29/2023    AST 20 09/29/2023    ALKPHOS 77 09/29/2023    BILITOTAL 0.5 09/29/2023     OTHER:   Lab Results   Component Value Date    A1C 7.7 (H) 11/29/2021    JAI 9.2 09/29/2023    LIPASE 22 09/29/2023    TSH 3.42 07/17/2020    T4 1.10 07/26/2012    SED 14 07/14/2010        Anesthesia Plan    ASA Status:  2       Anesthesia Type: General.              Consents    Anesthesia Plan(s) and associated risks, benefits, and realistic alternatives discussed. Questions answered and patient/representative(s) expressed understanding.     - Discussed: Risks, Benefits and Alternatives for BOTH SEDATION and the PROCEDURE were discussed     - Discussed with:  Patient            Postoperative Care    Pain management: Multi-modal analgesia.   PONV prophylaxis: Background Propofol Infusion, Ondansetron (or other 5HT-3), Dexamethasone or Solumedrol, Scopolamine patch     Comments:                DANA Rojas CRNA

## 2023-09-29 NOTE — MEDICATION SCRIBE - ADMISSION MEDICATION HISTORY
Medication Scribe Admission Medication History    Admission medication history is complete. The information provided in this note is only as accurate as the sources available at the time of the update.    Medication reconciliation/reorder completed by provider prior to medication history? No    Information Source(s): Patient via in-person    Pertinent Information: Generic Vyvanse was just approved with insurance-patient hasn't taken in a week due to this    Changes made to PTA medication list:  Added: None  Deleted: None  Changed: None    Medication Affordability:  Not including over the counter (OTC) medications, was there a time in the past 3 months when you did not take your medications as prescribed because of cost?: No    Allergies reviewed with patient and updates made in EHR: yes    Medication History Completed By: Mulu Pope 9/29/2023 4:42 PM    Prior to Admission medications    Medication Sig Last Dose Taking? Auth Provider Long Term End Date   amphetamine-dextroamphetamine (ADDERALL) 10 MG tablet Take 1 tablet (10 mg) by mouth daily as needed (attentional deficits) 9/28/2023 at am Yes Alicai Smith, NP No    blood glucose (NO BRAND SPECIFIED) test strip Use to test blood sugar 2 times daily or as directed. To accompany: Blood Glucose Monitor Brands: per insurance. Past Month Yes Clau Dukes APRN CNP     blood glucose monitoring (NO BRAND SPECIFIED) meter device kit Use to test blood sugar 2 times daily or as directed. Preferred blood glucose meter OR supplies to accompany: Blood Glucose Monitor Brands: per insurance. Past Month Yes Clau Dukes APRN CNP     cetirizine (ZYRTEC) 10 MG tablet Take 10 mg by mouth daily Unknown at prn Yes Reported, Patient     chlorhexidine (HIBICLENS) 4 % liquid Use to wash groin daily. Unknown at prn Yes Angie Castro PA-C     clindamycin (CLINDAMAX) 1 % external lotion Apply to affected areas twice daily 9/29/2023 at am Yes Matthew  Angie Daniel PA-C     cyclobenzaprine (FLEXERIL) 10 MG tablet TAKE ONE TABLET BY MOUTH THREE TIMES A DAY AS NEEDED FOR MUSCLE SPASMS Unknown at prn Yes Clau Dukes APRN CNP     famotidine (PEPCID) 20 MG tablet TAKE ONE TABLET BY MOUTH TWICE A DAY 9/29/2023 at am Yes Clau Dukes APRN CNP     fluticasone (FLONASE) 50 MCG/ACT nasal spray Spray 2 sprays into both nostrils daily Unknown at prn Yes Eliana Smith MD     lisdexamfetamine (VYVANSE) 40 MG capsule Take 1 capsule (40 mg) by mouth every morning Past Week Yes Alicia Smith NP     lisdexamfetamine (VYVANSE) 40 MG capsule Take 1 capsule (40 mg) by mouth every morning Fill on or after October 23 Unknown at future use Yes Alicia Smith NP lisdexamfetamine (VYVANSE) 40 MG capsule Take 1 capsule (40 mg) by mouth every morning Fill on or after  November 20 Unknown at future use Yes Alicia Smith NP     order for DME Equipment being ordered: Dynaflex insert  Yes Gwendolyn Mo PA-C     propranolol (INDERAL) 20 MG tablet Take 1 tablet (20 mg) by mouth 2 times daily 9/29/2023 at am Yes Alicia Smith NP Yes    SETLAKIN 0.15-0.03 MG tablet TAKE 1 TABLET BY MOUTH DAILY 9/29/2023 at am Yes Yamila Franklin APRN CNP Yes    thin (NO BRAND SPECIFIED) lancets Use with lanceting device. To accompany: Blood Glucose Monitor Brands: per insurance. Past Month Yes Clau Dukes APRN CNP     triamcinolone (KENALOG) 0.1 % external cream Apply to affected areas twice daily for 2 weeks, then as needed only Unknown at prn Yes Angie Castro PA-C

## 2023-09-29 NOTE — H&P
"[facility]  [facility] - H&P     Lianna Sorto MRN# [id number]   Age: 43 year old YOB: 1980     HPI:  Patient has been experiencing acute RLQ abdominal pain for the past 1 days associated with nausea and anorexia.  These symptoms have been increasing in severity. She has never had this prior.  She felt bloated yesterday and did not want to eat.  Worse with movement, better with rest.  Denies changes in stool, no blood in stool.    History is obtained from the patient    Review Of Systems:  Respiratory: No shortness of breath, dyspnea on exertion, cough, or hemoptysis  Cardiovascular: negative  Gastrointestinal: as above  Genitourinary: negative    PMH:  Past Medical History:   Diagnosis Date    Abnormal Pap smear of cervix     Mikana-Benign    Acute tonsillitis     treated with antibiotics    Condylomata 2011    Endometritis     s/p SAB, treated with antibiotics    Hidradenitis 7/3/2009    Intussusception (H)     surgically repaired    Unspecified trauma to perineum and vulva, unspecified as to episode of care in pregnancy     Vaginal and uterine tear with internal bleeding, scarring.         PSH:  Past Surgical History:   Procedure Laterality Date    SURGICAL HISTORY OF -       oral surgery    VAGINA SURGERY      Fell off deck, internal bleeding.    [facility]  DELIVERY ONLY  2009    arrest of dilation at 6 cm, low transverse uterine incision       Allergies:  Allergies   Allergen Reactions    Topiramate Rash    Celexa [Citalopram Hydrobromide] GI Disturbance    Morphine     Polymyxin B Other (See Comments)     Polymixin eye drops  \"severe burning\"    Vicodin [Hydrocodone-Acetaminophen]      Stomach upset, \"hearing things\", irritability        Home Medications:  Current Outpatient Medications   Medication Sig Dispense Refill    amphetamine-dextroamphetamine (ADDERALL) 10 MG tablet Take 1 tablet (10 mg) by mouth daily as needed " (attentional deficits) 30 tablet 0    blood glucose (NO BRAND SPECIFIED) test strip Use to test blood sugar 2 times daily or as directed. To accompany: Blood Glucose Monitor Brands: per insurance. 100 each 11    blood glucose monitoring (NO BRAND SPECIFIED) meter device kit Use to test blood sugar 2 times daily or as directed. Preferred blood glucose meter OR supplies to accompany: Blood Glucose Monitor Brands: per insurance. 1 kit 0    cetirizine (ZYRTEC) 10 MG tablet Take 10 mg by mouth daily      chlorhexidine (HIBICLENS) 4 % liquid Use to wash groin daily. 118 mL 11    clindamycin (CLINDAMAX) 1 % external lotion Apply to affected areas twice daily 60 mL 1    cyclobenzaprine (FLEXERIL) 10 MG tablet TAKE ONE TABLET BY MOUTH THREE TIMES A DAY AS NEEDED FOR MUSCLE SPASMS 30 tablet 5    famotidine (PEPCID) 20 MG tablet TAKE ONE TABLET BY MOUTH TWICE A DAY 60 tablet 0    fluticasone (FLONASE) 50 MCG/ACT nasal spray Spray 2 sprays into both nostrils daily 16 g 0    lisdexamfetamine (VYVANSE) 40 MG capsule Take 1 capsule (40 mg) by mouth every morning 30 capsule 0    [START ON 10/23/2023] lisdexamfetamine (VYVANSE) 40 MG capsule Take 1 capsule (40 mg) by mouth every morning Fill on or after October 23 30 capsule 0    [START ON 11/20/2023] lisdexamfetamine (VYVANSE) 40 MG capsule Take 1 capsule (40 mg) by mouth every morning Fill on or after  November 20 30 capsule 0    order for DME Equipment being ordered: Dynaflex insert 2 Units 0    propranolol (INDERAL) 20 MG tablet Take 1 tablet (20 mg) by mouth 2 times daily 60 tablet 3    SETLAKIN 0.15-0.03 MG tablet TAKE 1 TABLET BY MOUTH DAILY 91 tablet 3    thin (NO BRAND SPECIFIED) lancets Use with lanceting device. To accompany: Blood Glucose Monitor Brands: per insurance. 100 each 11    triamcinolone (KENALOG) 0.1 % external cream Apply to affected areas twice daily for 2 weeks, then as needed only 45 g 6       Social History:  Social History     Tobacco Use    Smoking  "status: Some Days     Packs/day: 0.50     Years: 10.00     Pack years: 5.00     Types: Cigarettes     Last attempt to quit: 9/15/2021     Years since quittin.0    Smokeless tobacco: Never    Tobacco comments:     23 half a pack a week     about a pack a week   Vaping Use    Vaping Use: Never used   Substance Use Topics    Alcohol use: Yes     Comment: Avg. twice per month    Drug use: No       Family History:  No family history chronic diarrhea or colon cancer.  Mother has Crohn's.    Objective:  /89   Pulse 82   Temp 97.9  F (36.6  C) (Oral)   Resp 20   Ht 1.676 m (5' 6\")   Wt 105.2 kg (232 lb)   LMP 2023 (Approximate)   SpO2 97%   BMI 37.45 kg/m      General appearance: healthy, alert, and mild distress  Hydration: mildly dehydrated  Neck: normal, supple, and no adenopathy  Lungs: normal  Heart: normal  Abdomen: obese, normal bowel sounds.   Tenderness: present: RLQ moderate and voluntary guarding  Masses: none  Organomegaly: none    Labs Reviewed:  Lab Results   Component Value Date    WBC 21.1 2023    WBC 8.0 2013     Lab Results   Component Value Date    HGB 15.2 2023    HGB 12.9 2013     Lab Results   Component Value Date     2023     2013       Last Basic Metabolic Panel:  Lab Results   Component Value Date     2021     2020      Lab Results   Component Value Date    POTASSIUM 4.0 2021    POTASSIUM 3.8 2020     Lab Results   Component Value Date    CHLORIDE 105 2021    CHLORIDE 107 2020     Lab Results   Component Value Date    JAI 9.3 2021    JAI 8.5 2020     Lab Results   Component Value Date    CO2 29 2021    CO2 27 2020     Lab Results   Component Value Date    BUN 16 2021    BUN 12 2020     Lab Results   Component Value Date    CR 0.83 2021    CR 0.87 2020     Lab Results   Component Value Date     2021     " 07/17/2020         Radiology:  CT abdomen reveals a dilated, thick-walled appendix with surrounding fat stranding.  All imaging studies reviewed by me.    ASSESSMENT/PLAN:  The patient's history, physical exam, laboratory and imaging studies are suspicious for acute appendicitis.  I have offered the patient laparoscopic possible open appendectomy.  The risks, benefits, and alternatives have been discussed in detail.  All of the patient's questions have been answered.  They elect to proceed and we will go to the OR at the soonest availability.  Pre-operative antibiotics have been ordered.     Risks discussed include but are not limited to: bleeding, infection, hernia, need for additional treatment, nontherapeutic intervention, wound complication (such as dehiscence), conversion to open surgery, changes in bowel habits, and rare complications related to surgery and/or anesthesia such as venous thromboembolism and cardiorespiratory complications.    Kevin Barrios, DO

## 2023-09-29 NOTE — ED TRIAGE NOTES
Pt c/o RLQ abd pain starting yesterday, getting worse. C/o nausea and vomiting.      Triage Assessment       Row Name 09/29/23 9340       Triage Assessment (Adult)    Airway WDL WDL       Respiratory WDL    Respiratory WDL WDL       Skin Circulation/Temperature WDL    Skin Circulation/Temperature WDL WDL       Cardiac WDL    Cardiac WDL WDL       Peripheral/Neurovascular WDL    Peripheral Neurovascular WDL WDL       Cognitive/Neuro/Behavioral WDL    Cognitive/Neuro/Behavioral WDL WDL

## 2023-09-30 NOTE — DISCHARGE INSTRUCTIONS
HOME CARE FOLLOWING ABDOMINAL SURGERY    INCISIONAL CARE:  Replace the bandage over your incision (or incisions) until all drainage stops, or if more comfortable to have in place.  If present, leave the steri-strips (white paper tapes) in place for 14 days after surgery.  If you have staples in your incision at the time of discharge, they will be removed at your follow-up appointment.  If Dermabond (a type of skin glue) is present, leave in place until it wears/flakes off.     BATHING:  Avoid baths for 1 week after surgery.  Showers are okay.  You may wash your hair at any time.  Gently pat your incision dry after bathing.    ACTIVITY:  Light Activity -- you may immediately be up and about as tolerated.  Driving -- you may drive when comfortable and off narcotic pain medications (example: Norco, Percocet, Hydrocodone).  Light Work -- resume when comfortable off pain medications.  (If you can drive, you probably can work.)  Strenuous Work/Activity -- limit lifting to 20 pounds for 4 weeks.  Then, progressively increase with time.  Active Sports (running, biking, etc.) -- cautiously resume after 6 weeks.  Most importantly listen to your body.  If an activity causes pain or discomfort please stop and try in a few days or decrease your intensity.    DISCOMFORT:  Use pain medications as prescribed by your surgeon.  Take the pain medication with some food, when possible, to minimize side effects.  Expect gradual improvement.      Narcotic Pain Medications:  Do not drive, make important decisions or operate heavy machinery while on narcotic pain medications.  Narcotic pain medications can be associated with nausea, sleep disturbance, and constipation.  Unless directed otherwise, please take an over the counter stool softener (Colace 100mg twice a day) unless directed otherwise.  As soon as you are able to stop narcotic pain medications the better. Long term use of narcotics is associated with tolerance (the need for more  medication for effect) and potential addiction.    DIET:  Return to diet you were on before surgery, unless you are given specific diet instructions.  Drink plenty of fluids.  While taking pain medications, increase dietary fiber or add a fiber supplementation like Metamucil or Citrucel to help prevent constipation - a possible side effect of pain medications.    NAUSEA:  If nauseated from the anesthetic/pain meds; rest in bed, get up cautiously with assistance, and drink clear liquids (juice, tea, broth).    RETURN APPOINTMENT:  Schedule a follow-up visit 2-3 weeks after discharge from the hospital.  Office Phone: [phone]     CONTACT US IF THE FOLLOWING DEVELOPS:   1. A fever that is above 101     2. If there is a large amount of drainage, bleeding, or swelling.   3. Severe pain that is not relieved by your prescription.   4. Drainage that is thick, cloudy, yellow, green or white.   5. Any other questions not answered by  Frequently Asked Questions  sheet.      FREQUENTLY ASKED QUESTIONS:    Q:  How should my incision look?    A:  Normally your incision will appear slightly swollen with light redness directly along the incision itself as it heals.  It may feel like a bump or ridge as the healing/scarring happens, and over time (3-4 months) this bump or ridge feeling should slowly go away.  In general, clear or pink watery drainage can be normal at first as your incision heals, but should decrease over time.    Q:  How do I know if my incision is infected?  A:  Look at your incision for signs of infection, like redness around the incision spreading to surrounding skin, or drainage of cloudy or foul-smelling drainage.  If you feel warm, check your temperature to see if you are running a fever.    **If any of these things occur, please notify the nurse at our office.  We may need you to come into the office for an incision check.      Q:  How do I take care of my incision?  A:  If you have a dressing in place -  Starting the day after surgery, replace the dressing 1-2 times a day until there is no further drainage from the incision.  At that time, a dressing is no longer needed.  Try to minimize tape on the skin if irritation is occurring at the tape sites.  If you have significant irritation from tape on the skin, please call the office to discuss other method of dressing your incision.    Small pieces of tape called  steri-strips  may be present directly overlying your incision; these may be removed 10 days after surgery unless otherwise specified by your surgeon.  If these tapes start to loosen at the ends, you may trim them back until they fall off or are removed.    A:  If you had  Dermabond  tissue glue used as a dressing (this causes your incision to look shiny with a clear covering over it) - This type of dressing wears off with time and does not require more dressings over the top unless it is draining around the glue as it wears off.  Do not apply ointments or lotions over the incisions until the glue has completely worn off.    Q:  There is a piece of tape or a sticky  lead  still on my skin.  Can I remove this?  A:  Sometimes the sticky  leads  used for monitoring during surgery or for evaluation in the emergency department are not all removed while you are in the hospital.  These sometimes have a tab or metal dot on them.  You can easily remove these on your own, like taking off a band-aid.  If there is a gel substance under the  lead , simply wipe/clean it off with a washcloth or paper towel.      Q:  What can I do to minimize constipation (very hard stools, or lack of stools)?  A:  Stay well hydrated.  Increase your dietary fiber intake or take a fiber supplement -with plenty of water.  Walk around frequently.  You may consider an over-the-counter stool-softener.  Your Pharmacist can assist you with choosing one that is stocked at your pharmacy.  Constipation is also one of the most common side effects of  pain medication.  If you are using pain medication, be pro-active and try to PREVENT problems with constipation by taking the steps above BEFORE constipation becomes a problem.    Q:  What do I do if I need more pain medications?  A:  Call the office to receive refills.  Be aware that certain pain meds cannot be called into a pharmacy and actually require a paper prescription.  A change may be made in your pain med as you progress thru your recovery period or if you have side effects to certain meds.    --Pain meds are NOT refilled after 5pm on weekdays, and NOT AT ALL on the weekends, so please look ahead to prevent problems.      Q:  Why am I having a hard time sleeping now that I am at home?  A:  Many medications you receive while you are in the hospital can impact your sleep for a number of days after your surgery/hospitalization.  Decreased level of activity and naps during the day may also make sleeping at night difficult.  Try to minimize day-time naps, and get up frequently during the day to walk around your home during your recovery time.  Sleep aides may be of some help, but are not recommended for long-term use.      Q:  I am having some back discomfort.  What should I do?  A:  This may be related to certain positioning that was required for your surgery, extended periods of time in bed, or other changes in your overall activity level.  You may try ice, heat, acetaminophen, or ibuprofen to treat this temporarily.  Note that many pain medications have acetaminophen in them and would state this on the prescription bottle.  Be sure not to exceed the maximum of 4000mg per day of acetaminophen.     **If the pain you are having does not resolve, is severe, or is a flare of back pain you have had on other occasions prior to surgery, please contact your primary physician for further recommendations or for an appointment to be examined at their office.    Q:  Why am I having headaches?  A:  Headaches can be caused  by many things:  caffeine withdrawal, use of pain meds, dehydration, high blood pressure, lack of sleep, over-activity/exhaustion, flare-up of usual migraine headaches.  If you feel this is related to muscle tension (a band-like feeling around the head, or a pressure at the low-back of the head) you may try ice or heat to this area.  You may need to drink more fluids (try electrolyte drink like Gatorade), rest, or take your usual migraine medications.   **If your headaches do not resolve, worsen, are accompanied by other symptoms, or if your blood pressure is high, please call your primary physician for recommendation and/or examination.    Q:  I am unable to urinate.  What do I do?  A:  A small percentage of people can have difficulty urinating initially after surgery.  This includes being able to urinate only a very small amount at a time and feeling discomfort or pressure in the very low abdomen.  This is called  urinary retention , and is actually an urgent situation.  Proceed to your nearest Emergency department for evaluation (not an Urgent Care Center).  Sometimes the bladder does not work correctly after certain medications you receive during surgery, or related to certain procedures.  You may need to have a catheter placed until your bladder recovers.  When planning to go to an Emergency department, it may help to call the ER to let them know you are coming in for this problem after a surgery.  This may help you get in quicker to be evaluated.  **If you have symptoms of a urinary tract infection, please contact your primary physician for the proper evaluation and treatment.          If you have other questions, please call the office Monday thru Friday between 8am and 5pm to discuss with the nurse.  # [phone]    There is a surgeon ON CALL on weekday evenings and over the weekend in case of urgent need only, and may be contacted at the same number.    If you are having an emergency, call 911 or proceed  to your nearest emergency department.                          Same Day Surgery Discharge Instructions  Special Precautions After Surgery - Adult    It is not unusual to feel lightheaded or faint, up to 24 hours after surgery or while taking pain medication.  If you have these symptoms; sit for a few minutes before standing and have someone assist you when getting up.  You should rest and relax for the next 24 hours and must have someone stay with you for at least 24 hours after your discharge.  DO NOT DRIVE any vehicle or operate mechanical equipment for 24 hours following the end of your surgery.  DO NOT DRIVE while taking narcotic pain medications that have been prescribed by your physician.  If you had a limb operated on, you must be able to use it fully to drive.  DO NOT drink alcoholic beverages for 24 hours following surgery or while taking prescription pain medication.  Drink clear liquids (apple juice, ginger ale, broth, 7-Up, etc.).  Progress to your regular diet as you feel able.  Any questions call your physician and do not make important decisions for 24 hours.    ACTIVITY  Rest today.  No activity or diet restrictions.  Resume activity as tolerated.     INCISIONAL CARE  Apply ice 1/2 hour on and 1/2 hour off while awake.  Be alert for signs of infection:  redness, swelling, heat, drainage of pus, and/or elevated temperature.  Contact your doctor if these occur.         Medications:  Acetaminophen (Tylenol):  Next dose: as needed .  Ibuprofen (Motrin, Advil):  last dose: 7:30 pm next dose at 0130 am.  tramadol:  Next dose: as needed.  Follow the instructions on the bottle.    __________________________________________________________________________________________________________________________________  IMPORTANT NUMBERS:    [facility] Main Number:  [phone], [phone]  Pharmacy:  [phone]  Same Day Surgery:  [phone], Monday - Friday until 8:30 p.m.  Surgery Specialty Clinic:  [phone]    Nurse Advice Line: [phone]

## 2023-09-30 NOTE — OP NOTE
Procedure Date: September 29, 2023    PREOPERATIVE DIAGNOSIS: 1. Acute appendicitis  POSTOPERATIVE DIAGNOSIS: Same    PROCEDURE:  1. Laparoscopic Appendectomy    ATTENDING SURGEON and Assistants:   Surgeon(s):  Kevin Barrios DO    ESTIMATED BLOOD LOSS: 30 mL    FINDINGS:   1. The appendix appeared inflamed; there was not evidence of a perforation. There was not evidence of abscess.    INDICATIONS: Ms. Lianna Sorto is a 43 year old white female who presents with clinical features consistent with acute appendicitis. A discussion was held with the patient regarding indications, risks, benefits, and alternatives to surgery (including, but not limited to bleeding, infection, intra-abdominal injury, conversion from laparoscopy to open surgery, nontherapeutic operation, potential alternate etiology of presenting symptoms, abscess, potential need for additional treatments, and the cardiopulmonary risks associated with surgery and anesthesia). The patient's questions were addressed, and she consented to laparoscopic (versus open) appendectomy.     PROCEDURE: The patient was brought to the operating room, and placed in the supine position on the operating table. Lower extremity sequential compression devices were placed and functioning prior to delivery of general anesthesia, which included endotracheal intubation. A urinary catheter was not placed, as the patient urinated just prior to arrival to the operating room. The abdomen was then prepped and draped in the usual, sterile fashion. A procedural time out was performed.     An open Cecilia approach was undertaken via a periumbilical incision, and under direct visualization a 5-mm trochar was delivered and pneumoperitoneum administered. Additional 5mm trochars were delivered in the left upper and left lower quadrant abdomen under direct visualization via laparoscopy.     Exploration confirmed acute appendicitis.  The appendix was elevated and the mesoappendix  was dissected free with the ligasure. Dissection was completed to the base of the appendix at the level of the cecum, and the appendix was secured with 2 PDS endoloops proximal and one distal prior to division. The appendix was placed in an endocatch and retrieved via the periumbilical port site.     Suction was utilized to clear the field and in the assessment for hemostasis, which was assured. An instrument count, including laparotomy pads, sponges and needles was performed and found to be correct. Fascial closure at the 5mm port site was accomplished with a single 0-Vicryl suture. Subcutaneous closure was accomplished with interrupted 3-0 Vicryl suture. Skin edges were re-approximated with 4-0 Monocryl suture, and the wounds were all cleansed and dried prior to application of sterile glue. The patient tolerated the procedure well, was extubated without incident, and transferred to the PACU in stable condition.    Kevin Barrios DO on 9/29/2023 at 7:53 PM

## 2023-09-30 NOTE — ANESTHESIA POSTPROCEDURE EVALUATION
Patient: Lianna Sorto    Procedure: Procedure(s):  APPENDECTOMY, LAPAROSCOPIC       Anesthesia Type:  General    Note:  Disposition: Outpatient   Postop Pain Control: Uneventful            Sign Out: Well controlled pain   PONV: No   Neuro/Psych: Uneventful            Sign Out: Acceptable/Baseline neuro status   Airway/Respiratory: Uneventful            Sign Out: Acceptable/Baseline resp. status   CV/Hemodynamics: Uneventful            Sign Out: Acceptable CV status; No obvious hypovolemia; No obvious fluid overload   Other NRE: NONE   DID A NON-ROUTINE EVENT OCCUR? No           Last vitals:  Vitals Value Taken Time   /94 09/29/23 2022   Temp 37.2  C (99  F) 09/29/23 2000   Pulse 90 09/29/23 2025   Resp 52 09/29/23 2025   SpO2 100 % 09/29/23 2022   Vitals shown include unvalidated device data.    Electronically Signed By: DANA Dodge CRNA  September 29, 2023  8:26 PM

## 2023-09-30 NOTE — ANESTHESIA CARE TRANSFER NOTE
Patient: Lianna Sorto    Procedure: Procedure(s):  APPENDECTOMY, LAPAROSCOPIC       Diagnosis: Acute appendicitis with generalized peritonitis, without gangrene or abscess, unspecified whether perforation present [K35.20]  Diagnosis Additional Information: No value filed.    Anesthesia Type:   General     Note:    Oropharynx: oral airway in place and spontaneously breathing  Level of Consciousness: drowsy    Level of Supplemental Oxygen (L/min / FiO2): 8  Independent Airway: airway patency satisfactory and stable  Dentition: dentition unchanged  Vital Signs Stable: post-procedure vital signs reviewed and stable  Report to RN Given: handoff report given  Patient transferred to: PACU    Handoff Report: Identifed the Patient, Identified the Reponsible Provider, Reviewed the pertinent medical history, Discussed the surgical course, Reviewed Intra-OP anesthesia mangement and issues during anesthesia, Set expectations for post-procedure period and Allowed opportunity for questions and acknowledgement of understanding      Vitals:  Vitals Value Taken Time   BP     Temp     Pulse     Resp     SpO2         Electronically Signed By: DANA Dodge CRNA  September 29, 2023  7:58 PM

## 2023-10-02 ENCOUNTER — TELEPHONE (OUTPATIENT)
Dept: SURGERY | Facility: CLINIC | Age: 43
End: 2023-10-02
Payer: COMMERCIAL

## 2023-10-02 NOTE — TELEPHONE ENCOUNTER
Type of surgery: APPENDECTOMY, LAPAROSCOPIC (N/A)   Location of surgery: [place] [facility]  Date and time of surgery: 09/29/2023  Surgeon: Denis  Pre-Op Appt Date: seen on call   Post-Op Appt Date: tbd   Packet sent out: No  Pre-cert/Authorization completed:  No  Date:

## 2023-10-03 LAB
PATH REPORT.COMMENTS IMP SPEC: NORMAL
PATH REPORT.COMMENTS IMP SPEC: NORMAL
PATH REPORT.FINAL DX SPEC: NORMAL
PATH REPORT.GROSS SPEC: NORMAL
PATH REPORT.MICROSCOPIC SPEC OTHER STN: NORMAL
PATH REPORT.RELEVANT HX SPEC: NORMAL
PHOTO IMAGE: NORMAL

## 2023-10-03 PROCEDURE — 88304 TISSUE EXAM BY PATHOLOGIST: CPT | Mod: 26 | Performed by: PATHOLOGY

## 2023-10-08 ENCOUNTER — NURSE TRIAGE (OUTPATIENT)
Dept: NURSING | Facility: CLINIC | Age: 43
End: 2023-10-08
Payer: COMMERCIAL

## 2023-10-08 NOTE — TELEPHONE ENCOUNTER
Jesse on 9/29/23.  Pain had mostly resolved.    Friday night.10/6/23 started experiencing increased pain at surgical site.   Pain has increased since Friday.   Oral temp is 98.0  Pain is always present.  Pain goes as high as 8/10 with movement or when takes a deep breath.    Tramadol and ibuprofen are not helping.  Last bowel movement was today. Had had one yesterday and Friday.    Took MOM after a bm today hoping it would relieve the pain.It didn't help.     I told patient I was going to speak to the on call surgeon for Dr Pereira then call her back.  I was told by page  Dr Go was the on call provider  Dr Go called back right away.  Dr Go advised that patient continue taking her pain medication and see Dr Pereira on Tuesday, 10/10/23. If pain continues or develops fever patient go to ER.    I gave Lianna Go's advice.  Caller stated understanding and agreement.  Skylar ZURITA RN [facility]

## 2023-10-10 ENCOUNTER — HOSPITAL ENCOUNTER (OUTPATIENT)
Dept: CT IMAGING | Facility: CLINIC | Age: 43
Discharge: HOME | End: 2023-10-10
Attending: SURGERY | Admitting: SURGERY
Payer: COMMERCIAL

## 2023-10-10 ENCOUNTER — OFFICE VISIT (OUTPATIENT)
Dept: SURGERY | Facility: CLINIC | Age: 43
End: 2023-10-10
Payer: COMMERCIAL

## 2023-10-10 VITALS
HEART RATE: 91 BPM | TEMPERATURE: 97.7 F | WEIGHT: 231.92 LBS | HEIGHT: 66 IN | SYSTOLIC BLOOD PRESSURE: 127 MMHG | DIASTOLIC BLOOD PRESSURE: 95 MMHG | BODY MASS INDEX: 37.27 KG/M2

## 2023-10-10 DIAGNOSIS — R10.31 RIGHT LOWER QUADRANT ABDOMINAL PAIN: ICD-10-CM

## 2023-10-10 DIAGNOSIS — R10.31 RIGHT LOWER QUADRANT ABDOMINAL PAIN: Primary | ICD-10-CM

## 2023-10-10 LAB
ALBUMIN SERPL BCG-MCNC: 3.6 G/DL (ref 3.5–5.2)
ALBUMIN UR-MCNC: NEGATIVE MG/DL
ALP SERPL-CCNC: 79 U/L (ref 35–104)
ALT SERPL W P-5'-P-CCNC: 19 U/L (ref 0–50)
ANION GAP SERPL CALCULATED.3IONS-SCNC: 12 MMOL/L (ref 7–15)
APPEARANCE UR: CLEAR
AST SERPL W P-5'-P-CCNC: 18 U/L (ref 0–45)
BACTERIA #/AREA URNS HPF: ABNORMAL /HPF
BASO+EOS+MONOS # BLD AUTO: ABNORMAL 10*3/UL
BASO+EOS+MONOS NFR BLD AUTO: ABNORMAL %
BASOPHILS # BLD AUTO: 0 10E3/UL (ref 0–0.2)
BASOPHILS NFR BLD AUTO: 0 %
BILIRUB SERPL-MCNC: 0.3 MG/DL
BILIRUB UR QL STRIP: NEGATIVE
BUN SERPL-MCNC: 9.2 MG/DL (ref 6–20)
CALCIUM SERPL-MCNC: 9.4 MG/DL (ref 8.6–10)
CHLORIDE SERPL-SCNC: 97 MMOL/L (ref 98–107)
COLOR UR AUTO: YELLOW
CREAT SERPL-MCNC: 0.71 MG/DL (ref 0.51–0.95)
DEPRECATED HCO3 PLAS-SCNC: 25 MMOL/L (ref 22–29)
EGFRCR SERPLBLD CKD-EPI 2021: >90 ML/MIN/1.73M2
EOSINOPHIL # BLD AUTO: 0.2 10E3/UL (ref 0–0.7)
EOSINOPHIL NFR BLD AUTO: 1 %
ERYTHROCYTE [DISTWIDTH] IN BLOOD BY AUTOMATED COUNT: 12.9 % (ref 10–15)
GLUCOSE SERPL-MCNC: 343 MG/DL (ref 70–99)
GLUCOSE UR STRIP-MCNC: >=1000 MG/DL
HCT VFR BLD AUTO: 42.5 % (ref 35–47)
HGB BLD-MCNC: 13.3 G/DL (ref 11.7–15.7)
HGB UR QL STRIP: ABNORMAL
IMM GRANULOCYTES # BLD: 0 10E3/UL
IMM GRANULOCYTES NFR BLD: 0 %
KETONES UR STRIP-MCNC: ABNORMAL MG/DL
LEUKOCYTE ESTERASE UR QL STRIP: NEGATIVE
LYMPHOCYTES # BLD AUTO: 1.7 10E3/UL (ref 0.8–5.3)
LYMPHOCYTES NFR BLD AUTO: 12 %
MCH RBC QN AUTO: 28.2 PG (ref 26.5–33)
MCHC RBC AUTO-ENTMCNC: 31.3 G/DL (ref 31.5–36.5)
MCV RBC AUTO: 90 FL (ref 78–100)
MONOCYTES # BLD AUTO: 1 10E3/UL (ref 0–1.3)
MONOCYTES NFR BLD AUTO: 7 %
MUCOUS THREADS #/AREA URNS LPF: PRESENT /LPF
NEUTROPHILS # BLD AUTO: 10.9 10E3/UL (ref 1.6–8.3)
NEUTROPHILS NFR BLD AUTO: 79 %
NITRATE UR QL: NEGATIVE
PH UR STRIP: 6 [PH] (ref 5–7)
PLATELET # BLD AUTO: 347 10E3/UL (ref 150–450)
POTASSIUM SERPL-SCNC: 4.5 MMOL/L (ref 3.4–5.3)
PROT SERPL-MCNC: 7.5 G/DL (ref 6.4–8.3)
RBC # BLD AUTO: 4.72 10E6/UL (ref 3.8–5.2)
RBC #/AREA URNS AUTO: ABNORMAL /HPF
SODIUM SERPL-SCNC: 134 MMOL/L (ref 135–145)
SP GR UR STRIP: 1.02 (ref 1–1.03)
SQUAMOUS #/AREA URNS AUTO: ABNORMAL /LPF
UROBILINOGEN UR STRIP-ACNC: 0.2 E.U./DL
WBC # BLD AUTO: 13.9 10E3/UL (ref 4–11)
WBC #/AREA URNS AUTO: ABNORMAL /HPF

## 2023-10-10 PROCEDURE — 36415 COLL VENOUS BLD VENIPUNCTURE: CPT | Performed by: SURGERY

## 2023-10-10 PROCEDURE — 80053 COMPREHEN METABOLIC PANEL: CPT | Performed by: SURGERY

## 2023-10-10 PROCEDURE — 250N000009 HC RX 250: Performed by: RADIOLOGY

## 2023-10-10 PROCEDURE — 81001 URINALYSIS AUTO W/SCOPE: CPT | Performed by: SURGERY

## 2023-10-10 PROCEDURE — 250N000011 HC RX IP 250 OP 636: Performed by: RADIOLOGY

## 2023-10-10 PROCEDURE — 99024 POSTOP FOLLOW-UP VISIT: CPT | Performed by: SURGERY

## 2023-10-10 PROCEDURE — 85025 COMPLETE CBC W/AUTO DIFF WBC: CPT | Performed by: SURGERY

## 2023-10-10 PROCEDURE — 74177 CT ABD & PELVIS W/CONTRAST: CPT

## 2023-10-10 RX ORDER — IOPAMIDOL 755 MG/ML
100 INJECTION, SOLUTION INTRAVASCULAR ONCE
Status: COMPLETED | OUTPATIENT
Start: 2023-10-10 | End: 2023-10-10

## 2023-10-10 RX ADMIN — IOPAMIDOL 100 ML: 755 INJECTION, SOLUTION INTRAVENOUS at 09:16

## 2023-10-10 RX ADMIN — SODIUM CHLORIDE 68 ML: 9 INJECTION, SOLUTION INTRAVENOUS at 09:16

## 2023-10-10 ASSESSMENT — PAIN SCALES - GENERAL: PAINLEVEL: EXTREME PAIN (8)

## 2023-10-10 NOTE — PROGRESS NOTES
"General Surgery Post Op    Pt returns for follow up visit s/p laparoscopic appendectomy on 9/29/23.    She admits dull abdominal pain in RLQ radiating up to her RUQ.  She feels this is similar to her appendicitis.  No nausea, vomiting.  Tolerating diet.  No fevers or chills.  Having regular bowel function.  Denies hematuria    Physical exam: Vitals: BP (!) 127/95 (BP Location: Right arm, Patient Position: Sitting, Cuff Size: Adult Large)   Pulse 91   Temp 97.7  F (36.5  C) (Tympanic)   Ht 1.676 m (5' 5.98\")   Wt 105.2 kg (231 lb 14.8 oz)   LMP 08/25/2023 (Approximate)   BMI 37.45 kg/m    BMI= Body mass index is 37.45 kg/m .    Exam:  Constitutional: healthy, alert, and no distress  Cardiovascular: negative  Respiratory: negative  Gastrointestinal: Soft, mild TTP RLQ without R/R/G.  Incisions well healed    Path:  Appendix, appendectomy:  -- Acute appendicitis and serositis.    Assessment:     ICD-10-CM    1. Right lower quadrant abdominal pain  R10.31 CBC with platelets and differential     CT Abdomen Pelvis w Contrast     Comprehensive metabolic panel (BMP + Alb, Alk Phos, ALT, AST, Total. Bili, TP)     UA Macroscopic with reflex to Microscopic and Culture - Lab Collect     CBC with platelets and differential     Comprehensive metabolic panel (BMP + Alb, Alk Phos, ALT, AST, Total. Bili, TP)     UA Macroscopic with reflex to Microscopic and Culture - Lab Collect     UA Microscopic with Reflex to Culture     amoxicillin-clavulanate (AUGMENTIN) 875-125 MG tablet        Plan: Patient is over 2 weeks out from laparoscopic appendectomy. Her exam is benign and non surgical.  We discussed potential etiologies.  I recommended labs and we discussed imaging and she wishes to proceed.      Lab Results   Component Value Date    WBC 13.9 10/10/2023    WBC 8.0 07/29/2013     Lab Results   Component Value Date    RBC 4.72 10/10/2023    RBC 4.41 07/29/2013     Lab Results   Component Value Date    HGB 13.3 10/10/2023    HGB " 12.9 07/29/2013     Lab Results   Component Value Date    HCT 42.5 10/10/2023    HCT 38.9 07/29/2013     No components found for: MCT  Lab Results   Component Value Date    MCV 90 10/10/2023    MCV 88 07/29/2013     Lab Results   Component Value Date    MCH 28.2 10/10/2023    MCH 29.3 07/29/2013     Lab Results   Component Value Date    MCHC 31.3 10/10/2023    MCHC 33.2 07/29/2013     Lab Results   Component Value Date    RDW 12.9 10/10/2023    RDW 13.5 07/29/2013     Lab Results   Component Value Date     10/10/2023     07/29/2013     Last Comprehensive Metabolic Panel:  Sodium   Date Value Ref Range Status   10/10/2023 134 (L) 135 - 145 mmol/L Final     Comment:     Reference intervals for this test were updated on 09/26/2023 to more accurately reflect our healthy population. There may be differences in the flagging of prior results with similar values performed with this method. Interpretation of those prior results can be made in the context of the updated reference intervals.    07/17/2020 138 133 - 144 mmol/L Final     Potassium   Date Value Ref Range Status   10/10/2023 4.5 3.4 - 5.3 mmol/L Final   11/29/2021 4.0 3.4 - 5.3 mmol/L Final   07/17/2020 3.8 3.4 - 5.3 mmol/L Final     Chloride   Date Value Ref Range Status   10/10/2023 97 (L) 98 - 107 mmol/L Final   11/29/2021 105 94 - 109 mmol/L Final   07/17/2020 107 94 - 109 mmol/L Final     Carbon Dioxide   Date Value Ref Range Status   07/17/2020 27 20 - 32 mmol/L Final     Carbon Dioxide (CO2)   Date Value Ref Range Status   10/10/2023 25 22 - 29 mmol/L Final   11/29/2021 29 20 - 32 mmol/L Final     Anion Gap   Date Value Ref Range Status   10/10/2023 12 7 - 15 mmol/L Final   11/29/2021 4 3 - 14 mmol/L Final   07/17/2020 4 3 - 14 mmol/L Final     Glucose   Date Value Ref Range Status   10/10/2023 343 (H) 70 - 99 mg/dL Final   11/29/2021 152 (H) 70 - 99 mg/dL Final   07/17/2020 168 (H) 70 - 99 mg/dL Final     Urea Nitrogen   Date Value Ref Range  Status   10/10/2023 9.2 6.0 - 20.0 mg/dL Final   11/29/2021 16 7 - 30 mg/dL Final   07/17/2020 12 7 - 30 mg/dL Final     Creatinine   Date Value Ref Range Status   10/10/2023 0.71 0.51 - 0.95 mg/dL Final   07/17/2020 0.87 0.52 - 1.04 mg/dL Final     GFR Estimate   Date Value Ref Range Status   10/10/2023 >90 >60 mL/min/1.73m2 Final   07/17/2020 85 >60 mL/min/[1.73_m2] Final     Comment:     Non  GFR Calc  Starting 12/18/2018, serum creatinine based estimated GFR (eGFR) will be   calculated using the Chronic Kidney Disease Epidemiology Collaboration   (CKD-EPI) equation.       Calcium   Date Value Ref Range Status   10/10/2023 9.4 8.6 - 10.0 mg/dL Final   07/17/2020 8.5 8.5 - 10.1 mg/dL Final     Bilirubin Total   Date Value Ref Range Status   10/10/2023 0.3 <=1.2 mg/dL Final   06/22/2011 0.8 0.2 - 1.3 mg/dL Final     Alkaline Phosphatase   Date Value Ref Range Status   10/10/2023 79 35 - 104 U/L Final   06/22/2011 51 40 - 150 U/L Final     ALT   Date Value Ref Range Status   10/10/2023 19 0 - 50 U/L Final     Comment:     Reference intervals for this test were updated on 6/12/2023 to more accurately reflect our healthy population. There may be differences in the flagging of prior results with similar values performed with this method. Interpretation of those prior results can be made in the context of the updated reference intervals.     06/22/2011 <6 0 - 50 U/L Final     AST   Date Value Ref Range Status   10/10/2023 18 0 - 45 U/L Final     Comment:     Reference intervals for this test were updated on 6/12/2023 to more accurately reflect our healthy population. There may be differences in the flagging of prior results with similar values performed with this method. Interpretation of those prior results can be made in the context of the updated reference intervals.   06/22/2011 33 0 - 45 U/L Final     Color Urine (no units)   Date Value   10/10/2023 Yellow   11/16/2018 Yellow     Appearance Urine  (no units)   Date Value   10/10/2023 Clear   11/16/2018 Clear     Glucose Urine (mg/dL)   Date Value   10/10/2023 >=1000 (A)   11/16/2018 Negative     Bilirubin Urine (no units)   Date Value   10/10/2023 Negative   11/16/2018 Negative     Ketones Urine (mg/dL)   Date Value   10/10/2023 Trace (A)   11/16/2018 Negative     Specific Gravity Urine (no units)   Date Value   10/10/2023 1.020   11/16/2018 >1.030     pH Urine   Date Value   10/10/2023 6.0   11/16/2018 5.5 pH     Protein Albumin Urine (mg/dL)   Date Value   10/10/2023 Negative   11/16/2018 Negative     Urobilinogen Urine   Date Value   10/10/2023 0.2 E.U./dL   11/16/2018 0.2 EU/dL     Nitrite Urine (no units)   Date Value   10/10/2023 Negative   11/16/2018 Positive (A)     Leukocyte Esterase Urine (no units)   Date Value   10/10/2023 Negative   11/16/2018 Small (A)     Narrative & Impression   CT ABDOMEN AND PELVIS WITH CONTRAST 10/10/2023 9:22 AM     CLINICAL HISTORY: Right lower quadrant abdominal pain.     TECHNIQUE: CT scan of the abdomen and pelvis was performed following  injection of IV contrast. Multiplanar reformats were obtained. Dose  reduction techniques were used.     CONTRAST: 100mL Isovue 370     COMPARISON: CT abdomen and pelvis 9/29/2023.     FINDINGS:   LOWER CHEST: Unremarkable.     HEPATOBILIARY: Mild hepatic steatosis. No focal mass. Gallbladder is  unremarkable.     PANCREAS: No significant mass, duct dilatation, or inflammatory  change.     SPLEEN: Normal size.     ADRENAL GLANDS: No significant nodules.     KIDNEYS/BLADDER: No significant mass, stones, or hydronephrosis.     BOWEL: Interval appendectomy. Mild inflammation surrounding the  surgical bed/cecum and areas of peritoneal fat (2/64). No discrete  organized fluid collection. No free air. No bowel obstruction.  Periampullary duodenal diverticulum.     PELVIC ORGANS: Uterus is present. No adnexal mass.     ADDITIONAL FINDINGS: Scattered right mesenteric lymph nodes are  likely  reactive. Nonaneurysmal abdominal aorta.     MUSCULOSKELETAL: Similar focal skin thickening/mild inflammation at  the the anterior medial thigh and right labia majora crease (2/99).                                                                      IMPRESSION:   1.  Interval appendectomy. Mild inflammation surrounding the surgical  bed/cecum could reflect postsurgical changes and/or possibly evolving  fat necrosis. No discrete organized drainable fluid collection. No  free air.  2.  No bowel obstruction.  3.  Similar focal skin thickening/mild inflammation at the anterior  medial thigh/right labia majora crease. Direct visualization is  recommended.  4.  Hepatic steatosis.     Reviewed imaging.  Will assess skin thickening next office visit.  Discussed antibiotic therapy and she is in agreement.  RTC next week.    Kevin Barrios,  on 10/10/2023 at 8:23 AM

## 2023-10-10 NOTE — NURSING NOTE
"Initial BP (!) 127/95 (BP Location: Right arm, Patient Position: Sitting, Cuff Size: Adult Large)   Pulse 91   Temp 97.7  F (36.5  C) (Tympanic)   Ht 1.676 m (5' 5.98\")   Wt 105.2 kg (231 lb 14.8 oz)   LMP 08/25/2023 (Approximate)   BMI 37.45 kg/m   Estimated body mass index is 37.45 kg/m  as calculated from the following:    Height as of this encounter: 1.676 m (5' 5.98\").    Weight as of this encounter: 105.2 kg (231 lb 14.8 oz). .  Eliana Ngo MA    "

## 2023-10-10 NOTE — LETTER
"    10/10/2023         RE: Lianna Sorto  [address] [facility] [address]        Dear Colleague,    Thank you for referring your patient, Lianna Sorto, to the [facility]. Please see a copy of my visit note below.    General Surgery Post Op    Pt returns for follow up visit s/p laparoscopic appendectomy on 9/29/23.    She admits dull abdominal pain in RLQ radiating up to her RUQ.  She feels this is similar to her appendicitis.  No nausea, vomiting.  Tolerating diet.  No fevers or chills.  Having regular bowel function.  Denies hematuria    Physical exam: Vitals: BP (!) 127/95 (BP Location: Right arm, Patient Position: Sitting, Cuff Size: Adult Large)   Pulse 91   Temp 97.7  F (36.5  C) (Tympanic)   Ht 1.676 m (5' 5.98\")   Wt 105.2 kg (231 lb 14.8 oz)   LMP 08/25/2023 (Approximate)   BMI 37.45 kg/m    BMI= Body mass index is 37.45 kg/m .    Exam:  Constitutional: healthy, alert, and no distress  Cardiovascular: negative  Respiratory: negative  Gastrointestinal: Soft, mild TTP RLQ without R/R/G.  Incisions well healed    Path:  Appendix, appendectomy:  -- Acute appendicitis and serositis.    Assessment:     ICD-10-CM    1. Right lower quadrant abdominal pain  R10.31 CBC with platelets and differential     CT Abdomen Pelvis w Contrast     Comprehensive metabolic panel (BMP + Alb, Alk Phos, ALT, AST, Total. Bili, TP)     UA Macroscopic with reflex to Microscopic and Culture - Lab Collect     CBC with platelets and differential     Comprehensive metabolic panel (BMP + Alb, Alk Phos, ALT, AST, Total. Bili, TP)     UA Macroscopic with reflex to Microscopic and Culture - Lab Collect     UA Microscopic with Reflex to Culture     amoxicillin-clavulanate (AUGMENTIN) 875-125 MG tablet        Plan: Patient is over 2 weeks out from laparoscopic appendectomy. Her exam is benign and non surgical.  We discussed potential etiologies.  I recommended labs and we discussed imaging and she wishes " to proceed.      Lab Results   Component Value Date    WBC 13.9 10/10/2023    WBC 8.0 07/29/2013     Lab Results   Component Value Date    RBC 4.72 10/10/2023    RBC 4.41 07/29/2013     Lab Results   Component Value Date    HGB 13.3 10/10/2023    HGB 12.9 07/29/2013     Lab Results   Component Value Date    HCT 42.5 10/10/2023    HCT 38.9 07/29/2013     No components found for: MCT  Lab Results   Component Value Date    MCV 90 10/10/2023    MCV 88 07/29/2013     Lab Results   Component Value Date    MCH 28.2 10/10/2023    MCH 29.3 07/29/2013     Lab Results   Component Value Date    MCHC 31.3 10/10/2023    MCHC 33.2 07/29/2013     Lab Results   Component Value Date    RDW 12.9 10/10/2023    RDW 13.5 07/29/2013     Lab Results   Component Value Date     10/10/2023     07/29/2013     Last Comprehensive Metabolic Panel:  Sodium   Date Value Ref Range Status   10/10/2023 134 (L) 135 - 145 mmol/L Final     Comment:     Reference intervals for this test were updated on 09/26/2023 to more accurately reflect our healthy population. There may be differences in the flagging of prior results with similar values performed with this method. Interpretation of those prior results can be made in the context of the updated reference intervals.    07/17/2020 138 133 - 144 mmol/L Final     Potassium   Date Value Ref Range Status   10/10/2023 4.5 3.4 - 5.3 mmol/L Final   11/29/2021 4.0 3.4 - 5.3 mmol/L Final   07/17/2020 3.8 3.4 - 5.3 mmol/L Final     Chloride   Date Value Ref Range Status   10/10/2023 97 (L) 98 - 107 mmol/L Final   11/29/2021 105 94 - 109 mmol/L Final   07/17/2020 107 94 - 109 mmol/L Final     Carbon Dioxide   Date Value Ref Range Status   07/17/2020 27 20 - 32 mmol/L Final     Carbon Dioxide (CO2)   Date Value Ref Range Status   10/10/2023 25 22 - 29 mmol/L Final   11/29/2021 29 20 - 32 mmol/L Final     Anion Gap   Date Value Ref Range Status   10/10/2023 12 7 - 15 mmol/L Final   11/29/2021 4 3 - 14  mmol/L Final   07/17/2020 4 3 - 14 mmol/L Final     Glucose   Date Value Ref Range Status   10/10/2023 343 (H) 70 - 99 mg/dL Final   11/29/2021 152 (H) 70 - 99 mg/dL Final   07/17/2020 168 (H) 70 - 99 mg/dL Final     Urea Nitrogen   Date Value Ref Range Status   10/10/2023 9.2 6.0 - 20.0 mg/dL Final   11/29/2021 16 7 - 30 mg/dL Final   07/17/2020 12 7 - 30 mg/dL Final     Creatinine   Date Value Ref Range Status   10/10/2023 0.71 0.51 - 0.95 mg/dL Final   07/17/2020 0.87 0.52 - 1.04 mg/dL Final     GFR Estimate   Date Value Ref Range Status   10/10/2023 >90 >60 mL/min/1.73m2 Final   07/17/2020 85 >60 mL/min/[1.73_m2] Final     Comment:     Non  GFR Calc  Starting 12/18/2018, serum creatinine based estimated GFR (eGFR) will be   calculated using the Chronic Kidney Disease Epidemiology Collaboration   (CKD-EPI) equation.       Calcium   Date Value Ref Range Status   10/10/2023 9.4 8.6 - 10.0 mg/dL Final   07/17/2020 8.5 8.5 - 10.1 mg/dL Final     Bilirubin Total   Date Value Ref Range Status   10/10/2023 0.3 <=1.2 mg/dL Final   06/22/2011 0.8 0.2 - 1.3 mg/dL Final     Alkaline Phosphatase   Date Value Ref Range Status   10/10/2023 79 35 - 104 U/L Final   06/22/2011 51 40 - 150 U/L Final     ALT   Date Value Ref Range Status   10/10/2023 19 0 - 50 U/L Final     Comment:     Reference intervals for this test were updated on 6/12/2023 to more accurately reflect our healthy population. There may be differences in the flagging of prior results with similar values performed with this method. Interpretation of those prior results can be made in the context of the updated reference intervals.     06/22/2011 <6 0 - 50 U/L Final     AST   Date Value Ref Range Status   10/10/2023 18 0 - 45 U/L Final     Comment:     Reference intervals for this test were updated on 6/12/2023 to more accurately reflect our healthy population. There may be differences in the flagging of prior results with similar values performed  with this method. Interpretation of those prior results can be made in the context of the updated reference intervals.   06/22/2011 33 0 - 45 U/L Final     Color Urine (no units)   Date Value   10/10/2023 Yellow   11/16/2018 Yellow     Appearance Urine (no units)   Date Value   10/10/2023 Clear   11/16/2018 Clear     Glucose Urine (mg/dL)   Date Value   10/10/2023 >=1000 (A)   11/16/2018 Negative     Bilirubin Urine (no units)   Date Value   10/10/2023 Negative   11/16/2018 Negative     Ketones Urine (mg/dL)   Date Value   10/10/2023 Trace (A)   11/16/2018 Negative     Specific Gravity Urine (no units)   Date Value   10/10/2023 1.020   11/16/2018 >1.030     pH Urine   Date Value   10/10/2023 6.0   11/16/2018 5.5 pH     Protein Albumin Urine (mg/dL)   Date Value   10/10/2023 Negative   11/16/2018 Negative     Urobilinogen Urine   Date Value   10/10/2023 0.2 E.U./dL   11/16/2018 0.2 EU/dL     Nitrite Urine (no units)   Date Value   10/10/2023 Negative   11/16/2018 Positive (A)     Leukocyte Esterase Urine (no units)   Date Value   10/10/2023 Negative   11/16/2018 Small (A)     Narrative & Impression   CT ABDOMEN AND PELVIS WITH CONTRAST 10/10/2023 9:22 AM     CLINICAL HISTORY: Right lower quadrant abdominal pain.     TECHNIQUE: CT scan of the abdomen and pelvis was performed following  injection of IV contrast. Multiplanar reformats were obtained. Dose  reduction techniques were used.     CONTRAST: 100mL Isovue 370     COMPARISON: CT abdomen and pelvis 9/29/2023.     FINDINGS:   LOWER CHEST: Unremarkable.     HEPATOBILIARY: Mild hepatic steatosis. No focal mass. Gallbladder is  unremarkable.     PANCREAS: No significant mass, duct dilatation, or inflammatory  change.     SPLEEN: Normal size.     ADRENAL GLANDS: No significant nodules.     KIDNEYS/BLADDER: No significant mass, stones, or hydronephrosis.     BOWEL: Interval appendectomy. Mild inflammation surrounding the  surgical bed/cecum and areas of peritoneal fat  (2/64). No discrete  organized fluid collection. No free air. No bowel obstruction.  Periampullary duodenal diverticulum.     PELVIC ORGANS: Uterus is present. No adnexal mass.     ADDITIONAL FINDINGS: Scattered right mesenteric lymph nodes are likely  reactive. Nonaneurysmal abdominal aorta.     MUSCULOSKELETAL: Similar focal skin thickening/mild inflammation at  the the anterior medial thigh and right labia majora crease (2/99).                                                                      IMPRESSION:   1.  Interval appendectomy. Mild inflammation surrounding the surgical  bed/cecum could reflect postsurgical changes and/or possibly evolving  fat necrosis. No discrete organized drainable fluid collection. No  free air.  2.  No bowel obstruction.  3.  Similar focal skin thickening/mild inflammation at the anterior  medial thigh/right labia majora crease. Direct visualization is  recommended.  4.  Hepatic steatosis.     Reviewed imaging.  Will assess skin thickening next office visit.  Discussed antibiotic therapy and she is in agreement.  RTC next week.    Kevin Barrios DO on 10/10/2023 at 8:23 AM        Again, thank you for allowing me to participate in the care of your patient.        Sincerely,        Kevin Barrios DO

## 2023-11-24 DIAGNOSIS — Z30.41 ENCOUNTER FOR SURVEILLANCE OF CONTRACEPTIVE PILLS: ICD-10-CM

## 2023-11-24 DIAGNOSIS — L73.2 HIDRADENITIS SUPPURATIVA: ICD-10-CM

## 2023-11-24 RX ORDER — CLINDAMYCIN PHOSPHATE 10 UG/ML
LOTION TOPICAL
Qty: 60 ML | Refills: 1 | Status: CANCELLED | OUTPATIENT
Start: 2023-11-24

## 2023-11-24 RX ORDER — LEVONORGESTREL AND ETHINYL ESTRADIOL 0.15-0.03
KIT ORAL
Qty: 91 TABLET | Refills: 0 | Status: SHIPPED | OUTPATIENT
Start: 2023-11-24 | End: 2024-01-29

## 2023-11-24 NOTE — TELEPHONE ENCOUNTER
"Has appointment scheduled for 1/8/24      Requested Prescriptions   Pending Prescriptions Disp Refills    SETLAKIN 0.15-0.03 MG tablet [Pharmacy Med Name: SETLAKIN 0.15-0.03MG TABS] 91 tablet 3     Sig: TAKE 1 TABLET BY MOUTH DAILY       Contraceptives Protocol Failed - 11/24/2023 10:45 AM        Failed - Patient is not a current smoker if age is 35 or older        Failed - Recent (12 mo) or future (30 days) visit within the authorizing provider's specialty     Patient has had an office visit with the authorizing provider or a provider within the authorizing providers department within the previous 12 mos or has a future within next 30 days. See \"Patient Info\" tab in inbasket, or \"Choose Columns\" in Meds & Orders section of the refill encounter.              Passed - Medication is active on med list        Passed - No active pregnancy on record        Passed - No positive pregnancy test in past 12 months             "

## 2023-12-18 ENCOUNTER — OFFICE VISIT (OUTPATIENT)
Dept: PSYCHIATRY | Facility: CLINIC | Age: 43
End: 2023-12-18
Payer: COMMERCIAL

## 2023-12-18 VITALS
BODY MASS INDEX: 37.81 KG/M2 | WEIGHT: 234.12 LBS | DIASTOLIC BLOOD PRESSURE: 89 MMHG | SYSTOLIC BLOOD PRESSURE: 140 MMHG | HEART RATE: 90 BPM

## 2023-12-18 DIAGNOSIS — F33.0 MILD RECURRENT MAJOR DEPRESSION (H): Primary | ICD-10-CM

## 2023-12-18 DIAGNOSIS — F41.1 GAD (GENERALIZED ANXIETY DISORDER): ICD-10-CM

## 2023-12-18 DIAGNOSIS — F90.0 ADHD (ATTENTION DEFICIT HYPERACTIVITY DISORDER), INATTENTIVE TYPE: ICD-10-CM

## 2023-12-18 PROCEDURE — 99214 OFFICE O/P EST MOD 30 MIN: CPT | Mod: 24 | Performed by: NURSE PRACTITIONER

## 2023-12-18 RX ORDER — LISDEXAMFETAMINE DIMESYLATE 40 MG/1
40 CAPSULE ORAL EVERY MORNING
Qty: 30 CAPSULE | Refills: 0 | Status: SHIPPED | OUTPATIENT
Start: 2023-12-25 | End: 2024-01-29

## 2023-12-18 RX ORDER — LISDEXAMFETAMINE DIMESYLATE 40 MG/1
40 CAPSULE ORAL EVERY MORNING
Qty: 30 CAPSULE | Refills: 0 | Status: SHIPPED | OUTPATIENT
Start: 2024-02-19 | End: 2024-03-25 | Stop reason: ALTCHOICE

## 2023-12-18 RX ORDER — PROPRANOLOL HYDROCHLORIDE 20 MG/1
20 TABLET ORAL 2 TIMES DAILY
Qty: 60 TABLET | Refills: 3 | Status: SHIPPED | OUTPATIENT
Start: 2023-12-18 | End: 2024-04-29

## 2023-12-18 RX ORDER — LISDEXAMFETAMINE DIMESYLATE 40 MG/1
40 CAPSULE ORAL EVERY MORNING
Qty: 30 CAPSULE | Refills: 0 | Status: SHIPPED | OUTPATIENT
Start: 2024-01-22 | End: 2024-03-25 | Stop reason: ALTCHOICE

## 2023-12-18 RX ORDER — DEXTROAMPHETAMINE SACCHARATE, AMPHETAMINE ASPARTATE, DEXTROAMPHETAMINE SULFATE AND AMPHETAMINE SULFATE 2.5; 2.5; 2.5; 2.5 MG/1; MG/1; MG/1; MG/1
10 TABLET ORAL DAILY PRN
Qty: 30 TABLET | Refills: 0 | Status: SHIPPED | OUTPATIENT
Start: 2023-12-18 | End: 2024-04-29

## 2023-12-18 ASSESSMENT — PATIENT HEALTH QUESTIONNAIRE - PHQ9: SUM OF ALL RESPONSES TO PHQ QUESTIONS 1-9: 15

## 2023-12-18 NOTE — PROGRESS NOTES
"         Outpatient Psychiatric Progress Note    Name: Lianna Sorto   : 1980                    Primary Care Provider: DANA Amos CNP   Therapist: None    PHQ-9 scores:      2022     9:04 AM 2023     8:11 AM 2023     5:32 PM   PHQ-9 SCORE   PHQ-9 Total Score MyChart  5 (Mild depression) 9 (Mild depression)   PHQ-9 Total Score 7 5 9       FAUSTINO-7 scores:      2021     8:14 AM 2021     8:00 AM 2023     5:34 PM   FAUSTINO-7 SCORE   Total Score   9 (mild anxiety)   Total Score 0 4 9       Patient Identification:    Patient is a 43 year old year old, single  White Choose not to answer female  who presents for return visit with me.  Patient is currently employed full time. Patient attended the session alone. Patient prefers to be called: \" Lianna\".    Current medications include: amoxicillin-clavulanate (AUGMENTIN) 875-125 MG tablet, Take 1 tablet by mouth 2 times daily  amphetamine-dextroamphetamine (ADDERALL) 10 MG tablet, Take 1 tablet (10 mg) by mouth daily as needed (attentional deficits)  blood glucose (NO BRAND SPECIFIED) test strip, Use to test blood sugar 2 times daily or as directed. To accompany: Blood Glucose Monitor Brands: per insurance.  blood glucose monitoring (NO BRAND SPECIFIED) meter device kit, Use to test blood sugar 2 times daily or as directed. Preferred blood glucose meter OR supplies to accompany: Blood Glucose Monitor Brands: per insurance.  cetirizine (ZYRTEC) 10 MG tablet, Take 10 mg by mouth daily  chlorhexidine (HIBICLENS) 4 % liquid, Use to wash groin daily.  clindamycin (CLINDAMAX) 1 % external lotion, Apply to affected areas twice daily  cyclobenzaprine (FLEXERIL) 10 MG tablet, TAKE ONE TABLET BY MOUTH THREE TIMES A DAY AS NEEDED FOR MUSCLE SPASMS  docusate sodium (COLACE) 100 MG capsule, Take 1 capsule (100 mg) by mouth 2 times daily Take while on opiate to prevent constipation  famotidine (PEPCID) 20 MG tablet, TAKE ONE TABLET BY MOUTH TWICE " A DAY  fluticasone (FLONASE) 50 MCG/ACT nasal spray, Spray 2 sprays into both nostrils daily  levonorgestrel-ethinyl estradiol (SETLAKIN) 0.15-0.03 MG tablet, TAKE 1 TABLET BY MOUTH DAILY  lisdexamfetamine (VYVANSE) 40 MG capsule, Take 1 capsule (40 mg) by mouth every morning  lisdexamfetamine (VYVANSE) 40 MG capsule, Take 1 capsule (40 mg) by mouth every morning Fill on or after October 23  lisdexamfetamine (VYVANSE) 40 MG capsule, Take 1 capsule (40 mg) by mouth every morning Fill on or after  November 20  order for DME, Equipment being ordered: Dynaflex insert  propranolol (INDERAL) 20 MG tablet, Take 1 tablet (20 mg) by mouth 2 times daily  thin (NO BRAND SPECIFIED) lancets, Use with lanceting device. To accompany: Blood Glucose Monitor Brands: per insurance.  triamcinolone (KENALOG) 0.1 % external cream, Apply to affected areas twice daily for 2 weeks, then as needed only    No current facility-administered medications on file prior to visit.       The [place] Prescription Monitoring Program has been reviewed and there are no concerns about diversionary activity for controlled substances at this time.      I was able to review most recent Primary Care Provider, specialty provider, and therapy visit notes that I have access to.     Today, patient reports experiencing anxiety related to financial stress.  Sometimes it is difficult for her to fall asleep at night due to racing thoughts.  At other times she experiences lack of motivation.  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  Now that her children are getting older, it is less labor-intensive for her at home but she still desires them to be more helpful than they have been.  Taking Vyvanse has helped her stay focused and organized at work.  She takes the propranolol most days to help decrease anxiety level.  Adderall IR is not taken every day, only if she needs to focus later in the day to complete job and home tasks.         has a past  medical history of Abnormal Pap smear of cervix (2011), Acute tonsillitis (2008), Condylomata (4/7/2011), Endometritis (2008), Hidradenitis (7/3/2009), Intussusception (H) (1983), and Unspecified trauma to perineum and vulva, unspecified as to episode of care in pregnancy (1985).    She has no past medical history of Basal cell carcinoma or Squamous cell carcinoma.    Social history updates:    Lianna lives with her children.    Substance use updates:    No alcohol use reported  Tobacco use: No    Vital Signs:   There were no vitals taken for this visit.    Labs:    Most recent laboratory results reviewed and no new labs.     Mental Status Examination:  Appearance: awake, alert and casual dress  Attitude: cooperative  Eye Contact:  adequate  Gait and Station: No dizziness or falls  Psychomotor Behavior:  intact station, gait and muscle tone  Oriented to:  time, person, and place  Attention Span and Concentration:  Normal  Speech:   vtspeech: clear, coherent and Speaks: English  Mood:  anxious  Affect:  mood congruent  Associations:  no loose associations  Thought Process:  goal oriented  Thought Content:  no evidence of suicidal ideation or homicidal ideation, no auditory hallucinations present, and no visual hallucinations present  Recent and Remote Memory:  intact Not formally assessed. No amnesia.  Fund of Knowledge: appropriate  Insight:  good  Judgment: good  Impulse Control:  good    Suicide Risk Assessment:  Today Lianna Sorto reports no thoughts to harm themself or others. In addition, there are notable risk factors for self-harm, including single status and anxiety. However, risk is mitigated by commitment to family, history of seeking help when needed, future oriented, denies suicidal intent or plan, and denies homicidal ideation, intent, or plan. Therefore, based on all available evidence including the factors cited above, Lianna Sorto does not appear to be at imminent risk for self-harm, does not  meet criteria for a 72-hr hold, and therefore remains appropriate for ongoing outpatient level of care.  A thorough assessment of risk factors related to suicide and self-harm have been reviewed and are noted above. The patient convincingly denies suicidality on several occasions. Local community safety resources printed and reviewed for patient to use if needed. There was no deceit detected, and the patient presented in a manner that was believable.     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  296.31 (F33.0) Major Depressive Disorder, Recurrent Episode, Mild _ and With mixed features  300.02 (F41.1) Generalized Anxiety Disorder    Medical comorbidities include:   Patient Active Problem List    Diagnosis Date Noted    Morbid obesity (H) 11/02/2020     Priority: Medium    Diabetes mellitus, type 2 (H) 08/07/2020     Priority: Medium    Moderate episode of recurrent major depressive disorder (H) 09/22/2017     Priority: Medium    Controlled substance agreement signed 09/28/2016     Priority: Medium    Hidradenitis suppurativa 11/02/2015     Priority: Medium    Anxiety 06/02/2014     Priority: Medium    Pelvic pain in female 11/13/2013     Priority: Medium    Fibromyalgia 05/28/2013     Priority: Medium    Piriformis syndrome 12/12/2012     Priority: Medium    Scapular dyskinesis 03/13/2012     Priority: Medium    Varicose veins of legs 07/13/2011     Priority: Medium    Sacroiliac pain 07/13/2011     Priority: Medium    Back muscle spasm 06/06/2011     Priority: Medium    Tension headache 06/06/2011     Priority: Medium    CARDIOVASCULAR SCREENING; LDL GOAL LESS THAN 130 06/06/2011     Priority: Medium    Atypical squamous cells of undetermined significance (ASCUS) on Papanicolaou smear of cervix 03/22/2011     Priority: Medium     3/22/11: Pap - ASCUS, + HPV 16. Plan colp.  4/7/11:[place]--benign. Repeat pap 6 months. Reminder placed in epic.    11/22/11:Pap--ASCUS +(low risk) HPV type 54. Rpt pap in 1 yr. In , ep, prob list, hx updated. Reminder sent.  2/20/13:Pap--NIL. Pap 1 year. Reminder placed in EPIC  3/18/14: Pap - NIL, Neg HPV. Repeat pap 3 yrs.   6/7/2017 Pap: ASCUS, neg HPV. Plan to repeat Pap+HPV cotesting in 3 yrs (2020)  11/2/20 NIL Pap, Neg HPV. Plan cotest in 5 years.         DDD (degenerative disc disease), cervical 03/10/2011     Priority: Medium    Acne 03/10/2011     Priority: Medium    Attention deficit disorder of adult 09/22/2010     Priority: Medium    Allergic rhinitis 03/30/2007     Priority: Medium     Problem list name updated by automated process. Provider to review         Assessment:    Lianna Sorto is doing well on her current medication regiment.  She takes the Adderall 10 mg dose daily as needed for breakthrough ADHD symptoms and tells me she does not take it every day.  Vyvanse is working well to sustain her attention and organization throughout any given day.  Propranolol is helpful in managing anxiety that is partially related to being a single parent and financial stress.  She denies any suicide thinking..    Medication side effects and alternatives were reviewed. Health promotion activities recommended and reviewed today. All questions addressed. Education and counseling completed regarding risks and benefits of medications and psychotherapy options.    Treatment Plan:      1.  Continue Vyvanse 40 mg daily  2.  Continue Adderall 10 mg daily as  needed for breakthrough ADHD symptoms  3.  Continue Propranolol 20 mg twice daily    Continue all other medications as reviewed per electronic medical record today.   Safety plan reviewed. To the Emergency Department as needed or call after hours crisis line at [phone] or [phone]. [place] Crisis Text Line. Text [place] to 825164 or Suicide LifeLine Chat: suicidepreventionlifeline.org/chat/  To schedule individual or family therapy, call [facility]  at [phone]  Schedule an appointment with me in 3 months or sooner as needed. Call [facility] at [phone] to schedule.  Follow up with primary care provider as planned or for acute medical concerns.  Call the psychiatric nurse line with medication questions or concerns at [phone]  MyChart may be used to communicate with your provider, but this is not intended to be used for emergencies.    Crisis Resources:    National Suicide Prevention Lifeline: 207.777.1400 (TTY: 946.525.3197). Call anytime for help.  (www.suicidepreventionlifeline.org)  National [place] on Mental Illness (www.sina.org): 562.249.4941 or 848-070-1682.   Mental Health Association (www.mentalhealth.org): 290.195.9263 or 585-673-8415.  [place] Crisis Text Line: Text [place] to 683191  Suicide LifeLine Chat: suicideCortexicaline.org/chat    Administrative Billing:   Time spent with patient includes counseling and coordination of care regarding above diagnoses and treatment plan.    Patient Status:  This is a continuous care patient and medications will be prescribed by the psychiatric provider until further indicated.    Signed:   ELIANA Jeffrey-BC   Psychiatry

## 2023-12-18 NOTE — PATIENT INSTRUCTIONS
"Patient Education   The Panel Psychiatry Program  What to Expect  Here's what to expect in the Panel Psychiatry Program.   About the program  You'll be meeting with a psychiatric doctor to check your mental health. A psychiatric doctor helps you deal with troubling thoughts and feelings by giving you medicine. They'll make sure you know the plan for your care. You may see them for a long time. When you're feeling better, they may refer you back to seeing your family doctor.   If you have any questions, we'll be glad to talk to you.  About visits  Be open  At your visits, please talk openly about your problems. It may feel hard, but it's the best way for us to help you.  Cancelling visits  If you can't come to your visit, please call us right away at [phone]. If you don't cancel at least 24 hours (1 full day) before your visit, that's \"late cancellation.\"  Not showing up for your visits  Being very late is the same as not showing up. You'll be a \"no show\" if:  You're more than 15 minutes late for a 30-minute (half hour) visit.  You're more than 30 minutes late for a 60-minute (full hour) visit.  If you cancel late or don't show up 2 times within 6 months, we may end your care.  Getting help between visits  If you need help between visits, you can call us Monday to Friday from 8 a.m. to 4:30 p.m. at [phone].  Emergency care  Call 911 or go to the nearest emergency department if your life or someone else's life is in danger.  Call 988 anytime to reach the national Suicide and Crisis hotline.  Medicine refills  To refill your medicine, call your pharmacy. You can also call [facility] at [phone], Monday to Friday, 8 a.m. to 4:30 p.m. It can take 1 to 3 business days to get a refill.   Forms, letters, and tests  You may have papers to fill out, like FMLA, short-term disability, and workability. We can help you with these forms at your visits, but you must have an " appointment. You may need more than 1 visit for this, to be in an intensive therapy program, or both.  Before we can give you medicine for ADHD, we may refer you to get tested for it or confirm it another way.  We may not be able to give you an emotional support animal letter.  We don't do mental health checks ordered by the court.   We don't do mental health testing, but we can refer you to get tested.   Thank you for choosing us for your care.  For informational purposes only. Not to replace the advice of your health care provider. Copyright   2022 [facility] [employer]. All rights reserved. [place] [id number] - 12/22.    Treatment Plan:      1.  Continue Vyvanse 40 mg daily  2.  Continue Adderall 10 mg daily as  needed for breakthroughh ADHD symptoms  3.  Continue Propranolol 20 mg twice daily    Continue all other medications as reviewed per electronic medical record today.   Safety plan reviewed. To the Emergency Department as needed or call after hours crisis line at [phone] or [phone]. [place] Crisis Text Line. Text [place] to 522227 or Suicide LifeLine Chat: suicidepreventionlifeline.org/chat/  To schedule individual or family therapy, call [facility] at [phone]  Schedule an appointment with me in 3 months or sooner as needed. Call [facility] at [phone] to schedule.  Follow up with primary care provider as planned or for acute medical concerns.  Call the psychiatric nurse line with medication questions or concerns at [phone]  MyChart may be used to communicate with your provider, but this is not intended to be used for emergencies.    Crisis Resources:    National Suicide Prevention Lifeline: 572.465.7407 (TTY: 961.319.6059). Call anytime for help.  (www.suicidepreventionlifeline.org)  National [place] on Mental Illness (www.sina.org): 751.418.3686 or 575-169-8153.   Mental Health Association (www.mentalhealth.org): 996.765.7493 or  114-841-2260.  [place] Crisis Text Line: Text [place] to 190098  Suicide LifeLine Chat: suicidepreventionlifeline.org/chat

## 2024-01-28 ENCOUNTER — HEALTH MAINTENANCE LETTER (OUTPATIENT)
Age: 44
End: 2024-01-28

## 2024-01-29 ENCOUNTER — OFFICE VISIT (OUTPATIENT)
Dept: FAMILY MEDICINE | Facility: CLINIC | Age: 44
End: 2024-01-29
Payer: COMMERCIAL

## 2024-01-29 VITALS
OXYGEN SATURATION: 98 % | RESPIRATION RATE: 20 BRPM | HEIGHT: 66 IN | BODY MASS INDEX: 37.19 KG/M2 | WEIGHT: 231.4 LBS | DIASTOLIC BLOOD PRESSURE: 88 MMHG | SYSTOLIC BLOOD PRESSURE: 132 MMHG | TEMPERATURE: 98.2 F | HEART RATE: 84 BPM

## 2024-01-29 DIAGNOSIS — Z12.4 CERVICAL CANCER SCREENING: ICD-10-CM

## 2024-01-29 DIAGNOSIS — Z01.419 WELL FEMALE EXAM WITH ROUTINE GYNECOLOGICAL EXAM: Primary | ICD-10-CM

## 2024-01-29 DIAGNOSIS — E11.9 TYPE 2 DIABETES MELLITUS WITHOUT COMPLICATION, WITHOUT LONG-TERM CURRENT USE OF INSULIN (H): ICD-10-CM

## 2024-01-29 DIAGNOSIS — E78.5 HYPERLIPIDEMIA LDL GOAL <100: ICD-10-CM

## 2024-01-29 DIAGNOSIS — Z12.31 ENCOUNTER FOR SCREENING MAMMOGRAM FOR BREAST CANCER: ICD-10-CM

## 2024-01-29 DIAGNOSIS — Z30.41 ENCOUNTER FOR SURVEILLANCE OF CONTRACEPTIVE PILLS: ICD-10-CM

## 2024-01-29 DIAGNOSIS — L73.2 HIDRADENITIS SUPPURATIVA: ICD-10-CM

## 2024-01-29 LAB
ALT SERPL W P-5'-P-CCNC: 19 U/L (ref 0–50)
ANION GAP SERPL CALCULATED.3IONS-SCNC: 16 MMOL/L (ref 7–15)
BUN SERPL-MCNC: 15.1 MG/DL (ref 6–20)
CALCIUM SERPL-MCNC: 9.3 MG/DL (ref 8.6–10)
CHLORIDE SERPL-SCNC: 99 MMOL/L (ref 98–107)
CHOLEST SERPL-MCNC: 219 MG/DL
CREAT SERPL-MCNC: 0.58 MG/DL (ref 0.51–0.95)
CREAT UR-MCNC: 68.3 MG/DL
DEPRECATED HCO3 PLAS-SCNC: 20 MMOL/L (ref 22–29)
EGFRCR SERPLBLD CKD-EPI 2021: >90 ML/MIN/1.73M2
FASTING STATUS PATIENT QL REPORTED: YES
GLUCOSE SERPL-MCNC: 240 MG/DL (ref 70–99)
HBA1C MFR BLD: 10.7 % (ref 0–5.6)
HDLC SERPL-MCNC: 50 MG/DL
LDLC SERPL CALC-MCNC: 137 MG/DL
MICROALBUMIN UR-MCNC: <12 MG/L
MICROALBUMIN/CREAT UR: NORMAL MG/G{CREAT}
NONHDLC SERPL-MCNC: 169 MG/DL
POTASSIUM SERPL-SCNC: 4 MMOL/L (ref 3.4–5.3)
SODIUM SERPL-SCNC: 135 MMOL/L (ref 135–145)
TRIGL SERPL-MCNC: 159 MG/DL

## 2024-01-29 PROCEDURE — 84460 ALANINE AMINO (ALT) (SGPT): CPT | Performed by: NURSE PRACTITIONER

## 2024-01-29 PROCEDURE — 99214 OFFICE O/P EST MOD 30 MIN: CPT | Mod: 25 | Performed by: NURSE PRACTITIONER

## 2024-01-29 PROCEDURE — 87624 HPV HI-RISK TYP POOLED RSLT: CPT | Performed by: NURSE PRACTITIONER

## 2024-01-29 PROCEDURE — 80061 LIPID PANEL: CPT | Performed by: NURSE PRACTITIONER

## 2024-01-29 PROCEDURE — 80048 BASIC METABOLIC PNL TOTAL CA: CPT | Performed by: NURSE PRACTITIONER

## 2024-01-29 PROCEDURE — 83036 HEMOGLOBIN GLYCOSYLATED A1C: CPT | Performed by: NURSE PRACTITIONER

## 2024-01-29 PROCEDURE — 36415 COLL VENOUS BLD VENIPUNCTURE: CPT | Performed by: NURSE PRACTITIONER

## 2024-01-29 PROCEDURE — 99396 PREV VISIT EST AGE 40-64: CPT | Performed by: NURSE PRACTITIONER

## 2024-01-29 PROCEDURE — 82043 UR ALBUMIN QUANTITATIVE: CPT | Performed by: NURSE PRACTITIONER

## 2024-01-29 PROCEDURE — 82570 ASSAY OF URINE CREATININE: CPT | Performed by: NURSE PRACTITIONER

## 2024-01-29 PROCEDURE — G0145 SCR C/V CYTO,THINLAYER,RESCR: HCPCS | Performed by: NURSE PRACTITIONER

## 2024-01-29 RX ORDER — LANCETS
EACH MISCELLANEOUS
Qty: 100 EACH | Refills: 11 | Status: SHIPPED | OUTPATIENT
Start: 2024-01-29

## 2024-01-29 RX ORDER — CLINDAMYCIN PHOSPHATE 10 UG/ML
LOTION TOPICAL
Qty: 60 ML | Refills: 11 | Status: SHIPPED | OUTPATIENT
Start: 2024-01-29

## 2024-01-29 RX ORDER — ROSUVASTATIN CALCIUM 20 MG/1
20 TABLET, COATED ORAL DAILY
Qty: 90 TABLET | Refills: 3 | Status: SHIPPED | OUTPATIENT
Start: 2024-01-29

## 2024-01-29 RX ORDER — LEVONORGESTREL AND ETHINYL ESTRADIOL 0.15-0.03
1 KIT ORAL DAILY
Qty: 91 TABLET | Refills: 3 | Status: SHIPPED | OUTPATIENT
Start: 2024-01-29

## 2024-01-29 ASSESSMENT — ENCOUNTER SYMPTOMS
DYSURIA: 0
HEMATURIA: 0
NERVOUS/ANXIOUS: 0
BREAST MASS: 0
ARTHRALGIAS: 1
SHORTNESS OF BREATH: 0
HEARTBURN: 1
FREQUENCY: 1
EYE PAIN: 0
DIARRHEA: 0
DIZZINESS: 0
MYALGIAS: 1
SORE THROAT: 0
CHILLS: 0
HEMATOCHEZIA: 0
FEVER: 0
JOINT SWELLING: 1
PARESTHESIAS: 1
HEADACHES: 1
PALPITATIONS: 0
WEAKNESS: 1
ABDOMINAL PAIN: 0
NAUSEA: 1
COUGH: 0
CONSTIPATION: 0

## 2024-01-29 ASSESSMENT — PAIN SCALES - GENERAL: PAINLEVEL: NO PAIN (0)

## 2024-01-29 NOTE — PROGRESS NOTES
Subjective:    Answers submitted by the patient for this visit:  Annual Preventive Visit (Submitted on 1/29/2024)  Chief Complaint: Annual Exam:  Frequency of exercise:: None  Getting at least 3 servings of Calcium per day:: Yes  Diet:: Regular (no restrictions)  Taking medications regularly:: Yes  Medication side effects:: Muscle aches, Significant flushing, Lightheadedness  Bi-annual eye exam:: Yes  Dental care twice a year:: NO  Sleep apnea or symptoms of sleep apnea:: Daytime drowsiness  abdominal pain: No  Blood in stool: No  Blood in urine: No  chest pain: No  chills: No  congestion: No  constipation: No  cough: No  diarrhea: No  dizziness: No  ear pain: No  eye pain: No  nervous/anxious: No  fever: No  frequency: Yes  genital sores: No  headaches: Yes  hearing loss: Yes  heartburn: Yes  arthralgias: Yes  joint swelling: Yes  peripheral edema: Yes  mood changes: No  myalgias: Yes  nausea: Yes  dysuria: No  palpitations: No  Skin sensation changes: Yes  sore throat: No  urgency: No  rash: No  shortness of breath: No  visual disturbance: No  weakness: Yes  pelvic pain: No  vaginal bleeding: No  vaginal discharge: No  tenderness: No  breast mass: No  breast discharge: No  Additional concerns today:: No        HPI:    Lianna is a 43 year old female with a past medical history significant of ADD, fibromyalgia, hidradenitis suppurativa, type 2 diabetes, obesity, and IBS, presents today for an annual physical.     DM:  The patient stopped taking Metformin about a year ago due to muscle cramping. She checks her blood sugar about once a month and last time she checked her blood sugar was 220. In the last few months she has not experienced any episodes of hyperglycemia or hypoglycemia. About 2 years ago she experienced hot flashes and lightheaded after drinking alcohol, so she now abstains from drinking. She denies any vision changes and sees an eye doctor once a year. Endorses occasional increased urination, thirst,  "and hunger but states this is nothing new for her. Denies having sensation changes and denies any open sores or lesions on her feet.     IBS:  Experiences nausea most often in the morning that improves with eating food. Feels like this is well controlled with Tums.    Hidradenitis Suppurativa:  Currently uses Clindamycin 1% external lotion and would like for us to prescribe this medication so she does not need to see dermatology.     Fibromyalgia:  Patient endorses muscle and joint aches. States that she has difficulty with many medications because of the muscle cramping/aches. This being the reason she stopped taking the Metformin. Otherwise feels this is well controlled.     GYN:  Patient uses [facility] with no stated side effects. Gets her period once every 3 months and it lasts around 5-6 days. She denies having any new sexual partners. She has no concerns about her gynecological health. No changes to her breasts, no lumps, masses, or discharge noted.     ROS:  Gen: Denies any fevers, chills, fatigue.   HENT: Negative for congestion, ear pain, and sore throat. Positive for hearing loss.   EYES: No changes to vision or blurriness.   RESP: No shortness of breath or cough.  CV: No chest pain or palpitations. No peripheral edema.  GI: Positive for nausea. Denies any abdominal pain.   : Negative for dysuria, genital sores, hematuria, pelvic pain, urgency, vaginal bleeding, or vaginal discharge. Positive for frequency.  BREAST: Denies any masses, lumps, or discharge.  MSK: Positive for arthralgias, joint swelling, and myalgias.  SKIN: Negative for rash. Negative for open wounds on feet.   NEURO: Positive for headaches. Negative for dizziness.  PSYCH: Negative for feeling nervous/anxious.      Objective:  /88 (BP Location: Right arm, Patient Position: Sitting, Cuff Size: Adult Regular)   Pulse 84   Temp 98.2  F (36.8  C) (Tympanic)   Resp 20   Ht 1.676 m (5' 6\")   Wt 105 kg (231 lb 6.4 oz)   SpO2 98%   BMI " 37.35 kg/m        Physical Exam:  GEN: Alert and does not appear to be in acute distress.   HEENT: Normocephalic. PERRL. TM pearly gray with bony landmarks and light reflex present bilaterally. Nasal mucosa pink and moist with edema. No lymphadenopathy noted.   RESP: Lungs clear to auscultation without wheezes, rales, or rhonchi.   CV: Regular rate and rhythm. S1 and S2 present without murmur, gallop, or rub. No peripheral edema noted. 2+ DP and PT pulses bilaterally.  ABDOMEN: Bowel sounds active in all 4 quadrants. Soft abdomen with no masses or tenderness to palpation.   BREAST: Symmetrical without masses, lesions, swelling, or nipple discharge bilaterally. No palpable axillary lymph nodes or masses.    (female): normal female external genitalia. Vaginal mucosa pink and moist. Normal cervix/uterus without masses or discharge.   MS: Full range of motion. No gross abnormalities or deficits noted.   SKIN: No rashes, lesions, or open wounds.  NEURO: Alert and oriented to person, place, and time. Speech normal.   PSYCH: Positive mood and affect. Cooperative, pleasant, and appropriate.       Assessment and Plan:    (Z01.419) Well female exam with routine gynecological exam  (primary encounter diagnosis)  Plan:   - Pap Screen with HPV - recommended age 30 - 65 years    (Z12.4) Cervical cancer screening  Plan: Pap Screen with HPV - recommended age 30 - 65 years    (E11.9) Type 2 diabetes mellitus without complication, without long-term current use of insulin (H)  Plan:   - Labs: Hemoglobin A1C, lipid panel, albumin creatinine ratio, BMP  - Patient no longer taking Metformin due to muscle cramping.   - Discussed benefits of starting GLP-1 (Ozempic) pending A1C results and insurance approval  - Refill lancets and test strips  - Encouraged patient to check blood sugar on regular basis  - Encouraged patient to follow and exercise and diabetic diet plan    (L73.2) Hidradenitis suppurativa  Plan:   - clindamycin (CLINDAMAX)  1 % external lotion,     (Z30.41) Encounter for surveillance of contraceptive pills  Plan:   - Patient reports no side effects of medication, would like to continue taking   - levonorgestrel-ethinyl estradiol (SETLAKIN) 0.15-0.03 MG tablet    (Z12.31) Encounter for screening mammogram for breast cancer  Plan:   - MA Screening Digital Bilateral        Note by Anali Bernard, RN, DNP Student

## 2024-01-29 NOTE — PROGRESS NOTES
Preventive Care Visit  [facility]  DANA Amos CNP, Family Medicine  Jan 29, 2024       SUBJECTIVE:   Lianna is a 43 year old, presenting for the following:  Physical (Physical/pappe), Diabetes (Follow up , renew test strips, lancets - she is fasting for labs ), Refill Request (Patient is wondering if Clau will take over prescribing the Clindamycin lotion from [facility] as it is expensive to see them  and hard to get in there.), and Contraception (Renew meds)        1/29/2024     8:49 AM   Additional Questions   Roomed by Alphonso MARTEL CMA   Accompanied by self     Healthy Habits:     Getting at least 3 servings of Calcium per day:  Yes    Bi-annual eye exam:  Yes    Dental care twice a year:  NO    Sleep apnea or symptoms of sleep apnea:  Daytime drowsiness    Diet:  Regular (no restrictions)    Frequency of exercise:  None    Taking medications regularly:  Yes    Medication side effects:  Muscle aches, Significant flushing and Lightheadedness    Additional concerns today:  No    Answers submitted by the patient for this visit:  Annual Preventive Visit (Submitted on 1/29/2024)  Chief Complaint: Annual Exam:  Blood in stool: No  heartburn: Yes  peripheral edema: Yes  mood changes: No  Skin sensation changes: Yes  tenderness: No  breast mass: No  breast discharge: No      Medication Followup of clinda lotion for HS  Taking Medication as prescribed: yes  Side Effects:  None  Medication Helping Symptoms:  yes         Medication Followup of birth control   Taking Medication as prescribed: yes  Side Effects:  None  Medication Helping Symptoms:  yes      Diabetes Follow-up    How often are you checking your blood sugar? A few times a month - needs test strips and lancets  What time of day are you checking your blood sugars (select all that apply)?  Before and after meals  Have you had any blood sugars above 200?  Yes   Have you had any blood sugars below 70?  No  What symptoms do you notice  when your blood sugar is low?  Shaky, Dizzy, and Blurred vision  What concerns do you have today about your diabetes? Other: yes would like to try a med that helps with this and weight loss as well    Do you have any of these symptoms? (Select all that apply)  No numbness or tingling in feet.  No redness, sores or blisters on feet.  No complaints of excessive thirst.  No reports of blurry vision.  No significant changes to weight.  Have you had a diabetic eye exam in the last 12 months? No  Stopped metformin as it caused muscle aches.  No FH of thyroid cancer        BP Readings from Last 2 Encounters:   24 132/88   10/10/23 (!) 127/95     Hemoglobin A1C (%)   Date Value   2021 7.7 (H)   10/30/2020 6.9 (H)   2020 6.7 (H)     LDL Cholesterol Calculated (mg/dL)   Date Value   2021 121 (H)   10/30/2020 124 (H)   2012 115     Wt Readings from Last 4 Encounters:   24 105 kg (231 lb 6.4 oz)   23 106.2 kg (234 lb 1.9 oz)   10/10/23 105.2 kg (231 lb 14.8 oz)   23 105.2 kg (232 lb)           Have you ever done Advance Care Planning? (For example, a Health Directive, POLST, or a discussion with a medical provider or your loved ones about your wishes): No, advance care planning information given to patient to review.  Patient plans to discuss their wishes with loved ones or provider.      Social History     Tobacco Use    Smoking status: Former     Packs/day: 0.50     Years: 10.00     Additional pack years: 0.00     Total pack years: 5.00     Types: Cigarettes     Quit date: 9/15/2021     Years since quittin.3    Smokeless tobacco: Never    Tobacco comments:     23 half a pack a week     about a pack a week   Substance Use Topics    Alcohol use: Not Currently     Comment: Avg. twice per month             2024     8:43 AM   Alcohol Use   Prescreen: >3 drinks/day or >7 drinks/week? Not Applicable     Reviewed orders with patient.  Reviewed health maintenance and  "updated orders accordingly - Yes      Breast Cancer Screenin/29/2024     8:45 AM   Breast CA Risk Assessment (FHS-7)   Do you have a family history of breast, colon, or ovarian cancer? No / Unknown           Pertinent mammograms are reviewed under the imaging tab.    History of abnormal Pap smear: YES - updated in Problem List and Health Maintenance accordingly. ASCUS in 2017      Latest Ref Rng & Units 2020    10:49 AM 2020    10:40 AM 2017    11:55 AM   PAP / HPV   PAP (Historical)  NIL      HPV 16 DNA NEG^Negative  Negative  Negative    HPV 18 DNA NEG^Negative  Negative  Negative    Other HR HPV NEG^Negative  Negative  Negative      Reviewed and updated as needed this visit by clinical staff   Tobacco  Allergies  Meds              Reviewed and updated as needed this visit by Provider                    Review of Systems   Constitutional:  Negative for chills and fever.   HENT:  Positive for hearing loss. Negative for congestion, ear pain and sore throat.    Eyes:  Negative for pain and visual disturbance.   Respiratory:  Negative for cough and shortness of breath.    Cardiovascular:  Negative for chest pain and palpitations.   Gastrointestinal:  Positive for nausea. Negative for abdominal pain, constipation and diarrhea.   Genitourinary:  Positive for frequency. Negative for dysuria, genital sores, hematuria, pelvic pain, urgency, vaginal bleeding and vaginal discharge.   Musculoskeletal:  Positive for arthralgias, joint swelling and myalgias.   Skin:  Negative for rash.   Neurological:  Positive for weakness and headaches. Negative for dizziness.   Psychiatric/Behavioral:  The patient is not nervous/anxious.          OBJECTIVE:   /88 (BP Location: Right arm, Patient Position: Sitting, Cuff Size: Adult Regular)   Pulse 84   Temp 98.2  F (36.8  C) (Tympanic)   Resp 20   Ht 1.676 m (5' 6\")   Wt 105 kg (231 lb 6.4 oz)   SpO2 98%   BMI 37.35 kg/m     Estimated body mass index " "is 37.35 kg/m  as calculated from the following:    Height as of this encounter: 1.676 m (5' 6\").    Weight as of this encounter: 105 kg (231 lb 6.4 oz).  Physical Exam  GENERAL: alert and no distress  EYES: Eyes grossly normal to inspection, PERRL and conjunctivae and sclerae normal  HENT: ear canals and TM's normal, nose and mouth without ulcers or lesions  NECK: no adenopathy, no asymmetry, masses, or scars  RESP: lungs clear to auscultation - no rales, rhonchi or wheezes  BREAST: normal without masses, tenderness or nipple discharge and no palpable axillary masses or adenopathy  CV: regular rate and rhythm, normal S1 S2, no S3 or S4, no murmur, click or rub, no peripheral edema  ABDOMEN: soft, nontender, no hepatosplenomegaly, no masses and bowel sounds normal   (female): normal female external genitalia, normal urethral meatus, vaginal mucosa pink, moist, well rugated, and normal cervix/adnexa/uterus without masses or discharge  MS: no gross musculoskeletal defects noted, no edema  SKIN: no suspicious lesions or rashes  NEURO: Normal strength and tone, mentation intact and speech normal  PSYCH: mentation appears normal, affect normal/bright  LYMPH: no cervical, supraclavicular, axillary, or inguinal adenopathy  Diabetic foot exam: normal DP and PT pulses, no trophic changes or ulcerative lesions, normal sensory exam, and normal monofilament exam    Diagnostic Test Results:  Results for orders placed or performed in visit on 01/29/24 (from the past 24 hour(s))   Basic metabolic panel  (Ca, Cl, CO2, Creat, Gluc, K, Na, BUN)   Result Value Ref Range    Sodium 135 135 - 145 mmol/L    Potassium 4.0 3.4 - 5.3 mmol/L    Chloride 99 98 - 107 mmol/L    Carbon Dioxide (CO2) 20 (L) 22 - 29 mmol/L    Anion Gap 16 (H) 7 - 15 mmol/L    Urea Nitrogen 15.1 6.0 - 20.0 mg/dL    Creatinine 0.58 0.51 - 0.95 mg/dL    GFR Estimate >90 >60 mL/min/1.73m2    Calcium 9.3 8.6 - 10.0 mg/dL    Glucose 240 (H) 70 - 99 mg/dL   Albumin " Random Urine Quantitative with Creat Ratio   Result Value Ref Range    Creatinine Urine mg/dL 68.3 mg/dL    Albumin Urine mg/L <12.0 mg/L    Albumin Urine mg/g Cr     Lipid panel reflex to direct LDL Non-fasting   Result Value Ref Range    Cholesterol 219 (H) <200 mg/dL    Triglycerides 159 (H) <150 mg/dL    Direct Measure HDL 50 >=50 mg/dL    LDL Cholesterol Calculated 137 (H) <=100 mg/dL    Non HDL Cholesterol 169 (H) <130 mg/dL    Patient Fasting > 8hrs? Yes     Narrative    Cholesterol  Desirable:  <200 mg/dL    Triglycerides  Normal:  Less than 150 mg/dL  Borderline High:  150-199 mg/dL  High:  200-499 mg/dL  Very High:  Greater than or equal to 500 mg/dL    Direct Measure HDL  Female:  Greater than or equal to 50 mg/dL   Male:  Greater than or equal to 40 mg/dL    LDL Cholesterol  Desirable:  <100mg/dL  Above Desirable:  100-129 mg/dL   Borderline High:  130-159 mg/dL   High:  160-189 mg/dL   Very High:  >= 190 mg/dL    Non HDL Cholesterol  Desirable:  130 mg/dL  Above Desirable:  130-159 mg/dL  Borderline High:  160-189 mg/dL  High:  190-219 mg/dL  Very High:  Greater than or equal to 220 mg/dL   HEMOGLOBIN A1C   Result Value Ref Range    Hemoglobin A1C 10.7 (H) 0.0 - 5.6 %       ASSESSMENT/PLAN:   Well female exam with routine gynecological exam      Type 2 diabetes mellitus without complication, without long-term current use of insulin (H)  Poorly controlled off medications.  Medication options discussed.  Recommend starting semaglutide.  Follow-up in 3 months for A1c recheck.  Recommend diabetes education and referral placed  - HEMOGLOBIN A1C; Future  - Lipid panel reflex to direct LDL Non-fasting; Future  - Albumin Random Urine Quantitative with Creat Ratio; Future  - thin (NO BRAND SPECIFIED) lancets; Use with lanceting device. To accompany: Blood Glucose Monitor Brands: per insurance.  - blood glucose (NO BRAND SPECIFIED) test strip; Use to test blood sugar 2 times daily or as directed. To accompany:  "Blood Glucose Monitor Brands: per insurance.  - Basic metabolic panel  (Ca, Cl, CO2, Creat, Gluc, K, Na, BUN); Future  - OFFICE/OUTPT VISIT,EST,LEVL IV  - Basic metabolic panel  (Ca, Cl, CO2, Creat, Gluc, K, Na, BUN)  - Albumin Random Urine Quantitative with Creat Ratio  - Lipid panel reflex to direct LDL Non-fasting  - HEMOGLOBIN A1C  - semaglutide (OZEMPIC) 2 MG/3ML pen; Inject 0.25 mg Subcutaneous every 7 days for 28 days, THEN 0.5 mg every 7 days for 56 days.  - rosuvastatin (CRESTOR) 20 MG tablet; Take 1 tablet (20 mg) by mouth daily    Hyperlipidemia LDL goal <100  New diagnosis.  Recommend starting rosuvastatin 20 mg daily.  Return to lab in 6 weeks for lipid panel and ALT recheck.  - OFFICE/OUTPT VISIT,EST,LEVL IV  - rosuvastatin (CRESTOR) 20 MG tablet; Take 1 tablet (20 mg) by mouth daily    Hidradenitis suppurativa  Well-controlled  - clindamycin (CLINDAMAX) 1 % external lotion; Apply to affected areas twice daily  - OFFICE/OUTPT VISIT,EST,LEVL IV    Cervical cancer screening  - Pap Screen with HPV - recommended age 30 - 65 years    Encounter for surveillance of contraceptive pills  - levonorgestrel-ethinyl estradiol (SETLAKIN) 0.15-0.03 MG tablet; Take 1 tablet by mouth daily    Encounter for screening mammogram for breast cancer  - MA Screening Digital Bilateral; Future    Patient has been advised of split billing requirements and indicates understanding: Yes      Counseling  Reviewed preventive health counseling, as reflected in patient instructions      BMI  Estimated body mass index is 37.35 kg/m  as calculated from the following:    Height as of this encounter: 1.676 m (5' 6\").    Weight as of this encounter: 105 kg (231 lb 6.4 oz).   Weight management plan: Discussed healthy diet and exercise guidelines      She reports that she quit smoking about 2 years ago. Her smoking use included cigarettes. She has a 5 pack-year smoking history. She has never used smokeless tobacco.      The risks, benefits " and treatment options of prescribed medications or other treatments have been discussed with the patient. The patient verbalized their understanding and should call or follow up if no improvement or if they develop further problems.    Signed Electronically by: DANA Amos CNP

## 2024-01-29 NOTE — COMMUNITY RESOURCES LIST (ENGLISH)
01/29/2024   [facility]  N/A  For questions about this resource list or additional care needs, please contact your primary care clinic or care manager.  Phone: [phone]   Email: N/A   Address: [address] [place] [address]   Hours: N/A        Food and Nutrition       Food pantry  1  [facility] - Food Pantry Distance: 1.76 miles      Pickup   [address] [place] [address]  Language: English  Hours: Mon 9:00 AM - 11:00 AM , Wed 3:00 PM - 6:00 PM , Fri 9:00 AM - 10:00 AM  Fees: Free   Phone: (975) 411-7275 Email: [email or web address]@[employer].com Website: [email or web address]/food-pantry/     2  [employer] [facility] [place] Distance: 3.8 miles      In-Person, Pickup   [address] [place] [address]  Language: English  Hours: Wed 2:00 PM - 5:00 PM , Sat 11:00 AM - 1:00 PM  Fees: Free   Phone: (978) 897-4207 Email: [employer].io Website: http://[employer].org/[employer]-Strong[employer]/     SNAP application assistance  3  [facility] [place] Family Investment Program (MFIP) - [place] Family Investment Program (MFIP) - SNAP application assistance Distance: 7.81 miles      In-Person, Phone/Virtual   [address] [address]  Language: English  Hours: Mon - Fri 8:00 AM - 4:30 PM  Fees: Free   Phone: (999) 541-1831 Email: imgeneralquestions@[facility].gov Website: https://www.[facility]./499/MFIP-Biennial-Service-Agreement-VCA-Plan     4  [facility] Distance: 9.98 miles      In-Person, Phone/Virtual   [address] [facility] [address] [place] [address]  Language: English, Croatian  Hours: Mon - Fri 8:30 AM - 4:30 PM  Fees: Free   Phone: (868) 120-3178 Email: arthur@[employer].wi. Website: http://www.[facility].org/          Important Numbers & Websites       Emergency Services   [address] [place] [address]    113  Poison Control   (954) 706-4523  Suicide Prevention Lifeline   (367) 444-4552 (TALK)  Child Abuse Hotline   (349) 249-4093 (4-A-Child)  Sexual Assault Hotline   (549) 297-9135 (HOPE)  National Runaway Safeline   (374) 660-8589 (RUNAWAY)  All-Options Talkline   (430) 895-3695  Substance Abuse Referral   (864) 785-3353 (HELP)

## 2024-01-31 LAB
BKR LAB AP GYN ADEQUACY: NORMAL
BKR LAB AP GYN INTERPRETATION: NORMAL
BKR LAB AP HPV REFLEX: NORMAL
BKR LAB AP PREVIOUS ABNORMAL: NORMAL
PATH REPORT.COMMENTS IMP SPEC: NORMAL
PATH REPORT.COMMENTS IMP SPEC: NORMAL
PATH REPORT.RELEVANT HX SPEC: NORMAL

## 2024-02-02 LAB
HUMAN PAPILLOMA VIRUS 16 DNA: NEGATIVE
HUMAN PAPILLOMA VIRUS 18 DNA: NEGATIVE
HUMAN PAPILLOMA VIRUS FINAL DIAGNOSIS: NORMAL
HUMAN PAPILLOMA VIRUS OTHER HR: NEGATIVE

## 2024-02-06 NOTE — TELEPHONE ENCOUNTER
Date of Last Office Visit: 3/7/22  Date of Next Office Visit: None - routing to scheduling  No shows since last visit: 0  Cancellations since last visit: 0    Medication requested: lisdexamfetamine (VYVANSE) 60 MG capsule Date last ordered:3/7/22 Qty: 30 Refills: 0     [place] of [place] ?: yes  Medication last filled date: 3/23/22 Qty filled: 30  Other controlled substance on [place] ?: NO      Lapse in medication adherence greater than 5 days?: YES  If yes, call patient and gather details: patient will run out 2/24/22   Medication refill request verified as identical to current order?: yes  Result of Last DAM, VPA, Li+ Level, CBC, or Carbamazepine Level (at or since last visit): NA    Last visit treatment plan:     1.  Continue Vyvanse 60 mg aily    2.  Continue Wellbutrin  mg daily         Continue all other medications as reviewed per electronic medical record today.     Safety plan reviewed. To the Emergency Department as needed or call after hours crisis line at [phone] or [phone]. [place] Crisis Text Line. Text [place] to 338589 or Suicide LifeLine Chat: suicidepreventionlifeline.org/chat/    To schedule individual or family therapy, call [facility] at [phone].    Schedule an appointment with me in 3 months or sooner as needed. Call [facility] at [phone] to schedule      []Medication refilled per  Medication Refill in Ambulatory Care  policy.  [x]Medication unable to be refilled by RN due to criteria not met as indicated below:    []Eligibility - not seen in the last year   [x]Supervision - no future appointment   []Compliance - no shows, cancellations or lapse in therapy   []Verification - order discrepancy   [x]Controlled medication   [x]Medication not included in policy   []90-day supply request   []Other     +HLD on meds

## 2024-02-20 ENCOUNTER — MYC MEDICAL ADVICE (OUTPATIENT)
Dept: FAMILY MEDICINE | Facility: CLINIC | Age: 44
End: 2024-02-20
Payer: COMMERCIAL

## 2024-02-20 DIAGNOSIS — E11.65 TYPE 2 DIABETES MELLITUS WITH HYPERGLYCEMIA, WITHOUT LONG-TERM CURRENT USE OF INSULIN (H): Primary | ICD-10-CM

## 2024-02-20 RX ORDER — GLIPIZIDE 5 MG/1
5 TABLET, FILM COATED, EXTENDED RELEASE ORAL DAILY
Qty: 30 TABLET | Refills: 0 | Status: SHIPPED | OUTPATIENT
Start: 2024-02-20 | End: 2024-03-14

## 2024-03-05 ENCOUNTER — TELEPHONE (OUTPATIENT)
Dept: FAMILY MEDICINE | Facility: CLINIC | Age: 44
End: 2024-03-05

## 2024-03-05 NOTE — TELEPHONE ENCOUNTER
Prior Authorization required on Ozempic 0.25 or 0.5mg/Dose  Insurance Phone: [phone]  Patient ID: [id number]  Please contact the pharmacy with Prior Auth status (approved/denied).    Thanks,  Moose Acosta   [facility]  [phone]

## 2024-03-12 ENCOUNTER — TELEPHONE (OUTPATIENT)
Dept: FAMILY MEDICINE | Facility: CLINIC | Age: 44
End: 2024-03-12

## 2024-03-12 DIAGNOSIS — E11.9 TYPE 2 DIABETES MELLITUS WITHOUT COMPLICATION, WITHOUT LONG-TERM CURRENT USE OF INSULIN (H): ICD-10-CM

## 2024-03-12 NOTE — TELEPHONE ENCOUNTER
Prior Authorization required on Accu Chek Guide Test Strips  Insurance Phone [phone]  Patient ID [id number]  Please contact the pharmacy with Prior Auth status (approved/denied)    Thank you  Michelle Osuna [facility]  [phone]

## 2024-03-13 DIAGNOSIS — E11.65 TYPE 2 DIABETES MELLITUS WITH HYPERGLYCEMIA, WITHOUT LONG-TERM CURRENT USE OF INSULIN (H): ICD-10-CM

## 2024-03-14 RX ORDER — GLIPIZIDE 5 MG/1
5 TABLET, FILM COATED, EXTENDED RELEASE ORAL DAILY
Qty: 30 TABLET | Refills: 1 | Status: SHIPPED | OUTPATIENT
Start: 2024-03-14 | End: 2024-05-01

## 2024-03-18 NOTE — TELEPHONE ENCOUNTER
Central Prior Authorization Team   Phone: [phone]    [place] Initiation    Medication: semaglutide (OZEMPIC) 2 MG/3ML pen   Insurance Company: [employer] - Phone [phone] Fax [phone]  Pharmacy Filling the Rx: [facility] [place], [place] - [address] [facility]  Filling Pharmacy Phone: [phone]  Filling Pharmacy Fax:    Start Date: 3/18/2024

## 2024-03-18 NOTE — TELEPHONE ENCOUNTER
Prior Authorization Approval          Authorization Effective Date: 3/18/2024  Authorization Expiration Date: 3/18/2025  Medication: semaglutide (OZEMPIC) 2 MG/3ML pen -[place] APPROVED   Approved Dose/Quantity: MAX DAILY DOSE OF 0.11   Reference #:     Insurance Company: [employer] - Phone [phone] Fax [phone]  Expected CoPay:       CoPay Card Available:      Foundation Assistance Needed:    Which Pharmacy is filling the prescription (Not needed for infusion/clinic administered): [facility] [place] [address] [place] [address]  Pharmacy Notified:  Yes  Patient Notified:  No

## 2024-03-24 NOTE — TELEPHONE ENCOUNTER
Central Prior Authorization Team   Phone: [phone]    [place] Initiation    Medication: Prior auth needed on Accu Chek Guide Strip  Insurance Company: [employer] - Phone [phone] Fax [phone]  Pharmacy Filling the Rx: [facility] [place], [place] - [facility]  Filling Pharmacy Phone: [phone]  Filling Pharmacy Fax:    Start Date: 3/24/2024

## 2024-03-25 DIAGNOSIS — F90.0 ADHD (ATTENTION DEFICIT HYPERACTIVITY DISORDER), INATTENTIVE TYPE: Primary | ICD-10-CM

## 2024-03-25 DIAGNOSIS — F90.0 ADHD (ATTENTION DEFICIT HYPERACTIVITY DISORDER), INATTENTIVE TYPE: ICD-10-CM

## 2024-03-25 RX ORDER — LISDEXAMFETAMINE DIMESYLATE 40 MG/1
40 CAPSULE ORAL EVERY MORNING
Qty: 30 CAPSULE | Refills: 0 | Status: SHIPPED | OUTPATIENT
Start: 2024-03-25 | End: 2024-04-25

## 2024-03-25 RX ORDER — LISDEXAMFETAMINE DIMESYLATE 40 MG/1
40 CAPSULE ORAL EVERY MORNING
Qty: 30 CAPSULE | Refills: 0 | OUTPATIENT
Start: 2024-03-25

## 2024-03-25 NOTE — TELEPHONE ENCOUNTER
Pt is requesting new rx for vyvanse 40mg. She would prefer brand name as generic has been causing her some neck pain. Can you try writing it with ARIELLE 1 to see if insurance will accept that? Also the generic is on a backorder again.    Thank you,    Shruti Walker, [facility]  [place], [place]

## 2024-03-28 NOTE — TELEPHONE ENCOUNTER
Prior Authorization Approval    Authorization Effective Date: 3/27/2024  Authorization Expiration Date:  as LONG as PATIENT IS ENROLLED ON CURRENT HEALTH PLAN (MEDIMPACT/CLEAR SCRIPTS)  Medication: Prior auth needed on Accu Chek Guide Strip-[place] APPROVED   Approved Dose/Quantity:   Reference #:     Insurance Company: [employer] - Phone [phone] Fax [phone]  Expected CoPay:       CoPay Card Available:      Foundation Assistance Needed:    Which Pharmacy is filling the prescription (Not needed for infusion/clinic administered): [facility] [place], [place] - [address] [place] [address]  Pharmacy Notified:  Yes  Patient Notified:  No

## 2024-04-07 ENCOUNTER — HEALTH MAINTENANCE LETTER (OUTPATIENT)
Age: 44
End: 2024-04-07

## 2024-04-08 ENCOUNTER — TELEPHONE (OUTPATIENT)
Dept: FAMILY MEDICINE | Facility: CLINIC | Age: 44
End: 2024-04-08
Payer: COMMERCIAL

## 2024-04-08 DIAGNOSIS — E78.5 HYPERLIPIDEMIA LDL GOAL <100: ICD-10-CM

## 2024-04-08 DIAGNOSIS — E11.65 TYPE 2 DIABETES MELLITUS WITH HYPERGLYCEMIA, WITHOUT LONG-TERM CURRENT USE OF INSULIN (H): Primary | ICD-10-CM

## 2024-04-08 NOTE — LETTER
April 16, 2024      Lianna Sorto  [address] [facility] [address]        Dear Lianna,     Your team at [facility] cares about your health. We have reviewed your chart and based on our findings; we are making the following recommendations to better manage your health.     You are in particular need of attention regarding the following:     Schedule a DIABETIC FOLLOW UP appointment for Office Visit  And fasting lab. Patients with diabetes should see their provider regularly.  Schedule Annual MAMMOGRAPHY. [facility] scheduling number is [phone] or schedule in [facility] (self referral).    If you have already completed these items, please contact the clinic via phone or   [employer] so your care team can review and update your records. Thank you for   choosing [facility] for your healthcare needs. For any questions,   concerns, or to schedule an appointment please contact our clinic.    Healthy Regards,      Your [facility] Care Team

## 2024-04-08 NOTE — TELEPHONE ENCOUNTER
Patient Quality Outreach    Patient is due for the following:   Diabetes -  Diabetic Follow-Up Visit and labs   Breast Cancer Screening - Mammogram    Next Steps:   Schedule a office visit for Diabetes     Type of outreach:    Sent [employer] message. Will postpone a few days to see if patient viewed      Questions for provider review:    None           Alphonso Ruiz, CMA

## 2024-04-08 NOTE — TELEPHONE ENCOUNTER
Patient is due for a diabetes follow up appointment with me.      Fasting labs due:  Lipid panel and A1C    Orders were placed, please have lab work done before visit.      Please ask patient to bring in their glucometer so we can download the data.    Please call patient to schedule.    Patient also due for:    Mammogram    Clau Dukes, CNP

## 2024-04-25 DIAGNOSIS — F90.0 ADHD (ATTENTION DEFICIT HYPERACTIVITY DISORDER), INATTENTIVE TYPE: ICD-10-CM

## 2024-04-25 NOTE — TELEPHONE ENCOUNTER
Date of Last Office Visit: 12/18/2023  Date of Next Office Visit: 4/29/2024  No shows since last visit: 0  Cancellations since last visit: 1    Medication requested: VYVANSE 40 MG capsule Date last ordered: 3/25/2024 Qty: 30 Refills: 0     [place] of [place] ?: Yes  Medication last sold date: 3/27/2024 Qty filled: 30  Other [place] on [place] ?: [place]    Lapse in medication adherence greater than 5 days?: No  Medication refill request verified as identical to current order?: Yes  Result of Last DAM, VPA, Li+ Level, CBC, or Carbamazepine Level (at or since last visit): N/A    Last visit treatment plan:     1.  Continue Vyvanse 40 mg daily  2.  Continue Adderall 10 mg daily as needed for breakthrough ADHD symptoms  3.  Continue Propranolol 20 mg twice daily    Continue all other medications as reviewed per electronic medical record today.   Safety plan reviewed. To the Emergency Department as needed or call after hours crisis line at [phone] or [phone]. [place] Crisis Text Line. Text [place] to 329518 or Suicide LifeLine Chat: suicidepreventionlifeline.org/chat/  To schedule individual or family therapy, call [facility] at [phone]  Schedule an appointment with me in 3 months or sooner as needed. Call [facility] at [phone] to schedule.  Follow up with primary care provider as planned or for acute medical concerns.  Call the psychiatric nurse line with medication questions or concerns at [phone]  MyChart may be used to communicate with your provider, but this is not intended to be used for emergencies.    []Medication refilled per  Medication Refill in Ambulatory Care  policy.  [x]Medication unable to be refilled by RN due to criteria not met as indicated below:    []Eligibility - not seen in the last year   []Supervision - no future appointment   []Compliance - no shows, cancellations or lapse in therapy   []Verification - order discrepancy   [x]Controlled  medication   []Medication not included in policy   []90-day supply request   []Other

## 2024-04-26 RX ORDER — LISDEXAMFETAMINE DIMESYLATE 40 MG/1
40 CAPSULE ORAL EVERY MORNING
Qty: 30 CAPSULE | Refills: 0 | Status: SHIPPED | OUTPATIENT
Start: 2024-04-26 | End: 2024-04-29

## 2024-04-29 ENCOUNTER — VIRTUAL VISIT (OUTPATIENT)
Dept: PSYCHIATRY | Facility: CLINIC | Age: 44
End: 2024-04-29
Payer: COMMERCIAL

## 2024-04-29 DIAGNOSIS — F33.0 MILD RECURRENT MAJOR DEPRESSION (H): Primary | ICD-10-CM

## 2024-04-29 DIAGNOSIS — F41.1 GAD (GENERALIZED ANXIETY DISORDER): ICD-10-CM

## 2024-04-29 DIAGNOSIS — F90.0 ADHD (ATTENTION DEFICIT HYPERACTIVITY DISORDER), INATTENTIVE TYPE: ICD-10-CM

## 2024-04-29 PROCEDURE — 99443 PR PHYSICIAN TELEPHONE EVALUATION 21-30 MIN: CPT | Mod: 93 | Performed by: NURSE PRACTITIONER

## 2024-04-29 RX ORDER — LISDEXAMFETAMINE DIMESYLATE 40 MG/1
40 CAPSULE ORAL EVERY MORNING
Qty: 30 CAPSULE | Refills: 0 | Status: SHIPPED | OUTPATIENT
Start: 2024-05-24 | End: 2024-06-24

## 2024-04-29 RX ORDER — PROPRANOLOL HYDROCHLORIDE 20 MG/1
20 TABLET ORAL 2 TIMES DAILY
Qty: 60 TABLET | Refills: 3 | Status: SHIPPED | OUTPATIENT
Start: 2024-04-29 | End: 2024-09-18

## 2024-04-29 RX ORDER — DEXTROAMPHETAMINE SACCHARATE, AMPHETAMINE ASPARTATE, DEXTROAMPHETAMINE SULFATE AND AMPHETAMINE SULFATE 2.5; 2.5; 2.5; 2.5 MG/1; MG/1; MG/1; MG/1
10 TABLET ORAL DAILY PRN
Qty: 30 TABLET | Refills: 0 | Status: SHIPPED | OUTPATIENT
Start: 2024-04-29

## 2024-04-29 ASSESSMENT — PATIENT HEALTH QUESTIONNAIRE - PHQ9
10. IF YOU CHECKED OFF ANY PROBLEMS, HOW DIFFICULT HAVE THESE PROBLEMS MADE IT FOR YOU TO DO YOUR WORK, TAKE CARE OF THINGS AT HOME, OR GET ALONG WITH OTHER PEOPLE: SOMEWHAT DIFFICULT
SUM OF ALL RESPONSES TO PHQ QUESTIONS 1-9: 8
SUM OF ALL RESPONSES TO PHQ QUESTIONS 1-9: 8

## 2024-04-29 NOTE — NURSING NOTE
Is the patient currently in the state of [place]? YES    Visit mode:TELEPHONE    If the visit is dropped, the patient can be reconnected by: TELEPHONE VISIT: Phone number: [phone]    Will anyone else be joining the visit? No  (If patient encounters technical issues they should call [phone])    How would you like to obtain your AVS? MyChart    Are changes needed to the allergy or medication list? No    Are refills needed on medications prescribed by this physician? YES    Rooming Documentation: Assigned questionnaire(s) completed .    Reason for visit: CIELO Mccullough

## 2024-04-29 NOTE — PATIENT INSTRUCTIONS
"Patient Education   The Panel Psychiatry Program  What to Expect  Here's what to expect in the Panel Psychiatry Program.   About the program  You'll be meeting with a psychiatric doctor to check your mental health. A psychiatric doctor helps you deal with troubling thoughts and feelings by giving you medicine. They'll make sure you know the plan for your care. You may see them for a long time. When you're feeling better, they may refer you back to seeing your family doctor.   If you have any questions, we'll be glad to talk to you.  About visits  Be open  At your visits, please talk openly about your problems. It may feel hard, but it's the best way for us to help you.  Cancelling visits  If you can't come to your visit, please call us right away at [phone]. If you don't cancel at least 24 hours (1 full day) before your visit, that's \"late cancellation.\"  Not showing up for your visits  Being very late is the same as not showing up. You'll be a \"no show\" if:  You're more than 15 minutes late for a 30-minute (half hour) visit.  You're more than 30 minutes late for a 60-minute (full hour) visit.  If you cancel late or don't show up 2 times within 6 months, we may end your care.  Getting help between visits  If you need help between visits, you can call us Monday to Friday from 8 a.m. to 4:30 p.m. at [phone].  Emergency care  Call 911 or go to the nearest emergency department if your life or someone else's life is in danger.  Call 988 anytime to reach the national Suicide and Crisis hotline.  Medicine refills  To refill your medicine, call your pharmacy. You can also call [facility]'s Behavioral Access at [phone], Monday to Friday, 8 a.m. to 4:30 p.m. It can take 1 to 3 business days to get a refill.   Forms, letters, and tests  You may have papers to fill out, like FMLA, short-term disability, and workability. We can help you with these forms at your visits, but you must have an " appointment. You may need more than 1 visit for this, to be in an intensive therapy program, or both.  Before we can give you medicine for ADHD, we may refer you to get tested for it or confirm it another way.  We may not be able to give you an emotional support animal letter.  We don't do mental health checks ordered by the court.   We don't do mental health testing, but we can refer you to get tested.   Thank you for choosing us for your care.  For informational purposes only. Not to replace the advice of your health care provider. Copyright   2022 [facility]. All rights reserved. [employer] 661074 - 12/22.       Treatment Plan:      1.  Continue Adderall 10 mg daily as needed for breakthrough attention deficit  2.  Continue Vyvanse 40 mg daily-first try to feel brand name before generic version  3.  Continue propranolol 20 mg 2 times daily    Continue all other medical directions per primary care provider.   Continue all other medications as reviewed per electronic medical record today.   Safety plan reviewed. To the Emergency Department as needed or call after hours crisis line at [phone] or [phone]. [place] Crisis Text [place]: Text [place] to 438981  or  Suicide LifeLine Chat: suicidepreventionlifeline.org/chat/  To schedule individual or family therapy, call [facility] at [phone].   Schedule an appointment with me in 3 months or sooner as needed.  Call [facility] at [phone] to schedule.  Follow up with primary care provider as planned or for acute medical concerns.  Call the psychiatric nurse line with medication questions or concerns at [phone].  [employer] may be used to communicate with your provider, but this is not intended to be used for emergencies.        Crisis Resources   The EmPath is an adults only unit located at [facility] in [place] is a short term (generally less than 23 hour stay) designed for crisis intervention and  stabilization. Pts have the opportunity to meet quickly with a behavioral health team for evaluation in a calm and peaceful therapuetic environment. To be evaluated for admission pts are triaged throught the [facility] ED.      The following hotlines are for both adults and children. The and are open 24 hours a day, 7 days a week unless noted otherwise.        Crisis Lines      Crisis Text Line  Text 350163  You will be connected with a trained live crisis counselor to provide support.        Gambling Hotline  0.337.465.1981 [hope]        línea de crisis española  [phone]        [facility] [employer] Helpline  [phone]        National Hope Line  5.381.705.5507 [hope]        National Suicide Prevention Lifeline  Free and confidential support  988 or 1.446.378.TALK [8255]  http://suicidepreventionlifeline.org        The Benigno Project (LGBTQ Youth Crisis Line)  3.701.547.2991  text START to 067-354        [facility] Crisis Line  4.040.212.1990 (Press 1)  or text 561806    [facility] Mental Health Crisis Response  Within [place], call **CRISIS [**370310] to be connected to a mental health professional who can assist you.        [facility] Crisis  [phone]      [facility] Mobile Crisis  [phone]      [facility] Crisis  [phone]      [facility] Mobile Crisis  [phone] (adults)  [phone] (children)      [facility] Mobile Crisis  [phone] (adults)  [phone] (children)      [facility] Mobile Crisis  [phone]      [facility] Mobile Crisis  [phone]    Community Resources      Fast Tracker  Linking people to mental health and substance use disorder resources  fasttrackermn.org        [place] Mental Health Warmline  Peer to peer support  5 pm to 9 am 7 days/week  1.414.253.7251  https://mnwitw.org/frank        National [place] on Mental Illness (MAGALI)  356.412.2089 or 1.888.MAGALI.HELPS  https://namimn.org/        [place]  [facility] for Adult Mental [facility]  [facility] residents only   [address]  [phone]        [facility]  Free mental health counseling  https://walkin.org/  612.870.0565 X2    Mental Health Apps      Calm Harm  https://calmharm.co.uk/      My3  https://my3app.org/      [place] Safety Plan  https://www.mysafetyplan.org/

## 2024-04-29 NOTE — PROGRESS NOTES
"Virtual Visit Details    Type of service:  Telephone Visit   Phone call duration: 25 minutes   Originating Location (pt. Location): Home    Distant Location (provider location):  On-site           Outpatient Psychiatric Progress Note    Name: Lianna Sorto   : 1980                    Primary Care Provider: DANA Amos CNP   Therapist: None    PHQ-9 scores:      2023     8:11 AM 2023     5:32 PM 2023    10:00 AM   PHQ-9 SCORE   PHQ-9 Total Score MyChart 5 (Mild depression) 9 (Mild depression)    PHQ-9 Total Score 5 9 15       FAUSTINO-7 scores:      2021     8:14 AM 2021     8:00 AM 2023     5:34 PM   FAUSTINO-7 SCORE   Total Score   9 (mild anxiety)   Total Score 0 4 9       Patient Identification:    Patient is a 43 year old year old, single  White Choose not to answer female  who presents for return visit with me.  Patient is currently employed full time. Patient attended the session alone. Patient prefers to be called: \" Lianna\".    Current medications include:   Current Outpatient Medications   Medication Sig Dispense Refill    amphetamine-dextroamphetamine (ADDERALL) 10 MG tablet Take 1 tablet (10 mg) by mouth daily as needed (attentional deficits) 30 tablet 0    blood glucose (NO BRAND SPECIFIED) test strip Use to test blood sugar 2 times daily or as directed. To accompany: Blood Glucose Monitor Brands: per insurance. 100 strip 11    blood glucose monitoring (NO BRAND SPECIFIED) meter device kit Use to test blood sugar 2 times daily or as directed. Preferred blood glucose meter OR supplies to accompany: Blood Glucose Monitor Brands: per insurance. (Patient not taking: Reported on 2024) 1 kit 0    cetirizine (ZYRTEC) 10 MG tablet Take 10 mg by mouth daily      chlorhexidine (HIBICLENS) 4 % liquid Use to wash groin daily. 118 mL 11    clindamycin (CLINDAMAX) 1 % external lotion Apply to affected areas twice daily 60 mL 11    cyclobenzaprine (FLEXERIL) 10 MG tablet " TAKE ONE TABLET BY MOUTH THREE TIMES A DAY AS NEEDED FOR MUSCLE SPASMS 30 tablet 5    famotidine (PEPCID) 20 MG tablet TAKE ONE TABLET BY MOUTH TWICE A DAY 60 tablet 0    fluticasone (FLONASE) 50 MCG/ACT nasal spray Spray 2 sprays into both nostrils daily 16 g 0    glipiZIDE (GLUCOTROL XL) 5 MG 24 hr tablet TAKE ONE TABLET BY MOUTH ONCE DAILY 30 tablet 1    levonorgestrel-ethinyl estradiol (SETLAKIN) 0.15-0.03 MG tablet Take 1 tablet by mouth daily 91 tablet 3    order for DME Equipment being ordered: Dynaflex insert 2 Units 0    propranolol (INDERAL) 20 MG tablet Take 1 tablet (20 mg) by mouth 2 times daily 60 tablet 3    rosuvastatin (CRESTOR) 20 MG tablet Take 1 tablet (20 mg) by mouth daily 90 tablet 3    thin (NO BRAND SPECIFIED) lancets Use with lanceting device. To accompany: Blood Glucose Monitor Brands: per insurance. 100 each 11    triamcinolone (KENALOG) 0.1 % external cream Apply to affected areas twice daily for 2 weeks, then as needed only 45 g 6    VYVANSE 40 MG capsule Take 1 capsule (40 mg) by mouth every morning 30 capsule 0     No current facility-administered medications for this visit.        The [place] Prescription Monitoring Program has been reviewed and there are no concerns about diversionary activity for controlled substances at this time.      I was able to review most recent Primary Care Provider, specialty provider, and therapy visit notes that I have access to.     Today, patient reports that last night she developed vertigo.  It can be hard to fall asleep.  She has had allergies.  She stopped taking the ozembic.  She had developed rectal bleeding and straining bowel movements.  Generic vyvanse - develops  upper neck pain- feels tight. Gets worse throughout the day. Takes Adderall some times  in the evening.  She is able to complete job tasks and manage her household as a single parent.     has a past medical history of Abnormal Pap smear of cervix (2011), Acute tonsillitis (2008),  Condylomata (4/7/2011), Endometritis (2008), Hidradenitis (7/3/2009), Intussusception (H) (1983), and Unspecified trauma to perineum and vulva, unspecified as to episode of care in pregnancy (1985).    She has no past medical history of Basal cell carcinoma or Squamous cell carcinoma.    Social history updates:    Work is now fully staffed in the office.  She has been social with friends and family.      Substance use updates:    No alcohol use reported  Tobacco use: No    Vital Signs:   There were no vitals taken for this visit.    Labs:    Most recent laboratory results reviewed and no new labs. HgbA1c 10.7    Mental Status Examination:  Appearance: awake, alert  Attitude: cooperative  Eye Contact:   not able to assess  Gait and Station: Dizziness  Psychomotor Behavior:   Not able to assess  Oriented to:  time, person, and place  Attention Span and Concentration:  Normal  Speech:   vtspeech: clear, coherent and Speaks: English  Mood:  anxious and depressed  Affect:  mood congruent  Associations:  no loose associations  Thought Process:  goal oriented  Thought Content:  no evidence of suicidal ideation or homicidal ideation, no auditory hallucinations present, and no visual hallucinations present  Recent and Remote Memory:  intact Not formally assessed. No amnesia.  Fund of Knowledge: appropriate  Insight:  good  Judgment: good  Impulse Control:  good    Suicide Risk Assessment:  Today Lianna Sorto reports no thoughts to harm themself or others. In addition, there are notable risk factors for self-harm, including single status and anxiety. However, risk is mitigated by commitment to family, history of seeking help when needed, future oriented, denies suicidal intent or plan, and denies homicidal ideation, intent, or plan. Therefore, based on all available evidence including the factors cited above, Lianna Sorto does not appear to be at imminent risk for self-harm, does not meet criteria for a 72-hr hold, and  therefore remains appropriate for ongoing outpatient level of care.  A thorough assessment of risk factors related to suicide and self-harm have been reviewed and are noted above. The patient convincingly denies suicidality on several occasions. Local community safety resources printed and reviewed for patient to use if needed. There was no deceit detected, and the patient presented in a manner that was believable.     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  296.31 (F33.0) Major Depressive Disorder, Recurrent Episode, Mild _ and With mixed features  300.02 (F41.1) Generalized Anxiety Disorder    Medical comorbidities include:   Patient Active Problem List    Diagnosis Date Noted    Morbid obesity (H) 11/02/2020     Priority: Medium    Diabetes mellitus, type 2 (H) 08/07/2020     Priority: Medium    Moderate episode of recurrent major depressive disorder (H) 09/22/2017     Priority: Medium    Controlled substance agreement signed 09/28/2016     Priority: Medium    Hidradenitis suppurativa 11/02/2015     Priority: Medium    Anxiety 06/02/2014     Priority: Medium    Pelvic pain in female 11/13/2013     Priority: Medium    Fibromyalgia 05/28/2013     Priority: Medium    Piriformis syndrome 12/12/2012     Priority: Medium    Scapular dyskinesis 03/13/2012     Priority: Medium    Varicose veins of legs 07/13/2011     Priority: Medium    Sacroiliac pain 07/13/2011     Priority: Medium    Back muscle spasm 06/06/2011     Priority: Medium    Tension headache 06/06/2011     Priority: Medium    CARDIOVASCULAR SCREENING; LDL GOAL LESS THAN 130 06/06/2011     Priority: Medium    Atypical squamous cells of undetermined significance (ASCUS) on Papanicolaou smear of cervix 03/22/2011     Priority: Medium     3/22/11 ASCUS, + HPV 16. Plan colp.  4/7/11 Thomas benign. Repeat pap 6 months.   11/22/11 ASCUS +(low risk) HPV type 54. Rpt pap in 1 yr.   2/20/13 NIL.  Pap 1 year.   3/18/14 NIL, Neg HPV. Repeat pap 3 yrs.   6/7/17 ASCUS, neg HPV. Plan cotest in 3 yrs  11/2/20 NIL Pap, Neg HPV. Plan cotest in 5 years.   1/29/24 NIL pap, neg HPV        DDD (degenerative disc disease), cervical 03/10/2011     Priority: Medium    Acne 03/10/2011     Priority: Medium    Attention deficit disorder of adult 09/22/2010     Priority: Medium    Allergic rhinitis 03/30/2007     Priority: Medium     Problem list name updated by automated process. Provider to review         Assessment:    Lianna Sorto is concerned about generic Vyvanse creating neck stiffness for her.  Prescription has been written to try brand name Vyvanse first before the generic version, if she can afford it.  Propranolol has been effective in controlling anxiety for her.  She takes the Adderall occasionally later in the day to help with breakthrough attention deficit symptoms that prohibit her from completing job tasks in her office.  She denies any suicide thinking.  Recently, she tells me she experienced some vertigo and this appointment was changed to a telephone visit from an in person visit as she did not feel comfortable driving to the clinic.    Medication side effects and alternatives were reviewed. Health promotion activities recommended and reviewed today. All questions addressed. Education and counseling completed regarding risks and benefits of medications and psychotherapy options.    Treatment Plan:      1.  Continue Adderall 10 mg daily as needed for breakthrough attention deficit  2.  Continue Vyvanse 40 mg daily-first try to feel brand name before generic version  3.  Continue propranolol 20 mg 2 times daily    Continue all other medications as reviewed per electronic medical record today.   Safety plan reviewed. To the Emergency Department as needed or call after hours crisis line at [phone] or [phone]. [place] Crisis Text [place]. Text [place] to 158001 or Suicide LifeLine Chat:  suicidepreventionlifeline.org/chat/  To schedule individual or family therapy, call [facility] at [phone]  Schedule an appointment with me in 3 months or sooner as needed. Call [facility] at [phone] to schedule.  Follow up with primary care provider as planned or for acute medical concerns.  Call the psychiatric nurse line with medication questions or concerns at [phone]  MyChart may be used to communicate with your provider, but this is not intended to be used for emergencies.        Crisis Resources   The EmPath is an adults only unit located at [facility] is a short term (generally less than 23 hour stay) designed for crisis intervention and stabilization. Pts have the opportunity to meet quickly with a behavioral health team for evaluation in a calm and peaceful therapuetic environment. To be evaluated for admission pts are triaged throught the [facility] ED.      The following hotlines are for both adults and children. The and are open 24 hours a day, 7 days a week unless noted otherwise.        Crisis Lines      Crisis Text Line  Text 710546  You will be connected with a trained live crisis counselor to provide support.        Gambling Hotline  7.165.858.8398 [hope]        línea de crisis española  [phone]        [facility]  [phone]        National Hope Line  5.539.329.0233 [hope]        National Suicide Prevention Lifeline  Free and confidential support  988 or 1.262.978.TALK [8255]  http://suicidepreventionlifeline.org        The Benigno Project (LGBTQ Youth Crisis Line)  6.486.588.1197  text START to 464-038        [facility] Crisis Line  1.672.638.7008 (Press 1)  or text 041576    [facility] Mental Health Crisis Response  Within [place], call **CRISIS [**611261] to be connected to a mental health professional who can assist you.        [facility] Crisis  [phone]      [facility] Crisis   [phone]      [facility] Crisis  [phone]      [facility] Mobile Crisis  [phone] (adults)  [phone] (children)      [facility] Crisis  [phone] (adults)  [phone] (children)      [facility] Mobile Crisis  [phone]      [facility] Mobile Crisis  [phone]    Community Resources      Fast Tracker  Linking people to mental health and substance use disorder resources  Skweezn.Nasuni        [place] Mental Health Warmline  Peer to peer support  5 pm to 9 am 7 days/week  7.161.443.0787  https://mnwitw.org/frank        National [place] on Mental Illness (MAGALI)  512.812.8549 or 1.888.MAGALI.HELPS  https://namimn.org/        [facility] Urgent Care for Adult Mental Health  [facility] residents [address]  [phone]        [facility]  Free mental health counseling  https://walkin.org/  612.870.0565 X2    Mental Health Apps      Calm Harm  https://calmharm.co.uk/      My3  https://my3app.org/      [place] Safety Plan  https://www.mysafetyplan.org/   Administrative Billing:   Time spent with patient includes counseling and coordination of care regarding above diagnoses and treatment plan.    Patient Status:  This is a continuous care patient and medications will be prescribed by the psychiatric provider until further indicated.    Signed:   ELIANA Jeffrey-BC   Psychiatry

## 2024-05-01 ENCOUNTER — OFFICE VISIT (OUTPATIENT)
Dept: FAMILY MEDICINE | Facility: CLINIC | Age: 44
End: 2024-05-01
Payer: COMMERCIAL

## 2024-05-01 VITALS
HEIGHT: 66 IN | TEMPERATURE: 98.7 F | WEIGHT: 228 LBS | OXYGEN SATURATION: 100 % | RESPIRATION RATE: 16 BRPM | HEART RATE: 90 BPM | DIASTOLIC BLOOD PRESSURE: 88 MMHG | SYSTOLIC BLOOD PRESSURE: 120 MMHG | BODY MASS INDEX: 36.64 KG/M2

## 2024-05-01 DIAGNOSIS — E11.65 TYPE 2 DIABETES MELLITUS WITH HYPERGLYCEMIA, WITHOUT LONG-TERM CURRENT USE OF INSULIN (H): ICD-10-CM

## 2024-05-01 DIAGNOSIS — H81.11 BENIGN PAROXYSMAL POSITIONAL VERTIGO, RIGHT: Primary | ICD-10-CM

## 2024-05-01 DIAGNOSIS — E78.5 HYPERLIPIDEMIA LDL GOAL <100: ICD-10-CM

## 2024-05-01 LAB
ALT SERPL W P-5'-P-CCNC: 35 U/L (ref 0–50)
CHOLEST SERPL-MCNC: 230 MG/DL
FASTING STATUS PATIENT QL REPORTED: YES
HBA1C MFR BLD: 9.4 % (ref 0–5.6)
HDLC SERPL-MCNC: 44 MG/DL
LDLC SERPL CALC-MCNC: 141 MG/DL
NONHDLC SERPL-MCNC: 186 MG/DL
TRIGL SERPL-MCNC: 223 MG/DL

## 2024-05-01 PROCEDURE — 36415 COLL VENOUS BLD VENIPUNCTURE: CPT | Performed by: NURSE PRACTITIONER

## 2024-05-01 PROCEDURE — 99214 OFFICE O/P EST MOD 30 MIN: CPT | Performed by: NURSE PRACTITIONER

## 2024-05-01 PROCEDURE — 84460 ALANINE AMINO (ALT) (SGPT): CPT | Performed by: NURSE PRACTITIONER

## 2024-05-01 PROCEDURE — 80061 LIPID PANEL: CPT | Performed by: NURSE PRACTITIONER

## 2024-05-01 PROCEDURE — G2211 COMPLEX E/M VISIT ADD ON: HCPCS | Performed by: NURSE PRACTITIONER

## 2024-05-01 PROCEDURE — 83036 HEMOGLOBIN GLYCOSYLATED A1C: CPT | Performed by: NURSE PRACTITIONER

## 2024-05-01 RX ORDER — METFORMIN HCL 500 MG
TABLET, EXTENDED RELEASE 24 HR ORAL
Qty: 180 TABLET | Refills: 3 | Status: SHIPPED | OUTPATIENT
Start: 2024-05-01 | End: 2024-09-03

## 2024-05-01 RX ORDER — GLIPIZIDE 5 MG/1
5 TABLET, FILM COATED, EXTENDED RELEASE ORAL DAILY
Qty: 90 TABLET | Refills: 3 | Status: SHIPPED | OUTPATIENT
Start: 2024-05-01 | End: 2024-09-03

## 2024-05-01 ASSESSMENT — PAIN SCALES - GENERAL: PAINLEVEL: NO PAIN (0)

## 2024-05-01 NOTE — PROGRESS NOTES
Assessment & Plan     Benign paroxysmal positional vertigo, right  History and exam consistent with vertigo.  Handout given for Epley maneuvers.  If no improvement in a week, she should let me know and I will send her to vestibular rehab.    Type 2 diabetes mellitus with hyperglycemia, without long-term current use of insulin (H)  Poorly controlled.  Discussed with patient the importance of taking her medications every day.  She reports that she is willing to try metformin again as well.  Follow-up A1c in 3 months.  - glipiZIDE (GLUCOTROL XL) 5 MG 24 hr tablet; Take 1 tablet (5 mg) by mouth daily  - metFORMIN (GLUCOPHAGE XR) 500 MG 24 hr tablet; Take 1 tablet (500 mg) by mouth daily (with dinner) for 7 days, THEN 1 tablet (500 mg) 2 times daily (with meals).  - Hemoglobin A1c    Hyperlipidemia LDL goal <100  Due for lab check today.  Encouraged her to start the rosuvastatin.  - Lipid panel reflex to direct LDL Fasting  - ALT      The risks, benefits and treatment options of prescribed medications or other treatments have been discussed with the patient. The patient verbalized their understanding and should call or follow up if no improvement or if they develop further problems.  Clau Dukes, DOMENIC Whelan   Lianna is a 43 year old, presenting for the following health issues:  Dizziness, Rectal Problem (Rectal bleeding off and on the last 6 weeks, worse the last 4 days, even without a bowel movement), and Health Maintenance (Declined immunizations today)    History of Present Illness       Reason for visit:  Diziness  Symptom onset:  1-3 days ago  Symptoms include:  Diziness  Symptom intensity:  Severe  Symptom progression:  Staying the same  Had these symptoms before:  No  What makes it worse:  Laying down on right side  What makes it better:  No    She eats 0-1 servings of fruits and vegetables daily.She consumes 0 sweetened beverage(s) daily.She exercises with enough effort to increase her  heart rate 9 or less minutes per day.  She exercises with enough effort to increase her heart rate 3 or less days per week.   She is taking medications regularly.         Dizziness  Onset/Duration: Sunday - three days ago  Every time she turns to the right side  Worse when she lifts her head up  Description:   Do you feel faint: No  Does it feel like the surroundings (bed, room) are moving: YES  Unsteady/off balance: YES- a little bit, specially if she moves fast  Have you passed out or fallen: No  Intensity: severe  Progression of Symptoms: same and intermittent  Accompanying Signs & Symptoms:  Heart palpitations or chest pain: No  Nausea, vomiting: YES  Weakness or lack of coordination in arms or legs: YES- on the right side  Vision or speech changes: YES- while its happening has double vision/or focus  Numbness or tingling: No  Ringing in ears (Tinnitus): YES- nothing new  Hearing Loss: No  Feels something draining from her right side  History:   Head trauma/concussion history: No  Previous similar symptoms: No  Recent bleeding history: YES- rectal bleeding for the last 6 weeks, worse the last 4 days even without bowel movement.  She relates this to being on ozempic (has stopped this now)   Any new medications (BP?): No  Precipitating factors:   Worse with activity: YES  Worse with head movement: YES  Alleviating factors:   Does staying in a fixed position give relief: YES- sometimes  Therapies tried and outcome: None        Diabetes Follow-up    How often are you checking your blood sugar? One time daily  What time of day are you checking your blood sugars (select all that apply)?  Before meals  Have you had any blood sugars above 200?  Yes - up to 300s  Have you had any blood sugars below 70?  No  What symptoms do you notice when your blood sugar is low?  None  What concerns do you have today about your diabetes? None   Do you have any of these symptoms? (Select all that apply)  No numbness or tingling in feet.  " No redness, sores or blisters on feet.  No complaints of excessive thirst.  No reports of blurry vision.  No significant changes to weight.  Have you had a diabetic eye exam in the last 12 months? No  Stopped Ozempic due to constipation.  Taking glipizide prn - about every other day        BP Readings from Last 2 Encounters:   05/01/24 120/88   01/29/24 132/88     Hemoglobin A1C (%)   Date Value   05/01/2024 9.4 (H)   01/29/2024 10.7 (H)   10/30/2020 6.9 (H)   07/17/2020 6.7 (H)     LDL Cholesterol Calculated (mg/dL)   Date Value   01/29/2024 137 (H)   11/29/2021 121 (H)   10/30/2020 124 (H)   07/13/2012 115             Hyperlipidemia Follow-Up    Are you regularly taking any medication or supplement to lower your cholesterol?   No  Are you having muscle aches or other side effects that you think could be caused by your cholesterol lowering medication?  No        Review of Systems  Constitutional, HEENT, cardiovascular, pulmonary, gi and gu systems are negative, except as otherwise noted.      Objective    /88   Pulse 90   Temp 98.7  F (37.1  C) (Tympanic)   Resp 16   Ht 1.664 m (5' 5.5\")   Wt 103.4 kg (228 lb)   LMP 03/01/2024 (Approximate)   SpO2 100%   BMI 37.36 kg/m    Body mass index is 37.36 kg/m .  Physical Exam   GENERAL: alert and no distress  EYES: Eyes grossly normal to inspection, PERRL and conjunctivae and sclerae normal  HENT: ear canals and TM's normal, nose and mouth without ulcers or lesions  NECK: no adenopathy, no asymmetry, masses, or scars  RESP: lungs clear to auscultation - no rales, rhonchi or wheezes  CV: regular rate and rhythm, normal S1 S2, no S3 or S4, no murmur, click or rub, no peripheral edema  MS: no gross musculoskeletal defects noted, no edema  NEURO: Normal strength and tone, mentation intact and speech normal. Wilmer- Hallpike positive to the right    Results for orders placed or performed in visit on 05/01/24 (from the past 24 hour(s))   Hemoglobin A1c   Result Value " Ref Range    Hemoglobin A1C 9.4 (H) 0.0 - 5.6 %   Lipid panel reflex to direct LDL Fasting   Result Value Ref Range    Cholesterol 230 (H) <200 mg/dL    Triglycerides 223 (H) <150 mg/dL    Direct Measure HDL 44 (L) >=50 mg/dL    LDL Cholesterol Calculated 141 (H) <=100 mg/dL    Non HDL Cholesterol 186 (H) <130 mg/dL    Patient Fasting > 8hrs? Yes     Narrative    Cholesterol  Desirable:  <200 mg/dL    Triglycerides  Normal:  Less than 150 mg/dL  Borderline High:  150-199 mg/dL  High:  200-499 mg/dL  Very High:  Greater than or equal to 500 mg/dL    Direct Measure HDL  Female:  Greater than or equal to 50 mg/dL   Male:  Greater than or equal to 40 mg/dL    LDL Cholesterol  Desirable:  <100mg/dL  Above Desirable:  100-129 mg/dL   Borderline High:  130-159 mg/dL   High:  160-189 mg/dL   Very High:  >= 190 mg/dL    Non HDL Cholesterol  Desirable:  130 mg/dL  Above Desirable:  130-159 mg/dL  Borderline High:  160-189 mg/dL  High:  190-219 mg/dL  Very High:  Greater than or equal to 220 mg/dL   ALT   Result Value Ref Range    ALT 35 0 - 50 U/L           The longitudinal plan of care for the diagnosis(es)/condition(s) as documented were addressed during this visit. Due to the added complexity in care, I will continue to support Lianna in the subsequent management and with ongoing continuity of care.    Signed Electronically by: DANA Amos CNP

## 2024-06-24 DIAGNOSIS — F90.0 ADHD (ATTENTION DEFICIT HYPERACTIVITY DISORDER), INATTENTIVE TYPE: ICD-10-CM

## 2024-06-25 RX ORDER — LISDEXAMFETAMINE DIMESYLATE 40 MG/1
40 CAPSULE ORAL EVERY MORNING
Qty: 30 CAPSULE | Refills: 0 | Status: SHIPPED | OUTPATIENT
Start: 2024-06-25 | End: 2024-07-30 | Stop reason: ALTCHOICE

## 2024-07-25 ENCOUNTER — TELEPHONE (OUTPATIENT)
Dept: PSYCHIATRY | Facility: CLINIC | Age: 44
End: 2024-07-25
Payer: COMMERCIAL

## 2024-07-25 NOTE — TELEPHONE ENCOUNTER
Reason for call:  Medication   If this is a refill request, has the caller requested the refill from the pharmacy already? N/A  Will the patient be using a [facility]? Yes  Name of the pharmacy and phone number for the current request: [facility] in [place]    Name of the medication requested: Vyvance    Other request: Pt states the last refill was put through as brand only, and she needs it changed to generic please.    Phone number to reach patient:  Home number on file [phone] (home)    Best Time:  any    Can we leave a detailed message on this number?  YES    Travel screening: Not Applicable

## 2024-08-12 ENCOUNTER — TELEPHONE (OUTPATIENT)
Dept: FAMILY MEDICINE | Facility: CLINIC | Age: 44
End: 2024-08-12

## 2024-08-12 DIAGNOSIS — E11.65 TYPE 2 DIABETES MELLITUS WITH HYPERGLYCEMIA, WITHOUT LONG-TERM CURRENT USE OF INSULIN (H): Primary | ICD-10-CM

## 2024-08-12 NOTE — TELEPHONE ENCOUNTER
Patient Quality Outreach    Patient is due for the following:   Diabetes -  A1C and Diabetic Follow-Up Visit    Next Steps:   Schedule a office visit for diabetes with lab prior     Type of outreach:    Sent [employer] message. Will postpone a few days for patient to view       Questions for provider review:    None           Alphonso Ruiz, [facility]

## 2024-08-21 NOTE — TELEPHONE ENCOUNTER
Fax received from [facility] with additional questions for the PA for Vyvanse. This was filled out and sent to provider for review and signature.  It will need to be faxed back to [facility].    Ileostomy in place, pt declines GI distress upon visit.

## 2024-08-25 ENCOUNTER — HEALTH MAINTENANCE LETTER (OUTPATIENT)
Age: 44
End: 2024-08-25

## 2024-08-26 ENCOUNTER — LAB (OUTPATIENT)
Dept: LAB | Facility: CLINIC | Age: 44
End: 2024-08-26
Payer: COMMERCIAL

## 2024-08-26 DIAGNOSIS — E11.65 TYPE 2 DIABETES MELLITUS WITH HYPERGLYCEMIA, WITHOUT LONG-TERM CURRENT USE OF INSULIN (H): ICD-10-CM

## 2024-08-26 LAB — HBA1C MFR BLD: 8.2 % (ref 0–5.6)

## 2024-08-26 PROCEDURE — 36415 COLL VENOUS BLD VENIPUNCTURE: CPT

## 2024-08-26 PROCEDURE — 83036 HEMOGLOBIN GLYCOSYLATED A1C: CPT

## 2024-08-28 DIAGNOSIS — F90.0 ADHD (ATTENTION DEFICIT HYPERACTIVITY DISORDER), INATTENTIVE TYPE: ICD-10-CM

## 2024-08-28 NOTE — TELEPHONE ENCOUNTER
Date of Last Office Visit: 4/29/24  Date of Next Office Visit:  none  No shows since last visit: No  More than one patient-initiated cancellation (with reschedule) since last seen in clinic? No    []Medication refilled per  Medication Refill in Ambulatory Care  policy.  []Medication unable to be refilled by RN due to criteria not met as indicated below:               []Eligibility: has not had a provider visit within last 6 months              [x]Supervision: no future appointment; < 7 days before next appointment              []Compliance: no shows; cancellations; lapse in therapy              []Verification: order discrepancy; may need modification...              [] > 30-day supply request              []Advanced refill request: > 7 days before refill date              [x]Controlled medication              []Medication not included in policy              []Review: new med; med adjusted ? 30 days; safety alert; requires lab monitoring...              []Scope of Practice: refill request processed by LPN/MA              [x]Other: Patient requests brand name        Medication(s) requested:      -  VYVANSE 40 MG capsule   Date last ordered: 6/25/24  Qty: 30  Refills: 0       Any Controlled Substance(s)? Yes   [place]  checked? Yes   VYVANSE 40 MG capsule  was last sold on 6/26/24 for quantity of 30.  Other controlled substance on [place] ?: Yes   If yes, are any new medications? No        Requested medication(s) verified as identical to current order? No: patient requests change in order to generic version of Vyvanse     Any lapse in adherence to medication(s) greater than 5 days? No       Additional action taken? routed encounter to provider for review.        Last visit treatment plan:      Treatment Plan:   Schedule an appointment with me in 3 months or sooner as needed     1.  Continue Adderall 10 mg daily as needed for breakthrough attention deficit  2.  Continue Vyvanse 40 mg daily-first try to feel brand name  before generic version  3.  Continue propranolol 20 mg 2 times daily     Continue all other medications as reviewed per electronic medical record today.   Safety plan reviewed. To the Emergency Department as needed or call after hours crisis line at [phone] or [phone]. [place] Crisis Text Line. Text [place] to 601997 or Suicide LifeLine Chat: suicidepreventionlifeline.org/chat/  To schedule individual or family therapy, call [facility] at [phone]      Any medication(s) require lab monitoring? No

## 2024-08-29 RX ORDER — LISDEXAMFETAMINE DIMESYLATE 40 MG/1
40 CAPSULE ORAL EVERY MORNING
Qty: 30 CAPSULE | Refills: 0 | Status: SHIPPED | OUTPATIENT
Start: 2024-08-29

## 2024-09-03 ENCOUNTER — OFFICE VISIT (OUTPATIENT)
Dept: FAMILY MEDICINE | Facility: CLINIC | Age: 44
End: 2024-09-03
Payer: COMMERCIAL

## 2024-09-03 VITALS
DIASTOLIC BLOOD PRESSURE: 89 MMHG | SYSTOLIC BLOOD PRESSURE: 125 MMHG | HEIGHT: 66 IN | WEIGHT: 236.5 LBS | TEMPERATURE: 99.2 F | BODY MASS INDEX: 38.01 KG/M2 | OXYGEN SATURATION: 100 % | HEART RATE: 81 BPM

## 2024-09-03 DIAGNOSIS — E11.65 TYPE 2 DIABETES MELLITUS WITH HYPERGLYCEMIA, WITHOUT LONG-TERM CURRENT USE OF INSULIN (H): Primary | ICD-10-CM

## 2024-09-03 DIAGNOSIS — F33.1 MODERATE EPISODE OF RECURRENT MAJOR DEPRESSIVE DISORDER (H): ICD-10-CM

## 2024-09-03 DIAGNOSIS — E66.01 MORBID OBESITY (H): ICD-10-CM

## 2024-09-03 DIAGNOSIS — R10.2 PELVIC CRAMPING: ICD-10-CM

## 2024-09-03 LAB
ALBUMIN UR-MCNC: NEGATIVE MG/DL
APPEARANCE UR: CLEAR
BILIRUB UR QL STRIP: NEGATIVE
COLOR UR AUTO: YELLOW
GLUCOSE UR STRIP-MCNC: 250 MG/DL
HGB UR QL STRIP: NEGATIVE
KETONES UR STRIP-MCNC: NEGATIVE MG/DL
LEUKOCYTE ESTERASE UR QL STRIP: NEGATIVE
NITRATE UR QL: NEGATIVE
PH UR STRIP: 6 [PH] (ref 5–7)
SP GR UR STRIP: 1.02 (ref 1–1.03)
UROBILINOGEN UR STRIP-ACNC: 1 E.U./DL

## 2024-09-03 PROCEDURE — 96127 BRIEF EMOTIONAL/BEHAV ASSMT: CPT | Performed by: NURSE PRACTITIONER

## 2024-09-03 PROCEDURE — G2211 COMPLEX E/M VISIT ADD ON: HCPCS | Performed by: NURSE PRACTITIONER

## 2024-09-03 PROCEDURE — 99214 OFFICE O/P EST MOD 30 MIN: CPT | Performed by: NURSE PRACTITIONER

## 2024-09-03 PROCEDURE — 81003 URINALYSIS AUTO W/O SCOPE: CPT | Performed by: NURSE PRACTITIONER

## 2024-09-03 RX ORDER — GLIPIZIDE 5 MG/1
5 TABLET, FILM COATED, EXTENDED RELEASE ORAL 2 TIMES DAILY
Qty: 180 TABLET | Refills: 3 | Status: SHIPPED | OUTPATIENT
Start: 2024-09-03

## 2024-09-03 RX ORDER — METFORMIN HCL 500 MG
500 TABLET, EXTENDED RELEASE 24 HR ORAL
Qty: 90 TABLET | Refills: 3 | Status: SHIPPED | OUTPATIENT
Start: 2024-09-03

## 2024-09-03 ASSESSMENT — PATIENT HEALTH QUESTIONNAIRE - PHQ9
SUM OF ALL RESPONSES TO PHQ QUESTIONS 1-9: 12
10. IF YOU CHECKED OFF ANY PROBLEMS, HOW DIFFICULT HAVE THESE PROBLEMS MADE IT FOR YOU TO DO YOUR WORK, TAKE CARE OF THINGS AT HOME, OR GET ALONG WITH OTHER PEOPLE: SOMEWHAT DIFFICULT
SUM OF ALL RESPONSES TO PHQ QUESTIONS 1-9: 12

## 2024-09-03 ASSESSMENT — PAIN SCALES - GENERAL: PAINLEVEL: MILD PAIN (3)

## 2024-09-03 NOTE — PROGRESS NOTES
Assessment & Plan     Type 2 diabetes mellitus with hyperglycemia, without long-term current use of insulin (H)  Improving, but not yet at goal  C/o leg cramps from the metformin.  Decrease metformin to 500 mg once daily  Increase glipizide to 5 mg BID  Follow up in 3 months - if still not at goal, will add Jardiance.  - glipiZIDE (GLUCOTROL XL) 5 MG 24 hr tablet; Take 1 tablet (5 mg) by mouth 2 times daily.  - metFORMIN (GLUCOPHAGE XR) 500 MG 24 hr tablet; Take 1 tablet (500 mg) by mouth daily (with dinner).    Moderate episode of recurrent major depressive disorder (H)  Well controlled.  Known issue that I take into account for their medical decisions, no current exacerbations or new concerns    Morbid obesity (H)  Encourage weight loss  Would likely help with blood sugar control.    Pelvic cramping  Reassured patient that UA was normal.  Follow up if no improvement in one week.  - UA Macroscopic with reflex to Microscopic and Culture - Clinic Collect      The risks, benefits and treatment options of prescribed medications or other treatments have been discussed with the patient. The patient verbalized their understanding and should call or follow up if no improvement or if they develop further problems.  Clau Dukes, DOMENIC Whelan   Lianna is a 44 year old, presenting for the following health issues:  Diabetes      9/3/2024     8:57 AM   Additional Questions   Roomed by CANDE Esqueda CMA     History of Present Illness       Diabetes:   She presents for follow up of diabetes.  She is checking home blood glucose a few times a week.   She checks blood glucose before meals and after meals.  Blood glucose is sometimes over 200 and never under 70. She is aware of hypoglycemia symptoms including shakiness, dizziness, weakness, blurred vision and other.    She has no concerns regarding her diabetes at this time.   She is not experiencing numbness or burning in feet, excessive thirst, blurry vision,  "weight changes or redness, sores or blisters on feet. The patient has not had a diabetic eye exam in the last 12 months.          She eats 0-1 servings of fruits and vegetables daily.She consumes 0 sweetened beverage(s) daily.She exercises with enough effort to increase her heart rate 10 to 19 minutes per day.  She exercises with enough effort to increase her heart rate 3 or less days per week.   She is taking medications regularly.       Lab Results   Component Value Date    A1C 8.2 08/26/2024    A1C 9.4 05/01/2024    A1C 10.7 01/29/2024    A1C 7.7 11/29/2021    A1C 6.9 10/30/2020    A1C 6.7 07/17/2020       Wt Readings from Last 4 Encounters:   09/03/24 107.3 kg (236 lb 8 oz)   05/01/24 103.4 kg (228 lb)   01/29/24 105 kg (231 lb 6.4 oz)   12/18/23 106.2 kg (234 lb 1.9 oz)       Pelvic cramping for a week  Possibly related to recent period  Wants a UA to r/o UTI  No dysuria or hematuria  No vaginal discharge              Review of Systems  Constitutional, neuro, ENT, endocrine, pulmonary, cardiac, gastrointestinal, genitourinary, musculoskeletal, integument and psychiatric systems are negative, except as otherwise noted.      Objective    /89   Pulse 81   Temp 99.2  F (37.3  C) (Oral)   Ht 1.67 m (5' 5.75\")   Wt 107.3 kg (236 lb 8 oz)   SpO2 100%   BMI 38.46 kg/m    Body mass index is 38.46 kg/m .  Physical Exam   GENERAL: alert and no distress  NECK: no adenopathy, no asymmetry, masses, or scars  RESP: lungs clear to auscultation - no rales, rhonchi or wheezes  CV: regular rate and rhythm, normal S1 S2, no S3 or S4, no murmur, click or rub, no peripheral edema  ABDOMEN: soft, nontender, no hepatosplenomegaly, no masses and bowel sounds normal  MS: no gross musculoskeletal defects noted, no edema    Results for orders placed or performed in visit on 09/03/24 (from the past 24 hour(s))   UA Macroscopic with reflex to Microscopic and Culture - Clinic Collect    Specimen: Urine, Midstream   Result Value " Ref Range    Color Urine Yellow Colorless, Straw, Light Yellow, Yellow    Appearance Urine Clear Clear    Glucose Urine 250 (A) Negative mg/dL    Bilirubin Urine Negative Negative    Ketones Urine Negative Negative mg/dL    Specific Gravity Urine 1.020 1.003 - 1.035    Blood Urine Negative Negative    pH Urine 6.0 5.0 - 7.0    Protein Albumin Urine Negative Negative mg/dL    Urobilinogen Urine 1.0 0.2, 1.0 E.U./dL    Nitrite Urine Negative Negative    Leukocyte Esterase Urine Negative Negative    Narrative    Microscopic not indicated           The longitudinal plan of care for the diagnosis(es)/condition(s) as documented were addressed during this visit. Due to the added complexity in care, I will continue to support Lianna in the subsequent management and with ongoing continuity of care.    Signed Electronically by: DANA Amos CNP

## 2024-09-09 NOTE — TELEPHONE ENCOUNTER
Patient is due for a diabetes follow up appointment with me.      Non-fasting labs due:  A1C    Orders were placed, please have lab work done before visit.      Please ask patient to bring in their glucometer so we can download the data.    Please call patient to schedule.      Please remind patient she is due for her diabetic eye exam.  Clau Dukes, CNP       There are no Wet Read(s) to document.

## 2024-09-18 ENCOUNTER — MYC REFILL (OUTPATIENT)
Dept: PSYCHIATRY | Facility: CLINIC | Age: 44
End: 2024-09-18
Payer: COMMERCIAL

## 2024-09-18 DIAGNOSIS — F41.1 GAD (GENERALIZED ANXIETY DISORDER): ICD-10-CM

## 2024-09-18 RX ORDER — PROPRANOLOL HYDROCHLORIDE 20 MG/1
20 TABLET ORAL 2 TIMES DAILY
Qty: 60 TABLET | Refills: 0 | Status: SHIPPED | OUTPATIENT
Start: 2024-09-18

## 2024-09-18 NOTE — TELEPHONE ENCOUNTER
Transitioning plan - community long-term specialist    Date of Last Office Visit: 4/29/24  Date of Next Office Visit:  None. Pt of BRADLEY Smith   No shows since last visit: No  More than one patient-initiated cancellation (with reschedule) since last seen in clinic? No    []Medication refilled per  Medication Refill in Ambulatory Care  policy.  [x]Medication unable to be refilled by RN due to criteria not met as indicated below:    []Eligibility: has not had a provider visit within last 6 months   [x]Supervision: no future appointment; < 7 days before next appointment   []Compliance: no shows; cancellations; lapse in therapy   []Verification: order discrepancy; may need modification...   [] > 30-day supply request   []Advanced refill request: > 7 days before refill date   []Controlled medication   []Medication not included in policy   []Review: new med; med adjusted ? 30 days; safety alert; requires lab monitoring...   [x]Scope of Practice: refill request processed by LPN/MA   [x]Other: overdue for follow up      Medication(s) requested:     -  propranolol (INDERAL) 20 MG tablet   Date last ordered: 4/29/24  Qty: 60  Refills: 3       Disp Refills Start End ARIELLE   propranolol (INDERAL) 20 MG tablet 60 tablet 3 4/29/2024 -- No   Sig - Route: Take 1 tablet (20 mg) by mouth 2 times daily - Oral   Sent to pharmacy as: Propranolol HCl 20 MG Oral Tablet (INDERAL)   Class: E-Prescribe       Any Controlled Substance(s)? No      Requested medication(s) verified as identical to current order? Yes    Any lapse in adherence to medication(s) greater than 5 days? Yes . As per pharmacy info, last filled on 8/9/24    Additional action taken?  No .      Last visit treatment plan:   Treatment Plan:   Schedule an appointment with me in 3 months or sooner as needed. Call [facility] at [phone] to schedule.     1.  Continue Adderall 10 mg daily as needed for breakthrough attention deficit  2.  Continue Vyvanse 40 mg  daily-first try to feel brand name before generic version  3.  Continue propranolol 20 mg 2 times daily    Any medication(s) require lab monitoring? No

## 2024-09-18 NOTE — TELEPHONE ENCOUNTER
Patient last seen ELIANA Jeffrey in 4/2024.  Has been referred to long term psychiatry in the community per her patient disposition list.  Please call her and get scheduled.  Filled for 30 days of the propranolol      Rhina Oakes DNP, APRN, FMHNP-BC,

## 2024-12-16 ENCOUNTER — LAB (OUTPATIENT)
Dept: LAB | Facility: CLINIC | Age: 44
End: 2024-12-16
Payer: COMMERCIAL

## 2024-12-16 DIAGNOSIS — E11.65 TYPE 2 DIABETES MELLITUS WITH HYPERGLYCEMIA, WITHOUT LONG-TERM CURRENT USE OF INSULIN (H): ICD-10-CM

## 2024-12-16 LAB
ANION GAP SERPL CALCULATED.3IONS-SCNC: 14 MMOL/L (ref 7–15)
BUN SERPL-MCNC: 13.4 MG/DL (ref 6–20)
CALCIUM SERPL-MCNC: 9.2 MG/DL (ref 8.8–10.4)
CHLORIDE SERPL-SCNC: 103 MMOL/L (ref 98–107)
CREAT SERPL-MCNC: 0.77 MG/DL (ref 0.51–0.95)
CREAT UR-MCNC: 267 MG/DL
EGFRCR SERPLBLD CKD-EPI 2021: >90 ML/MIN/1.73M2
EST. AVERAGE GLUCOSE BLD GHB EST-MCNC: 203 MG/DL
GLUCOSE SERPL-MCNC: 148 MG/DL (ref 70–99)
HBA1C MFR BLD: 8.7 % (ref 0–5.6)
HCO3 SERPL-SCNC: 23 MMOL/L (ref 22–29)
MICROALBUMIN UR-MCNC: 32.7 MG/L
MICROALBUMIN/CREAT UR: 12.25 MG/G CR (ref 0–25)
POTASSIUM SERPL-SCNC: 4.3 MMOL/L (ref 3.4–5.3)
SODIUM SERPL-SCNC: 140 MMOL/L (ref 135–145)

## 2024-12-16 PROCEDURE — 83036 HEMOGLOBIN GLYCOSYLATED A1C: CPT

## 2024-12-16 PROCEDURE — 36415 COLL VENOUS BLD VENIPUNCTURE: CPT

## 2024-12-16 PROCEDURE — 82570 ASSAY OF URINE CREATININE: CPT

## 2024-12-16 PROCEDURE — 82043 UR ALBUMIN QUANTITATIVE: CPT

## 2024-12-16 PROCEDURE — 80048 BASIC METABOLIC PNL TOTAL CA: CPT

## 2024-12-30 ENCOUNTER — PATIENT OUTREACH (OUTPATIENT)
Dept: CARE COORDINATION | Facility: CLINIC | Age: 44
End: 2024-12-30
Payer: COMMERCIAL

## 2025-01-13 ENCOUNTER — PATIENT OUTREACH (OUTPATIENT)
Dept: CARE COORDINATION | Facility: CLINIC | Age: 45
End: 2025-01-13

## 2025-01-13 ENCOUNTER — OFFICE VISIT (OUTPATIENT)
Dept: FAMILY MEDICINE | Facility: CLINIC | Age: 45
End: 2025-01-13
Payer: COMMERCIAL

## 2025-01-13 VITALS
WEIGHT: 246.6 LBS | HEART RATE: 81 BPM | BODY MASS INDEX: 39.63 KG/M2 | RESPIRATION RATE: 18 BRPM | SYSTOLIC BLOOD PRESSURE: 136 MMHG | OXYGEN SATURATION: 98 % | HEIGHT: 66 IN | DIASTOLIC BLOOD PRESSURE: 88 MMHG | TEMPERATURE: 97.5 F

## 2025-01-13 DIAGNOSIS — E11.9 TYPE 2 DIABETES MELLITUS WITHOUT COMPLICATION, WITHOUT LONG-TERM CURRENT USE OF INSULIN (H): ICD-10-CM

## 2025-01-13 DIAGNOSIS — F33.1 MODERATE EPISODE OF RECURRENT MAJOR DEPRESSIVE DISORDER (H): ICD-10-CM

## 2025-01-13 DIAGNOSIS — E66.01 MORBID OBESITY (H): ICD-10-CM

## 2025-01-13 PROCEDURE — 99214 OFFICE O/P EST MOD 30 MIN: CPT | Performed by: NURSE PRACTITIONER

## 2025-01-13 PROCEDURE — G2211 COMPLEX E/M VISIT ADD ON: HCPCS | Performed by: NURSE PRACTITIONER

## 2025-01-13 RX ORDER — TIRZEPATIDE 7.5 MG/.5ML
7.5 INJECTION, SOLUTION SUBCUTANEOUS
Qty: 2 ML | Refills: 0 | Status: SHIPPED | OUTPATIENT
Start: 2025-03-09

## 2025-01-13 RX ORDER — TIRZEPATIDE 2.5 MG/.5ML
2.5 INJECTION, SOLUTION SUBCUTANEOUS
Qty: 2 ML | Refills: 0 | Status: SHIPPED | OUTPATIENT
Start: 2025-01-13 | End: 2025-02-10

## 2025-01-13 RX ORDER — TIRZEPATIDE 5 MG/.5ML
5 INJECTION, SOLUTION SUBCUTANEOUS
Qty: 2 ML | Refills: 0 | Status: SHIPPED | OUTPATIENT
Start: 2025-02-09

## 2025-01-13 ASSESSMENT — PAIN SCALES - GENERAL: PAINLEVEL_OUTOF10: NO PAIN (0)

## 2025-01-13 ASSESSMENT — PATIENT HEALTH QUESTIONNAIRE - PHQ9
10. IF YOU CHECKED OFF ANY PROBLEMS, HOW DIFFICULT HAVE THESE PROBLEMS MADE IT FOR YOU TO DO YOUR WORK, TAKE CARE OF THINGS AT HOME, OR GET ALONG WITH OTHER PEOPLE: SOMEWHAT DIFFICULT
SUM OF ALL RESPONSES TO PHQ QUESTIONS 1-9: 12
SUM OF ALL RESPONSES TO PHQ QUESTIONS 1-9: 12

## 2025-01-13 NOTE — PROGRESS NOTES
Assessment & Plan     Type 2 diabetes mellitus without complication, without long-term current use of insulin (H)  Poorly controlled.  Options discussed  Start Mounjaro and increase dose monthly as tolerates  Diabetes ed for further nutrition education.  Patient not sexually active - discussed that OCP may be less effective for pregnancy prevention if she becomes sexually active.  Follow up with me again in 3 months  - MOUNJARO 2.5 MG/0.5ML SOAJ; Inject 0.5 mLs (2.5 mg) subcutaneously every 7 days for 28 days.  - MOUNJARO 5 MG/0.5ML SOAJ; Inject 0.5 mLs (5 mg) subcutaneously every 7 days.  - Tirzepatide (MOUNJARO) 7.5 MG/0.5ML SOAJ; Inject 0.5 mLs (7.5 mg) subcutaneously every 7 days.  - Adult Diabetes Education  Referral; Future    Morbid obesity (H)  Starting Mounjaro for diabetes - will likely help with treatment of obesity as well.  Discussed diet - consult for diabetes ed placed.    Moderate episode of recurrent major depressive disorder (H)  Managed by psychiatry.  Known issue that I take into account for their medical decisions, no current exacerbations or new concerns      The risks, benefits and treatment options of prescribed medications or other treatments have been discussed with the patient. The patient verbalized their understanding and should call or follow up if no improvement or if they develop further problems.  DOMENIC Amos   Lianna is a 44 year old, presenting for the following health issues:  Diabetes and Medication Request (Wants to discuss new weight medications.)        1/13/2025     8:20 AM   Additional Questions   Roomed by Deyanira Ramirez   Accompanied by self         1/13/2025     8:20 AM   Patient Reported Additional Medications   Patient reports taking the following new medications none     HPI       Diabetes Follow-up    How often are you checking your blood sugar? A few times a week  What time of day are you checking your blood sugars (select  "all that apply)?  At bedtime  Have you had any blood sugars above 200?  No  Have you had any blood sugars below 70?  No  What symptoms do you notice when your blood sugar is low?  Dizzy and Blurred vision  What concerns do you have today about your diabetes? None and Blood sugar is often over 200   Do you have any of these symptoms? (Select all that apply)  Weight gain  Have you had a diabetic eye exam in the last 12 months? No      Wants to start another GLP1  Contraception - on OCP, not sexually active, taking OCP for period control.  Had significant constipation on Ozempic and had to stop it.      BP Readings from Last 2 Encounters:   01/13/25 136/88   09/03/24 125/89     Hemoglobin A1C (%)   Date Value   12/16/2024 8.7 (H)   08/26/2024 8.2 (H)   10/30/2020 6.9 (H)   07/17/2020 6.7 (H)     LDL Cholesterol Calculated (mg/dL)   Date Value   05/01/2024 141 (H)   01/29/2024 137 (H)   10/30/2020 124 (H)   07/13/2012 115         How many servings of fruits and vegetables do you eat daily?  0-1  On average, how many sweetened beverages do you drink each day (Examples: soda, juice, sweet tea, etc.  Do NOT count diet or artificially sweetened beverages)?   0  How many days per week do you exercise enough to make your heart beat faster? 3 or less  How many minutes a day do you exercise enough to make your heart beat faster? 9 or less  How many days per week do you miss taking your medication? 0      Weight:  Patient continues to gain weight  Unsure what to do with her diet.    Wt Readings from Last 4 Encounters:   01/13/25 111.9 kg (246 lb 9.6 oz)   09/03/24 107.3 kg (236 lb 8 oz)   05/01/24 103.4 kg (228 lb)   01/29/24 105 kg (231 lb 6.4 oz)               Review of Systems  Constitutional, HEENT, cardiovascular, pulmonary, gi and gu systems are negative, except as otherwise noted.      Objective    /88   Pulse 81   Temp 97.5  F (36.4  C) (Tympanic)   Resp 18   Ht 1.67 m (5' 5.75\")   Wt 111.9 kg (246 lb 9.6 oz)  "  SpO2 98%   BMI 40.11 kg/m    Body mass index is 40.11 kg/m .  Physical Exam   GENERAL: alert and no distress  NECK: no adenopathy, no asymmetry, masses, or scars  RESP: lungs clear to auscultation - no rales, rhonchi or wheezes  CV: regular rate and rhythm, normal S1 S2, no S3 or S4, no murmur, click or rub, no peripheral edema  MS: no gross musculoskeletal defects noted, no edema  PSYCH: mentation appears normal, affect normal/bright  Diabetic foot exam: normal DP and PT pulses, no trophic changes or ulcerative lesions, normal sensory exam, and normal monofilament exam            Signed Electronically by: DANA Amos CNP

## 2025-02-05 ENCOUNTER — MYC MEDICAL ADVICE (OUTPATIENT)
Dept: FAMILY MEDICINE | Facility: CLINIC | Age: 45
End: 2025-02-05
Payer: COMMERCIAL

## 2025-02-05 DIAGNOSIS — N94.6 DYSMENORRHEA: Primary | ICD-10-CM

## 2025-02-06 RX ORDER — NORETHINDRONE ACETATE AND ETHINYL ESTRADIOL .03; 1.5 MG/1; MG/1
1 TABLET ORAL DAILY
Qty: 112 TABLET | Refills: 4 | Status: SHIPPED | OUTPATIENT
Start: 2025-02-06

## 2025-03-01 ENCOUNTER — HEALTH MAINTENANCE LETTER (OUTPATIENT)
Age: 45
End: 2025-03-01

## 2025-03-13 ENCOUNTER — LAB (OUTPATIENT)
Dept: LAB | Facility: CLINIC | Age: 45
End: 2025-03-13
Payer: COMMERCIAL

## 2025-03-13 DIAGNOSIS — F90.2 ATTENTION DEFICIT HYPERACTIVITY DISORDER, COMBINED TYPE: Primary | ICD-10-CM

## 2025-03-13 LAB
AMPHETAMINES UR QL SCN: ABNORMAL
BARBITURATES UR QL SCN: ABNORMAL
BENZODIAZ UR QL SCN: ABNORMAL
BZE UR QL SCN: ABNORMAL
CANNABINOIDS UR QL SCN: ABNORMAL
FENTANYL UR QL: ABNORMAL
OPIATES UR QL SCN: ABNORMAL
PCP QUAL URINE (ROCHE): ABNORMAL

## 2025-03-29 ENCOUNTER — HEALTH MAINTENANCE LETTER (OUTPATIENT)
Age: 45
End: 2025-03-29

## 2025-03-30 ENCOUNTER — DOCUMENTATION ONLY (OUTPATIENT)
Dept: FAMILY MEDICINE | Facility: CLINIC | Age: 45
End: 2025-03-30
Payer: COMMERCIAL

## 2025-03-30 NOTE — PROGRESS NOTES
Lianna Sorto has an upcoming lab appointment:    Future Appointments   Date Time Provider Department Center   4/14/2025 10:30 AM [facility]   4/15/2025  8:40 AM Clau Dukes APRN CNP [facility]     Patient is scheduled for the following lab(s): A1C order was signed per protocol.  Are any other labs needed?      There is no order available. Please review and place either future orders or HMPO (Review of Health Maintenance Protocol Orders), as appropriate.    Health Maintenance Due   Topic    A1C      Daisy Byrnes

## 2025-04-01 DIAGNOSIS — E78.5 HYPERLIPIDEMIA LDL GOAL <100: ICD-10-CM

## 2025-04-01 DIAGNOSIS — E11.9 TYPE 2 DIABETES MELLITUS WITHOUT COMPLICATION, WITHOUT LONG-TERM CURRENT USE OF INSULIN (H): ICD-10-CM

## 2025-04-01 RX ORDER — ROSUVASTATIN CALCIUM 20 MG/1
20 TABLET, COATED ORAL DAILY
Qty: 90 TABLET | Refills: 2 | Status: SHIPPED | OUTPATIENT
Start: 2025-04-01

## 2025-04-02 DIAGNOSIS — E11.9 TYPE 2 DIABETES MELLITUS WITHOUT COMPLICATION, WITHOUT LONG-TERM CURRENT USE OF INSULIN (H): ICD-10-CM

## 2025-04-03 RX ORDER — TIRZEPATIDE 7.5 MG/.5ML
INJECTION, SOLUTION SUBCUTANEOUS
Qty: 2 ML | Refills: 0 | Status: SHIPPED | OUTPATIENT
Start: 2025-04-03

## 2025-04-03 NOTE — TELEPHONE ENCOUNTER
Requested Prescriptions   Pending Prescriptions Disp Refills    MOUNJARO 7.5 MG/0.5ML SOAJ auto-injector pen [Pharmacy Med Name: MOUNJARO 7.5MG/0.5ML SOAJ] 2 mL 0     Sig: INJECT 0.5 MLS (7.5 MG) UNDER THE SKIN EVERY 7 DAYS.       GLP-1 Agonists Protocol Failed - 4/3/2025 10:40 AM        Failed - HgbA1C in past 3 or 6 months     If HgbA1C is 8 or greater, it needs to be on file within the past 3 months.  If less than 8, must be on file within the past 6 months.     Recent Labs   Lab Test 12/16/24  0804   A1C 8.7*             Failed - Medication is active on med list and the sig matches. RN to manually verify dose and sig if red X/fail.     If the protocol passes (green check), you do not need to verify med dose and sig.    A prescription matches if they are the same clinical intention.    For Example: once daily and every morning are the same.    The protocol can not identify upper and lower case letters as matching and will fail.     For Example: Take 1 tablet (50 mg) by mouth daily     TAKE 1 TABLET (50 MG) BY MOUTH DAILY    For all fails (red x), verify dose and sig.    If the refill does match what is on file, the RN can still proceed to approve the refill request.       If they do not match, route to the appropriate provider.             Passed - Has GFR on file in past 12 months and most recent value is normal        Passed - Recent (6 mo) or future (90 days) visit within the authorizing provider's specialty     The patient must have completed an in-person or virtual visit within the past 6 months or has a future visit scheduled within the next 90 days with the authorizing provider s specialty.  Urgent care and e-visits do not quality as an office visit for this protocol.          Passed - Medication indicated for associated diagnosis     Medication is associated with one or more of the following diagnoses:     Type 2 diabetes mellitus           Passed - Patient is age 18 or older        Passed - No active  pregnancy on record        Passed - No positive pregnancy test in past 12 months          Julie Behrendt RN

## 2025-04-04 DIAGNOSIS — L73.2 HIDRADENITIS SUPPURATIVA: ICD-10-CM

## 2025-04-07 RX ORDER — CLINDAMYCIN PHOSPHATE 10 UG/ML
LOTION TOPICAL
Qty: 60 ML | Refills: 11 | Status: SHIPPED | OUTPATIENT
Start: 2025-04-07

## 2025-04-14 ENCOUNTER — LAB (OUTPATIENT)
Dept: LAB | Facility: CLINIC | Age: 45
End: 2025-04-14
Payer: COMMERCIAL

## 2025-04-14 DIAGNOSIS — E11.9 DIABETES MELLITUS, TYPE 2 (H): Primary | ICD-10-CM

## 2025-04-14 LAB
CHOLEST SERPL-MCNC: 207 MG/DL
EST. AVERAGE GLUCOSE BLD GHB EST-MCNC: 163 MG/DL
FASTING STATUS PATIENT QL REPORTED: YES
HBA1C MFR BLD: 7.3 % (ref 0–5.6)
HDLC SERPL-MCNC: 42 MG/DL
LDLC SERPL CALC-MCNC: 132 MG/DL
NONHDLC SERPL-MCNC: 165 MG/DL
TRIGL SERPL-MCNC: 163 MG/DL

## 2025-04-14 PROCEDURE — 83036 HEMOGLOBIN GLYCOSYLATED A1C: CPT

## 2025-04-14 PROCEDURE — 80061 LIPID PANEL: CPT

## 2025-04-14 PROCEDURE — 36415 COLL VENOUS BLD VENIPUNCTURE: CPT

## 2025-04-15 ENCOUNTER — OFFICE VISIT (OUTPATIENT)
Dept: FAMILY MEDICINE | Facility: CLINIC | Age: 45
End: 2025-04-15
Payer: COMMERCIAL

## 2025-04-15 VITALS
SYSTOLIC BLOOD PRESSURE: 120 MMHG | TEMPERATURE: 98.1 F | OXYGEN SATURATION: 100 % | WEIGHT: 225 LBS | BODY MASS INDEX: 36.16 KG/M2 | DIASTOLIC BLOOD PRESSURE: 84 MMHG | RESPIRATION RATE: 16 BRPM | HEIGHT: 66 IN | HEART RATE: 87 BPM

## 2025-04-15 DIAGNOSIS — Z12.31 VISIT FOR SCREENING MAMMOGRAM: ICD-10-CM

## 2025-04-15 DIAGNOSIS — E78.5 HYPERLIPIDEMIA LDL GOAL <100: ICD-10-CM

## 2025-04-15 DIAGNOSIS — E11.9 TYPE 2 DIABETES MELLITUS WITHOUT COMPLICATION, WITHOUT LONG-TERM CURRENT USE OF INSULIN (H): Primary | ICD-10-CM

## 2025-04-15 PROCEDURE — G2211 COMPLEX E/M VISIT ADD ON: HCPCS | Performed by: NURSE PRACTITIONER

## 2025-04-15 PROCEDURE — 3074F SYST BP LT 130 MM HG: CPT | Performed by: NURSE PRACTITIONER

## 2025-04-15 PROCEDURE — 3079F DIAST BP 80-89 MM HG: CPT | Performed by: NURSE PRACTITIONER

## 2025-04-15 PROCEDURE — 1125F AMNT PAIN NOTED PAIN PRSNT: CPT | Performed by: NURSE PRACTITIONER

## 2025-04-15 PROCEDURE — 99214 OFFICE O/P EST MOD 30 MIN: CPT | Performed by: NURSE PRACTITIONER

## 2025-04-15 RX ORDER — ATORVASTATIN CALCIUM 10 MG/1
10 TABLET, FILM COATED ORAL DAILY
Qty: 90 TABLET | Refills: 3 | Status: SHIPPED | OUTPATIENT
Start: 2025-04-15

## 2025-04-15 RX ORDER — PROPRANOLOL HYDROCHLORIDE 10 MG/1
10 TABLET ORAL
COMMUNITY
Start: 2025-03-24

## 2025-04-15 RX ORDER — TIRZEPATIDE 7.5 MG/.5ML
7.5 INJECTION, SOLUTION SUBCUTANEOUS WEEKLY
Qty: 2 ML | Refills: 5 | Status: SHIPPED | OUTPATIENT
Start: 2025-04-15

## 2025-04-15 RX ORDER — LISDEXAMFETAMINE DIMESYLATE 30 MG/1
30 CAPSULE ORAL
COMMUNITY
Start: 2025-03-24

## 2025-04-15 ASSESSMENT — PATIENT HEALTH QUESTIONNAIRE - PHQ9
10. IF YOU CHECKED OFF ANY PROBLEMS, HOW DIFFICULT HAVE THESE PROBLEMS MADE IT FOR YOU TO DO YOUR WORK, TAKE CARE OF THINGS AT HOME, OR GET ALONG WITH OTHER PEOPLE: SOMEWHAT DIFFICULT
SUM OF ALL RESPONSES TO PHQ QUESTIONS 1-9: 11
SUM OF ALL RESPONSES TO PHQ QUESTIONS 1-9: 11

## 2025-04-15 ASSESSMENT — PAIN SCALES - GENERAL: PAINLEVEL_OUTOF10: MILD PAIN (3)

## 2025-04-15 NOTE — PROGRESS NOTES
Assessment & Plan     Type 2 diabetes mellitus without complication, without long-term current use of insulin (H)  Improved and now well controlled.  Continue:  - Tirzepatide (MOUNJARO) 7.5 MG/0.5ML SOAJ auto-injector pen; Inject 0.5 mLs (7.5 mg) subcutaneously once a week.  Follow up in 6 months.    Hyperlipidemia LDL goal <100  Poorly controlled.  Did not tolerate rosuvastatin.  Recommend:  - atorvastatin (LIPITOR) 10 MG tablet; Take 1 tablet (10 mg) by mouth daily.    Visit for screening mammogram  - MA Screening Bilateral w/ Sam; Future      The risks, benefits and treatment options of prescribed medications or other treatments have been discussed with the patient. The patient verbalized their understanding and should call or follow up if no improvement or if they develop further problems.  DOMENIC Amos   Lianna is a 44 year old, presenting for the following health issues:  Diabetes and Medication Reconciliation (Stopped taking rosuvastatin due to side effect of frequent urination.   Doesn't take glipizide consistently based on blood sugars since starting mounjaro)        4/15/2025     8:28 AM   Additional Questions   Roomed by Scarlett ISBELL CMA   Accompanied by self         4/15/2025     8:28 AM   Patient Reported Additional Medications   Patient reports taking the following new medications apple cider vinegar gummies     History of Present Illness       Diabetes:   She presents for follow up of diabetes.  She is checking home blood glucose a few times a week.   She checks blood glucose before meals and before and after meals.  Blood glucose is never over 200 and never under 70. She is aware of hypoglycemia symptoms including dizziness, weakness, lethargy and blurred vision.    She has no concerns regarding her diabetes at this time.   She is not experiencing numbness or burning in feet, excessive thirst, blurry vision, weight changes or redness, sores or blisters on feet. The  "patient has not had a diabetic eye exam in the last 12 months.          She eats 0-1 servings of fruits and vegetables daily.She consumes 1 sweetened beverage(s) daily.She exercises with enough effort to increase her heart rate 9 or less minutes per day.  She exercises with enough effort to increase her heart rate 3 or less days per week.   She is taking medications regularly.      Doing well on the Mounjaro  Blood sugars are better  Weight is done  Medication controlling appetite and cravings.    BP Readings from Last 2 Encounters:   04/15/25 120/84   01/13/25 136/88     Hemoglobin A1C (%)   Date Value   04/14/2025 7.3 (H)   12/16/2024 8.7 (H)   10/30/2020 6.9 (H)   07/17/2020 6.7 (H)     LDL Cholesterol Calculated (mg/dL)   Date Value   04/14/2025 132 (H)   05/01/2024 141 (H)   10/30/2020 124 (H)   07/13/2012 115       Wt Readings from Last 4 Encounters:   04/15/25 102.1 kg (225 lb)   01/13/25 111.9 kg (246 lb 9.6 oz)   09/03/24 107.3 kg (236 lb 8 oz)   05/01/24 103.4 kg (228 lb)           Hyperlipidemia Follow-Up    Are you regularly taking any medication or supplement to lower your cholesterol?   No  Are you having muscle aches or other side effects that you think could be caused by your cholesterol lowering medication?  No  Stopped rosuvastatin due to frequent urination.          Review of Systems  Constitutional, HEENT, cardiovascular, pulmonary, GI, , musculoskeletal, neuro, skin, endocrine and psych systems are negative, except as otherwise noted.        Objective    /84 (BP Location: Right arm, Patient Position: Sitting, Cuff Size: Adult Large)   Pulse 87   Temp 98.1  F (36.7  C) (Tympanic)   Resp 16   Ht 1.676 m (5' 6\")   Wt 102.1 kg (225 lb)   LMP  (LMP Unknown)   SpO2 100%   BMI 36.32 kg/m    Body mass index is 36.32 kg/m .  Physical Exam   GENERAL: alert and no distress  NECK: no adenopathy, no asymmetry, masses, or scars  RESP: lungs clear to auscultation - no rales, rhonchi or " wheezes  CV: regular rate and rhythm, normal S1 S2, no S3 or S4, no murmur, click or rub, no peripheral edema  MS: no gross musculoskeletal defects noted, no edema    Results for orders placed or performed in visit on 04/14/25 (from the past 24 hours)   HEMOGLOBIN A1C   Result Value Ref Range    Estimated Average Glucose 163 (H) <117 mg/dL    Hemoglobin A1C 7.3 (H) 0.0 - 5.6 %   Lipid panel reflex to direct LDL Non-fasting   Result Value Ref Range    Cholesterol 207 (H) <200 mg/dL    Triglycerides 163 (H) <150 mg/dL    Direct Measure HDL 42 (L) >=50 mg/dL    LDL Cholesterol Calculated 132 (H) <100 mg/dL    Non HDL Cholesterol 165 (H) <130 mg/dL    Patient Fasting > 8hrs? Yes     Narrative    Cholesterol  Desirable: < 200 mg/dL  Borderline High: 200 - 239 mg/dL  High: >= 240 mg/dL    Triglycerides  Normal: < 150 mg/dL  Borderline High: 150 - 199 mg/dL  High: 200-499 mg/dL  Very High: >= 500 mg/dL    Direct Measure HDL  Female: >= 50 mg/dL   Male: >= 40 mg/dL    LDL Cholesterol  Desirable: < 100 mg/dL  Above Desirable: 100 - 129 mg/dL   Borderline High: 130 - 159 mg/dL   High:  160 - 189 mg/dL   Very High: >= 190 mg/dL    Non HDL Cholesterol  Desirable: < 130 mg/dL  Above Desirable: 130 - 159 mg/dL  Borderline High: 160 - 189 mg/dL  High: 190 - 219 mg/dL  Very High: >= 220 mg/dL           The longitudinal plan of care for the diagnosis(es)/condition(s) as documented were addressed during this visit. Due to the added complexity in care, I will continue to support Lianna in the subsequent management and with ongoing continuity of care.    Signed Electronically by: DANA Amos CNP

## 2025-04-15 NOTE — PATIENT INSTRUCTIONS
Stop the glipizide.    Continue Mounjaro at 7.5 mg weekly.    Start atorvastatin 10 mg daily.      Schedule diabetic eye exam.    Schedule mammogram.

## 2025-04-16 ENCOUNTER — PATIENT OUTREACH (OUTPATIENT)
Dept: CARE COORDINATION | Facility: CLINIC | Age: 45
End: 2025-04-16
Payer: COMMERCIAL

## 2025-07-28 ENCOUNTER — PATIENT OUTREACH (OUTPATIENT)
Dept: CARE COORDINATION | Facility: CLINIC | Age: 45
End: 2025-07-28
Payer: COMMERCIAL

## 2025-08-02 ENCOUNTER — HEALTH MAINTENANCE LETTER (OUTPATIENT)
Age: 45
End: 2025-08-02

## 2025-08-13 ENCOUNTER — MYC MEDICAL ADVICE (OUTPATIENT)
Dept: FAMILY MEDICINE | Facility: CLINIC | Age: 45
End: 2025-08-13
Payer: COMMERCIAL

## 2025-08-13 DIAGNOSIS — F41.1 GAD (GENERALIZED ANXIETY DISORDER): ICD-10-CM

## 2025-08-13 DIAGNOSIS — E11.65 TYPE 2 DIABETES MELLITUS WITH HYPERGLYCEMIA, WITHOUT LONG-TERM CURRENT USE OF INSULIN (H): Primary | ICD-10-CM

## 2025-08-14 RX ORDER — PROPRANOLOL HYDROCHLORIDE 10 MG/1
30 TABLET ORAL AT BEDTIME
Qty: 270 TABLET | Refills: 0 | Status: SHIPPED | OUTPATIENT
Start: 2025-08-14

## 2025-08-14 RX ORDER — PROPRANOLOL HCL 20 MG
20 TABLET ORAL EVERY MORNING
Qty: 90 TABLET | Refills: 0 | Status: SHIPPED | OUTPATIENT
Start: 2025-08-14

## (undated) DEVICE — SYSTEM LAPAROVUE VISIBILITY LAPVUE10

## (undated) DEVICE — Device

## (undated) DEVICE — ESU PENCIL W/COATED BLADE E2450H

## (undated) DEVICE — SUCTION MANIFOLD NEPTUNE 2 SYS 1 PORT 702-025-000

## (undated) DEVICE — SOL NACL 0.9% IRRIG 1000ML BOTTLE 07138-09

## (undated) DEVICE — GLOVE BIOGEL PI MICRO SZ 7.5 48575

## (undated) DEVICE — PREP CHLORAPREP 26ML TINTED ORANGE  260815

## (undated) DEVICE — DRAPE POUCH INSTRUMENT 3 POCKET 1018L

## (undated) DEVICE — SUCTION IRR STRYKERFLOW II W/TIP 250-070-520

## (undated) DEVICE — STOCKING SLEEVE COMPRESSION CALF MED

## (undated) DEVICE — SOL NACL 0.9% IRRIG 3000ML BAG 2B7477

## (undated) DEVICE — GLOVE BIOGEL PI MICRO INDICATOR UNDERGLOVE SZ 8.0 48980

## (undated) DEVICE — ESU LIGASURE LAPAROSCOPIC BLUNT TIP SEALER 5MMX37CM LF1837

## (undated) DEVICE — SU DERMABOND ADVANCED .7ML DNX12

## (undated) DEVICE — DECANTER VIAL 2006S

## (undated) DEVICE — ENDO TROCAR SLEEVE KII ADV FIXATION 05X100MM CFS02

## (undated) DEVICE — ENDO POUCH UNIVERSAL RETRIEVAL SYSTEM INZII 5MM CD003

## (undated) DEVICE — SU PDS II 0 ENDOLOOP EZ10G

## (undated) DEVICE — ESU HOLSTER PLASTIC DISP E2400

## (undated) DEVICE — ENDO TROCAR FIRST ENTRY KII FIOS ADV FIX 05X100MM CFF03

## (undated) DEVICE — GOWN XLG DISP 9545

## (undated) DEVICE — SU MONOCRYL 4-0 PS-2 18" UND Y496G

## (undated) DEVICE — SOL WATER IRRIG 1000ML BOTTLE 07139-09

## (undated) RX ORDER — PROPOFOL 10 MG/ML
INJECTION, EMULSION INTRAVENOUS
Status: DISPENSED
Start: 2023-09-29

## (undated) RX ORDER — BUPIVACAINE HYDROCHLORIDE 5 MG/ML
INJECTION, SOLUTION EPIDURAL; INTRACAUDAL
Status: DISPENSED
Start: 2023-09-29

## (undated) RX ORDER — LIDOCAINE HYDROCHLORIDE AND EPINEPHRINE 10; 10 MG/ML; UG/ML
INJECTION, SOLUTION INFILTRATION; PERINEURAL
Status: DISPENSED
Start: 2023-09-29

## (undated) RX ORDER — FENTANYL CITRATE 50 UG/ML
INJECTION, SOLUTION INTRAMUSCULAR; INTRAVENOUS
Status: DISPENSED
Start: 2023-09-29

## (undated) RX ORDER — FENTANYL CITRATE-0.9 % NACL/PF 10 MCG/ML
PLASTIC BAG, INJECTION (ML) INTRAVENOUS
Status: DISPENSED
Start: 2023-09-29

## (undated) RX ORDER — KETOROLAC TROMETHAMINE 30 MG/ML
INJECTION, SOLUTION INTRAMUSCULAR; INTRAVENOUS
Status: DISPENSED
Start: 2023-09-29